# Patient Record
Sex: FEMALE | Race: WHITE | NOT HISPANIC OR LATINO | URBAN - METROPOLITAN AREA
[De-identification: names, ages, dates, MRNs, and addresses within clinical notes are randomized per-mention and may not be internally consistent; named-entity substitution may affect disease eponyms.]

---

## 2023-06-23 ENCOUNTER — INPATIENT (INPATIENT)
Facility: HOSPITAL | Age: 83
LOS: 15 days | Discharge: NURSING, REHAB OR LONG-TERM CARE | DRG: 535 | End: 2023-07-09
Attending: INTERNAL MEDICINE | Admitting: INTERNAL MEDICINE
Payer: MEDICARE

## 2023-06-23 VITALS
SYSTOLIC BLOOD PRESSURE: 145 MMHG | DIASTOLIC BLOOD PRESSURE: 95 MMHG | TEMPERATURE: 98 F | WEIGHT: 104.94 LBS | RESPIRATION RATE: 17 BRPM | OXYGEN SATURATION: 98 % | HEART RATE: 89 BPM

## 2023-06-23 PROCEDURE — 99285 EMERGENCY DEPT VISIT HI MDM: CPT | Mod: 25

## 2023-06-23 PROCEDURE — 29125 APPL SHORT ARM SPLINT STATIC: CPT

## 2023-06-23 PROCEDURE — 72192 CT PELVIS W/O DYE: CPT | Mod: 26,MA

## 2023-06-23 PROCEDURE — 73090 X-RAY EXAM OF FOREARM: CPT | Mod: 26,LT

## 2023-06-23 PROCEDURE — 73110 X-RAY EXAM OF WRIST: CPT | Mod: 26,LT

## 2023-06-23 RX ORDER — ACETAMINOPHEN 500 MG
650 TABLET ORAL ONCE
Refills: 0 | Status: COMPLETED | OUTPATIENT
Start: 2023-06-23 | End: 2023-06-23

## 2023-06-23 RX ADMIN — Medication 650 MILLIGRAM(S): at 22:57

## 2023-06-23 RX ADMIN — Medication 650 MILLIGRAM(S): at 20:57

## 2023-06-23 NOTE — ED ADULT NURSE NOTE - NSFALLHARMRISKINTERV_ED_ALL_ED
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
## 2023-06-24 DIAGNOSIS — E78.00 PURE HYPERCHOLESTEROLEMIA, UNSPECIFIED: ICD-10-CM

## 2023-06-24 DIAGNOSIS — S32.592S OTHER SPECIFIED FRACTURE OF LEFT PUBIS, SEQUELA: ICD-10-CM

## 2023-06-24 DIAGNOSIS — S32.599A OTHER SPECIFIED FRACTURE OF UNSPECIFIED PUBIS, INITIAL ENCOUNTER FOR CLOSED FRACTURE: ICD-10-CM

## 2023-06-24 DIAGNOSIS — I10 ESSENTIAL (PRIMARY) HYPERTENSION: ICD-10-CM

## 2023-06-24 DIAGNOSIS — Z29.9 ENCOUNTER FOR PROPHYLACTIC MEASURES, UNSPECIFIED: ICD-10-CM

## 2023-06-24 DIAGNOSIS — E03.9 HYPOTHYROIDISM, UNSPECIFIED: ICD-10-CM

## 2023-06-24 LAB
ALBUMIN SERPL ELPH-MCNC: 3.1 G/DL — LOW (ref 3.5–5)
ALP SERPL-CCNC: 61 U/L — SIGNIFICANT CHANGE UP (ref 40–120)
ALT FLD-CCNC: 19 U/L DA — SIGNIFICANT CHANGE UP (ref 10–60)
ANION GAP SERPL CALC-SCNC: 8 MMOL/L — SIGNIFICANT CHANGE UP (ref 5–17)
ANION GAP SERPL CALC-SCNC: 8 MMOL/L — SIGNIFICANT CHANGE UP (ref 5–17)
APPEARANCE UR: ABNORMAL
AST SERPL-CCNC: 15 U/L — SIGNIFICANT CHANGE UP (ref 10–40)
BACTERIA # UR AUTO: ABNORMAL /HPF
BASE EXCESS BLDA CALC-SCNC: 4 MMOL/L — HIGH (ref -2–3)
BASOPHILS # BLD AUTO: 0.05 K/UL — SIGNIFICANT CHANGE UP (ref 0–0.2)
BASOPHILS NFR BLD AUTO: 0.4 % — SIGNIFICANT CHANGE UP (ref 0–2)
BILIRUB SERPL-MCNC: 0.5 MG/DL — SIGNIFICANT CHANGE UP (ref 0.2–1.2)
BILIRUB UR-MCNC: NEGATIVE — SIGNIFICANT CHANGE UP
BUN SERPL-MCNC: 20 MG/DL — HIGH (ref 7–18)
BUN SERPL-MCNC: 20 MG/DL — HIGH (ref 7–18)
CALCIUM SERPL-MCNC: 8.6 MG/DL — SIGNIFICANT CHANGE UP (ref 8.4–10.5)
CALCIUM SERPL-MCNC: 8.8 MG/DL — SIGNIFICANT CHANGE UP (ref 8.4–10.5)
CHLORIDE SERPL-SCNC: 102 MMOL/L — SIGNIFICANT CHANGE UP (ref 96–108)
CHLORIDE SERPL-SCNC: 107 MMOL/L — SIGNIFICANT CHANGE UP (ref 96–108)
CK SERPL-CCNC: 71 U/L — SIGNIFICANT CHANGE UP (ref 21–215)
CO2 SERPL-SCNC: 26 MMOL/L — SIGNIFICANT CHANGE UP (ref 22–31)
CO2 SERPL-SCNC: 28 MMOL/L — SIGNIFICANT CHANGE UP (ref 22–31)
COLOR SPEC: SIGNIFICANT CHANGE UP
COMMENT - URINE: SIGNIFICANT CHANGE UP
CREAT SERPL-MCNC: 0.86 MG/DL — SIGNIFICANT CHANGE UP (ref 0.5–1.3)
CREAT SERPL-MCNC: 0.96 MG/DL — SIGNIFICANT CHANGE UP (ref 0.5–1.3)
DIFF PNL FLD: ABNORMAL
EGFR: 59 ML/MIN/1.73M2 — LOW
EGFR: 67 ML/MIN/1.73M2 — SIGNIFICANT CHANGE UP
EOSINOPHIL # BLD AUTO: 0 K/UL — SIGNIFICANT CHANGE UP (ref 0–0.5)
EOSINOPHIL NFR BLD AUTO: 0 % — SIGNIFICANT CHANGE UP (ref 0–6)
EPI CELLS # UR: SIGNIFICANT CHANGE UP /HPF
GLUCOSE SERPL-MCNC: 125 MG/DL — HIGH (ref 70–99)
GLUCOSE SERPL-MCNC: 133 MG/DL — HIGH (ref 70–99)
GLUCOSE UR QL: NEGATIVE — SIGNIFICANT CHANGE UP
HCO3 BLDA-SCNC: 29 MMOL/L — HIGH (ref 21–28)
HCT VFR BLD CALC: 28.7 % — LOW (ref 34.5–45)
HCT VFR BLD CALC: 31.7 % — LOW (ref 34.5–45)
HGB BLD-MCNC: 10.3 G/DL — LOW (ref 11.5–15.5)
HGB BLD-MCNC: 9.4 G/DL — LOW (ref 11.5–15.5)
HOROWITZ INDEX BLDA+IHG-RTO: 21 — SIGNIFICANT CHANGE UP
IMM GRANULOCYTES NFR BLD AUTO: 0.4 % — SIGNIFICANT CHANGE UP (ref 0–0.9)
KETONES UR-MCNC: NEGATIVE — SIGNIFICANT CHANGE UP
LEUKOCYTE ESTERASE UR-ACNC: ABNORMAL
LYMPHOCYTES # BLD AUTO: 0.48 K/UL — LOW (ref 1–3.3)
LYMPHOCYTES # BLD AUTO: 4 % — LOW (ref 13–44)
MAGNESIUM SERPL-MCNC: 1.9 MG/DL — SIGNIFICANT CHANGE UP (ref 1.6–2.6)
MCHC RBC-ENTMCNC: 29.4 PG — SIGNIFICANT CHANGE UP (ref 27–34)
MCHC RBC-ENTMCNC: 29.5 PG — SIGNIFICANT CHANGE UP (ref 27–34)
MCHC RBC-ENTMCNC: 32.5 GM/DL — SIGNIFICANT CHANGE UP (ref 32–36)
MCHC RBC-ENTMCNC: 32.8 GM/DL — SIGNIFICANT CHANGE UP (ref 32–36)
MCV RBC AUTO: 90 FL — SIGNIFICANT CHANGE UP (ref 80–100)
MCV RBC AUTO: 90.6 FL — SIGNIFICANT CHANGE UP (ref 80–100)
MONOCYTES # BLD AUTO: 0.82 K/UL — SIGNIFICANT CHANGE UP (ref 0–0.9)
MONOCYTES NFR BLD AUTO: 6.8 % — SIGNIFICANT CHANGE UP (ref 2–14)
NEUTROPHILS # BLD AUTO: 10.74 K/UL — HIGH (ref 1.8–7.4)
NEUTROPHILS NFR BLD AUTO: 88.4 % — HIGH (ref 43–77)
NITRITE UR-MCNC: POSITIVE
NRBC # BLD: 0 /100 WBCS — SIGNIFICANT CHANGE UP (ref 0–0)
NRBC # BLD: 0 /100 WBCS — SIGNIFICANT CHANGE UP (ref 0–0)
PCO2 BLDA: 45 MMHG — HIGH (ref 32–35)
PH BLDA: 7.42 — SIGNIFICANT CHANGE UP (ref 7.35–7.45)
PH UR: 7 — SIGNIFICANT CHANGE UP (ref 5–8)
PLATELET # BLD AUTO: 147 K/UL — LOW (ref 150–400)
PLATELET # BLD AUTO: 264 K/UL — SIGNIFICANT CHANGE UP (ref 150–400)
PO2 BLDA: 54 MMHG — LOW (ref 83–108)
POTASSIUM SERPL-MCNC: 3.8 MMOL/L — SIGNIFICANT CHANGE UP (ref 3.5–5.3)
POTASSIUM SERPL-MCNC: 4.3 MMOL/L — SIGNIFICANT CHANGE UP (ref 3.5–5.3)
POTASSIUM SERPL-SCNC: 3.8 MMOL/L — SIGNIFICANT CHANGE UP (ref 3.5–5.3)
POTASSIUM SERPL-SCNC: 4.3 MMOL/L — SIGNIFICANT CHANGE UP (ref 3.5–5.3)
PROT SERPL-MCNC: 6 G/DL — SIGNIFICANT CHANGE UP (ref 6–8.3)
PROT UR-MCNC: 15 MG/DL
RBC # BLD: 3.19 M/UL — LOW (ref 3.8–5.2)
RBC # BLD: 3.5 M/UL — LOW (ref 3.8–5.2)
RBC # FLD: 13.2 % — SIGNIFICANT CHANGE UP (ref 10.3–14.5)
RBC # FLD: 13.2 % — SIGNIFICANT CHANGE UP (ref 10.3–14.5)
RBC CASTS # UR COMP ASSIST: SIGNIFICANT CHANGE UP /HPF (ref 0–2)
SAO2 % BLDA: 88 % — SIGNIFICANT CHANGE UP
SODIUM SERPL-SCNC: 138 MMOL/L — SIGNIFICANT CHANGE UP (ref 135–145)
SODIUM SERPL-SCNC: 141 MMOL/L — SIGNIFICANT CHANGE UP (ref 135–145)
SP GR SPEC: 1 — LOW (ref 1.01–1.02)
UROBILINOGEN FLD QL: NEGATIVE — SIGNIFICANT CHANGE UP
WBC # BLD: 12.14 K/UL — HIGH (ref 3.8–10.5)
WBC # BLD: 12.48 K/UL — HIGH (ref 3.8–10.5)
WBC # FLD AUTO: 12.14 K/UL — HIGH (ref 3.8–10.5)
WBC # FLD AUTO: 12.48 K/UL — HIGH (ref 3.8–10.5)
WBC UR QL: SIGNIFICANT CHANGE UP /HPF (ref 0–5)

## 2023-06-24 PROCEDURE — 93010 ELECTROCARDIOGRAM REPORT: CPT

## 2023-06-24 PROCEDURE — 70450 CT HEAD/BRAIN W/O DYE: CPT | Mod: 26,MA

## 2023-06-24 PROCEDURE — 71250 CT THORAX DX C-: CPT | Mod: 26,MA

## 2023-06-24 RX ORDER — LEVOTHYROXINE SODIUM 125 MCG
50 TABLET ORAL
Refills: 0 | Status: DISCONTINUED | OUTPATIENT
Start: 2023-06-24 | End: 2023-07-09

## 2023-06-24 RX ORDER — SENNA PLUS 8.6 MG/1
2 TABLET ORAL AT BEDTIME
Refills: 0 | Status: DISCONTINUED | OUTPATIENT
Start: 2023-06-24 | End: 2023-07-09

## 2023-06-24 RX ORDER — ONDANSETRON 8 MG/1
4 TABLET, FILM COATED ORAL EVERY 8 HOURS
Refills: 0 | Status: DISCONTINUED | OUTPATIENT
Start: 2023-06-24 | End: 2023-07-09

## 2023-06-24 RX ORDER — ACETAMINOPHEN 500 MG
650 TABLET ORAL EVERY 6 HOURS
Refills: 0 | Status: DISCONTINUED | OUTPATIENT
Start: 2023-06-24 | End: 2023-06-24

## 2023-06-24 RX ORDER — AMLODIPINE BESYLATE 2.5 MG/1
10 TABLET ORAL DAILY
Refills: 0 | Status: DISCONTINUED | OUTPATIENT
Start: 2023-06-24 | End: 2023-07-09

## 2023-06-24 RX ORDER — LEVOTHYROXINE SODIUM 125 MCG
100 TABLET ORAL
Refills: 0 | Status: DISCONTINUED | OUTPATIENT
Start: 2023-06-24 | End: 2023-07-09

## 2023-06-24 RX ORDER — ACETAMINOPHEN 500 MG
650 TABLET ORAL EVERY 8 HOURS
Refills: 0 | Status: COMPLETED | OUTPATIENT
Start: 2023-06-24 | End: 2023-06-26

## 2023-06-24 RX ORDER — POLYETHYLENE GLYCOL 3350 17 G/17G
17 POWDER, FOR SOLUTION ORAL DAILY
Refills: 0 | Status: DISCONTINUED | OUTPATIENT
Start: 2023-06-24 | End: 2023-07-09

## 2023-06-24 RX ORDER — ENOXAPARIN SODIUM 100 MG/ML
40 INJECTION SUBCUTANEOUS EVERY 24 HOURS
Refills: 0 | Status: DISCONTINUED | OUTPATIENT
Start: 2023-06-24 | End: 2023-07-04

## 2023-06-24 RX ORDER — LIDOCAINE 4 G/100G
1 CREAM TOPICAL DAILY
Refills: 0 | Status: DISCONTINUED | OUTPATIENT
Start: 2023-06-24 | End: 2023-07-09

## 2023-06-24 RX ORDER — LANOLIN ALCOHOL/MO/W.PET/CERES
3 CREAM (GRAM) TOPICAL AT BEDTIME
Refills: 0 | Status: DISCONTINUED | OUTPATIENT
Start: 2023-06-24 | End: 2023-07-09

## 2023-06-24 RX ORDER — SIMVASTATIN 20 MG/1
10 TABLET, FILM COATED ORAL AT BEDTIME
Refills: 0 | Status: DISCONTINUED | OUTPATIENT
Start: 2023-06-24 | End: 2023-07-09

## 2023-06-24 RX ADMIN — Medication 10 MILLIGRAM(S): at 06:31

## 2023-06-24 RX ADMIN — AMLODIPINE BESYLATE 10 MILLIGRAM(S): 2.5 TABLET ORAL at 06:31

## 2023-06-24 RX ADMIN — Medication 650 MILLIGRAM(S): at 14:18

## 2023-06-24 RX ADMIN — Medication 650 MILLIGRAM(S): at 22:19

## 2023-06-24 RX ADMIN — ENOXAPARIN SODIUM 40 MILLIGRAM(S): 100 INJECTION SUBCUTANEOUS at 06:34

## 2023-06-24 RX ADMIN — Medication 650 MILLIGRAM(S): at 15:15

## 2023-06-24 RX ADMIN — Medication 50 MICROGRAM(S): at 06:31

## 2023-06-24 RX ADMIN — Medication 3 MILLIGRAM(S): at 21:50

## 2023-06-24 RX ADMIN — Medication 650 MILLIGRAM(S): at 21:49

## 2023-06-24 RX ADMIN — SENNA PLUS 2 TABLET(S): 8.6 TABLET ORAL at 21:50

## 2023-06-24 RX ADMIN — SIMVASTATIN 10 MILLIGRAM(S): 20 TABLET, FILM COATED ORAL at 21:50

## 2023-06-24 NOTE — CONSULT NOTE ADULT - ASSESSMENT
Search Terms: Justa Davalos, 1940 Search Date: 06/24/2023 16:22:53 PM    The Drug Utilization Report below displays all of the controlled substance prescriptions, if any, that your patient has filled in the last twelve months. The information displayed on this report is compiled from pharmacy submissions to the Department, and accurately reflects the information as submitted by the pharmacies.    This report was requested by: Frances Gonzales | Reference #: [id number]    There are no results for the search terms that you entered.

## 2023-06-24 NOTE — H&P ADULT - NSHPSOCIALHISTORY_GEN_ALL_CORE
Pt lives in assisted. Ambulates with cane at baseline. Denies hx of smoking, EtOH or recreational drug use.

## 2023-06-24 NOTE — ED PROVIDER NOTE - CARE PLAN
1 Principal Discharge DX:	Pubic ramus fracture   Principal Discharge DX:	Pubic ramus fracture  Secondary Diagnosis:	Hypoxemia
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
## 2023-06-24 NOTE — ED PROVIDER NOTE - PHYSICAL EXAMINATION
Lt wrist-volar med. aspect with hematoma, tenderness to palp., pulses/sensory intact  Lt groin- tenderness to palp.,   LLE-no shortening, ext rotated.

## 2023-06-24 NOTE — CHART NOTE - NSCHARTNOTEFT_GEN_A_CORE
Patient is a 82y old  Female who presents with a chief complaint of pubic rami fracture (24 Jun 2023 05:29)    Vital Signs Last 24 Hrs  T(F): 99.9 (24 Jun 2023 16:02), Max: 100.1 (24 Jun 2023 13:58)  HR: 105 (24 Jun 2023 16:02) (85 - 105)  BP: 114/67 (24 Jun 2023 16:02) (114/67 - 145/95)  RR: 18 (24 Jun 2023 16:02) (17 - 20)  SpO2: 98% (24 Jun 2023 16:02) (90% - 100%)    Parameters below as of 24 Jun 2023 16:02  Patient On (Oxygen Delivery Method): nasal cannula O2 Flow (L/min): 2    PAST MEDICAL & SURGICAL HISTORY:  Hypercholesterolemia, HTN (hypertension), Hypothyroidism, Osteoarthritis, Anemia    LABS:                     10.3   12.48 )-----------( 264      ( 24 Jun 2023 11:00 )             31.7     06-24    138  |  102  |  20<H>  ----------------------------<  125<H>  3.8   |  28  |  0.96    Ca    8.8      24 Jun 2023 11:00  Mg     1.9     06-24    TPro  6.0  /  Alb  3.1<L>  /  TBili  0.5  /  DBili  x   /  AST  15  /  ALT  19  /  AlkPhos  61  06-24    ASSESSMENT AND PLAN:  Patient is a 82y old  Female who presents with a chief complaint of pubic rami fracture s/p mechanical fall    Patient seen and the bedside, awake and alert, denies any pain except left hand in splint - placed by ED thumb spica  ACE wrap adjusted, pt states is better   pending ortho consult   will repeat head CT scan for stability    Care Collaborated Discussed with Consultants/Other Providers [x] YES  [ ] NO

## 2023-06-24 NOTE — ED PROVIDER NOTE - EYES, MLM
Clear bilaterally, pupils equal, round and reactive to light. EOM, Lt eye does not cross lat midline
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
## 2023-06-24 NOTE — ED PROVIDER NOTE - PROGRESS NOTE DETAILS
pt with superior ramus fracture, inability to ambulate.    Also noted O2 sat is low- 89-92%, will get ABG.  Pt denies any SOB pt with hypoxemia on ABG, will get CT chest. pt with superior ramus fracture, inability to ambulate.    Also noted O2 sat is low- 89-92%, will get ABG.  Pt denies any SOB, or any rib/chest pain pt with hypoxemia on ABG, will get CT chest to r/o PTX. S.O. to Dr. DEMAR Lopez to f/u with CT chest, & to admit pt. Lopez: ct chest neg for ptx. admit for PT

## 2023-06-24 NOTE — ED PROVIDER NOTE - NSICDXPASTMEDICALHX_GEN_ALL_CORE_FT
PAST MEDICAL HISTORY:  Anemia     HTN (hypertension)     Hypercholesterolemia     Hypothyroidism     Osteoarthritis

## 2023-06-24 NOTE — PATIENT PROFILE ADULT - FALL HARM RISK - HARM RISK INTERVENTIONS
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
## 2023-06-24 NOTE — H&P ADULT - NSHPPHYSICALEXAM_GEN_ALL_CORE
T(C): 36.6 (06-24-23 @ 05:09), Max: 36.7 (06-24-23 @ 01:02)  HR: 86 (06-24-23 @ 05:09) (85 - 89)  BP: 135/69 (06-24-23 @ 05:09) (120/80 - 145/95)  RR: 17 (06-24-23 @ 05:09) (17 - 18)  SpO2: 99% (06-24-23 @ 05:09) (92% - 100%)    GENERAL: patient appears well, NAD, pleasant  HEAD: normocephalic, atraumatic  EYES: sclera clear, no exudates  ENMT: oropharynx clear without erythema, no exudates, moist mucous membranes  NECK: supple, soft, no thyromegaly noted  LUNGS: clear to auscultation bilaterally, no wheezing, rhonchi or rales appreciated  HEART: S1/S2, regular rate and rhythm, no murmurs noted, no lower extremity edema  GASTROINTESTINAL: abdomen is soft, nondistended, nontender, normoactive bowel sounds, no palpable masses  INTEGUMENT: good skin turgor, no lesions noted  MUSCULOSKELETAL: no clubbing or cyanosis, +L wrist splinted  NEUROLOGIC: AAO x3, CNII-XII intact, no obvious sensorimotor deficits noted
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
## 2023-06-24 NOTE — H&P ADULT - PROBLEM SELECTOR PLAN 1
CT: Comminuted acute left superior ramus fracture. No hip fracture or dislocation  c/w pain control  f/u PT

## 2023-06-25 LAB
ANION GAP SERPL CALC-SCNC: 8 MMOL/L — SIGNIFICANT CHANGE UP (ref 5–17)
BUN SERPL-MCNC: 18 MG/DL — SIGNIFICANT CHANGE UP (ref 7–18)
CALCIUM SERPL-MCNC: 8.8 MG/DL — SIGNIFICANT CHANGE UP (ref 8.4–10.5)
CHLORIDE SERPL-SCNC: 100 MMOL/L — SIGNIFICANT CHANGE UP (ref 96–108)
CO2 SERPL-SCNC: 29 MMOL/L — SIGNIFICANT CHANGE UP (ref 22–31)
CREAT SERPL-MCNC: 0.74 MG/DL — SIGNIFICANT CHANGE UP (ref 0.5–1.3)
EGFR: 81 ML/MIN/1.73M2 — SIGNIFICANT CHANGE UP
GLUCOSE SERPL-MCNC: 109 MG/DL — HIGH (ref 70–99)
HCT VFR BLD CALC: 29.1 % — LOW (ref 34.5–45)
HGB BLD-MCNC: 9.6 G/DL — LOW (ref 11.5–15.5)
MCHC RBC-ENTMCNC: 29.4 PG — SIGNIFICANT CHANGE UP (ref 27–34)
MCHC RBC-ENTMCNC: 33 GM/DL — SIGNIFICANT CHANGE UP (ref 32–36)
MCV RBC AUTO: 89.3 FL — SIGNIFICANT CHANGE UP (ref 80–100)
NRBC # BLD: 0 /100 WBCS — SIGNIFICANT CHANGE UP (ref 0–0)
PLATELET # BLD AUTO: 214 K/UL — SIGNIFICANT CHANGE UP (ref 150–400)
POTASSIUM SERPL-MCNC: 3.5 MMOL/L — SIGNIFICANT CHANGE UP (ref 3.5–5.3)
POTASSIUM SERPL-SCNC: 3.5 MMOL/L — SIGNIFICANT CHANGE UP (ref 3.5–5.3)
RBC # BLD: 3.26 M/UL — LOW (ref 3.8–5.2)
RBC # FLD: 13.3 % — SIGNIFICANT CHANGE UP (ref 10.3–14.5)
SODIUM SERPL-SCNC: 137 MMOL/L — SIGNIFICANT CHANGE UP (ref 135–145)
WBC # BLD: 10.49 K/UL — SIGNIFICANT CHANGE UP (ref 3.8–10.5)
WBC # FLD AUTO: 10.49 K/UL — SIGNIFICANT CHANGE UP (ref 3.8–10.5)

## 2023-06-25 PROCEDURE — 70450 CT HEAD/BRAIN W/O DYE: CPT | Mod: 26

## 2023-06-25 RX ADMIN — ENOXAPARIN SODIUM 40 MILLIGRAM(S): 100 INJECTION SUBCUTANEOUS at 06:03

## 2023-06-25 RX ADMIN — POLYETHYLENE GLYCOL 3350 17 GRAM(S): 17 POWDER, FOR SOLUTION ORAL at 12:59

## 2023-06-25 RX ADMIN — Medication 650 MILLIGRAM(S): at 06:33

## 2023-06-25 RX ADMIN — SIMVASTATIN 10 MILLIGRAM(S): 20 TABLET, FILM COATED ORAL at 22:22

## 2023-06-25 RX ADMIN — Medication 650 MILLIGRAM(S): at 14:54

## 2023-06-25 RX ADMIN — Medication 10 MILLIGRAM(S): at 06:03

## 2023-06-25 RX ADMIN — SENNA PLUS 2 TABLET(S): 8.6 TABLET ORAL at 22:22

## 2023-06-25 RX ADMIN — Medication 100 MICROGRAM(S): at 06:03

## 2023-06-25 RX ADMIN — Medication 650 MILLIGRAM(S): at 14:11

## 2023-06-25 RX ADMIN — Medication 650 MILLIGRAM(S): at 22:22

## 2023-06-25 RX ADMIN — AMLODIPINE BESYLATE 10 MILLIGRAM(S): 2.5 TABLET ORAL at 06:03

## 2023-06-25 RX ADMIN — LIDOCAINE 1 PATCH: 4 CREAM TOPICAL at 12:58

## 2023-06-25 RX ADMIN — Medication 650 MILLIGRAM(S): at 22:57

## 2023-06-25 RX ADMIN — Medication 650 MILLIGRAM(S): at 06:03

## 2023-06-25 NOTE — DISCHARGE NOTE PROVIDER - NPI NUMBER (FOR SYSADMIN USE ONLY) :
[[id number]] [[id number]] [[id number]],[[id number]]

## 2023-06-25 NOTE — DISCHARGE NOTE PROVIDER - HOSPITAL COURSE
82 year old female, from [facility], with PMHx HTN, HLD, hypothyroidism presented to ED post mechanical fall and complain of left arm and left leg pain. Patient  stated that she was walking out of the medicine room in the facility when she was accidentally struck by a serving cart. Patient stated that she was not sure if she hit her head but denied LOC. Denied any other complaint including fever/chills, headache, dizziness, chest pain, palpitations cough, SOB, n/v/d/c, dysuria or leg swelling  Patient underwent CT imaging and was found with pubic ramus fracture and left hand sprain. Ortho was consulted with no surgical intervention recommended.   PT recommended SARAN        Discharge planning as per multidisciplinary team 82 year old female, from [facility], with PMHx HTN, HLD, hypothyroidism presents with L arm and leg pain. Patient underwent CT imaging and was found with pubic ramus fracture and left hand sprain. Ortho was consulted with no surgical intervention recommended. Pt states that she was walking out of the medicine room in the facility when she was accidentally struck by a serving cart. Found to have cystic lesion on CTH.   There was concern for possible maliignany and patient had a CT scan that showed cystic lesion on the pancrease and inflammation in the colon. MRI of the abdomen showed XXXXXX. Neurology and Hemo Onc was consulted and recommeneded XXXXXX     82 year old female, from [facility], with PMHx HTN, HLD, hypothyroidism, cervical cancer presents with L arm and leg pain. Patient underwent CT imaging and was found with pubic ramus fracture and left hand sprain. Ortho was consulted with no surgical intervention recommended. Pt states that she was walking out of the medicine room in the facility when she was accidentally struck by a serving cart.     * Ortho consulted: conservative management with pain medications and physical therapy. Pt didnot require any opioids in-patient. Orthopedic surgeon, Dr. Galicia at:  [phone] OP follow up.   *CTH: Found to have cystic lesion on CTH. 2.6 x 1.9 cm hypodensity focus in the anterior right temporal lobe and a 1.2 x 1 cm hypoattenuating focus in the lateral aspect of the left lentiform nucleus versus left subinsular   region are suspicious for small acute or subacute infarcts.  MRI brain showed Similar-appearing 9 x 8 mm T1 and FLAIR hypointense, T2 hyperintense, well-defined lesion in the right anterior temporal lobe with similar surrounding vasogenic edema. The lesion does not enhance, nor does it demonstrate restricted diffusion. Neuro consulted likely metastasize lesion , unknown primary. Follow up outpatient with neuro oncologist for further care.     * CT scan that showed cystic lesion on the pancrease and inflammation in the colon. MRI of the abdomen showed 4.0 cm cyst seen on prior CT is likely arising from the left kidney rather than the pancreas. There is a 1.3 cm cyst in the inferior pancreatic body without concerning features. 7 mm segmental dilatation of the main pancreatic duct in the distal body and tail may represent sequela of chronic pancreatitis or main duct  IPMN. GI and Hemo Onc consulted. Left kidney cyst likely benign. For pancreatic cyst as per GI outpatient follow up but features are not neoplastic.     *Anemia : Iron saturation 12%, iron total 33. Ferritin 157. Vitamin 298. elevated homocysteine. Will be discharged on vit  B12 1000mcg once daily. Hgb remain stable in-pt. Hgb in 9s and on discharge was in 8s . No s.s of active bleeding. Pt had EGD/[place] 7/7: EGD showed The esophagus was very inflamed and ulcerated throughout. Yeast was present as well. 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 Also external impingement of distal esophagus was apparent. Multiple biopsies were taken, A large 10-11 cm Hiatal Hernia was present. OP follow up for biopsy result. PPI BID , Carafate 1gm qid and Iron tabs BID. Nystatin swish and swallow .  EARLENE showed weak IgG kappa band, likely MGUS, monitor for now outpt f/u after d/c . thrombocytosis -- acute, reactive, monitor for now    Colonoscopy showed Irregular mucosa in rectum possibly radiation injury biopsied. Polyp (5 mm) in the descending colon. OP follow up with GI.    PT consulted recommended SARAN.Pt is medically stabilized and ready to be discharged home. Case discussed with attending.        82 year old female, from [facility], with PMHx HTN, HLD, hypothyroidism, cervical cancer presents with L arm and leg pain. Patient underwent CT imaging and was found with pubic ramus fracture and left hand sprain. Ortho was consulted with no surgical intervention recommended. Pt states that she was walking out of the medicine room in the facility when she was accidentally struck by a serving cart.     * Ortho consulted: conservative management with pain medications and physical therapy. Pt didnot require any opioids in-patient. Orthopedic surgeon, Dr. Galicia at:  [phone] OP follow up.   *CTH: Found to have cystic lesion on CTH. 2.6 x 1.9 cm hypodensity focus in the anterior right temporal lobe and a 1.2 x 1 cm hypoattenuating focus in the lateral aspect of the left lentiform nucleus versus left subinsular   region are suspicious for small acute or subacute infarcts.  MRI brain showed Similar-appearing 9 x 8 mm T1 and FLAIR hypointense, T2 hyperintense, well-defined lesion in the right anterior temporal lobe with similar surrounding vasogenic edema. The lesion does not enhance, nor does it demonstrate restricted diffusion. Neuro consulted likely metastasize lesion , unknown primary. Follow up outpatient with neuro oncologist for further care.     * CT scan that showed cystic lesion on the pancrease and inflammation in the colon. MRI of the abdomen showed 4.0 cm cyst seen on prior CT is likely arising from the left kidney rather than the pancreas. There is a 1.3 cm cyst in the inferior pancreatic body without concerning features. 7 mm segmental dilatation of the main pancreatic duct in the distal body and tail may represent sequela of chronic pancreatitis or main duct  IPMN. GI and Hemo Onc consulted. Left kidney cyst likely benign. For pancreatic cyst as per GI outpatient follow up but features are not neoplastic.     *Anemia : Iron saturation 12%, iron total 33. Ferritin 157. Vitamin 298. elevated homocysteine. Will be discharged on vit  B12 1000mcg once daily. Hgb remain stable in-pt. Hgb in 9s and on discharge was in 8s . No s.s of active bleeding. Pt had EGD/[place] 7/7: EGD showed The esophagus was very inflamed and ulcerated throughout. Yeast was present as well. 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 Also external impingement of distal esophagus was apparent. Multiple biopsies were taken, A large 10-11 cm Hiatal Hernia was present. OP follow up for biopsy result. PPI BID , Carafate 1gm qid and Iron tabs BID. Nystatin swish and swallow . clear liquid diet 48hours. then regular.   EARLENE showed weak IgG kappa band, likely MGUS, monitor for now outpt f/u after d/c . thrombocytosis -- acute, reactive, monitor for now    Colonoscopy showed Irregular mucosa in rectum possibly radiation injury biopsied. Polyp (5 mm) in the descending colon. OP follow up with GI.    PT consulted recommended SARAN.Pt is medically stabilized and ready to be discharged home. Case discussed with attending.

## 2023-06-25 NOTE — CHART NOTE - NSCHARTNOTEFT_GEN_A_CORE
Patient is a 82y old  Female who presents with a chief complaint of pubic rami fracture post mechanical fall (25 Jun 2023 14:48)  CT head repeated as initial head CT had motion artifact  results of repeated CT     IMPRESSION:  Poor visualization of gray matter in the right lentiform nucleus is   suspicious for an acute infarct.  A 2.6 x 1.9 cm hypodensity focus in the   anterior right temporal lobe and a 1.2 x 1 cm hypoattenuating focus in   the lateral aspect of the left lentiform nucleus versus left subinsular   region are suspicious for small acute or subacute infarcts. The   possibility of vasogenic edema is not completely excluded.  Follow-up   brain MRI is recommended for further evaluation.    No CT evidence of acute intracranial hemorrhage, subdural collection or   calvarial fracture.    Discussed with attending  will transfer patient to telemetry   neurology consult r/o stroke       Care Collaborated Discussed with Consultants/Other Providers [x] YES  [ ] NO

## 2023-06-25 NOTE — DISCHARGE NOTE PROVIDER - NSDCFUADDINST_GEN_ALL_CORE_FT
Daily Physical Therapy - Weight bearing as tolerated to left lower extremity with appropriate assistive device/rolling walker   Weight bearing as tolerated to left wrist   Fall precautions  Patient is to follow up with Orthopedic surgeon, Dr. Galicia at:  [phone]

## 2023-06-25 NOTE — DISCHARGE NOTE PROVIDER - CARE PROVIDERS DIRECT ADDRESSES
,DirectAddress_Unknown ,DirectAddress_Unknown,DirectAddress_Unknown,DirectAddress_Unknown,DirectAddress_Unknown,DirectAddress_Unknown ,DirectAddress_Unknown,DirectAddress_Unknown,DirectAddress_Unknown,DirectAddress_Unknown,DirectAddress_Unknown,DirectAddress_Unknown

## 2023-06-25 NOTE — PROGRESS NOTE ADULT - SUBJECTIVE AND OBJECTIVE BOX
PATIENT SEEN AND EXAMINED ON :- 6/25/23  DATE OF SERVICE:    6/25/23         Interim events noted,Labs ,Radiological studies and Cardiology tests reviewed .    MR#[id number]  PATIENT NAME:-[facility] COURSE: HPI:  82 year old female, from [facility], with PMHx HTN, HLD, hypothyroidism presents with L arm and leg pain. Pt states that she was walking out of the medicine room in the facility when she was accidentally struck by a serving cart. Pt states that she is not sure if she hit her head but denies LOC. Denies any other complaint including fever/chills, headache, dizziness, chest pain, palpitations cough, SOB, n/v/d/c, dysuria or leg swelling. NKDA. (24 Jun 2023 05:29)      INTERIM EVENTS:Patient seen at bedside ,interim events noted.      PMH -reviewed admission note, no change since admission  HEART FAILURE: Acute[ ]Chronic[ ] Systolic[ ] Diastolic[ ] Combined Systolic and Diastolic[ ]  CAD[ ] CABG[ ] PCI[ ]  DEVICES[ ] PPM[ ] ICD[ ] ILR[ ]  ATRIAL FIBRILLATION[ ] Paroxysmal[ ] Permanent[ ] CHADS2-[  ]  ROXI[ ] CKD1[ ] CKD2[ ] CKD3[ ] CKD4[ ] ESRD[ ]  COPD[ ] HTN[ ]   DM[ ] Type1[ ] Type 2[ ]   CVA[ ] Paresis[ ]    AMBULATION: Assisted[ ] Cane/walker[ ] Independent[ ]    MEDICATIONS  (STANDING):  acetaminophen     Tablet .. 650 milliGRAM(s) Oral every 8 hours  amLODIPine   Tablet 10 milliGRAM(s) Oral daily  enalapril 10 milliGRAM(s) Oral daily  enoxaparin Injectable 40 milliGRAM(s) SubCutaneous every 24 hours  levothyroxine 100 MICROGram(s) Oral <User Schedule>  levothyroxine 50 MICROGram(s) Oral <User Schedule>  lidocaine   4% Patch 1 Patch Transdermal daily  polyethylene glycol 3350 17 Gram(s) Oral daily  senna 2 Tablet(s) Oral at bedtime  simvastatin 10 milliGRAM(s) Oral at bedtime    MEDICATIONS  (PRN):  melatonin 3 milliGRAM(s) Oral at bedtime PRN Insomnia  ondansetron Injectable 4 milliGRAM(s) IV Push every 8 hours PRN Nausea and/or Vomiting            REVIEW OF SYSTEMS:  Constitutional: [ ] fever, [ ]weight loss,  [ ]fatigue [ ]weight gain  Eyes: [ ] visual changes  Respiratory: [ ]shortness of breath;  [ ] cough, [ ]wheezing, [ ]chills, [ ]hemoptysis  Cardiovascular: [ ] chest pain, [ ]palpitations, [ ]dizziness,  [ ]leg swelling[ ]orthopnea[ ]PND  Gastrointestinal: [ ] abdominal pain, [ ]nausea, [ ]vomiting,  [ ]diarrhea [ ]Constipation [ ]Melena  Genitourinary: [ ] dysuria, [ ] hematuria [ ]Love  Neurologic: [ ] headaches [ ] tremors[ ]weakness [ ]Paralysis Right[ ] Left[ ]  Skin: [ ] itching, [ ]burning, [ ] rashes  Endocrine: [ ] heat or cold intolerance  Musculoskeletal: [ ] joint pain or swelling; [ ] muscle, back, or extremity pain  Psychiatric: [ ] depression, [ ]anxiety, [ ]mood swings, or [ ]difficulty sleeping  Hematologic: [ ] easy bruising, [ ] bleeding gums    [ ] All remaining systems negative except as per above.   [ ]Unable to obtain.  [x] No change in ROS since admission      Vital Signs Last 24 Hrs  T(C): 36.6 (25 Jun 2023 05:00), Max: 37.8 (24 Jun 2023 13:58)  T(F): 97.9 (25 Jun 2023 05:00), Max: 100.1 (24 Jun 2023 13:58)  HR: 90 (25 Jun 2023 11:05) (87 - 105)  BP: 93/58 (25 Jun 2023 11:05) (93/58 - 136/67)  BP(mean): --  RR: 18 (25 Jun 2023 05:00) (18 - 18)  SpO2: 95% (25 Jun 2023 11:05) (95% - 98%)    Parameters below as of 25 Jun 2023 11:05  Patient On (Oxygen Delivery Method): nasal cannula  O2 Flow (L/min): 1    I&O's Summary      PHYSICAL EXAM:  General: No acute distress BMI-  HEENT: EOMI, PERRL  Neck: Supple, [ ] JVD  Lungs: Equal air entry bilaterally; [ ] rales [ ] wheezing [ ] rhonchi  Heart: Regular rate and rhythm; [x ] murmur   2/6 [ x] systolic [ ] diastolic [ ] radiation[ ] rubs [ ]  gallops  Abdomen: Nontender, bowel sounds present  Extremities: No clubbing, cyanosis, [ ] edema [ ]Pulses  equal and intact  Nervous system:  Alert & Oriented X3, no focal deficits  Psychiatric: Normal affect  Skin: No rashes or lesions    LABS:  06-25    137  |  100  |  18  ----------------------------<  109<H>  3.5   |  29  |  0.74    Ca    8.8      25 Jun 2023 06:59  Mg     1.9     06-24    TPro  6.0  /  Alb  3.1<L>  /  TBili  0.5  /  DBili  x   /  AST  15  /  ALT  19  /  AlkPhos  61  06-24    Creatinine Trend: 0.74<--, 0.96<--, 0.86<--                        9.6    10.49 )-----------( 214      ( 25 Jun 2023 06:59 )             29.1

## 2023-06-25 NOTE — CONSULT NOTE ADULT - SUBJECTIVE AND OBJECTIVE BOX
ELLIOT VENTURA  [id number]    Orthopedic Consult:    Orthopedic Diagnosis:      ELLIOT [id number]  HPI:  82 year old female, from [facility], with PMHx HTN, HLD, hypothyroidism presents with L arm and leg pain. Pt states that she was walking out of the medicine room in the facility when she was accidentally struck by a serving cart. Pt states that she is not sure if she hit her head but denies LOC. Denies any other complaint including fever/chills, headache, dizziness, chest pain, palpitations cough, SOB, n/v/d/c, dysuria or leg swelling. NKDA. (24 Jun 2023 05:29)        Ambulation:     PAST MEDICAL & SURGICAL HISTORY:  Hypercholesterolemia      HTN (hypertension)      Hypothyroidism      Osteoarthritis      Anemia      No significant past surgical history          FAMILY HISTORY:  No pertinent family history in first degree relatives        Social History:  Pt lives in [facility]. Ambulates with cane at baseline. Denies hx of smoking, EtOH or recreational drug use. (24 Jun 2023 05:29)      Allergies    No Known Allergies    Intolerances        Home Medications:  AMLODIPINE 10MG TAB:  (24 Jun 2023 05:40)  ENALAPRIL 10MG TAB:  (24 Jun 2023 05:40)  LEVOTHYROXIN 50MCG TAB:  (24 Jun 2023 05:40)  ONDANSETRON 4MG TAB:  (24 Jun 2023 05:40)  SIMVASTATIN 10MG TAB:  (24 Jun 2023 05:40)      Vital Signs Last 24 Hrs  T(C): 36.6 (25 Jun 2023 05:00), Max: 37.8 (24 Jun 2023 13:58)  T(F): 97.9 (25 Jun 2023 05:00), Max: 100.1 (24 Jun 2023 13:58)  HR: 87 (25 Jun 2023 05:00) (87 - 105)  BP: 136/67 (25 Jun 2023 05:00) (114/67 - 136/67)  BP(mean): --  RR: 18 (25 Jun 2023 05:00) (18 - 18)  SpO2: 96% (25 Jun 2023 05:00) (95% - 98%)    Parameters below as of 25 Jun 2023 05:00  Patient On (Oxygen Delivery Method): nasal cannula  O2 Flow (L/min): 2    I&O's Summary      Physical Exam:  General: Alert and oriented, NAD, resting comfortably  Musculoskeletal:  Left hip: Skin warm and pink. No obvious leg length discrepancy. TTP noted in the buttock/groin region of left hip. Negative heel strike. Hip ROM limited 2/2 pain  Left wrist: Splint in place - taken down. Skin intact. No open wounds. No snuffbox tenderness to palpation. FAROM of all digits with no pain.   Lower extremities: Calves soft and NTTP b/l. SILT. NVI. (+)EHL/FHL/ADF/APF intact bilaterally    Labs:                        9.6    10.49 )-----------( 214      ( 25 Jun 2023 06:59 )             29.1     06-25    137  |  100  |  18  ----------------------------<  109<H>  3.5   |  29  |  0.74    Ca    8.8      25 Jun 2023 06:59  Mg     1.9     06-24    TPro  6.0  /  Alb  3.1<L>  /  TBili  0.5  /  DBili  x   /  AST  15  /  ALT  19  /  AlkPhos  61  06-24        Radiology:  PROCEDURE DATE:  06/23/2023          INTERPRETATION:  HISTORY: Left hip and femur pain status post fall.  None available.  COMPARISON:    PROCEDURE:  CT of the Pelvis was performed.  Sagittal and coronal reformats were performed.    FINDINGS:  BLADDER: Partially distended and suboptimally assessed.  REPRODUCTIVE ORGANS: Uterus and adnexa are within normal limits.    BOWEL: No evidence of bowel obstruction. Moderate fecal burden in the   colon. Appendix is not discretely visualized.  PERITONEUM: No ascites.  VESSELS: Within normal limits.  RETROPERITONEUM/LYMPH NODES: No lymphadenopathy.  ABDOMINAL WALL: Within normal limits.  BONES: Generalized osteopenia. Acute comminuted fracture superior left   pubic ramus with fracture line extending towards the symphyseal joint.   The left inferior ramus is grossly intact although the left cortex   appears somewhat angulated. No lucent fracture line. The femurs are   intact. No hip dislocation. The sacrum appears intact. Grade 2   anterolisthesis of L5 on S1. Degenerative changes.    IMPRESSION:  Comminuted acute left superior ramus fracture. No hip fracture or   dislocation.    < end of copied text >    < from: Xray Wrist 3 Views, Left (06.23.23 @ 21:36) >      Radiographs of the left wrist     CPT 43333    CLINICAL INFORMATION: Left wrist pain of unknown severity or duration.    TECHNIQUE:  Frontal, oblique and lateral views of the wrist were obtained.    FINDINGS:   No prior examinations are available for review.    The osseous structures of the wrist are intact, without fracture or   malalignment.   Intercarpal spacing and alignment are preserved.  The   common carpal metacarpal and first carpal metacarpal joints appear intact.    No soft tissue abnormalities are seen.  No radiopaque foreign body is   seen.    IMPRESSION:   Unremarkable radiographs of the left wrist.    < end of copied text >      Impression: Patient is a 82yFemale with Left superior pubic ramus fracture   Plan:  - Recommendation: [ XX] Conservative management [  ] Surgical intervention  - Pain management  - DVT prophylaxis   - Daily PT - WBAT To LLE with appropriate assistive device   -  Splint removed from left wrist   - WBAT to Left wrist   - Patient is orthopedically stable for discharge  - Patient is to follow up with Orthopedic surgeon, Dr. Galicia at:  [phone]

## 2023-06-25 NOTE — DISCHARGE NOTE PROVIDER - PROVIDER TOKENS
PROVIDER:[TOKEN:[[id number]],FOLLOWUP:[1 month]] PROVIDER:[TOKEN:[[id number]],FOLLOWUP:[1 month]],PROVIDER:[TOKEN:[[id number]]],PROVIDER:[TOKEN:[[id number]]],PROVIDER:[TOKEN:[[id number]]],PROVIDER:[TOKEN:[[id number]]] PROVIDER:[TOKEN:[[id number]],FOLLOWUP:[1 month]],PROVIDER:[TOKEN:[[id number]]],PROVIDER:[TOKEN:[[id number]]],PROVIDER:[TOKEN:[[id number]]],PROVIDER:[TOKEN:[[id number]]],PROVIDER:[TOKEN:[[id number]]]

## 2023-06-25 NOTE — DISCHARGE NOTE PROVIDER - DETAILS OF MALNUTRITION DIAGNOSIS/DIAGNOSES
This patient has been assessed with a concern for Malnutrition and was treated during this hospitalization for the following Nutrition diagnosis/diagnoses:     -  06/29/2023: Severe protein-calorie malnutrition   -  06/29/2023: Underweight (BMI < 19)

## 2023-06-25 NOTE — DISCHARGE NOTE PROVIDER - CARE PROVIDER_API CALL
Wenyd Galicia  Orthopaedic Surgery  [address] [place] [address], [address]  [place] [address]  Phone: [phone]  Fax: [phone]  Follow Up Time: 1 month   Wendy Galicia  Orthopaedic Surgery  [address], [address]  [place] [address]  Phone: [phone]  Fax: [phone]  Follow Up Time: 1 month    Chalino Dubois  Gastroenterology  [address] [facility]nd [place] [address]  Phone: [phone]  Fax: [phone]  Follow Up Time:     Willie Ledbetter  Medical Oncology  [address] [place] [address]  Phone: [phone]  Fax: [phone]  Follow Up Time:     Hanna Blake  Neurology  [address] [place] [address]  Phone: [phone]  Fax: [phone]  Follow Up Time:     Deo Merida  Neurology  [address] [place] [address]  Phone: [phone]  Fax: [phone]  Follow Up Time:    Wendy Galicia  Orthopaedic Surgery  [address]  [place] [address]  Phone: [phone]  Fax: [phone]  Follow Up Time: 1 month    Chalino Dubois  Gastroenterology  [address] [facility], [address]nd [place] [address]  Phone: [phone]  Fax: [phone]  Follow Up Time:     Willie Ledbetter  Medical Oncology  [address] [place] [address]  Phone: [phone]  Fax: [phone]  Follow Up Time:     Hanna Blake  Neurology  [address] [place] [address]  Phone: [phone]  Fax: [phone]  Follow Up Time:     Deo Merida  Neurology  [address] [place] [address]  Phone: [phone]  Fax: [phone]  Follow Up Time:     Iker Kirkpatrick  Cardiology  [address] [place] [address]  Phone: [phone]  Fax: [phone]  Follow Up Time:

## 2023-06-25 NOTE — DISCHARGE NOTE PROVIDER - NSDCMRMEDTOKEN_GEN_ALL_CORE_FT
AMLODIPINE 10MG TAB:   ENALAPRIL 10MG TAB:   LEVOTHYROXIN 50MCG TAB:   ONDANSETRON 4MG TAB:   SIMVASTATIN 10MG TAB:    AMLODIPINE 10MG TAB: 1 tab(s) orally once a day  aspirin 81 mg oral tablet, chewable: 1 tab(s) orally once a day  cyanocobalamin 1000 mcg oral tablet: 1 tab(s) orally once a day  ENALAPRIL 10MG TAB: 1 tab(s) orally once a day  ferrous sulfate 325 mg (65 mg elemental iron) oral tablet: 1 tab(s) orally 2 times a day  levothyroxine 50 mcg (0.05 mg) oral tablet: 1 tab(s) orally once a day in empty stomach one hour before breakfast. Donot take with vitamins or iron pills  nystatin 100,000 units/mL oral suspension: 5 milliliter(s) orally 4 times a day  ONDANSETRON 4MG TAB: 1 tab(s) orally once a day as needed for  nausea  pantoprazole 40 mg oral delayed release tablet: 1 tab(s) orally every 12 hours  senna leaf extract oral tablet: 2 tab(s) orally once a day (at bedtime)  SIMVASTATIN 10MG TAB:   sucralfate 1 g oral tablet: 1 tab(s) orally every 6 hours   AMLODIPINE 10MG TAB: 1 tab(s) orally once a day  aspirin 81 mg oral tablet, chewable: 1 tab(s) orally once a day  cyanocobalamin 1000 mcg oral tablet: 1 tab(s) orally once a day  ENALAPRIL 10MG TAB: 1 tab(s) orally once a day  ferrous sulfate 325 mg (65 mg elemental iron) oral tablet: 1 tab(s) orally 2 times a day  levothyroxine 50 mcg (0.05 mg) oral tablet: 1 tab(s) orally once a day Take in empty stomach one hour before breakfast. Do not take with vitamins or iron pills.   TAKE 2 TABS ON SUNDAY  nystatin 100,000 units/mL oral suspension: 5 milliliter(s) orally 4 times a day  ONDANSETRON 4MG TAB: 1 tab(s) orally once a day as needed for  nausea  pantoprazole 40 mg oral delayed release tablet: 1 tab(s) orally every 12 hours  senna leaf extract oral tablet: 2 tab(s) orally once a day (at bedtime)  SIMVASTATIN 10MG TAB:   sucralfate 1 g oral tablet: 1 tab(s) orally every 6 hours  Tylenol Extended Release 650 mg oral tablet, extended release: 1 tab(s) orally every 6 hours as needed for  mild pain

## 2023-06-25 NOTE — DISCHARGE NOTE PROVIDER - NSDCCPCAREPLAN_GEN_ALL_CORE_FT
PRINCIPAL DISCHARGE DIAGNOSIS  Diagnosis: Pubic ramus fracture  Assessment and Plan of Treatment: Per orthopedic recommendations conservative management with pain menagement  Daily PT and WBAT to left lower extremity with appropriate assistive device   Fall precautions        SECONDARY DISCHARGE DIAGNOSES  Diagnosis: HTN (hypertension)  Assessment and Plan of Treatment: Continue taking your regimen as prescribed  have a low salt diet    Diagnosis: Hypothyroidism  Assessment and Plan of Treatment: Continue taking your regimen as prescribed    Diagnosis: Hypercholesterolemia  Assessment and Plan of Treatment:     Diagnosis: Hypoxemia  Assessment and Plan of Treatment: Continue taking your regimen as prescribed     PRINCIPAL DISCHARGE DIAGNOSIS  Diagnosis: Pubic ramus fracture  Assessment and Plan of Treatment: You came for fall. Per orthopedic recommendations conservative management with pain menagement  Daily PT and WBAT to left lower extremity with appropriate assistive device . Orthopedic surgeon, Dr. Galicia at:  [phone] OP follow up.   Fall precautions        SECONDARY DISCHARGE DIAGNOSES  Diagnosis: Brain lesion  Assessment and Plan of Treatment: You had a CT head on admission and Found to have cystic lesion on CTH. 2.6 x 1.9 cm hypodensity focus in the anterior right temporal lobe and a 1.2 x 1 cm hypoattenuating focus in the lateral aspect of the left lentiform nucleus versus left subinsular region are suspicious for small acute or subacute infarcts.  MRI brain showed Similar-appearing 9 x 8 mm T1 and FLAIR hypointense, T2 hyperintense, well-defined lesion in the right anterior temporal lobe with similar surrounding vasogenic edema. The lesion does not enhance, nor does it demonstrate restricted diffusion.   Neuro consulted likely spread of cancer from other part of the body.  unknown primary source  . Follow up outpatient with neuro oncologist for further care. Deo Merida MD Address: [address] [place] [address]  Phone: [phone]  OR Hanna Blake MD  Neurologist  [place], [place] · [phone]    Diagnosis: Anemia  Assessment and Plan of Treatment: You came for fall. Hgb on admission 9. On discharge it was stable in 8. Iron saturation 12%, iron total 33. Ferritin 157. Vitamin 298. elevated homocysteine. Will be discharged on vit  B12 1000mcg once daily. Hgb remain stable in-pt. Hgb in 9s and on discharge was in 8s . No signs of active bleeding. Pt had EGD/[place] 7/7: EGD showed The esophagus was very inflamed and ulcerated throughout. Yeast was present as well. 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 Also external impingement of distal esophagus was apparent. Multiple biopsies were taken, A large 10-11 cm Hiatal Hernia was present. Outpatient follow up for biopsy result. PPI BID , Carafate 1gm qid for one month atleastd and follow up GI for further care and Iron tabs BID. Nystatin swish and swallow for 14 days.    Diagnosis: Pancreatic cyst  Assessment and Plan of Treatment: CT scan that showed cystic lesion on the pancrease and inflammation in the colon. MRI of the abdomen showed 4.0 cm cyst seen on prior CT is likely arising from the left kidney rather than the pancreas. There is a 1.3 cm cyst in the inferior pancreatic body without concerning features. 7 mm segmental dilatation of the main pancreatic duct in the distal body and tail may represent sequela of chronic pancreatitis or main duct  IPMN. GI and Hemo Onc consulted. Left kidney cyst likely benign. less likely malignancy. For pancreatic cyst as per GI outpatient follow up Dr Chalino Dubois    Diagnosis: Reactive thrombocytosis  Assessment and Plan of Treatment: You had serum electrophoresis that showed weak IgG kappa band, likely MGUS, monitor for now outpt f/u with heme/onc . thrombocytosis -- acute, reactive,outpatient follow up with Dr Ledbetter Hematologist /Oncologist    Diagnosis: HTN (hypertension)  Assessment and Plan of Treatment: Continue taking your regimen as prescribed  have a low salt diet    Diagnosis: Hypothyroidism  Assessment and Plan of Treatment: Continue taking your regimen as prescribed    Diagnosis: Hypercholesterolemia  Assessment and Plan of Treatment: continue to take your medication     PRINCIPAL DISCHARGE DIAGNOSIS  Diagnosis: Pubic ramus fracture  Assessment and Plan of Treatment: You came for fall. Per orthopedic recommendations conservative management with pain menagement  Daily PT and WBAT to left lower extremity with appropriate assistive device . Orthopedic surgeon, Dr. Galicia at:  [phone] OP follow up.   Fall precautions        SECONDARY DISCHARGE DIAGNOSES  Diagnosis: Brain lesion  Assessment and Plan of Treatment: You had a CT head on admission and Found to have cystic lesion on CTH. 2.6 x 1.9 cm hypodensity focus in the anterior right temporal lobe and a 1.2 x 1 cm hypoattenuating focus in the lateral aspect of the left lentiform nucleus versus left subinsular region are suspicious for small acute or subacute infarcts.  MRI brain showed Similar-appearing 9 x 8 mm T1 and FLAIR hypointense, T2 hyperintense, well-defined lesion in the right anterior temporal lobe with similar surrounding vasogenic edema. The lesion does not enhance, nor does it demonstrate restricted diffusion.   Neuro consulted likely spread of cancer from other part of the body.  unknown primary source  . Follow up outpatient with neuro oncologist for further care. Deo Merida MD Address: [address] [place] [address]  Phone: [phone]  OR Hanna Blake MD  Neurologist  [place], [place] · [phone]    Diagnosis: Anemia  Assessment and Plan of Treatment: You came for fall. Hgb on admission 9. On discharge it was stable in 8. Iron saturation 12%, iron total 33. Ferritin 157. Vitamin 298. elevated homocysteine. Will be discharged on vit  B12 1000mcg once daily. Hgb remain stable in-pt. Hgb in 9s and on discharge was in 8s . No signs of active bleeding. Pt had EGD/[place] 7/7: EGD showed The esophagus was very inflamed and ulcerated throughout. Yeast was present as well. 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 Also external impingement of distal esophagus was apparent. Multiple biopsies were taken, A large 10-11 cm Hiatal Hernia was present. Outpatient follow up for biopsy result. PPI BID , Carafate 1gm qid for one month atleast and follow up GI for further care and Iron tabs BID. Nystatin swish and swallow for 14 days.  Repeat CBC within 2 weeks to make sure hemoglobin is stable    Diagnosis: Pancreatic cyst  Assessment and Plan of Treatment: CT scan that showed cystic lesion on the pancrease and inflammation in the colon. MRI of the abdomen showed 4.0 cm cyst seen on prior CT is likely arising from the left kidney rather than the pancreas. There is a 1.3 cm cyst in the inferior pancreatic body without concerning features. 7 mm segmental dilatation of the main pancreatic duct in the distal body and tail may represent sequela of chronic pancreatitis or main duct  IPMN. GI and Hemo Onc consulted. Left kidney cyst likely benign. less likely malignancy. For pancreatic cyst as per GI outpatient follow up Dr Chalino Dubois    Diagnosis: Reactive thrombocytosis  Assessment and Plan of Treatment: You had serum electrophoresis that showed weak IgG kappa band, likely MGUS, monitor for now outpt f/u with heme/onc . thrombocytosis -- acute, reactive,outpatient follow up with Dr Ledbetter Hematologist /Oncologist    Diagnosis: HTN (hypertension)  Assessment and Plan of Treatment: Continue taking your regimen as prescribed  have a low salt diet    Diagnosis: Hypothyroidism  Assessment and Plan of Treatment: Continue taking your regimen as prescribed    Diagnosis: Hypercholesterolemia  Assessment and Plan of Treatment: continue to take your medication
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
## 2023-06-25 NOTE — PROGRESS NOTE ADULT - PROBLEM SELECTOR PLAN 1
CT: Comminuted acute left superior ramus fracture. No hip fracture or dislocation  c/w pain control  f/u PT  othro  f/u

## 2023-06-25 NOTE — DISCHARGE NOTE PROVIDER - NSDCCAREPROVSEEN_GEN_ALL_CORE_FT
Casimiro, Iker Lopez, Nikhil Stephenson Casimiro, Iker Lopez, Atif Solano, Nikhil Fernandez, Susana Irene, Elo

## 2023-06-26 DIAGNOSIS — R90.89 OTHER ABNORMAL FINDINGS ON DIAGNOSTIC IMAGING OF CENTRAL NERVOUS SYSTEM: ICD-10-CM

## 2023-06-26 LAB
A1C WITH ESTIMATED AVERAGE GLUCOSE RESULT: 5.5 % — SIGNIFICANT CHANGE UP (ref 4–5.6)
ANION GAP SERPL CALC-SCNC: 5 MMOL/L — SIGNIFICANT CHANGE UP (ref 5–17)
BUN SERPL-MCNC: 16 MG/DL — SIGNIFICANT CHANGE UP (ref 7–18)
CALCIUM SERPL-MCNC: 8.8 MG/DL — SIGNIFICANT CHANGE UP (ref 8.4–10.5)
CHLORIDE SERPL-SCNC: 98 MMOL/L — SIGNIFICANT CHANGE UP (ref 96–108)
CHOLEST SERPL-MCNC: 147 MG/DL — SIGNIFICANT CHANGE UP
CO2 SERPL-SCNC: 30 MMOL/L — SIGNIFICANT CHANGE UP (ref 22–31)
CREAT SERPL-MCNC: 0.78 MG/DL — SIGNIFICANT CHANGE UP (ref 0.5–1.3)
EGFR: 76 ML/MIN/1.73M2 — SIGNIFICANT CHANGE UP
ESTIMATED AVERAGE GLUCOSE: 111 MG/DL — SIGNIFICANT CHANGE UP (ref 68–114)
GLUCOSE SERPL-MCNC: 97 MG/DL — SIGNIFICANT CHANGE UP (ref 70–99)
HCT VFR BLD CALC: 27.9 % — LOW (ref 34.5–45)
HDLC SERPL-MCNC: 63 MG/DL — SIGNIFICANT CHANGE UP
HGB BLD-MCNC: 9.2 G/DL — LOW (ref 11.5–15.5)
LIPID PNL WITH DIRECT LDL SERPL: 58 MG/DL — SIGNIFICANT CHANGE UP
MCHC RBC-ENTMCNC: 29.8 PG — SIGNIFICANT CHANGE UP (ref 27–34)
MCHC RBC-ENTMCNC: 33 GM/DL — SIGNIFICANT CHANGE UP (ref 32–36)
MCV RBC AUTO: 90.3 FL — SIGNIFICANT CHANGE UP (ref 80–100)
NON HDL CHOLESTEROL: 84 MG/DL — SIGNIFICANT CHANGE UP
NRBC # BLD: 0 /100 WBCS — SIGNIFICANT CHANGE UP (ref 0–0)
PLATELET # BLD AUTO: 206 K/UL — SIGNIFICANT CHANGE UP (ref 150–400)
POTASSIUM SERPL-MCNC: 3.4 MMOL/L — LOW (ref 3.5–5.3)
POTASSIUM SERPL-SCNC: 3.4 MMOL/L — LOW (ref 3.5–5.3)
RBC # BLD: 3.09 M/UL — LOW (ref 3.8–5.2)
RBC # FLD: 13.4 % — SIGNIFICANT CHANGE UP (ref 10.3–14.5)
SODIUM SERPL-SCNC: 133 MMOL/L — LOW (ref 135–145)
TRIGL SERPL-MCNC: 130 MG/DL — SIGNIFICANT CHANGE UP
TSH SERPL-MCNC: 2.46 UU/ML — SIGNIFICANT CHANGE UP (ref 0.34–4.82)
WBC # BLD: 8.75 K/UL — SIGNIFICANT CHANGE UP (ref 3.8–10.5)
WBC # FLD AUTO: 8.75 K/UL — SIGNIFICANT CHANGE UP (ref 3.8–10.5)

## 2023-06-26 PROCEDURE — 99223 1ST HOSP IP/OBS HIGH 75: CPT | Mod: FS

## 2023-06-26 RX ORDER — POTASSIUM CHLORIDE 20 MEQ
40 PACKET (EA) ORAL ONCE
Refills: 0 | Status: COMPLETED | OUTPATIENT
Start: 2023-06-26 | End: 2023-06-26

## 2023-06-26 RX ORDER — ASPIRIN/CALCIUM CARB/MAGNESIUM 324 MG
300 TABLET ORAL DAILY
Refills: 0 | Status: DISCONTINUED | OUTPATIENT
Start: 2023-06-26 | End: 2023-06-26

## 2023-06-26 RX ADMIN — ENOXAPARIN SODIUM 40 MILLIGRAM(S): 100 INJECTION SUBCUTANEOUS at 06:08

## 2023-06-26 RX ADMIN — LIDOCAINE 1 PATCH: 4 CREAM TOPICAL at 00:58

## 2023-06-26 RX ADMIN — LIDOCAINE 1 PATCH: 4 CREAM TOPICAL at 20:27

## 2023-06-26 RX ADMIN — POLYETHYLENE GLYCOL 3350 17 GRAM(S): 17 POWDER, FOR SOLUTION ORAL at 12:11

## 2023-06-26 RX ADMIN — LIDOCAINE 1 PATCH: 4 CREAM TOPICAL at 23:23

## 2023-06-26 RX ADMIN — Medication 650 MILLIGRAM(S): at 06:34

## 2023-06-26 RX ADMIN — LIDOCAINE 1 PATCH: 4 CREAM TOPICAL at 11:52

## 2023-06-26 RX ADMIN — Medication 650 MILLIGRAM(S): at 15:30

## 2023-06-26 RX ADMIN — SENNA PLUS 2 TABLET(S): 8.6 TABLET ORAL at 22:13

## 2023-06-26 RX ADMIN — SIMVASTATIN 10 MILLIGRAM(S): 20 TABLET, FILM COATED ORAL at 22:13

## 2023-06-26 RX ADMIN — LIDOCAINE 1 PATCH: 4 CREAM TOPICAL at 00:57

## 2023-06-26 RX ADMIN — Medication 300 MILLIGRAM(S): at 11:52

## 2023-06-26 RX ADMIN — Medication 40 MILLIEQUIVALENT(S): at 08:19

## 2023-06-26 RX ADMIN — Medication 650 MILLIGRAM(S): at 14:39

## 2023-06-26 RX ADMIN — Medication 50 MICROGRAM(S): at 06:08

## 2023-06-26 RX ADMIN — Medication 10 MILLIGRAM(S): at 06:08

## 2023-06-26 RX ADMIN — Medication 650 MILLIGRAM(S): at 06:08

## 2023-06-26 RX ADMIN — AMLODIPINE BESYLATE 10 MILLIGRAM(S): 2.5 TABLET ORAL at 06:09
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
## 2023-06-26 NOTE — CONSULT NOTE ADULT - TIME BILLING
I counseled the primary team about the testing indicated to complete the stroke workup, as well as the medications to maintain for secondary stroke prevention.

## 2023-06-26 NOTE — CONSULT NOTE ADULT - SUBJECTIVE AND OBJECTIVE BOX
NEUROLOGY CONSULT NOTE    NAME:  ELLIOT VENTURA      ASSESSMENT:  82 RHF with subacute right temporal infarct in setting of public rami fracture      RECOMMENDATIONS:      1. Stroke workup  - MRI Brain approved to distinguish among chronic and acute infarcts (if there are no contraindications)  - If stroke is present on MRI Brain, recommend CTA Head/Neck to evaluate for intracranial or neck vessel abnormalities  - Transthoracic Echocardiogram  - Telemetry monitoring while inpatient    2. Secondary stroke prevention  - Q4H Neurochecks & Vital signs  - Confirm that patient has passed bedside swallow evaluation before administering any PO meds  - Aspirin 81mg PO Daily (or Aspirin 300mg [place] daily if NPO)  - Simvastatin 10mg PO QHS if patient is not NPO (higher dose or change in statin is not needed since LDL < 70 mg/dL on Simvastatin 10mg PO QHS)  - Treat BP if over 140/90 (goal /80) - Maintain home antihypertensive medications, No role for permissive hypertension at this time  - Diabetes management as per primary team  - PT/OT  - DVT ppx: SCDs, Enoxaparin          NOTE TO BE COMPLETED - PLEASE REFER TO ABOVE ONLY AND IGNORE INFORMATION BELOW    *******************************      CHIEF COMPLAINT:  Patient is a 82y old  Female who presents with a chief complaint of pubic rami fracture (26 Jun 2023 12:25)      HPI:  82 year old female, from [facility], with PMHx HTN, HLD, hypothyroidism presents with L arm and leg pain. Pt states that she was walking out of the medicine room in the facility when she was accidentally struck by a serving cart. Pt states that she is not sure if she hit her head but denies LOC. Denies any other complaint including fever/chills, headache, dizziness, chest pain, palpitations cough, SOB, n/v/d/c, dysuria or leg swelling. NKDA. (24 Jun 2023 05:29)      NEURO HPI:      PAST MEDICAL & SURGICAL HISTORY:  Hypercholesterolemia  HTN (hypertension)  Hypothyroidism  Osteoarthritis  Anemia  No significant past surgical history      MEDICATIONS:  amLODIPine   Tablet 10 milliGRAM(s) Oral daily  enalapril 10 milliGRAM(s) Oral daily  enoxaparin Injectable 40 milliGRAM(s) SubCutaneous every 24 hours  levothyroxine 100 MICROGram(s) Oral <User Schedule>  levothyroxine 50 MICROGram(s) Oral <User Schedule>  lidocaine   4% Patch 1 Patch Transdermal daily  melatonin 3 milliGRAM(s) Oral at bedtime PRN  ondansetron Injectable 4 milliGRAM(s) IV Push every 8 hours PRN  polyethylene glycol 3350 17 Gram(s) Oral daily  senna 2 Tablet(s) Oral at bedtime  simvastatin 10 milliGRAM(s) Oral at bedtime      ALLERGIES:  No Known Allergies      FAMILY HISTORY:  No pertinent family history in first degree relatives          SOCIAL HISTORY:  Denies alcohol, tobacco, or illicit drug use      REVIEW OF SYSTEMS:  GENERAL: No fever, weight changes, fatigue  EYES: No eye pain or discharge  EAR/NOSE/MOUTH/THROAT: No sinus or throat pain; No difficulty hearing  NECK: No pain or stiffness  RESPIRATORY: No cough, wheezing, chills, or hemoptysis  CARDIOVASCULAR: No chest pain, palpitations, shortness of breath, or dyspnea on exertion  GASTROINTESTINAL: No abdominal pain, nausea, vomiting, hematemesis, diarrhea, or constipation  GENITOURINARY: No dysuria, frequency, hematuria, or incontinence  SKIN: No rashes or lesions  ENDOCRINE: No heat or cold intolerance  HEMATOLOGIC: No easy bruising or bleeding  PSYCHIATRIC: No depression, anxiety, or mood swings  MUSCULOSKELETAL: No joint pain or swelling  NEUROLOGICAL: As per HPI          OBJECTIVE:    Vital Signs Last 24 Hrs  T(C): 36.9 (26 Jun 2023 10:53), Max: 37.2 (25 Jun 2023 22:25)  T(F): 98.4 (26 Jun 2023 10:53), Max: 99 (25 Jun 2023 22:25)  HR: 94 (26 Jun 2023 10:53) (87 - 94)  BP: 110/67 (26 Jun 2023 10:53) (110/67 - 135/76)  RR: 17 (26 Jun 2023 10:53) (17 - 20)  SpO2: 94% (26 Jun 2023 10:53) (94% - 98%)  Parameters below as of 26 Jun 2023 10:53  Patient On (Oxygen Delivery Method): nasal cannula  O2 Flow (L/min): 2    General Examination:  General: No acute distress  HEENT: Atraumatic, Normocephalic  Respiratory: CTA B/l.  No crackles, rhonchi, or wheezes.  Cardiovascular: RRR.  Normal S1 & S2.  Normal b/l radial and pedal pulses.    Neurological Examination:  General / Mental Status: AAO x 3.  No aphasia or dysarthria.  Naming and repetition intact.  Cranial Nerves: VFF x 4.  PERRL.  EOMI x 2, No nystagmus or diplopia.  B/l V1-V3 equal and intact to light touch and pinprick.  Symmetric facial movement and palate elevation.  B/l hearing equal to finger rub.  5/5 strength with b/l sternocleidomastoid and trapezius.  Midline tongue protrusion, with no atrophy or fasciculations.  Motor: Normal bulk & tone in all four extremities.  5/5 strength throughout all four extremities.  No downward drift, rigidity, spasticity, or tremors in any of the four extremities.  Sensory: Intact to light touch and pinprick in all four extremities.  Negative Romberg.  Reflex: 2+ and symmetric at b/l biceps, triceps, brachioradialis, patellae, and ankles.  Downgoing toes b/l.  Coordination: No dysmetria with b/l finger-to-nose and heel raise tests.  Symmetric rapid alternating movements b/l.  Gait: Normal, narrow-based gait.  No difficulty with tiptoe, heel, and tandem gaits.        LABORATORY VALUES:                        9.2    8.75  )-----------( 206      ( 26 Jun 2023 05:55 )             27.9       06-26    133<L>  |  98  |  16  ----------------------------<  97  3.4<L>   |  30  |  0.78    Ca    8.8      26 Jun 2023 05:55    06-26 Chol 147 LDL 58 HDL 63 Trig 130    A1C with Estimated Average Glucose (06.26.23 @ 05:35)   A1C with Estimated Average Glucose Result: 5.5%  Estimated Average Glucose: 111 mg/dL                NEUROIMAGING:          Please contact the Neurology consult service with any neurological questions.    Timoteo Romero MD   of Neurology  [facility] at [facility] NEUROLOGY CONSULT NOTE    NAME:  ELLIOT VENTURA      ASSESSMENT:  82 RHF with subacute right temporal infarct in setting of public rami fracture      RECOMMENDATIONS:      1. Stroke workup  - MRI Brain approved to distinguish among chronic and acute infarcts (if there are no contraindications)  - If stroke is present on MRI Brain, recommend CTA Head/Neck to evaluate for intracranial or neck vessel abnormalities  - Transthoracic Echocardiogram  - Telemetry monitoring while inpatient    2. Secondary stroke prevention  - Q4H Neurochecks & Vital signs  - Confirm that patient has passed bedside swallow evaluation before administering any PO meds  - Aspirin 81mg PO Daily (or Aspirin 300mg [place] daily if NPO)  - Simvastatin 10mg PO QHS if patient is not NPO (higher dose or change in statin is not needed since LDL < 70 mg/dL on Simvastatin 10mg PO QHS)  - Treat BP if over 140/90 (goal /80) - Maintain home antihypertensive medications, No role for permissive hypertension at this time  - Diabetes management as per primary team  - PT/OT  - DVT ppx: SCDs, Enoxaparin          *******************************      CHIEF COMPLAINT:  Patient is a 82y old  Female who presents with a chief complaint of pubic rami fracture (26 Jun 2023 12:25)      HPI:  82 year old female, from [facility], with PMHx HTN, HLD, hypothyroidism presents with L arm and leg pain. Pt states that she was walking out of the medicine room in the facility when she was accidentally struck by a serving cart. Pt states that she is not sure if she hit her head but denies LOC. Denies any other complaint including fever/chills, headache, dizziness, chest pain, palpitations cough, SOB, n/v/d/c, dysuria or leg swelling. NKDA. (24 Jun 2023 05:29)      NEURO HPI:  82F with dementia presents from a skilled nursing facility and presents after having an accidental head injury without loss of consciousness. Her CT Head revealed an incidental ischemic stroke.      PAST MEDICAL & SURGICAL HISTORY:  Hypercholesterolemia  HTN (hypertension)  Hypothyroidism  Osteoarthritis  Anemia  No significant past surgical history      MEDICATIONS:  amLODIPine   Tablet 10 milliGRAM(s) Oral daily  enalapril 10 milliGRAM(s) Oral daily  enoxaparin Injectable 40 milliGRAM(s) SubCutaneous every 24 hours  levothyroxine 100 MICROGram(s) Oral <User Schedule>  levothyroxine 50 MICROGram(s) Oral <User Schedule>  lidocaine   4% Patch 1 Patch Transdermal daily  melatonin 3 milliGRAM(s) Oral at bedtime PRN  ondansetron Injectable 4 milliGRAM(s) IV Push every 8 hours PRN  polyethylene glycol 3350 17 Gram(s) Oral daily  senna 2 Tablet(s) Oral at bedtime  simvastatin 10 milliGRAM(s) Oral at bedtime      ALLERGIES:  No Known Allergies      FAMILY HISTORY:  No reported family history of stroke      SOCIAL HISTORY:  Skilled nursing facility resident  No reported alcohol, tobacco, or illicit drug use      REVIEW OF SYSTEMS: Limited due to encephalopathy  GENERAL: No fever  EYES: No eye discharge  EAR/NOSE/MOUTH/THROAT: No sinus discharge  NECK: No stiffness  RESPIRATORY: No cough  CARDIOVASCULAR: No shortness of breath  GASTROINTESTINAL: No nausea, vomiting, hematemesis  GENITOURINARY: No frequency, hematuria  SKIN: No rashes or lesions  ENDOCRINE: No reported heat or cold intolerance  HEMATOLOGIC: No reported easy bruising or bleeding  PSYCHIATRIC: Dementia present; No known depression or anxiety  MUSCULOSKELETAL: Pubic rami fracture present; No joint swelling  NEUROLOGICAL: As per HPI          OBJECTIVE:    Vital Signs Last 24 Hrs  T(C): 36.9 (26 Jun 2023 10:53), Max: 37.2 (25 Jun 2023 22:25)  T(F): 98.4 (26 Jun 2023 10:53), Max: 99 (25 Jun 2023 22:25)  HR: 94 (26 Jun 2023 10:53) (87 - 94)  BP: 110/67 (26 Jun 2023 10:53) (110/67 - 135/76)  RR: 17 (26 Jun 2023 10:53) (17 - 20)  SpO2: 94% (26 Jun 2023 10:53) (94% - 98%)  Parameters below as of 26 Jun 2023 10:53  Patient On (Oxygen Delivery Method): nasal cannula  O2 Flow (L/min): 2    General Examination:  General: No acute distress  HEENT: Atraumatic, Normocephalic  Respiratory: CTA B/l.  No crackles, rhonchi, or wheezes.  Cardiovascular: RRR.  Normal S1 & S2.  Normal b/l radial and pedal pulses.    Neurological Examination:  General / Mental Status: AAO x 1 (only to person).  Minimally verbal, with no apparent aphasia or dysarthria.  Not answering all questions or following all commands.  Cranial Nerves: B/l blink to threat present.  PERRL.  EOMI x 2, No nystagmus or gaze preference.  B/l V1-V3 equal and intact to light touch and pinprick.  Symmetric facial movement and palate elevation.  B/l hearing diminished to finger rub.  At least 4/5 strength with b/l sternocleidomastoid and trapezius.  Midline tongue protrusion.  Motor: Diminished bulk & tone in all four extremities.  At least 4/5 strength throughout all four extremities.  No downward drift, rigidity, spasticity, or tremors in any of the four extremities.  Sensory: Intact to light touch and pinprick in all four extremities.  Reflex: 1+ and symmetric at b/l biceps, triceps, brachioradialis, patellae, and ankles.  Downgoing toes b/l.  Coordination: No dysmetria with b/l finger-to-nose and heel raise tests.  Gait and Romberg sign testing deferred due to pubic rami fracture.      NIHSS Score:    LOC - 0  LOC Questions - 1  LOC Commands - 1  Gaze Preference - 0  Visual Fields - 0  Facial Palsy - 0  Motor Arm Left - 0  Motor Arm Right - 0  Motor Leg Left - 0  Motor Leg Right - 0  Limb Ataxia - 0  Sensory - 0  Language - 0  Speech - 0  Extinction - 0    NIHSS Score Total: 2 - No IV TNK because patient presented outside of time window    Modified [place] Scale: 2          LABORATORY VALUES:                        9.2    8.75  )-----------( 206      ( 26 Jun 2023 05:55 )             27.9       06-26    133<L>  |  98  |  16  ----------------------------<  97  3.4<L>   |  30  |  0.78    Ca    8.8      26 Jun 2023 05:55    06-26 Chol 147 LDL 58 HDL 63 Trig 130    A1C with Estimated Average Glucose (06.26.23 @ 05:35)   A1C with Estimated Average Glucose Result: 5.5%  Estimated Average Glucose: 111 mg/dL          NEUROIMAGING:      CT Head (6/25/23):  - Subacute/Chronic right temporal infarct  - Mild chronic microvascular changes          Please contact the Neurology consult service with any neurological questions.    Timoteo Romero MD   of Neurology  [facility] [employer] [facility] at [facility] NEUROLOGY CONSULT NOTE    NAME:  ELLIOT VENTURA      ASSESSMENT:  82 RHF with subacute right temporal infarct in setting of pubic rami fracture      RECOMMENDATIONS:      1. Stroke workup  - MRI Brain approved to distinguish among chronic and acute infarcts (if there are no contraindications)  - If stroke is present on MRI Brain, recommend CTA Head/Neck to evaluate for intracranial or neck vessel abnormalities  - Transthoracic Echocardiogram  - Telemetry monitoring while inpatient    2. Secondary stroke prevention  - Q4H Neurochecks & Vital signs  - Confirm that patient has passed bedside swallow evaluation before administering any PO meds  - Aspirin 81mg PO Daily (or Aspirin 300mg [place] daily if NPO)  - Simvastatin 10mg PO QHS if patient is not NPO (higher dose or change in statin is not needed since LDL < 70 mg/dL on Simvastatin 10mg PO QHS)  - Treat BP if over 140/90 (goal /80) - Maintain home antihypertensive medications, No role for permissive hypertension at this time  - Diabetes management as per primary team  - PT/OT  - DVT ppx: SCDs, Enoxaparin          *******************************      CHIEF COMPLAINT:  Patient is a 82y old  Female who presents with a chief complaint of pubic rami fracture (26 Jun 2023 12:25)      HPI:  82 year old female, from [facility], with PMHx HTN, HLD, hypothyroidism presents with L arm and leg pain. Pt states that she was walking out of the medicine room in the facility when she was accidentally struck by a serving cart. Pt states that she is not sure if she hit her head but denies LOC. Denies any other complaint including fever/chills, headache, dizziness, chest pain, palpitations cough, SOB, n/v/d/c, dysuria or leg swelling. NKDA. (24 Jun 2023 05:29)      NEURO HPI:  82F with dementia presents from a skilled nursing facility and presents after having an accidental head injury without loss of consciousness. Her CT Head revealed an incidental ischemic stroke.      PAST MEDICAL & SURGICAL HISTORY:  Hypercholesterolemia  HTN (hypertension)  Hypothyroidism  Osteoarthritis  Anemia  No significant past surgical history      MEDICATIONS:  amLODIPine   Tablet 10 milliGRAM(s) Oral daily  enalapril 10 milliGRAM(s) Oral daily  enoxaparin Injectable 40 milliGRAM(s) SubCutaneous every 24 hours  levothyroxine 100 MICROGram(s) Oral <User Schedule>  levothyroxine 50 MICROGram(s) Oral <User Schedule>  lidocaine   4% Patch 1 Patch Transdermal daily  melatonin 3 milliGRAM(s) Oral at bedtime PRN  ondansetron Injectable 4 milliGRAM(s) IV Push every 8 hours PRN  polyethylene glycol 3350 17 Gram(s) Oral daily  senna 2 Tablet(s) Oral at bedtime  simvastatin 10 milliGRAM(s) Oral at bedtime      ALLERGIES:  No Known Allergies      FAMILY HISTORY:  No reported family history of stroke      SOCIAL HISTORY:  Skilled nursing facility resident  No reported alcohol, tobacco, or illicit drug use      REVIEW OF SYSTEMS: Limited due to encephalopathy  GENERAL: No fever  EYES: No eye discharge  EAR/NOSE/MOUTH/THROAT: No sinus discharge  NECK: No stiffness  RESPIRATORY: No cough  CARDIOVASCULAR: No shortness of breath  GASTROINTESTINAL: No nausea, vomiting, hematemesis  GENITOURINARY: No frequency, hematuria  SKIN: No rashes or lesions  ENDOCRINE: No reported heat or cold intolerance  HEMATOLOGIC: No reported easy bruising or bleeding  PSYCHIATRIC: Dementia present; No known depression or anxiety  MUSCULOSKELETAL: Pubic rami fracture present; No joint swelling  NEUROLOGICAL: As per HPI          OBJECTIVE:    Vital Signs Last 24 Hrs  T(C): 36.9 (26 Jun 2023 10:53), Max: 37.2 (25 Jun 2023 22:25)  T(F): 98.4 (26 Jun 2023 10:53), Max: 99 (25 Jun 2023 22:25)  HR: 94 (26 Jun 2023 10:53) (87 - 94)  BP: 110/67 (26 Jun 2023 10:53) (110/67 - 135/76)  RR: 17 (26 Jun 2023 10:53) (17 - 20)  SpO2: 94% (26 Jun 2023 10:53) (94% - 98%)  Parameters below as of 26 Jun 2023 10:53  Patient On (Oxygen Delivery Method): nasal cannula  O2 Flow (L/min): 2    General Examination:  General: No acute distress  HEENT: Atraumatic, Normocephalic  Respiratory: CTA B/l.  No crackles, rhonchi, or wheezes.  Cardiovascular: RRR.  Normal S1 & S2.  Normal b/l radial and pedal pulses.    Neurological Examination:  General / Mental Status: AAO x 1 (only to person).  Minimally verbal, with no apparent aphasia or dysarthria.  Not answering all questions or following all commands.  Cranial Nerves: B/l blink to threat present.  PERRL.  EOMI x 2, No nystagmus or gaze preference.  B/l V1-V3 equal and intact to light touch and pinprick.  Symmetric facial movement and palate elevation.  B/l hearing diminished to finger rub.  At least 4/5 strength with b/l sternocleidomastoid and trapezius.  Midline tongue protrusion.  Motor: Diminished bulk & tone in all four extremities.  At least 4/5 strength throughout all four extremities.  No downward drift, rigidity, spasticity, or tremors in any of the four extremities.  Sensory: Intact to light touch and pinprick in all four extremities.  Reflex: 1+ and symmetric at b/l biceps, triceps, brachioradialis, patellae, and ankles.  Downgoing toes b/l.  Coordination: No dysmetria with b/l finger-to-nose and heel raise tests.  Gait and Romberg sign testing deferred due to pubic rami fracture.      NIHSS Score:    LOC - 0  LOC Questions - 1  LOC Commands - 1  Gaze Preference - 0  Visual Fields - 0  Facial Palsy - 0  Motor Arm Left - 0  Motor Arm Right - 0  Motor Leg Left - 0  Motor Leg Right - 0  Limb Ataxia - 0  Sensory - 0  Language - 0  Speech - 0  Extinction - 0    NIHSS Score Total: 2 - No IV TNK because patient presented outside of time window    Modified [place] Scale: 2          LABORATORY VALUES:                        9.2    8.75  )-----------( 206      ( 26 Jun 2023 05:55 )             27.9       06-26    133<L>  |  98  |  16  ----------------------------<  97  3.4<L>   |  30  |  0.78    Ca    8.8      26 Jun 2023 05:55    06-26 Chol 147 LDL 58 HDL 63 Trig 130    A1C with Estimated Average Glucose (06.26.23 @ 05:35)   A1C with Estimated Average Glucose Result: 5.5%  Estimated Average Glucose: 111 mg/dL          NEUROIMAGING:      CT Head (6/25/23):  - Subacute/Chronic right temporal infarct  - Mild chronic microvascular changes          Please contact the Neurology consult service with any neurological questions.    Timoteo Romero MD   of Neurology  [facility] at [facility] NEUROLOGY CONSULT NOTE    NAME:  ELLIOT VENTURA      ASSESSMENT:  82 RHF with subacute right temporal infarct in setting of pubic rami fracture      RECOMMENDATIONS:      1. Stroke workup  - MRI Brain approved to distinguish among chronic and acute infarcts (if there are no contraindications)  - If stroke is present on MRI Brain, recommend CTA Head/Neck to evaluate for intracranial or neck vessel abnormalities  - Transthoracic Echocardiogram  - Telemetry monitoring while inpatient    2. Secondary stroke prevention  - Q4H Neurochecks & Vital signs  - Confirm that patient has passed bedside swallow evaluation before administering any PO meds  - Aspirin 81mg PO Daily (or Aspirin 300mg [place] daily if NPO)  - Simvastatin 10mg PO QHS if patient is not NPO (higher dose or change in statin is not needed since LDL < 70 mg/dL on Simvastatin 10mg PO QHS)  - Treat BP if over 140/90 (goal /80) - Maintain home antihypertensive medications, No role for permissive hypertension at this time  - Diabetes management as per primary team  - PT/OT  - DVT ppx: SCDs, Enoxaparin          *******************************      CHIEF COMPLAINT:  Patient is a 82y old  Female who presents with a chief complaint of pubic rami fracture (26 Jun 2023 12:25)      HPI:  82 year old female, from [facility], with PMHx HTN, HLD, hypothyroidism presents with L arm and leg pain. Pt states that she was walking out of the medicine room in the facility when she was accidentally struck by a serving cart. Pt states that she is not sure if she hit her head but denies LOC. Denies any other complaint including fever/chills, headache, dizziness, chest pain, palpitations cough, SOB, n/v/d/c, dysuria or leg swelling. NKDA. (24 Jun 2023 05:29)      NEURO HPI:  82F with dementia presents from a skilled nursing facility and presents after having an accidental head injury without loss of consciousness. Her CT Head revealed an incidental ischemic stroke.      PAST MEDICAL & SURGICAL HISTORY:  Hypercholesterolemia  HTN (hypertension)  Hypothyroidism  Osteoarthritis  Anemia  No significant past surgical history      MEDICATIONS:  amLODIPine   Tablet 10 milliGRAM(s) Oral daily  enalapril 10 milliGRAM(s) Oral daily  enoxaparin Injectable 40 milliGRAM(s) SubCutaneous every 24 hours  levothyroxine 100 MICROGram(s) Oral <User Schedule>  levothyroxine 50 MICROGram(s) Oral <User Schedule>  lidocaine   4% Patch 1 Patch Transdermal daily  melatonin 3 milliGRAM(s) Oral at bedtime PRN  ondansetron Injectable 4 milliGRAM(s) IV Push every 8 hours PRN  polyethylene glycol 3350 17 Gram(s) Oral daily  senna 2 Tablet(s) Oral at bedtime  simvastatin 10 milliGRAM(s) Oral at bedtime      ALLERGIES:  No Known Allergies      FAMILY HISTORY:  No reported family history of stroke      SOCIAL HISTORY:  Skilled nursing facility resident  No reported alcohol, tobacco, or illicit drug use      REVIEW OF SYSTEMS: Limited due to encephalopathy  GENERAL: No fever  EYES: No eye discharge  EAR/NOSE/MOUTH/THROAT: No sinus discharge  NECK: No stiffness  RESPIRATORY: No cough  CARDIOVASCULAR: No shortness of breath  GASTROINTESTINAL: No nausea, vomiting, hematemesis  GENITOURINARY: No frequency, hematuria  SKIN: No rashes or lesions  ENDOCRINE: No reported heat or cold intolerance  HEMATOLOGIC: No reported easy bruising or bleeding  PSYCHIATRIC: Dementia present; No known depression or anxiety  MUSCULOSKELETAL: Pubic rami fracture present; No joint swelling  NEUROLOGICAL: As per HPI          OBJECTIVE:    Vital Signs Last 24 Hrs  T(C): 36.9 (26 Jun 2023 10:53), Max: 37.2 (25 Jun 2023 22:25)  T(F): 98.4 (26 Jun 2023 10:53), Max: 99 (25 Jun 2023 22:25)  HR: 94 (26 Jun 2023 10:53) (87 - 94)  BP: 110/67 (26 Jun 2023 10:53) (110/67 - 135/76)  RR: 17 (26 Jun 2023 10:53) (17 - 20)  SpO2: 94% (26 Jun 2023 10:53) (94% - 98%)  Parameters below as of 26 Jun 2023 10:53  Patient On (Oxygen Delivery Method): nasal cannula  O2 Flow (L/min): 2    General Examination:  General: No acute distress  HEENT: Atraumatic, Normocephalic  Respiratory: CTA B/l.  No crackles, rhonchi, or wheezes.  Cardiovascular: RRR.  Normal S1 & S2.  Normal b/l radial and pedal pulses.    Neurological Examination:  General / Mental Status: AAO x 1 (only to person).  Minimally verbal, with no apparent aphasia or dysarthria.  Immediate recall: 1/3, 5-minute delayed recall: 0/3 (cannot recall any words with category or MCQ cues).  Not answering all questions or following all commands.  Cranial Nerves: B/l blink to threat present.  PERRL.  EOMI x 2, No nystagmus or gaze preference.  B/l V1-V3 equal and intact to light touch and pinprick.  Symmetric facial movement and palate elevation.  B/l hearing diminished to finger rub.  At least 4/5 strength with b/l sternocleidomastoid and trapezius.  Midline tongue protrusion.  Motor: Diminished bulk & tone in all four extremities.  At least 4/5 strength throughout all four extremities.  No downward drift, rigidity, spasticity, or tremors in any of the four extremities.  Sensory: Intact to light touch and pinprick in all four extremities.  Reflex: 1+ and symmetric at b/l biceps, triceps, brachioradialis, patellae, and ankles.  Downgoing toes b/l.  Coordination: No dysmetria with b/l finger-to-nose and heel raise tests.  Gait and Romberg sign testing deferred due to pubic rami fracture.      NIHSS Score:    LOC - 0  LOC Questions - 1  LOC Commands - 1  Gaze Preference - 0  Visual Fields - 0  Facial Palsy - 0  Motor Arm Left - 0  Motor Arm Right - 0  Motor Leg Left - 0  Motor Leg Right - 0  Limb Ataxia - 0  Sensory - 0  Language - 0  Speech - 0  Extinction - 0    NIHSS Score Total: 2 - No IV TNK because patient presented outside of time window    Modified John Scale: 2          LABORATORY VALUES:                        9.2    8.75  )-----------( 206      ( 26 Jun 2023 05:55 )             27.9       06-26    133<L>  |  98  |  16  ----------------------------<  97  3.4<L>   |  30  |  0.78    Ca    8.8      26 Jun 2023 05:55    06-26 Chol 147 LDL 58 HDL 63 Trig 130    A1C with Estimated Average Glucose (06.26.23 @ 05:35)   A1C with Estimated Average Glucose Result: 5.5%  Estimated Average Glucose: 111 mg/dL          NEUROIMAGING:      CT Head (6/25/23):  - Subacute/Chronic right temporal infarct  - Mild chronic microvascular changes          Please contact the Neurology consult service with any neurological questions.    Timoteo Romero MD   of Neurology  [facility] [employer] [facility] NEUROLOGY CONSULT NOTE    NAME:  ELLIOT VENTURA      ASSESSMENT:  82 RHF with subacute right temporal infarct in setting of pubic rami fracture      RECOMMENDATIONS:      1. Stroke workup  - MRI Brain approved to distinguish among chronic and acute infarcts (if there are no contraindications)  - If stroke is present on MRI Brain, recommend CTA Head/Neck to evaluate for intracranial or neck vessel abnormalities  - Transthoracic Echocardiogram  - Telemetry monitoring while inpatient    2. Secondary stroke prevention  - Q4H Neurochecks & Vital signs  - Confirm that patient has passed bedside swallow evaluation before administering any PO meds  - Aspirin 81mg PO Daily (or Aspirin 300mg MN daily if NPO)  - Simvastatin 10mg PO QHS if patient is not NPO (higher dose or change in statin is not needed since LDL < 70 mg/dL on Simvastatin 10mg PO QHS)  - Treat BP if over 140/90 (goal /80) - Maintain home antihypertensive medications, No role for permissive hypertension at this time  - Diabetes management as per primary team  - PT/OT  - DVT ppx: SCDs, Enoxaparin          *******************************      CHIEF COMPLAINT:  Patient is a 82y old  Female who presents with a chief complaint of pubic rami fracture (26 Jun 2023 12:25)      HPI:  82 year old female, from [facility], with PMHx HTN, HLD, hypothyroidism presents with L arm and leg pain. Pt states that she was walking out of the medicine room in the facility when she was accidentally struck by a serving cart. Pt states that she is not sure if she hit her head but denies LOC. Denies any other complaint including fever/chills, headache, dizziness, chest pain, palpitations cough, SOB, n/v/d/c, dysuria or leg swelling. NKDA. (24 Jun 2023 05:29)      NEURO HPI:  82F with dementia presents from a skilled nursing facility and presents after having an accidental head injury without loss of consciousness. Her CT Head revealed an incidental ischemic stroke.      PAST MEDICAL & SURGICAL HISTORY:  Hypercholesterolemia  HTN (hypertension)  Hypothyroidism  Osteoarthritis  Anemia  No significant past surgical history      MEDICATIONS:  amLODIPine   Tablet 10 milliGRAM(s) Oral daily  enalapril 10 milliGRAM(s) Oral daily  enoxaparin Injectable 40 milliGRAM(s) SubCutaneous every 24 hours  levothyroxine 100 MICROGram(s) Oral <User Schedule>  levothyroxine 50 MICROGram(s) Oral <User Schedule>  lidocaine   4% Patch 1 Patch Transdermal daily  melatonin 3 milliGRAM(s) Oral at bedtime PRN  ondansetron Injectable 4 milliGRAM(s) IV Push every 8 hours PRN  polyethylene glycol 3350 17 Gram(s) Oral daily  senna 2 Tablet(s) Oral at bedtime  simvastatin 10 milliGRAM(s) Oral at bedtime      ALLERGIES:  No Known Allergies      FAMILY HISTORY:  No reported family history of stroke      SOCIAL HISTORY:  Skilled nursing facility resident  No reported alcohol, tobacco, or illicit drug use      REVIEW OF SYSTEMS: Limited due to encephalopathy  GENERAL: No fever  EYES: No eye discharge  EAR/NOSE/MOUTH/THROAT: No sinus discharge  NECK: No stiffness  RESPIRATORY: No cough  CARDIOVASCULAR: No shortness of breath  GASTROINTESTINAL: No nausea, vomiting, hematemesis  GENITOURINARY: No frequency, hematuria  SKIN: No rashes or lesions  ENDOCRINE: No reported heat or cold intolerance  HEMATOLOGIC: No reported easy bruising or bleeding  PSYCHIATRIC: Dementia present; No known depression or anxiety  MUSCULOSKELETAL: Pubic rami fracture present; No joint swelling  NEUROLOGICAL: As per HPI          OBJECTIVE:    Vital Signs Last 24 Hrs  T(C): 36.9 (26 Jun 2023 10:53), Max: 37.2 (25 Jun 2023 22:25)  T(F): 98.4 (26 Jun 2023 10:53), Max: 99 (25 Jun 2023 22:25)  HR: 94 (26 Jun 2023 10:53) (87 - 94)  BP: 110/67 (26 Jun 2023 10:53) (110/67 - 135/76)  RR: 17 (26 Jun 2023 10:53) (17 - 20)  SpO2: 94% (26 Jun 2023 10:53) (94% - 98%)  Parameters below as of 26 Jun 2023 10:53  Patient On (Oxygen Delivery Method): nasal cannula  O2 Flow (L/min): 2    General Examination:  General: No acute distress  HEENT: Atraumatic, Normocephalic  Respiratory: CTA B/l.  No crackles, rhonchi, or wheezes.  Cardiovascular: RRR.  Normal S1 & S2.  Normal b/l radial and pedal pulses.    Neurological Examination:  General / Mental Status: AAO x 1 (only to person).  Minimally verbal, with no apparent aphasia or dysarthria.  Immediate recall: 1/3, 5-minute delayed recall: 0/3 (cannot recall any words with category or MCQ cues).  Not answering all questions or following all commands.  Cranial Nerves: B/l blink to threat present.  PERRL.  EOMI x 2, No nystagmus or gaze preference.  B/l V1-V3 equal and intact to light touch and pinprick.  Symmetric facial movement and palate elevation.  B/l hearing diminished to finger rub.  At least 4/5 strength with b/l sternocleidomastoid and trapezius.  Midline tongue protrusion.  Motor: Diminished bulk & tone in all four extremities.  At least 4/5 strength throughout all four extremities.  No downward drift, rigidity, spasticity, or tremors in any of the four extremities.  Sensory: Intact to light touch and pinprick in all four extremities.  Reflex: 1+ and symmetric at b/l biceps, triceps, brachioradialis, patellae, and ankles.  Downgoing toes b/l.  Coordination: No dysmetria with b/l finger-to-nose and heel raise tests.  Gait and Romberg sign testing deferred due to pubic rami fracture.      NIHSS Score:    LOC - 0  LOC Questions - 1  LOC Commands - 1  Gaze Preference - 0  Visual Fields - 0  Facial Palsy - 0  Motor Arm Left - 0  Motor Arm Right - 0  Motor Leg Left - 0  Motor Leg Right - 0  Limb Ataxia - 0  Sensory - 0  Language - 0  Speech - 0  Extinction - 0    NIHSS Score Total: 2 - No IV TNK because patient presented outside of time window    Modified John Scale: 2          LABORATORY VALUES:                        9.2    8.75  )-----------( 206      ( 26 Jun 2023 05:55 )             27.9       06-26    133<L>  |  98  |  16  ----------------------------<  97  3.4<L>   |  30  |  0.78    Ca    8.8      26 Jun 2023 05:55    06-26 Chol 147 LDL 58 HDL 63 Trig 130    A1C with Estimated Average Glucose (06.26.23 @ 05:35)   A1C with Estimated Average Glucose Result: 5.5%  Estimated Average Glucose: 111 mg/dL    Thyroid Stimulating Hormone, Serum: 2.46 uU/mL (06.26.23 @ 05:55)           NEUROIMAGING:      CT Head (6/25/23):  - Subacute/Chronic right temporal infarct  - Mild chronic microvascular changes          Please contact the Neurology consult service with any neurological questions.    Timoteo Romero MD   of Neurology  [facility] at [facility]

## 2023-06-26 NOTE — PROGRESS NOTE ADULT - PROBLEM SELECTOR PLAN 1
CT: Comminuted acute left superior ramus fracture. No hip fracture or dislocation  c/w pain control-pain management consulted  PT recommending SARAN  Ortho recommending conservative management

## 2023-06-26 NOTE — PROGRESS NOTE ADULT - PROBLEM SELECTOR PLAN 2
-CT head shows: Poor visualization of gray matter in the right lentiform nucleus is suspicious for an acute infarct.  A 2.6 x 1.9 cm hypodensity focus in the anterior right temporal lobe and a 1.2 x 1 cm hypoattenuating focus in   the lateral aspect of the left lentiform nucleus versus left subinsular region are suspicious for small acute or subacute infarcts.  -Neurology consulted: Dr. Romero  -f/u MR Brain   -f/u Lipid panel, A1c  -c/w ASA and statin

## 2023-06-26 NOTE — PROGRESS NOTE ADULT - SUBJECTIVE AND OBJECTIVE BOX
PGY-1 Progress Note discussed with attending    PAGER #: [[phone]] TILL 5:00 PM  PLEASE CONTACT ON CALL TEAM:  - On Call Team (Please refer to Alissa) FROM 5:00 PM - 8:30PM  - Nightfloat Team FROM 8:30 -7:30 AM    CC: Patient is a 82y old  Female who presents with a chief complaint of pubic rami fracture (25 Jun 2023 14:48)      OVERNIGHT EVENTS:    SUBJECTIVE / INTERVAL HPI: Patient seen and examined at bedside. Says that pelvic pain is improved. No acute complaints. Limited participation in interview. AOx2. Unable to answer year. Denies, headache, focal weakness, difficulty swallowing, chest pain, shortness of breath, abdominal pain, nausea, vomiting, diarrhea, constipation, and dysuria.     ROS:  CONSTITUTIONAL: No weakness, fevers or chills  EYES/ENT: No visual changes;  No vertigo or throat pain   NECK: No pain or stiffness  RESPIRATORY: No cough, wheezing, hemoptysis; No shortness of breath  CARDIOVASCULAR: No chest pain or palpitations  GASTROINTESTINAL: No abdominal or epigastric pain. No nausea, vomiting, or hematemesis; No diarrhea or constipation. No melena or hematochezia.  GENITOURINARY: No dysuria, frequency or hematuria  NEUROLOGICAL: No numbness or weakness  SKIN: No itching, burning, rashes, or lesions     VITAL SIGNS:  Vital Signs Last 24 Hrs  T(C): 36.9 (26 Jun 2023 10:53), Max: 37.2 (25 Jun 2023 22:25)  T(F): 98.4 (26 Jun 2023 10:53), Max: 99 (25 Jun 2023 22:25)  HR: 94 (26 Jun 2023 10:53) (87 - 94)  BP: 110/67 (26 Jun 2023 10:53) (110/67 - 135/76)  BP(mean): --  RR: 17 (26 Jun 2023 10:53) (17 - 20)  SpO2: 94% (26 Jun 2023 10:53) (94% - 98%)    Parameters below as of 26 Jun 2023 10:53  Patient On (Oxygen Delivery Method): nasal cannula  O2 Flow (L/min): 2      PHYSICAL EXAM:    General: frail, limited interaction  HEENT: NC/AT; PERRL, anicteric sclera; MMM  Neck: supple  Cardiovascular: +S1/S2; RRR  Respiratory: CTA B/L; no W/R/R  Gastrointestinal: soft, NT/ND; +BSx4  Extremities: WWP; no edema, clubbing or cyanosis  Vascular: 2+ radial, DP/PT pulses B/L  Skin: Warm, dry, good turgor, no rashes, or ecchymoses  Neurological: AAOx2; no focal deficits    MEDICATIONS:  MEDICATIONS  (STANDING):  acetaminophen     Tablet .. 650 milliGRAM(s) Oral every 8 hours  amLODIPine   Tablet 10 milliGRAM(s) Oral daily  aspirin Suppository 300 milliGRAM(s) Rectal daily  enalapril 10 milliGRAM(s) Oral daily  enoxaparin Injectable 40 milliGRAM(s) SubCutaneous every 24 hours  levothyroxine 50 MICROGram(s) Oral <User Schedule>  levothyroxine 100 MICROGram(s) Oral <User Schedule>  lidocaine   4% Patch 1 Patch Transdermal daily  polyethylene glycol 3350 17 Gram(s) Oral daily  senna 2 Tablet(s) Oral at bedtime  simvastatin 10 milliGRAM(s) Oral at bedtime    MEDICATIONS  (PRN):  melatonin 3 milliGRAM(s) Oral at bedtime PRN Insomnia  ondansetron Injectable 4 milliGRAM(s) IV Push every 8 hours PRN Nausea and/or Vomiting      ALLERGIES:  Allergies    No Known Allergies    Intolerances        LABS:                        9.2    8.75  )-----------( 206      ( 26 Jun 2023 05:55 )             27.9     06-26    133<L>  |  98  |  16  ----------------------------<  97  3.4<L>   |  30  |  0.78    Ca    8.8      26 Jun 2023 05:55        Urinalysis Basic - ( 26 Jun 2023 05:55 )    Color: x / Appearance: x / SG: x / pH: x  Gluc: 97 mg/dL / Ketone: x  / Bili: x / Urobili: x   Blood: x / Protein: x / Nitrite: x   Leuk Esterase: x / RBC: x / WBC x   Sq Epi: x / Non Sq Epi: x / Bacteria: x      CAPILLARY BLOOD GLUCOSE          RADIOLOGY & ADDITIONAL TESTS:   < from: CT Head No Cont (06.25.23 @ 13:45) >  INTERPRETATION:  CLINICAL INFORMATION: Status post fall.  Rule out   intracranial hemorrhage.  Prior noncontrast head CT was motion limited.    TECHNIQUE:  Noncontrast axial CT images were acquired through the head.  Sagittal and coronal reformats were performed.    COMPARISON STUDY: 06/24/2023.    FINDINGS:  There is poor visualization of gray matter in the right lentiform nucleus   suspiciousfor acute infarct.  A 2.6 x 1.9 cm hypoattenuating focus in   the anterior right temporal lobe (2:21) and a 1.2 x 1 cm hypoattenuating   focus in the lateral aspect of the left lentiform nucleus versus left   subinsular region (2:30) are suspicious for acute versus subacute   infarcts.   The possibility of vasogenic edema is not completely excluded.    There is no evidence of acute intracranial hemorrhage, subdural   collection, midline shift or hydrocephalus.    There is slight prominence of thesulci compatible with mild age-related   involutional changes.  There is mild patchy periventricular white matter   hypoattenuation compatible with chronic microvascular ischemic disease.    There are well-defined lacunar infarcts in the medial aspect of the left   lentiform nucleus and anterior limb of the right internal capsule, which   are probably chronic.      The paranasal sinuses and mastoids are grossly clear.      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  There is disconjugate gaze with both globes being   medially deviated.    The calvarium and skull base appear within normal limits.    IMPRESSION:  Poor visualization of gray matter in the right lentiform nucleus is   suspicious for an acute infarct.  A 2.6 x 1.9 cm hypodensity focus in the   anterior right temporal lobe and a 1.2 x 1 cm hypoattenuating focus in   the lateral aspect of the left lentiform nucleus versus left subinsular   region are suspicious for small acute or subacute infarcts. The   possibility of vasogenic edema is not completely excluded.  Follow-up   brain MRI is recommended for further evaluation.    No CT evidence of acute intracranial hemorrhage, subdural collection or   calvarial fracture.    --- End of Report ---      < end of copied text >

## 2023-06-26 NOTE — PROGRESS NOTE ADULT - ASSESSMENT
82F from [facility] with PMHx HTN, HLD, hypothyroidism p/w pain after being hit by serving cart. CT shows pubic rami fracture. CT head incidentally showing possible acute infarcts. Pending MR brain.

## 2023-06-26 NOTE — PROGRESS NOTE ADULT - SUBJECTIVE AND OBJECTIVE BOX
PATIENT SEEN AND EXAMINED ON :- 6/26/23  DATE OF SERVICE: 6/26/23            Interim events noted,Labs ,Radiological studies and Cardiology tests reviewed .    MR#[id number]  PATIENT NAME:-[facility] COURSE: HPI:  82 year old female, from [facility], with PMHx HTN, HLD, hypothyroidism presents with L arm and leg pain. Pt states that she was walking out of the medicine room in the facility when she was accidentally struck by a serving cart. Pt states that she is not sure if she hit her head but denies LOC. Denies any other complaint including fever/chills, headache, dizziness, chest pain, palpitations cough, SOB, n/v/d/c, dysuria or leg swelling. NKDA. (24 Jun 2023 05:29)      INTERIM EVENTS:Patient seen at bedside ,interim events noted.      PMH -reviewed admission note, no change since admission  HEART FAILURE: Acute[ ]Chronic[ ] Systolic[ ] Diastolic[ ] Combined Systolic and Diastolic[ ]  CAD[ ] CABG[ ] PCI[ ]  DEVICES[ ] PPM[ ] ICD[ ] ILR[ ]  ATRIAL FIBRILLATION[ ] Paroxysmal[ ] Permanent[ ] CHADS2-[  ]  ROXI[ ] CKD1[ ] CKD2[ ] CKD3[ ] CKD4[ ] ESRD[ ]  COPD[ ] HTN[ ]   DM[ ] Type1[ ] Type 2[ ]   CVA[ ] Paresis[ ]    AMBULATION: Assisted[ ] Cane/walker[ ] Independent[ ]    MEDICATIONS  (STANDING):  amLODIPine   Tablet 10 milliGRAM(s) Oral daily  enalapril 10 milliGRAM(s) Oral daily  enoxaparin Injectable 40 milliGRAM(s) SubCutaneous every 24 hours  levothyroxine 100 MICROGram(s) Oral <User Schedule>  levothyroxine 50 MICROGram(s) Oral <User Schedule>  lidocaine   4% Patch 1 Patch Transdermal daily  polyethylene glycol 3350 17 Gram(s) Oral daily  senna 2 Tablet(s) Oral at bedtime  simvastatin 10 milliGRAM(s) Oral at bedtime    MEDICATIONS  (PRN):  melatonin 3 milliGRAM(s) Oral at bedtime PRN Insomnia  ondansetron Injectable 4 milliGRAM(s) IV Push every 8 hours PRN Nausea and/or Vomiting            REVIEW OF SYSTEMS:  Constitutional: [ ] fever, [ ]weight loss,  [ ]fatigue [ ]weight gain  Eyes: [ ] visual changes  Respiratory: [ ]shortness of breath;  [ ] cough, [ ]wheezing, [ ]chills, [ ]hemoptysis  Cardiovascular: [ ] chest pain, [ ]palpitations, [ ]dizziness,  [ ]leg swelling[ ]orthopnea[ ]PND  Gastrointestinal: [ ] abdominal pain, [ ]nausea, [ ]vomiting,  [ ]diarrhea [ ]Constipation [ ]Melena  Genitourinary: [ ] dysuria, [ ] hematuria [ ]Love  Neurologic: [ ] headaches [ ] tremors[ ]weakness [ ]Paralysis Right[ ] Left[ ]  Skin: [ ] itching, [ ]burning, [ ] rashes  Endocrine: [ ] heat or cold intolerance  Musculoskeletal: [ ] joint pain or swelling; [ ] muscle, back, or extremity pain  Psychiatric: [ ] depression, [ ]anxiety, [ ]mood swings, or [ ]difficulty sleeping  Hematologic: [ ] easy bruising, [ ] bleeding gums    [ ] All remaining systems negative except as per above.   [ ]Unable to obtain.  [x] No change in ROS since admission      Vital Signs Last 24 Hrs  T(C): 36.8 (26 Jun 2023 19:50), Max: 37.2 (25 Jun 2023 22:25)  T(F): 98.2 (26 Jun 2023 19:50), Max: 99 (25 Jun 2023 22:25)  HR: 90 (26 Jun 2023 19:50) (87 - 99)  BP: 125/74 (26 Jun 2023 19:50) (110/67 - 135/76)  BP(mean): --  RR: 18 (26 Jun 2023 19:50) (17 - 20)  SpO2: 96% (26 Jun 2023 19:50) (94% - 98%)    Parameters below as of 26 Jun 2023 19:50  Patient On (Oxygen Delivery Method): nasal cannula  O2 Flow (L/min): 2    I&O's Summary      PHYSICAL EXAM:  General: No acute distress BMI-  HEENT: EOMI, PERRL  Neck: Supple, [ ] JVD  Lungs: Equal air entry bilaterally; [ ] rales [ ] wheezing [ ] rhonchi  Heart: Regular rate and rhythm; [x ] murmur   2/6 [ x] systolic [ ] diastolic [ ] radiation[ ] rubs [ ]  gallops  Abdomen: Nontender, bowel sounds present  Extremities: No clubbing, cyanosis, [ ] edema [ ]Pulses  equal and intact  Nervous system:  Alert & Oriented X3, no focal deficits  Psychiatric: Normal affect  Skin: No rashes or lesions    LABS:  06-26    133<L>  |  98  |  16  ----------------------------<  97  3.4<L>   |  30  |  0.78    Ca    8.8      26 Jun 2023 05:55      Creatinine Trend: 0.78<--, 0.74<--, 0.96<--, 0.86<--                        9.2    8.75  )-----------( 206      ( 26 Jun 2023 05:55 )             27.9

## 2023-06-27 LAB
ANION GAP SERPL CALC-SCNC: 8 MMOL/L — SIGNIFICANT CHANGE UP (ref 5–17)
BASOPHILS # BLD AUTO: 0.04 K/UL — SIGNIFICANT CHANGE UP (ref 0–0.2)
BASOPHILS NFR BLD AUTO: 0.4 % — SIGNIFICANT CHANGE UP (ref 0–2)
BUN SERPL-MCNC: 17 MG/DL — SIGNIFICANT CHANGE UP (ref 7–18)
CALCIUM SERPL-MCNC: 8.7 MG/DL — SIGNIFICANT CHANGE UP (ref 8.4–10.5)
CHLORIDE SERPL-SCNC: 98 MMOL/L — SIGNIFICANT CHANGE UP (ref 96–108)
CHOLEST SERPL-MCNC: 137 MG/DL — SIGNIFICANT CHANGE UP
CO2 SERPL-SCNC: 28 MMOL/L — SIGNIFICANT CHANGE UP (ref 22–31)
CREAT SERPL-MCNC: 0.69 MG/DL — SIGNIFICANT CHANGE UP (ref 0.5–1.3)
EGFR: 87 ML/MIN/1.73M2 — SIGNIFICANT CHANGE UP
EOSINOPHIL # BLD AUTO: 0.01 K/UL — SIGNIFICANT CHANGE UP (ref 0–0.5)
EOSINOPHIL NFR BLD AUTO: 0.1 % — SIGNIFICANT CHANGE UP (ref 0–6)
GLUCOSE SERPL-MCNC: 112 MG/DL — HIGH (ref 70–99)
HCT VFR BLD CALC: 26.7 % — LOW (ref 34.5–45)
HDLC SERPL-MCNC: 60 MG/DL — SIGNIFICANT CHANGE UP
HGB BLD-MCNC: 8.8 G/DL — LOW (ref 11.5–15.5)
IMM GRANULOCYTES NFR BLD AUTO: 0.6 % — SIGNIFICANT CHANGE UP (ref 0–0.9)
LIPID PNL WITH DIRECT LDL SERPL: 55 MG/DL — SIGNIFICANT CHANGE UP
LYMPHOCYTES # BLD AUTO: 0.51 K/UL — LOW (ref 1–3.3)
LYMPHOCYTES # BLD AUTO: 5 % — LOW (ref 13–44)
MAGNESIUM SERPL-MCNC: 1.9 MG/DL — SIGNIFICANT CHANGE UP (ref 1.6–2.6)
MCHC RBC-ENTMCNC: 29.7 PG — SIGNIFICANT CHANGE UP (ref 27–34)
MCHC RBC-ENTMCNC: 33 GM/DL — SIGNIFICANT CHANGE UP (ref 32–36)
MCV RBC AUTO: 90.2 FL — SIGNIFICANT CHANGE UP (ref 80–100)
MONOCYTES # BLD AUTO: 1.11 K/UL — HIGH (ref 0–0.9)
MONOCYTES NFR BLD AUTO: 10.8 % — SIGNIFICANT CHANGE UP (ref 2–14)
NEUTROPHILS # BLD AUTO: 8.55 K/UL — HIGH (ref 1.8–7.4)
NEUTROPHILS NFR BLD AUTO: 83.1 % — HIGH (ref 43–77)
NON HDL CHOLESTEROL: 77 MG/DL — SIGNIFICANT CHANGE UP
NRBC # BLD: 0 /100 WBCS — SIGNIFICANT CHANGE UP (ref 0–0)
PHOSPHATE SERPL-MCNC: 3.7 MG/DL — SIGNIFICANT CHANGE UP (ref 2.5–4.5)
PLATELET # BLD AUTO: 215 K/UL — SIGNIFICANT CHANGE UP (ref 150–400)
POTASSIUM SERPL-MCNC: 3.8 MMOL/L — SIGNIFICANT CHANGE UP (ref 3.5–5.3)
POTASSIUM SERPL-SCNC: 3.8 MMOL/L — SIGNIFICANT CHANGE UP (ref 3.5–5.3)
RBC # BLD: 2.96 M/UL — LOW (ref 3.8–5.2)
RBC # FLD: 13.4 % — SIGNIFICANT CHANGE UP (ref 10.3–14.5)
SODIUM SERPL-SCNC: 134 MMOL/L — LOW (ref 135–145)
TRIGL SERPL-MCNC: 109 MG/DL — SIGNIFICANT CHANGE UP
WBC # BLD: 10.28 K/UL — SIGNIFICANT CHANGE UP (ref 3.8–10.5)
WBC # FLD AUTO: 10.28 K/UL — SIGNIFICANT CHANGE UP (ref 3.8–10.5)

## 2023-06-27 PROCEDURE — 99232 SBSQ HOSP IP/OBS MODERATE 35: CPT

## 2023-06-27 PROCEDURE — 70551 MRI BRAIN STEM W/O DYE: CPT | Mod: 26

## 2023-06-27 RX ORDER — ACETAMINOPHEN 500 MG
650 TABLET ORAL EVERY 6 HOURS
Refills: 0 | Status: DISCONTINUED | OUTPATIENT
Start: 2023-06-27 | End: 2023-06-27

## 2023-06-27 RX ORDER — ASPIRIN/CALCIUM CARB/MAGNESIUM 324 MG
81 TABLET ORAL DAILY
Refills: 0 | Status: DISCONTINUED | OUTPATIENT
Start: 2023-06-27 | End: 2023-07-09

## 2023-06-27 RX ORDER — ACETAMINOPHEN 500 MG
1000 TABLET ORAL EVERY 8 HOURS
Refills: 0 | Status: DISCONTINUED | OUTPATIENT
Start: 2023-06-27 | End: 2023-06-30

## 2023-06-27 RX ORDER — ACETAMINOPHEN 500 MG
1000 TABLET ORAL EVERY 8 HOURS
Refills: 0 | Status: DISCONTINUED | OUTPATIENT
Start: 2023-06-27 | End: 2023-06-27

## 2023-06-27 RX ADMIN — LIDOCAINE 1 PATCH: 4 CREAM TOPICAL at 19:35

## 2023-06-27 RX ADMIN — Medication 81 MILLIGRAM(S): at 13:52

## 2023-06-27 RX ADMIN — AMLODIPINE BESYLATE 10 MILLIGRAM(S): 2.5 TABLET ORAL at 05:54

## 2023-06-27 RX ADMIN — LIDOCAINE 1 PATCH: 4 CREAM TOPICAL at 13:52

## 2023-06-27 RX ADMIN — Medication 3 MILLIGRAM(S): at 21:40

## 2023-06-27 RX ADMIN — POLYETHYLENE GLYCOL 3350 17 GRAM(S): 17 POWDER, FOR SOLUTION ORAL at 13:52

## 2023-06-27 RX ADMIN — SIMVASTATIN 10 MILLIGRAM(S): 20 TABLET, FILM COATED ORAL at 21:39

## 2023-06-27 RX ADMIN — ENOXAPARIN SODIUM 40 MILLIGRAM(S): 100 INJECTION SUBCUTANEOUS at 05:54

## 2023-06-27 RX ADMIN — Medication 10 MILLIGRAM(S): at 05:54

## 2023-06-27 RX ADMIN — Medication 1000 MILLIGRAM(S): at 21:39

## 2023-06-27 RX ADMIN — SENNA PLUS 2 TABLET(S): 8.6 TABLET ORAL at 21:39

## 2023-06-27 RX ADMIN — Medication 50 MICROGRAM(S): at 05:54

## 2023-06-27 NOTE — PROGRESS NOTE ADULT - PROBLEM SELECTOR PLAN 2
-CT head shows: Poor visualization of gray matter in the right lentiform nucleus is suspicious for an acute infarct.  A 2.6 x 1.9 cm hypodensity focus in the anterior right temporal lobe and a 1.2 x 1 cm hypoattenuating focus in   the lateral aspect of the left lentiform nucleus versus left subinsular region are suspicious for small acute or subacute infarcts.  -Neurology consulted: Dr. Romero  -f/u MR Brain   -lipid panel WNL (LDL 55), A1c 5.5  -c/w ASA and home simvastatin 10mg

## 2023-06-27 NOTE — PROGRESS NOTE ADULT - ASSESSMENT
82F from [facility] with PMHx HTN, HLD, hypothyroidism p/w pain after being hit by serving cart. CT shows pubic rami fracture. CT head incidentally showing possible acute infarcts. Pending MR brain.

## 2023-06-27 NOTE — PROGRESS NOTE ADULT - SUBJECTIVE AND OBJECTIVE BOX
PGY-1 Progress Note discussed with attending    PAGER #: [[phone]] TILL 5:00 PM  PLEASE CONTACT ON CALL TEAM:  - On Call Team (Please refer to Alissa) FROM 5:00 PM - 8:30PM  - Nightfloat Team FROM 8:30 -7:30 AM    CC: Patient is a 82y old  Female who presents with a chief complaint of pubic rami fracture (26 Jun 2023 16:04)      OVERNIGHT EVENTS:    SUBJECTIVE / INTERVAL HPI: Patient seen and examined at bedside. No acute complaints. Denies pelvic pain. More alert, oriented, and interactive today. However does not answer all questions appropriately. Denies, headache, vision changes, chest pain, shortness of breath, abdominal pain, nausea, vomiting, diarrhea, constipation, and dysuria.     ROS:  CONSTITUTIONAL: No weakness, fevers or chills  EYES/ENT: No visual changes;  No vertigo or throat pain   NECK: No pain or stiffness  RESPIRATORY: No cough, wheezing, hemoptysis; No shortness of breath  CARDIOVASCULAR: No chest pain or palpitations  GASTROINTESTINAL: No abdominal or epigastric pain. No nausea, vomiting, or hematemesis; No diarrhea or constipation. No melena or hematochezia.  GENITOURINARY: No dysuria, frequency or hematuria  NEUROLOGICAL: No numbness or weakness  SKIN: No itching, burning, rashes, or lesions     VITAL SIGNS:  Vital Signs Last 24 Hrs  T(C): 36.8 (27 Jun 2023 11:10), Max: 37 (26 Jun 2023 16:12)  T(F): 98.2 (27 Jun 2023 11:10), Max: 98.6 (26 Jun 2023 16:12)  HR: 97 (27 Jun 2023 11:10) (90 - 99)  BP: 113/66 (27 Jun 2023 11:10) (113/66 - 132/76)  BP(mean): --  RR: 18 (27 Jun 2023 11:10) (17 - 20)  SpO2: 97% (27 Jun 2023 11:10) (94% - 98%)    Parameters below as of 27 Jun 2023 11:10  Patient On (Oxygen Delivery Method): nasal cannula  O2 Flow (L/min): 2      PHYSICAL EXAM:    General: cachectic, NAD  HEENT: NC/AT; PERRL, medial deviation of left eye (strabismus vs amblyopia ex anopsia) with limited lateral horizontal movement. Right EOMI, anicteric sclera; MMM  Neck: supple  Cardiovascular: +S1/S2; RRR  Respiratory: CTA B/L; no W/R/R  Gastrointestinal: soft, NT/ND; +BSx4  Extremities: WWP; no edema, clubbing or cyanosis  Vascular: 2+ radial, DP/PT pulses B/L  Skin: Warm, dry, good turgor, no rashes, or ecchymoses  Neurological: AAOx2-3; medial deviation of left eye (strabismus vs amblyopia ex anopsia) with limited lateral horizontal movement, finger to finger test impaired on right finger to nose normal.     MEDICATIONS:  MEDICATIONS  (STANDING):  amLODIPine   Tablet 10 milliGRAM(s) Oral daily  enalapril 10 milliGRAM(s) Oral daily  enoxaparin Injectable 40 milliGRAM(s) SubCutaneous every 24 hours  levothyroxine 50 MICROGram(s) Oral <User Schedule>  levothyroxine 100 MICROGram(s) Oral <User Schedule>  lidocaine   4% Patch 1 Patch Transdermal daily  polyethylene glycol 3350 17 Gram(s) Oral daily  senna 2 Tablet(s) Oral at bedtime  simvastatin 10 milliGRAM(s) Oral at bedtime    MEDICATIONS  (PRN):  acetaminophen     Tablet .. 650 milliGRAM(s) Oral every 6 hours PRN Moderate Pain (4 - 6)  melatonin 3 milliGRAM(s) Oral at bedtime PRN Insomnia  ondansetron Injectable 4 milliGRAM(s) IV Push every 8 hours PRN Nausea and/or Vomiting      ALLERGIES:  Allergies    No Known Allergies    Intolerances        LABS:                        8.8    10.28 )-----------( 215      ( 27 Jun 2023 05:47 )             26.7     06-27    134<L>  |  98  |  17  ----------------------------<  112<H>  3.8   |  28  |  0.69    Ca    8.7      27 Jun 2023 05:47  Phos  3.7     06-27  Mg     1.9     06-27        Urinalysis Basic - ( 27 Jun 2023 05:47 )    Color: x / Appearance: x / SG: x / pH: x  Gluc: 112 mg/dL / Ketone: x  / Bili: x / Urobili: x   Blood: x / Protein: x / Nitrite: x   Leuk Esterase: x / RBC: x / WBC x   Sq Epi: x / Non Sq Epi: x / Bacteria: x      CAPILLARY BLOOD GLUCOSE          RADIOLOGY & ADDITIONAL TESTS: Reviewed.

## 2023-06-27 NOTE — PROGRESS NOTE ADULT - SUBJECTIVE AND OBJECTIVE BOX
Source of information: ELLIOT VENTURA, Chart review  Patient language: English  : n/a    HPI:  82 year old female, from [facility], with PMHx HTN, HLD, hypothyroidism presents with L arm and leg pain. Pt states that she was walking out of the medicine room in the facility when she was accidentally struck by a serving cart. Pt states that she is not sure if she hit her head but denies LOC. Denies any other complaint including fever/chills, headache, dizziness, chest pain, palpitations cough, SOB, n/v/d/c, dysuria or leg swelling. NKDA. (24 Jun 2023 05:29)      Patient is a 82y old  Female who presents with a chief complaint of pubic rami fracture (24 Jun 2023 05:29)  CT Pelvis bony only demonstrated a comminuted acute left superior ramus fracture. No hip fracture or dislocation. Patient was recommended conservative management with daily PT and is WBAT to LLE.   Pt is admitted s/p fall after she was accidentally struck by a serving cart. Pain service consulted for management of left thigh pain.  Pt seen and examined at bedside, this morning. She reports no pain while laying in bed, unable to rate pain score upon movement and repositioning in bed. Patient reports that pain is exacerbated by movement. Pt was unable to describe quality of left leg pain. Pt tolerating PO diet. Denies lethargy, chest pain, SOB, nausea, vomiting, constipation. Reports last BM this morning.  Patient did not state pain goals. Pt denies taking medications for pain at home.     PAST MEDICAL & SURGICAL HISTORY:    Hypercholesterolemia    HTN (hypertension)    Hypothyroidism    Osteoarthritis    Anemia    No significant past surgical history    FAMILY HISTORY:  No pertinent family history in first degree relatives    Social History:  Pt lives in [facility]. Ambulates with cane at baseline. Denies hx of smoking, EtOH or recreational drug use. (24 Jun 2023 05:29)   [X] Denies ETOH use, illicit drug use and smoking    Allergies    No Known Allergies    MEDICATIONS  (STANDING):  acetaminophen     Tablet .. 1000 milliGRAM(s) Oral every 8 hours  amLODIPine   Tablet 10 milliGRAM(s) Oral daily  aspirin  chewable 81 milliGRAM(s) Oral daily  enalapril 10 milliGRAM(s) Oral daily  enoxaparin Injectable 40 milliGRAM(s) SubCutaneous every 24 hours  levothyroxine 100 MICROGram(s) Oral <User Schedule>  levothyroxine 50 MICROGram(s) Oral <User Schedule>  lidocaine   4% Patch 1 Patch Transdermal daily  polyethylene glycol 3350 17 Gram(s) Oral daily  senna 2 Tablet(s) Oral at bedtime  simvastatin 10 milliGRAM(s) Oral at bedtime    MEDICATIONS  (PRN):  melatonin 3 milliGRAM(s) Oral at bedtime PRN Insomnia  ondansetron Injectable 4 milliGRAM(s) IV Push every 8 hours PRN Nausea and/or Vomiting    Vital Signs Last 24 Hrs  T(C): 36.8 (27 Jun 2023 11:10), Max: 37 (26 Jun 2023 16:12)  T(F): 98.2 (27 Jun 2023 11:10), Max: 98.6 (26 Jun 2023 16:12)  HR: 97 (27 Jun 2023 11:10) (90 - 99)  BP: 113/66 (27 Jun 2023 11:10) (113/66 - 132/76)  BP(mean): --  RR: 18 (27 Jun 2023 11:10) (17 - 20)  SpO2: 97% (27 Jun 2023 11:10) (94% - 98%)    Parameters below as of 27 Jun 2023 11:10  Patient On (Oxygen Delivery Method): nasal cannula  O2 Flow (L/min): 2      LABS: Reviewed.                          8.8    10.28 )-----------( 215      ( 27 Jun 2023 05:47 )             26.7     06-27    134<L>  |  98  |  17  ----------------------------<  112<H>  3.8   |  28  |  0.69    Ca    8.7      27 Jun 2023 05:47  Phos  3.7     06-27  Mg     1.9     06-27    Urinalysis Basic - ( 27 Jun 2023 05:47 )    Color: x / Appearance: x / SG: x / pH: x  Gluc: 112 mg/dL / Ketone: x  / Bili: x / Urobili: x   Blood: x / Protein: x / Nitrite: x   Leuk Esterase: x / RBC: x / WBC x   Sq Epi: x / Non Sq Epi: x / Bacteria: x    CAPILLARY BLOOD GLUCOSE    Radiology: Reviewed.     ACC: [id number] EXAM:  CT PELVIS BONY ONLY   ORDERED BY: TE THORNE   PROCEDURE DATE:  06/23/2023      INTERPRETATION:  HISTORY: Left hip and femur pain status post fall.  None available.  COMPARISON:    PROCEDURE:  CT of the Pelvis was performed.  Sagittal and coronal reformats were performed.    FINDINGS:  BLADDER: Partially distended and suboptimally assessed.  REPRODUCTIVE ORGANS: Uterus and adnexa are within normal limits.    BOWEL: No evidence of bowel obstruction. Moderate fecal burden in the   colon. Appendix is not discretely visualized.  PERITONEUM: No ascites.  VESSELS: Within normal limits.  RETROPERITONEUM/LYMPH NODES: No lymphadenopathy.  ABDOMINAL WALL: Within normal limits.  BONES: Generalized osteopenia. Acute comminuted fracture superior left   pubic ramus with fracture line extending towards the symphyseal joint.   The left inferior ramus is grossly intact although the left cortex   appears somewhat angulated. No lucent fracture line. The femurs are   intact. No hip dislocation. The sacrum appears intact. Grade 2   anterolisthesis of L5 on S1. Degenerative changes.    IMPRESSION:  Comminuted acute left superior ramus fracture. No hip fracture or   dislocation.    --- End of Report ---    ALBARO SMYTH MD; Attending Radiologist  This document has been electronically signed. Jun 23 2023  9:58PM    ORT Score -   Family Hx of substance abuse	Female	      Male  Alcohol 	                                           1                     3  Illegal drugs	                                   2                     3  Rx drugs                                           4 	                  4  Personal Hx of substance abuse		  Alcohol 	                                          3	                  3  Illegal drugs                                     4	                  4  Rx drugs                                            5 	                  5  Age between 16- 45 years	           1                     1  hx preadolescent sexual abuse	   3 	                  0  Psychological disease		  ADD, OCD, bipolar, schizophrenia   2	          2  Depression                                           1 	          1  Total: 0    a score of 3 or lower indicates low risk for opioid abuse		  a score of 4-7 indicates moderate risk for opioid abuse		  a score of 8 or higher indicates high risk for opioid abuse  	  REVIEW OF SYSTEMS:  CONSTITUTIONAL: No fever or fatigue  HEENT:  No difficulty hearing, no change in vision  NECK: No pain or stiffness  RESPIRATORY: No cough, wheezing, chills or hemoptysis; No shortness of breath  CARDIOVASCULAR: No chest pain, palpitations, dizziness, or leg swelling  GASTROINTESTINAL: No loss of appetite, decreased PO intake. No abdominal or epigastric pain. No nausea, vomiting; No diarrhea or constipation.   GENITOURINARY: No dysuria, frequency, hematuria, +  incontinence  MUSCULOSKELETAL: No joint pain or swelling; No muscle, back, or extremity pain, no upper motor strength weakness, +LLE weakness  + falls   NEURO: No headaches, No numbness/tingling b/l LE  PSYCHIATRIC: No depression, anxiety or difficulty sleeping    PHYSICAL EXAM:  GENERAL:  Alert & Oriented X 2-3, cooperative, NAD, Good concentration. Speech is clear.   RESPIRATORY: Respirations even and unlabored. Clear to auscultation bilaterally; No rales, rhonchi, wheezing, or rubs  CARDIOVASCULAR: Normal S1/S2, regular rate and rhythm; No murmurs, rubs, or gallops. No JVD.   GASTROINTESTINAL:  Soft, Nontender, Nondistended; Bowel sounds present  GENITOURINARY: No dysuria, frequency, hematuria, +  incontinence (external urine collection device intact)  PERIPHERAL VASCULAR:  Extremities warm without edema. 2+ Peripheral Pulses, No cyanosis, No calf tenderness  MUSCULOSKELETAL: Motor Strength 5/5 B/L upper and lower extremities; moves all extremities equally against gravity; ROM intact; negative SLR to RLE;  + tenderness on palpation of left inner thigh  SKIN: Warm, dry, intact. No rashes, lesions, scars or wounds, + ecchymosis to L inner thigh      Risk factors associated with adverse outcomes related to opioid treatment  [ ] Concurrent benzodiazepine use  [ ] History/ Active substance use or alcohol use disorder  [ ] Psychiatric co-morbidity  [ ] Sleep apnea  [ ] COPD  [ ] BMI> 35  [ ] Liver dysfunction  [ ] Renal dysfunction  [ ] CHF  [ ] Smoker  [X]  Age > 60 years    [X]  [place]  Reviewed and Copied to Chart. See below.    Plan of care and goal oriented pain management treatment options were discussed with patient and /or primary care giver; all questions and concerns were addressed and care was aligned with patient's wishes.    Educated patient on goal oriented pain management treatment options      Source of information: ELLIOT VENTURA, Chart review  Patient language: English  : n/a    HPI:  82 year old female, from [facility], with PMHx HTN, HLD, hypothyroidism presents with L arm and leg pain. Pt states that she was walking out of the medicine room in the facility when she was accidentally struck by a serving cart. Pt states that she is not sure if she hit her head but denies LOC. Denies any other complaint including fever/chills, headache, dizziness, chest pain, palpitations cough, SOB, n/v/d/c, dysuria or leg swelling. NKDA. (24 Jun 2023 05:29)    Pt is admitted s/p fall after she was accidentally struck by a serving cart. Dx with pubic rami fracture. CT Pelvis bony only demonstrated a comminuted acute left superior ramus fracture. No hip fracture or dislocation. Patient was recommended conservative management with daily PT and is WBAT to LLE.   Pain service consulted for management of left thigh pain.  Pt seen and examined at bedside, this morning. She reports no pain while laying in bed, unable to rate pain score upon movement and repositioning in bed. Patient reports that pain is exacerbated by movement, managed with current pain regimen. Pt was unable to describe quality of left leg pain. Pt tolerating PO diet. Denies lethargy, chest pain, SOB, nausea, vomiting, constipation. Reports last BM this morning.  Patient did not state pain goals. Pt denies taking medications for pain at home.     PAST MEDICAL & SURGICAL HISTORY:    Hypercholesterolemia    HTN (hypertension)    Hypothyroidism    Osteoarthritis    Anemia    No significant past surgical history    FAMILY HISTORY:  No pertinent family history in first degree relatives    Social History:  Pt lives in [facility]. Ambulates with cane at baseline. Denies hx of smoking, EtOH or recreational drug use. (24 Jun 2023 05:29)   [X] Denies ETOH use, illicit drug use and smoking    Allergies    No Known Allergies    MEDICATIONS  (STANDING):  acetaminophen     Tablet .. 1000 milliGRAM(s) Oral every 8 hours  amLODIPine   Tablet 10 milliGRAM(s) Oral daily  aspirin  chewable 81 milliGRAM(s) Oral daily  enalapril 10 milliGRAM(s) Oral daily  enoxaparin Injectable 40 milliGRAM(s) SubCutaneous every 24 hours  levothyroxine 100 MICROGram(s) Oral <User Schedule>  levothyroxine 50 MICROGram(s) Oral <User Schedule>  lidocaine   4% Patch 1 Patch Transdermal daily  polyethylene glycol 3350 17 Gram(s) Oral daily  senna 2 Tablet(s) Oral at bedtime  simvastatin 10 milliGRAM(s) Oral at bedtime    MEDICATIONS  (PRN):  melatonin 3 milliGRAM(s) Oral at bedtime PRN Insomnia  ondansetron Injectable 4 milliGRAM(s) IV Push every 8 hours PRN Nausea and/or Vomiting    Vital Signs Last 24 Hrs  T(C): 36.8 (27 Jun 2023 11:10), Max: 37 (26 Jun 2023 16:12)  T(F): 98.2 (27 Jun 2023 11:10), Max: 98.6 (26 Jun 2023 16:12)  HR: 97 (27 Jun 2023 11:10) (90 - 99)  BP: 113/66 (27 Jun 2023 11:10) (113/66 - 132/76)  BP(mean): --  RR: 18 (27 Jun 2023 11:10) (17 - 20)  SpO2: 97% (27 Jun 2023 11:10) (94% - 98%)    Parameters below as of 27 Jun 2023 11:10  Patient On (Oxygen Delivery Method): nasal cannula  O2 Flow (L/min): 2      LABS: Reviewed.                          8.8    10.28 )-----------( 215      ( 27 Jun 2023 05:47 )             26.7     06-27    134<L>  |  98  |  17  ----------------------------<  112<H>  3.8   |  28  |  0.69    Ca    8.7      27 Jun 2023 05:47  Phos  3.7     06-27  Mg     1.9     06-27    Urinalysis Basic - ( 27 Jun 2023 05:47 )    Color: x / Appearance: x / SG: x / pH: x  Gluc: 112 mg/dL / Ketone: x  / Bili: x / Urobili: x   Blood: x / Protein: x / Nitrite: x   Leuk Esterase: x / RBC: x / WBC x   Sq Epi: x / Non Sq Epi: x / Bacteria: x    CAPILLARY BLOOD GLUCOSE    Radiology: Reviewed.     ACC: [id number] EXAM:  CT PELVIS BONY ONLY   ORDERED BY: TE THORNE   PROCEDURE DATE:  06/23/2023      INTERPRETATION:  HISTORY: Left hip and femur pain status post fall.  None available.  COMPARISON:    PROCEDURE:  CT of the Pelvis was performed.  Sagittal and coronal reformats were performed.    FINDINGS:  BLADDER: Partially distended and suboptimally assessed.  REPRODUCTIVE ORGANS: Uterus and adnexa are within normal limits.    BOWEL: No evidence of bowel obstruction. Moderate fecal burden in the   colon. Appendix is not discretely visualized.  PERITONEUM: No ascites.  VESSELS: Within normal limits.  RETROPERITONEUM/LYMPH NODES: No lymphadenopathy.  ABDOMINAL WALL: Within normal limits.  BONES: Generalized osteopenia. Acute comminuted fracture superior left   pubic ramus with fracture line extending towards the symphyseal joint.   The left inferior ramus is grossly intact although the left cortex   appears somewhat angulated. No lucent fracture line. The femurs are   intact. No hip dislocation. The sacrum appears intact. Grade 2   anterolisthesis of L5 on S1. Degenerative changes.    IMPRESSION:  Comminuted acute left superior ramus fracture. No hip fracture or   dislocation.    --- End of Report ---    ALBARO SMYTH MD; Attending Radiologist  This document has been electronically signed. Jun 23 2023  9:58PM    ORT Score -   Family Hx of substance abuse	Female	      Male  Alcohol 	                                           1                     3  Illegal drugs	                                   2                     3  Rx drugs                                           4 	                  4  Personal Hx of substance abuse		  Alcohol 	                                          3	                  3  Illegal drugs                                     4	                  4  Rx drugs                                            5 	                  5  Age between 16- 45 years	           1                     1  hx preadolescent sexual abuse	   3 	                  0  Psychological disease		  ADD, OCD, bipolar, schizophrenia   2	          2  Depression                                           1 	          1  Total: 0    a score of 3 or lower indicates low risk for opioid abuse		  a score of 4-7 indicates moderate risk for opioid abuse		  a score of 8 or higher indicates high risk for opioid abuse  	  REVIEW OF SYSTEMS:  CONSTITUTIONAL: No fever or fatigue  HEENT:  No difficulty hearing, no change in vision  NECK: No pain or stiffness  RESPIRATORY: No cough, wheezing, chills or hemoptysis; No shortness of breath  CARDIOVASCULAR: No chest pain, palpitations, dizziness, or leg swelling  GASTROINTESTINAL: No loss of appetite, decreased PO intake. No abdominal or epigastric pain. No nausea, vomiting; No diarrhea or constipation.   GENITOURINARY: No dysuria, frequency, hematuria, +  incontinence  MUSCULOSKELETAL: + intermittent left hip pain. No joint swelling; no upper motor strength weakness, +LLE weakness  + falls   NEURO: No headaches, No numbness/tingling b/l LE  PSYCHIATRIC: No depression, anxiety or difficulty sleeping    PHYSICAL EXAM:  GENERAL:  Alert & Oriented X 2-3, cooperative, NAD, Good concentration. Speech is clear.   RESPIRATORY: Respirations even and unlabored. Clear to auscultation bilaterally; No rales, rhonchi, wheezing, or rubs  CARDIOVASCULAR: Normal S1/S2, regular rate and rhythm; No murmurs, rubs, or gallops. No JVD.   GASTROINTESTINAL:  Soft, Nontender, Nondistended; Bowel sounds present  GENITOURINARY: No dysuria, frequency, hematuria, +  incontinence (external urine collection device intact)  PERIPHERAL VASCULAR:  Extremities warm without edema. 2+ Peripheral Pulses, No cyanosis, No calf tenderness  MUSCULOSKELETAL: Motor Strength 5/5 B/L upper and 4/5 lower extremities; moves all extremities equally against gravity; + limited ROM over left lower extremity; negative SLR to RLE;  + tenderness on palpation of left inner thigh  SKIN: Warm, dry, intact. No rashes, lesions, scars or wounds, + ecchymosis to L inner thigh      Risk factors associated with adverse outcomes related to opioid treatment  [ ] Concurrent benzodiazepine use  [ ] History/ Active substance use or alcohol use disorder  [ ] Psychiatric co-morbidity  [ ] Sleep apnea  [ ] COPD  [ ] BMI> 35  [ ] Liver dysfunction  [ ] Renal dysfunction  [ ] CHF  [ ] Smoker  [X]  Age > 60 years    [X]  [place]  Reviewed and Copied to Chart. See below.    Plan of care and goal oriented pain management treatment options were discussed with patient and /or primary care giver; all questions and concerns were addressed and care was aligned with patient's wishes.    Educated patient on goal oriented pain management treatment options

## 2023-06-27 NOTE — PROGRESS NOTE ADULT - ASSESSMENT
Search Terms: Justa Davalos, 1940 Search Date: 06/24/2023 16:22:53 PM    The Drug Utilization Report below displays all of the controlled substance prescriptions, if any, that your patient has filled in the last twelve months. The information displayed on this report is compiled from pharmacy submissions to the Department, and accurately reflects the information as submitted by the pharmacies.    This report was requested by: Frances Gonzales | Reference #: [id number]    There are no results for the search terms that you entered.

## 2023-06-27 NOTE — PROGRESS NOTE ADULT - ASSESSMENT
82F from long-term with PMHx HTN, HLD, hypothyroidism p/w pain after being hit by serving cart. CT shows pubic rami fracture. CT head incidentally showing possible acute infarcts. Pending MR brain.

## 2023-06-27 NOTE — PROGRESS NOTE ADULT - SUBJECTIVE AND OBJECTIVE BOX
PATIENT SEEN AND EXAMINED ON :- 6/27/23  DATE OF SERVICE:   6/27/23          Interim events noted,Labs ,Radiological studies and Cardiology tests reviewed .    MR#[id number]  PATIENT NAME:-[facility] COURSE: HPI:  82 year old female, from [facility], with PMHx HTN, HLD, hypothyroidism presents with L arm and leg pain. Pt states that she was walking out of the medicine room in the facility when she was accidentally struck by a serving cart. Pt states that she is not sure if she hit her head but denies LOC. Denies any other complaint including fever/chills, headache, dizziness, chest pain, palpitations cough, SOB, n/v/d/c, dysuria or leg swelling. NKDA. (24 Jun 2023 05:29)      INTERIM EVENTS:Patient seen at bedside ,interim events noted.      PMH -reviewed admission note, no change since admission  HEART FAILURE: Acute[ ]Chronic[ ] Systolic[ ] Diastolic[ ] Combined Systolic and Diastolic[ ]  CAD[ ] CABG[ ] PCI[ ]  DEVICES[ ] PPM[ ] ICD[ ] ILR[ ]  ATRIAL FIBRILLATION[ ] Paroxysmal[ ] Permanent[ ] CHADS2-[  ]  ROXI[ ] CKD1[ ] CKD2[ ] CKD3[ ] CKD4[ ] ESRD[ ]  COPD[ ] HTN[ ]   DM[ ] Type1[ ] Type 2[ ]   CVA[ ] Paresis[ ]    AMBULATION: Assisted[ ] Cane/walker[ ] Independent[ ]    MEDICATIONS  (STANDING):  acetaminophen     Tablet .. 1000 milliGRAM(s) Oral every 8 hours  amLODIPine   Tablet 10 milliGRAM(s) Oral daily  aspirin  chewable 81 milliGRAM(s) Oral daily  enalapril 10 milliGRAM(s) Oral daily  enoxaparin Injectable 40 milliGRAM(s) SubCutaneous every 24 hours  levothyroxine 50 MICROGram(s) Oral <User Schedule>  levothyroxine 100 MICROGram(s) Oral <User Schedule>  lidocaine   4% Patch 1 Patch Transdermal daily  polyethylene glycol 3350 17 Gram(s) Oral daily  senna 2 Tablet(s) Oral at bedtime  simvastatin 10 milliGRAM(s) Oral at bedtime    MEDICATIONS  (PRN):  melatonin 3 milliGRAM(s) Oral at bedtime PRN Insomnia  ondansetron Injectable 4 milliGRAM(s) IV Push every 8 hours PRN Nausea and/or Vomiting            REVIEW OF SYSTEMS:  Constitutional: [ ] fever, [ ]weight loss,  [ ]fatigue [ ]weight gain  Eyes: [ ] visual changes  Respiratory: [ ]shortness of breath;  [ ] cough, [ ]wheezing, [ ]chills, [ ]hemoptysis  Cardiovascular: [ ] chest pain, [ ]palpitations, [ ]dizziness,  [ ]leg swelling[ ]orthopnea[ ]PND  Gastrointestinal: [ ] abdominal pain, [ ]nausea, [ ]vomiting,  [ ]diarrhea [ ]Constipation [ ]Melena  Genitourinary: [ ] dysuria, [ ] hematuria [ ]Love  Neurologic: [ ] headaches [ ] tremors[ ]weakness [ ]Paralysis Right[ ] Left[ ]  Skin: [ ] itching, [ ]burning, [ ] rashes  Endocrine: [ ] heat or cold intolerance  Musculoskeletal: [ ] joint pain or swelling; [ ] muscle, back, or extremity pain  Psychiatric: [ ] depression, [ ]anxiety, [ ]mood swings, or [ ]difficulty sleeping  Hematologic: [ ] easy bruising, [ ] bleeding gums    [ ] All remaining systems negative except as per above.   [ ]Unable to obtain.  [x] No change in ROS since admission      Vital Signs Last 24 Hrs  T(C): 36.8 (27 Jun 2023 15:22), Max: 36.9 (27 Jun 2023 04:51)  T(F): 98.2 (27 Jun 2023 15:22), Max: 98.4 (27 Jun 2023 04:51)  HR: 95 (27 Jun 2023 15:22) (92 - 98)  BP: 121/67 (27 Jun 2023 15:22) (113/66 - 132/76)  BP(mean): --  RR: 20 (27 Jun 2023 15:22) (18 - 20)  SpO2: 92% (27 Jun 2023 15:22) (92% - 98%)    Parameters below as of 27 Jun 2023 15:22  Patient On (Oxygen Delivery Method): nasal cannula  O2 Flow (L/min): 2    I&O's Summary      PHYSICAL EXAM:  General: No acute distress BMI-  HEENT: EOMI, PERRL  Neck: Supple, [ ] JVD  Lungs: Equal air entry bilaterally; [ ] rales [ ] wheezing [ ] rhonchi  Heart: Regular rate and rhythm; [x ] murmur   2/6 [ x] systolic [ ] diastolic [ ] radiation[ ] rubs [ ]  gallops  Abdomen: Nontender, bowel sounds present  Extremities: No clubbing, cyanosis, [ ] edema [ ]Pulses  equal and intact  Nervous system:  Alert & Oriented X3, no focal deficits  Psychiatric: Normal affect  Skin: No rashes or lesions    LABS:  06-27    134<L>  |  98  |  17  ----------------------------<  112<H>  3.8   |  28  |  0.69    Ca    8.7      27 Jun 2023 05:47  Phos  3.7     06-27  Mg     1.9     06-27      Creatinine Trend: 0.69<--, 0.78<--, 0.74<--, 0.96<--, 0.86<--                        8.8    10.28 )-----------( 215      ( 27 Jun 2023 05:47 )             26.7

## 2023-06-27 NOTE — PROGRESS NOTE ADULT - PROBLEM SELECTOR PLAN 1
Pt was admitted on 6/24 s/p fall. Pt with pain which is somatic in nature s/p fall after she was accidentally struck by a serving cart. Pt with report of left inner thigh pain upon movement. CT Pelvis bony only demonstrated a comminuted acute left superior ramus fracture. No hip fracture or dislocation. The sacrum appears intact. Grade 2 anterolisthesis of L5 on S1. Degenerative changes.    No Opioid pain recommendations at this time. Will maximize the effects of Non-opioid pain medications.   - Increase Acetaminophen 650 mg PO q 8 to 1gram PO q 8hours. Monitor LFTs (to maximize OOB activities with PT)  - Consider adding Gabapentin 100mg po q 8 hours if pain increases and warrants adding. (Monitor renal function).   - Lidoderm 4% patch daily.   Bowel Regimen  - Miralax 17G PO daily  - Senna 2 tablets at bedtime for constipation  Mild pain   - Non-pharmacological pain treatment recommendations  - Warm/ Cool packs PRN   - Repositioning extremity, elevation, imagery, relaxation, distraction.  - Physical therapy OOB if no contraindications   Recommendations discussed with primary team and RN. Pt with acute left hip/ thigh pain which is somatic in nature due to comminuted acute left superior ramus fracture, s/p fall.  CT Pelvis bony only demonstrated a comminuted acute left superior ramus fracture. No hip fracture or dislocation. The sacrum appears intact. Grade 2 anterolisthesis of L5 on S1. Degenerative changes.   High risk medications reviewed. Avoid polypharmacy. Avoid IV opioids. Avoid NSAIDs and benzodiazepines. Non-pharmacological sleep aides initiated. Non-opioid medications and non-pharmacological pain management measures initiated.    No Opioid pain recommendations at this time. Will maximize non-opioid pain medications.   - Increase Acetaminophen 650 mg PO q 8 to 1gram PO q 8 hours x4 days. Monitor LFTs.   - Continue Lidoderm 4% patch daily.   Bowel Regimen  - Continue Miralax 17G PO daily. Hold for diarrhea/ loose stools  - Continue Senna 2 tablets at bedtime for constipation   Mild pain   - Non-pharmacological pain treatment recommendations  - Warm/ Cool packs PRN   - Repositioning extremity, elevation, imagery, relaxation, distraction.  - Physical therapy OOB if no contraindications   Recommendations discussed with primary team and RN.

## 2023-06-28 DIAGNOSIS — D64.9 ANEMIA, UNSPECIFIED: ICD-10-CM

## 2023-06-28 DIAGNOSIS — R82.71 BACTERIURIA: ICD-10-CM

## 2023-06-28 DIAGNOSIS — G93.89 OTHER SPECIFIED DISORDERS OF BRAIN: ICD-10-CM

## 2023-06-28 DIAGNOSIS — I51.89 OTHER ILL-DEFINED HEART DISEASES: ICD-10-CM

## 2023-06-28 LAB
-  AMIKACIN: SIGNIFICANT CHANGE UP
-  AMOXICILLIN/CLAVULANIC ACID: SIGNIFICANT CHANGE UP
-  AMPICILLIN/SULBACTAM: SIGNIFICANT CHANGE UP
-  AMPICILLIN: SIGNIFICANT CHANGE UP
-  AZTREONAM: SIGNIFICANT CHANGE UP
-  CEFAZOLIN: SIGNIFICANT CHANGE UP
-  CEFEPIME: SIGNIFICANT CHANGE UP
-  CEFOXITIN: SIGNIFICANT CHANGE UP
-  CEFTRIAXONE: SIGNIFICANT CHANGE UP
-  CEFUROXIME: SIGNIFICANT CHANGE UP
-  CIPROFLOXACIN: SIGNIFICANT CHANGE UP
-  ERTAPENEM: SIGNIFICANT CHANGE UP
-  GENTAMICIN: SIGNIFICANT CHANGE UP
-  IMIPENEM: SIGNIFICANT CHANGE UP
-  LEVOFLOXACIN: SIGNIFICANT CHANGE UP
-  MEROPENEM: SIGNIFICANT CHANGE UP
-  NITROFURANTOIN: SIGNIFICANT CHANGE UP
-  PIPERACILLIN/TAZOBACTAM: SIGNIFICANT CHANGE UP
-  TOBRAMYCIN: SIGNIFICANT CHANGE UP
-  TRIMETHOPRIM/SULFAMETHOXAZOLE: SIGNIFICANT CHANGE UP
ANION GAP SERPL CALC-SCNC: 11 MMOL/L — SIGNIFICANT CHANGE UP (ref 5–17)
BASOPHILS # BLD AUTO: 0.03 K/UL — SIGNIFICANT CHANGE UP (ref 0–0.2)
BASOPHILS NFR BLD AUTO: 0.4 % — SIGNIFICANT CHANGE UP (ref 0–2)
BUN SERPL-MCNC: 19 MG/DL — HIGH (ref 7–18)
CALCIUM SERPL-MCNC: 8.4 MG/DL — SIGNIFICANT CHANGE UP (ref 8.4–10.5)
CHLORIDE SERPL-SCNC: 96 MMOL/L — SIGNIFICANT CHANGE UP (ref 96–108)
CO2 SERPL-SCNC: 25 MMOL/L — SIGNIFICANT CHANGE UP (ref 22–31)
CREAT SERPL-MCNC: 0.63 MG/DL — SIGNIFICANT CHANGE UP (ref 0.5–1.3)
CULTURE RESULTS: SIGNIFICANT CHANGE UP
EGFR: 89 ML/MIN/1.73M2 — SIGNIFICANT CHANGE UP
EOSINOPHIL # BLD AUTO: 0.02 K/UL — SIGNIFICANT CHANGE UP (ref 0–0.5)
EOSINOPHIL NFR BLD AUTO: 0.3 % — SIGNIFICANT CHANGE UP (ref 0–6)
GLUCOSE SERPL-MCNC: 103 MG/DL — HIGH (ref 70–99)
HCT VFR BLD CALC: 26.3 % — LOW (ref 34.5–45)
HGB BLD-MCNC: 8.9 G/DL — LOW (ref 11.5–15.5)
IMM GRANULOCYTES NFR BLD AUTO: 0.5 % — SIGNIFICANT CHANGE UP (ref 0–0.9)
IRON SATN MFR SERPL: 12 % — LOW (ref 15–50)
IRON SATN MFR SERPL: 33 UG/DL — LOW (ref 40–150)
LYMPHOCYTES # BLD AUTO: 0.71 K/UL — LOW (ref 1–3.3)
LYMPHOCYTES # BLD AUTO: 9.4 % — LOW (ref 13–44)
MAGNESIUM SERPL-MCNC: 1.8 MG/DL — SIGNIFICANT CHANGE UP (ref 1.6–2.6)
MCHC RBC-ENTMCNC: 29.8 PG — SIGNIFICANT CHANGE UP (ref 27–34)
MCHC RBC-ENTMCNC: 33.8 GM/DL — SIGNIFICANT CHANGE UP (ref 32–36)
MCV RBC AUTO: 88 FL — SIGNIFICANT CHANGE UP (ref 80–100)
METHOD TYPE: SIGNIFICANT CHANGE UP
MONOCYTES # BLD AUTO: 1.03 K/UL — HIGH (ref 0–0.9)
MONOCYTES NFR BLD AUTO: 13.7 % — SIGNIFICANT CHANGE UP (ref 2–14)
NEUTROPHILS # BLD AUTO: 5.71 K/UL — SIGNIFICANT CHANGE UP (ref 1.8–7.4)
NEUTROPHILS NFR BLD AUTO: 75.7 % — SIGNIFICANT CHANGE UP (ref 43–77)
NRBC # BLD: 0 /100 WBCS — SIGNIFICANT CHANGE UP (ref 0–0)
ORGANISM # SPEC MICROSCOPIC CNT: SIGNIFICANT CHANGE UP
ORGANISM # SPEC MICROSCOPIC CNT: SIGNIFICANT CHANGE UP
PHOSPHATE SERPL-MCNC: 3.6 MG/DL — SIGNIFICANT CHANGE UP (ref 2.5–4.5)
PLATELET # BLD AUTO: 251 K/UL — SIGNIFICANT CHANGE UP (ref 150–400)
POTASSIUM SERPL-MCNC: 3.7 MMOL/L — SIGNIFICANT CHANGE UP (ref 3.5–5.3)
POTASSIUM SERPL-SCNC: 3.7 MMOL/L — SIGNIFICANT CHANGE UP (ref 3.5–5.3)
RBC # BLD: 2.99 M/UL — LOW (ref 3.8–5.2)
RBC # FLD: 13.3 % — SIGNIFICANT CHANGE UP (ref 10.3–14.5)
SARS-COV-2 RNA SPEC QL NAA+PROBE: SIGNIFICANT CHANGE UP
SODIUM SERPL-SCNC: 132 MMOL/L — LOW (ref 135–145)
SPECIMEN SOURCE: SIGNIFICANT CHANGE UP
TIBC SERPL-MCNC: 269 UG/DL — SIGNIFICANT CHANGE UP (ref 250–450)
UIBC SERPL-MCNC: 236 UG/DL — SIGNIFICANT CHANGE UP (ref 110–370)
WBC # BLD: 7.54 K/UL — SIGNIFICANT CHANGE UP (ref 3.8–10.5)
WBC # FLD AUTO: 7.54 K/UL — SIGNIFICANT CHANGE UP (ref 3.8–10.5)

## 2023-06-28 PROCEDURE — 99233 SBSQ HOSP IP/OBS HIGH 50: CPT | Mod: FS

## 2023-06-28 PROCEDURE — 71260 CT THORAX DX C+: CPT | Mod: 26

## 2023-06-28 PROCEDURE — 74177 CT ABD & PELVIS W/CONTRAST: CPT | Mod: 26

## 2023-06-28 RX ORDER — DIATRIZOATE MEGLUMINE 180 MG/ML
30 INJECTION, SOLUTION INTRAVESICAL ONCE
Refills: 0 | Status: COMPLETED | OUTPATIENT
Start: 2023-06-28 | End: 2023-06-28

## 2023-06-28 RX ORDER — SODIUM CHLORIDE 9 MG/ML
1000 INJECTION INTRAMUSCULAR; INTRAVENOUS; SUBCUTANEOUS
Refills: 0 | Status: DISCONTINUED | OUTPATIENT
Start: 2023-06-28 | End: 2023-07-06

## 2023-06-28 RX ADMIN — DIATRIZOATE MEGLUMINE 30 MILLILITER(S): 180 INJECTION, SOLUTION INTRAVESICAL at 11:49

## 2023-06-28 RX ADMIN — LIDOCAINE 1 PATCH: 4 CREAM TOPICAL at 01:00

## 2023-06-28 RX ADMIN — Medication 50 MICROGRAM(S): at 05:03

## 2023-06-28 RX ADMIN — AMLODIPINE BESYLATE 10 MILLIGRAM(S): 2.5 TABLET ORAL at 05:03

## 2023-06-28 RX ADMIN — Medication 1000 MILLIGRAM(S): at 21:57

## 2023-06-28 RX ADMIN — Medication 10 MILLIGRAM(S): at 05:03

## 2023-06-28 RX ADMIN — Medication 1000 MILLIGRAM(S): at 05:03

## 2023-06-28 RX ADMIN — Medication 81 MILLIGRAM(S): at 11:37

## 2023-06-28 RX ADMIN — Medication 1000 MILLIGRAM(S): at 21:27

## 2023-06-28 RX ADMIN — ENOXAPARIN SODIUM 40 MILLIGRAM(S): 100 INJECTION SUBCUTANEOUS at 05:03

## 2023-06-28 RX ADMIN — SIMVASTATIN 10 MILLIGRAM(S): 20 TABLET, FILM COATED ORAL at 21:28

## 2023-06-28 RX ADMIN — POLYETHYLENE GLYCOL 3350 17 GRAM(S): 17 POWDER, FOR SOLUTION ORAL at 11:37

## 2023-06-28 RX ADMIN — SENNA PLUS 2 TABLET(S): 8.6 TABLET ORAL at 21:28

## 2023-06-28 RX ADMIN — SODIUM CHLORIDE 90 MILLILITER(S): 9 INJECTION INTRAMUSCULAR; INTRAVENOUS; SUBCUTANEOUS at 08:32

## 2023-06-28 NOTE — PROGRESS NOTE ADULT - SUBJECTIVE AND OBJECTIVE BOX
NEUROLOGY FOLLOW-UP NOTE    NAME:  ELLIOT VENTURA      ASSESSMENT:  82 RHF with concern for right temporal neoplastic lesion in the setting of public rami fracture      RECOMMENDATIONS:    1. Stroke/Neoplasm workup  - MRI Brain w/wo Gadolinium approved for further evaluation of right temporal lesion (if there are no contraindications)  - CT Chest/Abdomen/Pelvis w/ IV contrast to evaluate for primary neoplasm source  - Hematology/Oncology consult for further recommendations on neoplastic workup  - Telemetry monitoring while inpatient    2. Stroke prevention  - Q4H Neurochecks & Vital signs  - Confirm that patient has passed bedside swallow evaluation before administering any PO meds  - Aspirin 81mg PO Daily (or Aspirin 300mg [place] daily if NPO)  - Simvastatin 10mg PO QHS if patient is not NPO (higher dose or change in statin is not needed since LDL < 70 mg/dL on Simvastatin 10mg PO QHS)  - Treat BP if over 140/90 (goal /80) - Maintain home antihypertensive medications, No role for permissive hypertension at this time  - Diabetes management as per primary team  - PT/OT  - DVT ppx: SCDs, Enoxaparin          NOTE TO BE COMPLETED - PLEASE REFER TO ABOVE ONLY AND IGNORE INFORMATION BELOW    ******************************    HPI:  82 year old female, from [facility], with PMHx HTN, HLD, hypothyroidism presents with L arm and leg pain. Pt states that she was walking out of the medicine room in the facility when she was accidentally struck by a serving cart. Pt states that she is not sure if she hit her head but denies LOC. Denies any other complaint including fever/chills, headache, dizziness, chest pain, palpitations cough, SOB, n/v/d/c, dysuria or leg swelling. NKDA. (24 Jun 2023 05:29)      NEURO HPI:      INTERVAL HISTORY:      MEDICATIONS:  acetaminophen     Tablet .. 1000 milliGRAM(s) Oral every 8 hours  amLODIPine   Tablet 10 milliGRAM(s) Oral daily  aspirin  chewable 81 milliGRAM(s) Oral daily  enalapril 10 milliGRAM(s) Oral daily  enoxaparin Injectable 40 milliGRAM(s) SubCutaneous every 24 hours  levothyroxine 50 MICROGram(s) Oral <User Schedule>  levothyroxine 100 MICROGram(s) Oral <User Schedule>  lidocaine   4% Patch 1 Patch Transdermal daily  melatonin 3 milliGRAM(s) Oral at bedtime PRN  ondansetron Injectable 4 milliGRAM(s) IV Push every 8 hours PRN  polyethylene glycol 3350 17 Gram(s) Oral daily  senna 2 Tablet(s) Oral at bedtime  simvastatin 10 milliGRAM(s) Oral at bedtime  sodium chloride 0.9%. 1000 milliLiter(s) IV Continuous <Continuous>      ALLERGIES:  No Known Allergies      REVIEW OF SYSTEMS:  Fourteen systems reviewed and negative except as in HPI / Interval History.          OBJECTIVE:  Vital Signs Last 24 Hrs  T(C): 37 (28 Jun 2023 11:23), Max: 37 (28 Jun 2023 11:23)  T(F): 98.6 (28 Jun 2023 11:23), Max: 98.6 (28 Jun 2023 11:23)  HR: 88 (28 Jun 2023 11:23) (87 - 100)  BP: 129/64 (28 Jun 2023 11:23) (111/63 - 129/64)  BP(mean): --  RR: 18 (28 Jun 2023 11:23) (18 - 20)  SpO2: 97% (28 Jun 2023 11:23) (92% - 97%)    Parameters below as of 28 Jun 2023 11:23  Patient On (Oxygen Delivery Method): nasal cannula  O2 Flow (L/min): 3      General Examination:  General: No acute distress  HEENT: Atraumatic, Normocephalic  Respiratory: CTA B/l.  No crackles, rhonchi, or wheezes.  Cardiovascular: RRR.  Normal S1 & S2.  Normal b/l radial and pedal pulses.    Neurological Examination:  General / Mental Status: AAO x 3.  No aphasia or dysarthria.  Naming and repetition intact.  Cranial Nerves: VFF x 4.  PERRL.  EOMI x 2, No nystagmus or diplopia.  B/l V1-V3 equal and intact to light touch and pinprick.  Symmetric facial movement and palate elevation.  B/l hearing equal to finger rub.  5/5 strength with b/l sternocleidomastoid and trapezius.  Midline tongue protrusion, with no atrophy or fasciculations.  Motor: Normal bulk & tone in all four extremities.  5/5 strength throughout all four extremities.  No downward drift, rigidity, spasticity, or tremors in any of the four extremities.  Sensory: Intact to light touch and pinprick in all four extremities.  Negative Romberg.  Reflex: 2+ and symmetric at b/l biceps, triceps, brachioradialis, patellae, and ankles.  Downgoing toes b/l.  Coordination: No dysmetria with b/l finger-to-nose and heel raise tests.  Symmetric rapid alternating movements b/l.  Gait: Normal, narrow-based gait.  No difficulty with tiptoe, heel, and tandem gaits.        LABORATORY VALUES:                          8.9    7.54  )-----------( 251      ( 28 Jun 2023 05:21 )             26.3       06-28    132<L>  |  96  |  19<H>  ----------------------------<  103<H>  3.7   |  25  |  0.63    Ca    8.4      28 Jun 2023 05:21  Phos  3.6     06-28  Mg     1.8     06-28 06-27 Chol 137 LDL -- HDL 60 Trig 109, 06-26 Chol 147 LDL -- HDL 63 Trig 130    Glucose Trend  06-28-23 @ 05:21   -  103<H> -- --  06-27-23 @ 05:47   -  112<H> -- --  06-26-23 @ 05:55   -  97 -- --                    NEUROIMAGING:          Please contact the Neurology consult service with any neurological questions.      Timoteo Romero MD   of Neurology  [facility] at [facility]         NEUROLOGY FOLLOW-UP NOTE    NAME:  ELLIOT VENTURA      ASSESSMENT:  82 RHF with concern for right temporal neoplastic lesion in the setting of pubic rami fracture      RECOMMENDATIONS:    1. Stroke/Neoplasm workup  - MRI Brain w/wo Gadolinium approved for further evaluation of right temporal lesion (if there are no contraindications)  - CT Chest/Abdomen/Pelvis w/ IV contrast to evaluate for primary neoplasm source  - Hematology/Oncology consult for further recommendations on neoplastic workup  - Telemetry monitoring while inpatient    2. Stroke prevention  - Q4H Neurochecks & Vital signs  - Confirm that patient has passed bedside swallow evaluation before administering any PO meds  - Aspirin 81mg PO Daily (or Aspirin 300mg CT daily if NPO)  - Simvastatin 10mg PO QHS if patient is not NPO (higher dose or change in statin is not needed since LDL < 70 mg/dL on Simvastatin 10mg PO QHS)  - Treat BP if over 140/90 (goal /80) - Maintain home antihypertensive medications, No role for permissive hypertension at this time  - Diabetes management as per primary team  - PT/OT  - DVT ppx: SCDs, Enoxaparin          ******************************    HPI:  82 year old female, from [facility], with PMHx HTN, HLD, hypothyroidism presents with L arm and leg pain. Pt states that she was walking out of the medicine room in the facility when she was accidentally struck by a serving cart. Pt states that she is not sure if she hit her head but denies LOC. Denies any other complaint including fever/chills, headache, dizziness, chest pain, palpitations cough, SOB, n/v/d/c, dysuria or leg swelling. NKDA. (24 Jun 2023 05:29)      NEURO HPI:      INTERVAL HISTORY:      MEDICATIONS:  acetaminophen     Tablet .. 1000 milliGRAM(s) Oral every 8 hours  amLODIPine   Tablet 10 milliGRAM(s) Oral daily  aspirin  chewable 81 milliGRAM(s) Oral daily  enalapril 10 milliGRAM(s) Oral daily  enoxaparin Injectable 40 milliGRAM(s) SubCutaneous every 24 hours  levothyroxine 50 MICROGram(s) Oral <User Schedule>  levothyroxine 100 MICROGram(s) Oral <User Schedule>  lidocaine   4% Patch 1 Patch Transdermal daily  melatonin 3 milliGRAM(s) Oral at bedtime PRN  ondansetron Injectable 4 milliGRAM(s) IV Push every 8 hours PRN  polyethylene glycol 3350 17 Gram(s) Oral daily  senna 2 Tablet(s) Oral at bedtime  simvastatin 10 milliGRAM(s) Oral at bedtime  sodium chloride 0.9%. 1000 milliLiter(s) IV Continuous <Continuous>      ALLERGIES:  No Known Allergies      REVIEW OF SYSTEMS:  Fourteen systems reviewed and negative except as in HPI / Interval History.          OBJECTIVE:  Vital Signs Last 24 Hrs  T(C): 37 (28 Jun 2023 11:23), Max: 37 (28 Jun 2023 11:23)  T(F): 98.6 (28 Jun 2023 11:23), Max: 98.6 (28 Jun 2023 11:23)  HR: 88 (28 Jun 2023 11:23) (87 - 100)  BP: 129/64 (28 Jun 2023 11:23) (111/63 - 129/64)  BP(mean): --  RR: 18 (28 Jun 2023 11:23) (18 - 20)  SpO2: 97% (28 Jun 2023 11:23) (92% - 97%)    Parameters below as of 28 Jun 2023 11:23  Patient On (Oxygen Delivery Method): nasal cannula  O2 Flow (L/min): 3      General Examination:  General: No acute distress  HEENT: Atraumatic, Normocephalic  Respiratory: CTA B/l.  No crackles, rhonchi, or wheezes.  Cardiovascular: RRR.  Normal S1 & S2.  Normal b/l radial and pedal pulses.    Neurological Examination:  General / Mental Status: AAO x 3.  No aphasia or dysarthria.  Naming and repetition intact.  Cranial Nerves: VFF x 4.  PERRL.  EOMI x 2, No nystagmus or diplopia.  B/l V1-V3 equal and intact to light touch and pinprick.  Symmetric facial movement and palate elevation.  B/l hearing equal to finger rub.  5/5 strength with b/l sternocleidomastoid and trapezius.  Midline tongue protrusion, with no atrophy or fasciculations.  Motor: Normal bulk & tone in all four extremities.  5/5 strength throughout all four extremities.  No downward drift, rigidity, spasticity, or tremors in any of the four extremities.  Sensory: Intact to light touch and pinprick in all four extremities.  Negative Romberg.  Reflex: 2+ and symmetric at b/l biceps, triceps, brachioradialis, patellae, and ankles.  Downgoing toes b/l.  Coordination: No dysmetria with b/l finger-to-nose and heel raise tests.  Symmetric rapid alternating movements b/l.  Gait: Normal, narrow-based gait.  No difficulty with tiptoe, heel, and tandem gaits.        LABORATORY VALUES:                          8.9    7.54  )-----------( 251      ( 28 Jun 2023 05:21 )             26.3       06-28    132<L>  |  96  |  19<H>  ----------------------------<  103<H>  3.7   |  25  |  0.63    Ca    8.4      28 Jun 2023 05:21  Phos  3.6     06-28  Mg     1.8     06-28 06-27 Chol 137 LDL -- HDL 60 Trig 109, 06-26 Chol 147 LDL -- HDL 63 Trig 130    Glucose Trend  06-28-23 @ 05:21   -  103<H> -- --  06-27-23 @ 05:47   -  112<H> -- --  06-26-23 @ 05:55   -  97 -- --                    NEUROIMAGING:          Please contact the Neurology consult service with any neurological questions.      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 Stroke/Neoplasm workup  - MRI Brain w/wo Gadolinium approved for further evaluation of right temporal lesion (if there are no contraindications)  - CT Chest/Abdomen/Pelvis w/ IV contrast to evaluate for primary neoplasm source  - Hematology/Oncology consult for further recommendations on neoplastic workup  - Telemetry monitoring while inpatient    2. Stroke prevention  - Q4H Neurochecks & Vital signs  - Confirm that patient has passed bedside swallow evaluation before administering any PO meds  - Aspirin 81mg PO Daily (or Aspirin 300mg [place] daily if NPO)  - Simvastatin 10mg PO QHS if patient is not NPO (higher dose or change in statin is not needed since LDL < 70 mg/dL on Simvastatin 10mg PO QHS)  - Treat BP if over 140/90 (goal /80) - Maintain home antihypertensive medications, No role for permissive hypertension at this time  - Diabetes management as per primary team  - PT/OT  - DVT ppx: SCDs, Enoxaparin          ******************************      HPI:  82 year old female, from [facility], with PMHx HTN, HLD, hypothyroidism presents with L arm and leg pain. Pt states that she was walking out of the medicine room in the facility when she was accidentally struck by a serving cart. Pt states that she is not sure if she hit her head but denies LOC. Denies any other complaint including fever/chills, headache, dizziness, chest pain, palpitations cough, SOB, n/v/d/c, dysuria or leg swelling. NKDA. (24 Jun 2023 05:29)      NEURO HPI:  82F with dementia presents from a skilled nursing facility and presents after having an accidental head injury without loss of consciousness. Her CT Head revealed an incidental ischemic stroke.      INTERVAL HISTORY:  The patient has not had any new stroke-like symptoms overnight.      MEDICATIONS:  acetaminophen     Tablet .. 1000 milliGRAM(s) Oral every 8 hours  amLODIPine   Tablet 10 milliGRAM(s) Oral daily  aspirin  chewable 81 milliGRAM(s) Oral daily  enalapril 10 milliGRAM(s) Oral daily  enoxaparin Injectable 40 milliGRAM(s) SubCutaneous every 24 hours  levothyroxine 50 MICROGram(s) Oral <User Schedule>  levothyroxine 100 MICROGram(s) Oral <User Schedule>  lidocaine   4% Patch 1 Patch Transdermal daily  melatonin 3 milliGRAM(s) Oral at bedtime PRN  ondansetron Injectable 4 milliGRAM(s) IV Push every 8 hours PRN  polyethylene glycol 3350 17 Gram(s) Oral daily  senna 2 Tablet(s) Oral at bedtime  simvastatin 10 milliGRAM(s) Oral at bedtime  sodium chloride 0.9%. 1000 milliLiter(s) IV Continuous <Continuous>      ALLERGIES:  No Known Allergies      REVIEW OF SYSTEMS:  Fourteen systems reviewed and negative except as in HPI / Interval History.          OBJECTIVE:  Vital Signs Last 24 Hrs  T(C): 37 (28 Jun 2023 11:23), Max: 37 (28 Jun 2023 11:23)  T(F): 98.6 (28 Jun 2023 11:23), Max: 98.6 (28 Jun 2023 11:23)  HR: 88 (28 Jun 2023 11:23) (87 - 100)  BP: 129/64 (28 Jun 2023 11:23) (111/63 - 129/64)  BP(mean): --  RR: 18 (28 Jun 2023 11:23) (18 - 20)  SpO2: 97% (28 Jun 2023 11:23) (92% - 97%)    Parameters below as of 28 Jun 2023 11:23  Patient On (Oxygen Delivery Method): nasal cannula  O2 Flow (L/min): 3      General Examination:  General: No acute distress  HEENT: Atraumatic, Normocephalic  Respiratory: CTA B/l.  No crackles, rhonchi, or wheezes.  Cardiovascular: RRR.  Normal S1 & S2.  Normal b/l radial and pedal pulses.    Neurological Examination:  General / Mental Status: AAO x 3.  No aphasia or dysarthria.  Naming and repetition intact.  Cranial Nerves: VFF x 4.  PERRL.  EOMI x 2, No nystagmus or diplopia.  B/l V1-V3 equal and intact to light touch and pinprick.  Symmetric facial movement and palate elevation.  B/l hearing equal to finger rub.  5/5 strength with b/l sternocleidomastoid and trapezius.  Midline tongue protrusion, with no atrophy or fasciculations.  Motor: Normal bulk & tone in all four extremities.  5/5 strength throughout all four extremities.  No downward drift, rigidity, spasticity, or tremors in any of the four extremities.  Sensory: Intact to light touch and pinprick in all four extremities.  Negative Romberg.  Reflex: 2+ and symmetric at b/l biceps, triceps, brachioradialis, patellae, and ankles.  Downgoing toes b/l.  Coordination: No dysmetria with b/l finger-to-nose and heel raise tests.  Symmetric rapid alternating movements b/l.  Gait: Normal, narrow-based gait.  No difficulty with tiptoe, heel, and tandem gaits.        LABORATORY VALUES:                          8.9    7.54  )-----------( 251      ( 28 Jun 2023 05:21 )             26.3       06-28    132<L>  |  96  |  19<H>  ----------------------------<  103<H>  3.7   |  25  |  0.63    Ca    8.4      28 Jun 2023 05:21  Phos  3.6     06-28  Mg     1.8     06-28 06-27 Chol 137 LDL -- HDL 60 Trig 109, 06-26 Chol 147 LDL -- HDL 63 Trig 130    Glucose Trend  06-28-23 @ 05:21   -  103<H> -- --  06-27-23 @ 05:47   -  112<H> -- --  06-26-23 @ 05:55   -  97 -- --                    NEUROIMAGING:          Please contact the Neurology consult service with any neurological questions.      Timoteo Romero MD   of Neurology  [facility] at [facility]         NEUROLOGY FOLLOW-UP NOTE    NAME:  ELLIOT VENTURA      ASSESSMENT:  82 RHF with concern for right temporal neoplastic lesion in the setting of pubic rami fracture      RECOMMENDATIONS:    1. Stroke/Neoplasm workup  - MRI Brain w/wo Gadolinium approved for further evaluation of right temporal lesion (if there are no contraindications)  - CT Chest/Abdomen/Pelvis w/ IV contrast to evaluate for primary neoplasm source  - Hematology/Oncology consult for further recommendations on neoplastic workup  - Telemetry monitoring while inpatient    2. Stroke prevention  - Q4H Neurochecks & Vital signs  - Confirm that patient has passed bedside swallow evaluation before administering any PO meds  - Aspirin 81mg PO Daily (or Aspirin 300mg [place] daily if NPO)  - Simvastatin 10mg PO QHS if patient is not NPO (higher dose or change in statin is not needed since LDL < 70 mg/dL on Simvastatin 10mg PO QHS)  - Treat BP if over 140/90 (goal /80) - Maintain home antihypertensive medications, No role for permissive hypertension at this time  - Diabetes management as per primary team  - PT/OT  - DVT ppx: SCDs, Enoxaparin          ******************************      HPI:  82 year old female, from [facility], with PMHx HTN, HLD, hypothyroidism presents with L arm and leg pain. Pt states that she was walking out of the medicine room in the facility when she was accidentally struck by a serving cart. Pt states that she is not sure if she hit her head but denies LOC. Denies any other complaint including fever/chills, headache, dizziness, chest pain, palpitations cough, SOB, n/v/d/c, dysuria or leg swelling. NKDA. (24 Jun 2023 05:29)      NEURO HPI:  82F with dementia presents from a skilled nursing facility and presents after having an accidental head injury without loss of consciousness. Her CT Head revealed an incidental ischemic stroke.      INTERVAL HISTORY:  The patient has not had any new stroke-like symptoms overnight.      MEDICATIONS:  acetaminophen     Tablet .. 1000 milliGRAM(s) Oral every 8 hours  amLODIPine   Tablet 10 milliGRAM(s) Oral daily  aspirin  chewable 81 milliGRAM(s) Oral daily  enalapril 10 milliGRAM(s) Oral daily  enoxaparin Injectable 40 milliGRAM(s) SubCutaneous every 24 hours  levothyroxine 50 MICROGram(s) Oral <User Schedule>  levothyroxine 100 MICROGram(s) Oral <User Schedule>  lidocaine   4% Patch 1 Patch Transdermal daily  melatonin 3 milliGRAM(s) Oral at bedtime PRN  ondansetron Injectable 4 milliGRAM(s) IV Push every 8 hours PRN  polyethylene glycol 3350 17 Gram(s) Oral daily  senna 2 Tablet(s) Oral at bedtime  simvastatin 10 milliGRAM(s) Oral at bedtime  sodium chloride 0.9%. 1000 milliLiter(s) IV Continuous <Continuous>      ALLERGIES:  No Known Allergies      REVIEW OF SYSTEMS:  Fourteen systems reviewed and negative or unobtainable except as in HPI / Interval History.          OBJECTIVE:  Vital Signs Last 24 Hrs  T(C): 37 (28 Jun 2023 11:23), Max: 37 (28 Jun 2023 11:23)  T(F): 98.6 (28 Jun 2023 11:23), Max: 98.6 (28 Jun 2023 11:23)  HR: 88 (28 Jun 2023 11:23) (87 - 100)  BP: 129/64 (28 Jun 2023 11:23) (111/63 - 129/64)  RR: 18 (28 Jun 2023 11:23) (18 - 20)  SpO2: 97% (28 Jun 2023 11:23) (92% - 97%)  Parameters below as of 28 Jun 2023 11:23  Patient On (Oxygen Delivery Method): nasal cannula  O2 Flow (L/min): 3      General Examination:  General: No acute distress  HEENT: Atraumatic, Normocephalic  Respiratory: CTA B/l.  No crackles, rhonchi, or wheezes.  Cardiovascular: RRR.  Normal S1 & S2.  Normal b/l radial and pedal pulses.    Neurological Examination:  General / Mental Status: AAO x 1 (only to person).  Minimally verbal, with no apparent aphasia or dysarthria.  Immediate recall: 0/3, 5-minute delayed recall: 0/3 (cannot recall any words with category or MCQ cues).  Not answering all questions or following all commands.  Cranial Nerves: B/l blink to threat present.  PERRL.  EOMI x 2, No nystagmus or gaze preference.  B/l V1-V3 equal and intact to light touch and pinprick.  Symmetric facial movement and palate elevation.  B/l hearing diminished to finger rub.  At least 4/5 strength with b/l sternocleidomastoid and trapezius.  Midline tongue protrusion.  Motor: Diminished bulk & tone in all four extremities.  At least 4/5 strength throughout all four extremities.  No downward drift, rigidity, spasticity, or tremors in any of the four extremities.  Sensory: Intact to light touch and pinprick in all four extremities.  Reflex: 1+ and symmetric at b/l biceps, triceps, brachioradialis, patellae, and ankles.  Downgoing toes b/l.  Coordination: No dysmetria with b/l finger-to-nose and heel raise tests.  Gait and Romberg sign testing deferred due to pubic rami fracture.      NIHSS Score:    LOC - 0  LOC Questions - 1  LOC Commands - 1  Gaze Preference - 0  Visual Fields - 0  Facial Palsy - 0  Motor Arm Left - 0  Motor Arm Right - 0  Motor Leg Left - 0  Motor Leg Right - 0  Limb Ataxia - 0  Sensory - 0  Language - 0  Speech - 0  Extinction - 0    NIHSS Score Total: 2 - No IV TNK because patient presented outside of time window    Modified John Scale: 2          LABORATORY VALUES:                          8.9    7.54  )-----------( 251      ( 28 Jun 2023 05:21 )             26.3       06-28    132<L>  |  96  |  19<H>  ----------------------------<  103<H>  3.7   |  25  |  0.63    Ca    8.4      28 Jun 2023 05:21  Phos  3.6     06-28  Mg     1.8     06-28 06-27 Chol 137 LDL 58 HDL 60 Trig 109  06-26 Chol 147 LDL 55 HDL 63 Trig 130    Glucose Trend  06-28-23 @ 05:21   -  103<H> -- --  06-27-23 @ 05:47   -  112<H> -- --  06-26-23 @ 05:55   -  97 -- --    A1C with Estimated Average Glucose (06.26.23 @ 05:35)   A1C with Estimated Average Glucose Result: 5.5%  Estimated Average Glucose: 111 mg/dL          NEUROIMAGING:      CT Head (6/25/23):  - Subacute/Chronic right temporal hypodenist  - Mild chronic microvascular changes      MRI Brain (6/27/23):  - Right anterior temporal lobe probably cystic lesion, 0.9 cm diameter  - Chronic microvascular changes      Echo (6/27/23):  - LVEF 55-60%  - Grade I (mild) diastolic dysfunction  - No cardiac thrombus or intracardiac shunt identified          Please contact the Neurology consult service with any neurological questions.      Timoteo Romero MD   of Neurology  [facility] at [facility]         NEUROLOGY FOLLOW-UP NOTE    NAME:  ELLIOT VENTURA      ASSESSMENT:  82 RHF with concern for right temporal neoplastic lesion in the setting of pubic rami fracture      RECOMMENDATIONS:    1. Stroke/Neoplasm workup  - MRI Brain w/wo Gadolinium approved for further evaluation of right temporal lesion (if there are no contraindications)  - CT Chest/Abdomen/Pelvis w/ IV contrast to evaluate for primary neoplasm source  - Hematology/Oncology consult for further recommendations on neoplastic workup  - Telemetry monitoring while inpatient    2. Stroke prevention  - Q4H Neurochecks & Vital signs  - Confirm that patient has passed bedside swallow evaluation before administering any PO meds  - Aspirin 81mg PO Daily (or Aspirin 300mg [place] daily if NPO)  - Simvastatin 10mg PO QHS if patient is not NPO (higher dose or change in statin is not needed since LDL < 70 mg/dL on Simvastatin 10mg PO QHS)  - Treat BP if over 140/90 (goal /80) - Maintain home antihypertensive medications, No role for permissive hypertension at this time  - Diabetes management as per primary team  - PT/OT  - DVT ppx: SCDs, Enoxaparin          ******************************      HPI:  82 year old female, from [facility], with PMHx HTN, HLD, hypothyroidism presents with L arm and leg pain. Pt states that she was walking out of the medicine room in the facility when she was accidentally struck by a serving cart. Pt states that she is not sure if she hit her head but denies LOC. Denies any other complaint including fever/chills, headache, dizziness, chest pain, palpitations cough, SOB, n/v/d/c, dysuria or leg swelling. NKDA. (24 Jun 2023 05:29)      NEURO HPI:  82F with dementia presents from a skilled nursing facility and presents after having an accidental head injury without loss of consciousness. Her CT Head revealed an incidental ischemic stroke.      INTERVAL HISTORY:  The patient has not had any new stroke-like symptoms overnight.      MEDICATIONS:  acetaminophen     Tablet .. 1000 milliGRAM(s) Oral every 8 hours  amLODIPine   Tablet 10 milliGRAM(s) Oral daily  aspirin  chewable 81 milliGRAM(s) Oral daily  enalapril 10 milliGRAM(s) Oral daily  enoxaparin Injectable 40 milliGRAM(s) SubCutaneous every 24 hours  levothyroxine 50 MICROGram(s) Oral <User Schedule>  levothyroxine 100 MICROGram(s) Oral <User Schedule>  lidocaine   4% Patch 1 Patch Transdermal daily  melatonin 3 milliGRAM(s) Oral at bedtime PRN  ondansetron Injectable 4 milliGRAM(s) IV Push every 8 hours PRN  polyethylene glycol 3350 17 Gram(s) Oral daily  senna 2 Tablet(s) Oral at bedtime  simvastatin 10 milliGRAM(s) Oral at bedtime  sodium chloride 0.9%. 1000 milliLiter(s) IV Continuous <Continuous>      ALLERGIES:  No Known Allergies      REVIEW OF SYSTEMS:  Fourteen systems reviewed and negative or unobtainable except as in HPI / Interval History.          OBJECTIVE:  Vital Signs Last 24 Hrs  T(C): 37 (28 Jun 2023 11:23), Max: 37 (28 Jun 2023 11:23)  T(F): 98.6 (28 Jun 2023 11:23), Max: 98.6 (28 Jun 2023 11:23)  HR: 88 (28 Jun 2023 11:23) (87 - 100)  BP: 129/64 (28 Jun 2023 11:23) (111/63 - 129/64)  RR: 18 (28 Jun 2023 11:23) (18 - 20)  SpO2: 97% (28 Jun 2023 11:23) (92% - 97%)  Parameters below as of 28 Jun 2023 11:23  Patient On (Oxygen Delivery Method): nasal cannula  O2 Flow (L/min): 3      General Examination:  General: No acute distress  HEENT: Atraumatic, Normocephalic  Respiratory: CTA B/l.  No crackles, rhonchi, or wheezes.  Cardiovascular: RRR.  Normal S1 & S2.  Normal b/l radial and pedal pulses.    Neurological Examination:  General / Mental Status: AAO x 1 (only to person).  Minimally verbal, with no apparent aphasia or dysarthria.  Immediate recall: 0/3, 5-minute delayed recall: 0/3 (cannot recall any words with category or MCQ cues).  Not answering all questions or following all commands.  Cranial Nerves: B/l blink to threat present.  PERRL.  EOMI x 2, No nystagmus or gaze preference.  B/l V1-V3 equal and intact to light touch and pinprick.  Symmetric facial movement and palate elevation.  B/l hearing diminished to finger rub.  At least 4/5 strength with b/l sternocleidomastoid and trapezius.  Midline tongue protrusion.  Motor: Diminished bulk & tone in all four extremities.  At least 4/5 strength throughout all four extremities.  No downward drift, rigidity, spasticity, or tremors in any of the four extremities.  Sensory: Intact to light touch and pinprick in all four extremities.  Reflex: 1+ and symmetric at b/l biceps, triceps, brachioradialis, patellae, and ankles.  Downgoing toes b/l.  Coordination: No dysmetria with b/l finger-to-nose and heel raise tests.  Gait and Romberg sign testing deferred due to pubic rami fracture.      NIHSS Score:    LOC - 0  LOC Questions - 1  LOC Commands - 1  Gaze Preference - 0  Visual Fields - 0  Facial Palsy - 0  Motor Arm Left - 0  Motor Arm Right - 0  Motor Leg Left - 0  Motor Leg Right - 0  Limb Ataxia - 0  Sensory - 0  Language - 0  Speech - 0  Extinction - 0    NIHSS Score Total: 2 - No IV TNK because patient presented outside of time window    Modified John Scale: 2          LABORATORY VALUES:                          8.9    7.54  )-----------( 251      ( 28 Jun 2023 05:21 )             26.3       06-28    132<L>  |  96  |  19<H>  ----------------------------<  103<H>  3.7   |  25  |  0.63    Ca    8.4      28 Jun 2023 05:21  Phos  3.6     06-28  Mg     1.8     06-28 06-27 Chol 137 LDL 58 HDL 60 Trig 109  06-26 Chol 147 LDL 55 HDL 63 Trig 130    Glucose Trend  06-28-23 @ 05:21   -  103<H> -- --  06-27-23 @ 05:47   -  112<H> -- --  06-26-23 @ 05:55   -  97 -- --    A1C with Estimated Average Glucose (06.26.23 @ 05:35)   A1C with Estimated Average Glucose Result: 5.5%  Estimated Average Glucose: 111 mg/dL    Thyroid Stimulating Hormone, Serum: 2.46 uU/mL (06.26.23 @ 05:55)           NEUROIMAGING:      CT Head (6/25/23):  - Subacute/Chronic right temporal hypodenist  - Mild chronic microvascular changes      MRI Brain (6/27/23):  - Right anterior temporal lobe probably cystic lesion, 0.9 cm diameter  - Chronic microvascular changes      Echo (6/27/23):  - LVEF 55-60%  - Grade I (mild) diastolic dysfunction  - No cardiac thrombus or intracardiac shunt identified          Please contact the Neurology consult service with any neurological questions.      Timoteo Romero MD   of Neurology  [facility] [employer] at [facility]

## 2023-06-28 NOTE — PROGRESS NOTE ADULT - ASSESSMENT
82F from senior care with PMHx HTN, HLD, hypothyroidism p/w pain after being hit by serving cart. CT shows pubic rami fracture. CT head incidentally showing possible acute infarcts. Follow up MRI revealed cystic lesion in left temporal lobe concerning for neoplasm warranting neoplastic workup.

## 2023-06-28 NOTE — PROGRESS NOTE ADULT - PROBLEM SELECTOR PLAN 5
Presenting with WBC of 12K that quickly down trended to normal  -Urinalysis negative for WBC, +nitrite, +LE, bacteria too numerous to count  -Urine culture growing klebsiella pneumoniae  -No dysuria or fevers, VSS  -No treatment indicated

## 2023-06-28 NOTE — PROGRESS NOTE ADULT - PROBLEM SELECTOR PROBLEM 5
Asymptomatic bacteriuria Mucosal Advancement Flap Text: Given the location of the defect, shape of the defect and the proximity to free margins a mucosal advancement flap was deemed most appropriate. Incisions were made with a 15 blade scalpel in the appropriate fashion along the cutaneous vermilion border and the mucosal lip. The remaining actinically damaged mucosal tissue was excised.  The mucosal advancement flap was then elevated to the gingival sulcus with care taken to preserve the neurovascular structures and advanced into the primary defect. Care was taken to ensure that precise realignment of the vermilion border was achieved.

## 2023-06-28 NOTE — PROGRESS NOTE ADULT - SUBJECTIVE AND OBJECTIVE BOX
PGY-1 Progress Note discussed with attending    PAGER #: [phone]] TILL 5:00 PM  PLEASE CONTACT ON CALL TEAM:  - On Call Team (Please refer to Alissa) FROM 5:00 PM - 8:30PM  - Nightfloat Team FROM 8:30 -7:30 AM    CC: Patient is a 82y old  Female who presents with a chief complaint of pubic rami fracture (28 Jun 2023 12:24)      OVERNIGHT EVENTS:    SUBJECTIVE / INTERVAL HPI: Patient seen and examined at bedside. No acute complaints. Denies pelvic pain. Less oriented to place today but knows it is 2023, June, and president is Brenda. Does not answer some questions appropriately. Denies, headache, chest pain, shortness of breath, abdominal pain, nausea, vomiting, diarrhea, constipation, and dysuria.     ROS:  CONSTITUTIONAL: No weakness, fevers or chills  EYES/ENT: No visual changes;  No vertigo or throat pain   NECK: No pain or stiffness  RESPIRATORY: No cough, wheezing, hemoptysis; No shortness of breath  CARDIOVASCULAR: No chest pain or palpitations  GASTROINTESTINAL: No abdominal or epigastric pain. No nausea, vomiting, or hematemesis; No diarrhea or constipation. No melena or hematochezia.  GENITOURINARY: No dysuria, frequency or hematuria  NEUROLOGICAL: No numbness or weakness  SKIN: No itching, burning, rashes, or lesions     VITAL SIGNS:  Vital Signs Last 24 Hrs  T(C): 37 (28 Jun 2023 11:23), Max: 37 (28 Jun 2023 11:23)  T(F): 98.6 (28 Jun 2023 11:23), Max: 98.6 (28 Jun 2023 11:23)  HR: 88 (28 Jun 2023 11:23) (87 - 100)  BP: 129/64 (28 Jun 2023 11:23) (111/63 - 129/64)  BP(mean): --  RR: 18 (28 Jun 2023 11:23) (18 - 20)  SpO2: 97% (28 Jun 2023 11:23) (92% - 97%)    Parameters below as of 28 Jun 2023 11:23  Patient On (Oxygen Delivery Method): nasal cannula  O2 Flow (L/min): 3      PHYSICAL EXAM:    General: cachectic, NAD  HEENT: NC/AT; PERRL, medial deviation of left eye (strabismus vs amblyopia ex anopsia) with limited lateral horizontal movement. Right EOMI, anicteric sclera; MMM  Neck: supple  Cardiovascular: +S1/S2; RRR  Respiratory: CTA B/L; no W/R/R  Gastrointestinal: soft, NT/ND; +BSx4  Extremities: WWP; no edema, clubbing or cyanosis  Vascular: 2+ radial, DP/PT pulses B/L  Skin: Warm, dry, good turgor, no rashes, or ecchymoses  Neurological: AAOx2-3; medial deviation of left eye (strabismus vs amblyopia ex anopsia) with limited lateral horizontal movement, finger to finger test impaired on right finger to nose normal.     MEDICATIONS:  MEDICATIONS  (STANDING):  acetaminophen     Tablet .. 1000 milliGRAM(s) Oral every 8 hours  amLODIPine   Tablet 10 milliGRAM(s) Oral daily  aspirin  chewable 81 milliGRAM(s) Oral daily  enalapril 10 milliGRAM(s) Oral daily  enoxaparin Injectable 40 milliGRAM(s) SubCutaneous every 24 hours  levothyroxine 50 MICROGram(s) Oral <User Schedule>  levothyroxine 100 MICROGram(s) Oral <User Schedule>  lidocaine   4% Patch 1 Patch Transdermal daily  polyethylene glycol 3350 17 Gram(s) Oral daily  senna 2 Tablet(s) Oral at bedtime  simvastatin 10 milliGRAM(s) Oral at bedtime  sodium chloride 0.9%. 1000 milliLiter(s) (90 mL/Hr) IV Continuous <Continuous>    MEDICATIONS  (PRN):  melatonin 3 milliGRAM(s) Oral at bedtime PRN Insomnia  ondansetron Injectable 4 milliGRAM(s) IV Push every 8 hours PRN Nausea and/or Vomiting      ALLERGIES:  Allergies    No Known Allergies    Intolerances        LABS:                        8.9    7.54  )-----------( 251      ( 28 Jun 2023 05:21 )             26.3     06-28    132<L>  |  96  |  19<H>  ----------------------------<  103<H>  3.7   |  25  |  0.63    Ca    8.4      28 Jun 2023 05:21  Phos  3.6     06-28  Mg     1.8     06-28        Urinalysis Basic - ( 28 Jun 2023 05:21 )    Color: x / Appearance: x / SG: x / pH: x  Gluc: 103 mg/dL / Ketone: x  / Bili: x / Urobili: x   Blood: x / Protein: x / Nitrite: x   Leuk Esterase: x / RBC: x / WBC x   Sq Epi: x / Non Sq Epi: x / Bacteria: x      CAPILLARY BLOOD GLUCOSE          RADIOLOGY & ADDITIONAL TESTS:   < from: MR Head No Cont (06.27.23 @ 16:50) >  INTERPRETATION:  CLINICAL STATEMENT: Status post fall. Question of acute   CVA on CT brain 6/25/2023.    TECHNIQUE: Multiplanar multisequence noncontrast MRI of the brain was   performed. Diffusion weighted imaging was performed. Multiple images   degraded by motion artifact.  COMPARISON: No prior studies available for comparison.    FINDINGS:    No areas of restricted diffusion are present.    Corpus callosum, pituitary and pineal regions appear unremarkable. No   tonsillar herniation or evidence of marrow replacement process.    CP angle and IAC regions appear within normal limits, as are the   intraconal regions and major intracranial flow-voids. Probable chronic   deformity bilateral globes. No paranasal sinus air-fluid levels or   opacification is evident.    Mild parenchymal atrophy with moderate confluent and multiple scattered   foci of increased T2 and FLAIR signal within the alma,periventricular   and juxtacortical white matter bilateral cerebral hemispheres.    0.9 x 0.9 x 0.7 cm CSF signal intensity lesion right anterior temporal   lobe, with surrounding vasogenic edema as well as a peripheral rim of   mildly decreased T2 signal and a question of mild signal loss on   susceptibility imaging. No evidence of acute hemorrhage.    IMPRESSION:  1. 0.9 cm probable cystic lesion with surrounding vasogenic edema right   anterior temporal lobe. Primary diagnostic considerations include   neoplastic as well as infectious-inflammatory etiologies. Appearance is   not typical of paracolic, and this finding corresponds to the region of   low-attenuation on the recent prior CT.  2. Pontine and bihemispheric altered white matter signal; a nonspecific   finding which statistically reflects chronic microvascular ischemic   change. No acute infarct.  3. Additional findings described in detail above.    In light of the right temporal findings, recommend multiplanar   T1-weighted MRimaging of the brain prior to and following intravenous   gadolinium administration, provided there are no medical   contraindications.    < end of copied text >

## 2023-06-28 NOTE — PROGRESS NOTE ADULT - ASSESSMENT
82F from [facility] with PMHx HTN, HLD, hypothyroidism p/w pain after being hit by serving cart. CT shows pubic rami fracture. CT head incidentally showing possible acute infarcts. Follow up MRI revealed cystic lesion in left temporal lobe concerning for neoplasm warranting neoplastic workup.

## 2023-06-28 NOTE — PROGRESS NOTE ADULT - PROBLEM SELECTOR PLAN 1
MR Brain on 0.9 cm probable cystic lesion with surrounding vasogenic edema right anterior temporal lobe.  -suspicious for metastatic malignant neoplasm  -sister reports history of ?thyroid or endometrial cancer s/p unknown treatment  -f/u CT C/A/P with IV and oral contrast today to identify possible primary neoplasm  -plan for repeat MRI with IV contrast tomorrow 6/29  -Heme/onc consult pending results of CT

## 2023-06-28 NOTE — PROGRESS NOTE ADULT - SUBJECTIVE AND OBJECTIVE BOX
PATIENT SEEN AND EXAMINED ON :- 6/28/23  DATE OF SERVICE: 6/28/23            Interim events noted,Labs ,Radiological studies and Cardiology tests reviewed .    MR#[id number]  PATIENT NAME:-[facility] COURSE: HPI:  82 year old female, from [facility], with PMHx HTN, HLD, hypothyroidism presents with L arm and leg pain. Pt states that she was walking out of the medicine room in the facility when she was accidentally struck by a serving cart. Pt states that she is not sure if she hit her head but denies LOC. Denies any other complaint including fever/chills, headache, dizziness, chest pain, palpitations cough, SOB, n/v/d/c, dysuria or leg swelling. NKDA. (24 Jun 2023 05:29)      INTERIM EVENTS:Patient seen at bedside ,interim events noted.      PMH -reviewed admission note, no change since admission  HEART FAILURE: Acute[ ]Chronic[ ] Systolic[ ] Diastolic[ ] Combined Systolic and Diastolic[ ]  CAD[ ] CABG[ ] PCI[ ]  DEVICES[ ] PPM[ ] ICD[ ] ILR[ ]  ATRIAL FIBRILLATION[ ] Paroxysmal[ ] Permanent[ ] CHADS2-[  ]  ROXI[ ] CKD1[ ] CKD2[ ] CKD3[ ] CKD4[ ] ESRD[ ]  COPD[ ] HTN[ ]   DM[ ] Type1[ ] Type 2[ ]   CVA[ ] Paresis[ ]    AMBULATION: Assisted[ ] Cane/walker[ ] Independent[ ]    MEDICATIONS  (STANDING):  acetaminophen     Tablet .. 1000 milliGRAM(s) Oral every 8 hours  amLODIPine   Tablet 10 milliGRAM(s) Oral daily  aspirin  chewable 81 milliGRAM(s) Oral daily  enalapril 10 milliGRAM(s) Oral daily  enoxaparin Injectable 40 milliGRAM(s) SubCutaneous every 24 hours  levothyroxine 50 MICROGram(s) Oral <User Schedule>  levothyroxine 100 MICROGram(s) Oral <User Schedule>  lidocaine   4% Patch 1 Patch Transdermal daily  polyethylene glycol 3350 17 Gram(s) Oral daily  senna 2 Tablet(s) Oral at bedtime  simvastatin 10 milliGRAM(s) Oral at bedtime  sodium chloride 0.9%. 1000 milliLiter(s) (90 mL/Hr) IV Continuous <Continuous>    MEDICATIONS  (PRN):  melatonin 3 milliGRAM(s) Oral at bedtime PRN Insomnia  ondansetron Injectable 4 milliGRAM(s) IV Push every 8 hours PRN Nausea and/or Vomiting            REVIEW OF SYSTEMS:  Constitutional: [ ] fever, [ ]weight loss,  [ ]fatigue [ ]weight gain  Eyes: [ ] visual changes  Respiratory: [ ]shortness of breath;  [ ] cough, [ ]wheezing, [ ]chills, [ ]hemoptysis  Cardiovascular: [ ] chest pain, [ ]palpitations, [ ]dizziness,  [ ]leg swelling[ ]orthopnea[ ]PND  Gastrointestinal: [ ] abdominal pain, [ ]nausea, [ ]vomiting,  [ ]diarrhea [ ]Constipation [ ]Melena  Genitourinary: [ ] dysuria, [ ] hematuria [ ]Love  Neurologic: [ ] headaches [ ] tremors[ ]weakness [ ]Paralysis Right[ ] Left[ ]  Skin: [ ] itching, [ ]burning, [ ] rashes  Endocrine: [ ] heat or cold intolerance  Musculoskeletal: [ ] joint pain or swelling; [ ] muscle, back, or extremity pain  Psychiatric: [ ] depression, [ ]anxiety, [ ]mood swings, or [ ]difficulty sleeping  Hematologic: [ ] easy bruising, [ ] bleeding gums    [ ] All remaining systems negative except as per above.   [ ]Unable to obtain.  [x] No change in ROS since admission      Vital Signs Last 24 Hrs  T(C): 37.1 (28 Jun 2023 16:51), Max: 37.1 (28 Jun 2023 16:51)  T(F): 98.8 (28 Jun 2023 16:51), Max: 98.8 (28 Jun 2023 16:51)  HR: 106 (28 Jun 2023 16:51) (87 - 106)  BP: 104/63 (28 Jun 2023 16:51) (104/63 - 129/64)  BP(mean): --  RR: 20 (28 Jun 2023 16:51) (18 - 20)  SpO2: 99% (28 Jun 2023 16:51) (96% - 99%)    Parameters below as of 28 Jun 2023 16:51  Patient On (Oxygen Delivery Method): nasal cannula  O2 Flow (L/min): 2    I&O's Summary    27 Jun 2023 07:01  -  28 Jun 2023 07:00  --------------------------------------------------------  IN: 0 mL / OUT: 200 mL / NET: -200 mL    28 Jun 2023 07:01  -  28 Jun 2023 19:29  --------------------------------------------------------  IN: 0 mL / OUT: 300 mL / NET: -300 mL        PHYSICAL EXAM:  General: No acute distress BMI-  HEENT: EOMI, PERRL  Neck: Supple, [ ] JVD  Lungs: Equal air entry bilaterally; [ ] rales [ ] wheezing [ ] rhonchi  Heart: Regular rate and rhythm; [x ] murmur   2/6 [ x] systolic [ ] diastolic [ ] radiation[ ] rubs [ ]  gallops  Abdomen: Nontender, bowel sounds present  Extremities: No clubbing, cyanosis, [ ] edema [ ]Pulses  equal and intact  Nervous system:  Alert & Oriented X3, no focal deficits  Psychiatric: Normal affect  Skin: No rashes or lesions    LABS:  06-28    132<L>  |  96  |  19<H>  ----------------------------<  103<H>  3.7   |  25  |  0.63    Ca    8.4      28 Jun 2023 05:21  Phos  3.6     06-28  Mg     1.8     06-28      Creatinine Trend: 0.63<--, 0.69<--, 0.78<--, 0.74<--, 0.96<--, 0.86<--                        8.9    7.54  )-----------( 251      ( 28 Jun 2023 05:21 )             26.3

## 2023-06-28 NOTE — PROGRESS NOTE ADULT - PROBLEM SELECTOR PLAN 4
presented with hbg of 9.4 and normal MCV  -hbg down trended to as low as 8.8  -total iron 33, % sat 12, TIBC 269  -borderline iron deficiency and normal MCV raise possible contribution of chronic disease (?malignancy)  -monitor CBC daily  -transfuse hbg<7  -heme/onc consult

## 2023-06-28 NOTE — PROGRESS NOTE ADULT - PROBLEM SELECTOR PLAN 3
-CT head shows: Poor visualization of gray matter in the right lentiform nucleus is suspicious for an acute infarct.  A 2.6 x 1.9 cm hypodensity focus in the anterior right temporal lobe and a 1.2 x 1 cm hypoattenuating focus in   the lateral aspect of the left lentiform nucleus versus left subinsular region are suspicious for small acute or subacute infarcts.  -Neurology consulted: Dr. Romero  -lipid panel WNL (LDL 55), A1c 5.5  -c/w ASA and home simvastatin 10mg  -follow up MR brain shows cystic lesion in left anterior temporal lobe as well as pontine and bihemispheric altered white matter signal likely chronic microvascular ischemic change. No acute infarct

## 2023-06-28 NOTE — PROGRESS NOTE ADULT - PROBLEM SELECTOR PLAN 7
TTE shows EF 55-60%, severely dilated LA, G1DD, and mild PHTN  -no clinical signs of CHF exacerbation  -d/c telemetry  -c/w current meds

## 2023-06-29 DIAGNOSIS — K44.9 DIAPHRAGMATIC HERNIA WITHOUT OBSTRUCTION OR GANGRENE: ICD-10-CM

## 2023-06-29 DIAGNOSIS — R68.89 OTHER GENERAL SYMPTOMS AND SIGNS: ICD-10-CM

## 2023-06-29 LAB
ANION GAP SERPL CALC-SCNC: 6 MMOL/L — SIGNIFICANT CHANGE UP (ref 5–17)
BASOPHILS # BLD AUTO: 0.02 K/UL — SIGNIFICANT CHANGE UP (ref 0–0.2)
BASOPHILS NFR BLD AUTO: 0.4 % — SIGNIFICANT CHANGE UP (ref 0–2)
BUN SERPL-MCNC: 15 MG/DL — SIGNIFICANT CHANGE UP (ref 7–18)
CALCIUM SERPL-MCNC: 8.3 MG/DL — LOW (ref 8.4–10.5)
CANCER AG125 SERPL-ACNC: 18 U/ML — SIGNIFICANT CHANGE UP
CANCER AG19-9 SERPL-ACNC: <2 U/ML — SIGNIFICANT CHANGE UP
CEA SERPL-MCNC: 3.1 NG/ML — SIGNIFICANT CHANGE UP (ref 0–3.8)
CHLORIDE SERPL-SCNC: 99 MMOL/L — SIGNIFICANT CHANGE UP (ref 96–108)
CO2 SERPL-SCNC: 28 MMOL/L — SIGNIFICANT CHANGE UP (ref 22–31)
CREAT SERPL-MCNC: 0.57 MG/DL — SIGNIFICANT CHANGE UP (ref 0.5–1.3)
EGFR: 91 ML/MIN/1.73M2 — SIGNIFICANT CHANGE UP
EOSINOPHIL # BLD AUTO: 0.05 K/UL — SIGNIFICANT CHANGE UP (ref 0–0.5)
EOSINOPHIL NFR BLD AUTO: 0.9 % — SIGNIFICANT CHANGE UP (ref 0–6)
GLUCOSE SERPL-MCNC: 98 MG/DL — SIGNIFICANT CHANGE UP (ref 70–99)
HCT VFR BLD CALC: 24.4 % — LOW (ref 34.5–45)
HGB BLD-MCNC: 8 G/DL — LOW (ref 11.5–15.5)
IMM GRANULOCYTES NFR BLD AUTO: 0.6 % — SIGNIFICANT CHANGE UP (ref 0–0.9)
LYMPHOCYTES # BLD AUTO: 0.55 K/UL — LOW (ref 1–3.3)
LYMPHOCYTES # BLD AUTO: 10.3 % — LOW (ref 13–44)
MAGNESIUM SERPL-MCNC: 1.9 MG/DL — SIGNIFICANT CHANGE UP (ref 1.6–2.6)
MCHC RBC-ENTMCNC: 29.3 PG — SIGNIFICANT CHANGE UP (ref 27–34)
MCHC RBC-ENTMCNC: 32.8 GM/DL — SIGNIFICANT CHANGE UP (ref 32–36)
MCV RBC AUTO: 89.4 FL — SIGNIFICANT CHANGE UP (ref 80–100)
MONOCYTES # BLD AUTO: 0.8 K/UL — SIGNIFICANT CHANGE UP (ref 0–0.9)
MONOCYTES NFR BLD AUTO: 15 % — HIGH (ref 2–14)
NEUTROPHILS # BLD AUTO: 3.89 K/UL — SIGNIFICANT CHANGE UP (ref 1.8–7.4)
NEUTROPHILS NFR BLD AUTO: 72.8 % — SIGNIFICANT CHANGE UP (ref 43–77)
NRBC # BLD: 0 /100 WBCS — SIGNIFICANT CHANGE UP (ref 0–0)
PHOSPHATE SERPL-MCNC: 3.3 MG/DL — SIGNIFICANT CHANGE UP (ref 2.5–4.5)
PLATELET # BLD AUTO: 256 K/UL — SIGNIFICANT CHANGE UP (ref 150–400)
POTASSIUM SERPL-MCNC: 3.7 MMOL/L — SIGNIFICANT CHANGE UP (ref 3.5–5.3)
POTASSIUM SERPL-SCNC: 3.7 MMOL/L — SIGNIFICANT CHANGE UP (ref 3.5–5.3)
RBC # BLD: 2.73 M/UL — LOW (ref 3.8–5.2)
RBC # FLD: 13.3 % — SIGNIFICANT CHANGE UP (ref 10.3–14.5)
SODIUM SERPL-SCNC: 133 MMOL/L — LOW (ref 135–145)
WBC # BLD: 5.34 K/UL — SIGNIFICANT CHANGE UP (ref 3.8–10.5)
WBC # FLD AUTO: 5.34 K/UL — SIGNIFICANT CHANGE UP (ref 3.8–10.5)

## 2023-06-29 PROCEDURE — 70552 MRI BRAIN STEM W/DYE: CPT | Mod: 26

## 2023-06-29 RX ADMIN — Medication 10 MILLIGRAM(S): at 06:04

## 2023-06-29 RX ADMIN — Medication 1000 MILLIGRAM(S): at 21:26

## 2023-06-29 RX ADMIN — Medication 1000 MILLIGRAM(S): at 15:00

## 2023-06-29 RX ADMIN — Medication 1000 MILLIGRAM(S): at 14:18

## 2023-06-29 RX ADMIN — Medication 81 MILLIGRAM(S): at 12:34

## 2023-06-29 RX ADMIN — Medication 1000 MILLIGRAM(S): at 06:05

## 2023-06-29 RX ADMIN — Medication 1000 MILLIGRAM(S): at 21:55

## 2023-06-29 RX ADMIN — SENNA PLUS 2 TABLET(S): 8.6 TABLET ORAL at 21:27

## 2023-06-29 RX ADMIN — SIMVASTATIN 10 MILLIGRAM(S): 20 TABLET, FILM COATED ORAL at 21:27

## 2023-06-29 RX ADMIN — AMLODIPINE BESYLATE 10 MILLIGRAM(S): 2.5 TABLET ORAL at 06:04

## 2023-06-29 RX ADMIN — Medication 1000 MILLIGRAM(S): at 06:36

## 2023-06-29 RX ADMIN — ENOXAPARIN SODIUM 40 MILLIGRAM(S): 100 INJECTION SUBCUTANEOUS at 06:12

## 2023-06-29 RX ADMIN — Medication 50 MICROGRAM(S): at 06:04

## 2023-06-29 NOTE — DIETITIAN NUTRITION RISK NOTIFICATION - TREATMENT: THE FOLLOWING DIET HAS BEEN RECOMMENDED
Diet, Soft and Bite Sized:   Supplement Feeding Modality:  Oral  Ensure Enlive Cans or Servings Per Day:  1       Frequency:  Three Times a day (06-29-23 @ 11:41) [Pending Verification By Attending]

## 2023-06-29 NOTE — DIETITIAN INITIAL EVALUATION ADULT - FEEDING ASSISTANCE
Provide food choices within diet Rx as available/updated; Nursing to continue feeding assistance and encouragement as needed  Provide food choices within diet Rx as available/updated; Nursing to continue feeding assistance and encouragement as needed.

## 2023-06-29 NOTE — DIETITIAN INITIAL EVALUATION ADULT - NSFNSGIIOFT_GEN_A_CORE
TAM=572 lb   Wt data in EMR: 104 lb 6/24/23; 82.6 lb 6/28/23; 86.6 lb 6/29/23  WPR=096 lb   Wt data in EMR: 104 lb ?? 6/24/23; 82.6 lb 6/28/23; 86.6 lb 6/29/23

## 2023-06-29 NOTE — DIETITIAN INITIAL EVALUATION ADULT - FACTORS AFF FOOD INTAKE
acute on chronic comorbidities/difficulty chewing/Rastafarian/ethnic/cultural/personal food preferences

## 2023-06-29 NOTE — DIETITIAN INITIAL EVALUATION ADULT - PERTINENT LABORATORY DATA
06-29    133<L>  |  99  |  15  ----------------------------<  98  3.7   |  28  |  0.57    Ca    8.3<L>      29 Jun 2023 05:30  Phos  3.3     06-29  Mg     1.9     06-29    A1C with Estimated Average Glucose Result: 5.5 % (06-26-23 @ 05:35)

## 2023-06-29 NOTE — DIETITIAN INITIAL EVALUATION ADULT - OTHER INFO
Pt from assisted living facility, alert, oriented, able to communicate but with difficult due to hard of hearing, also impaired vision per nursing; Reported appetite good, Ht=5' 5" verified by pt, unclear recent wt changes, poor dentition, denied GI distress, swallowing problem, no specific food choices, tolerating Soft/Bite Sized food, 50% lunch consumed; pt needs oral nutritional supplement per RN as family requested; Pending discharge planning to skilled nursing facility for rehab when medically ready per team  Pt from assisted living facility, alert, oriented, able to communicate but with difficult due to hard of hearing, also impaired vision per nursing; Reported appetite good, Ht=5' 5" verified by pt, unclear recent wt changes, poor dentition, denied GI distress, swallowing problem, no specific food choices, tolerating Soft/Bite Sized food, 50% lunch consumed; pt needs oral nutritional supplement per RN as family ( [place] requested; Pending discharge planning to skilled nursing facility for rehab when medically ready per team

## 2023-06-29 NOTE — PROGRESS NOTE ADULT - ASSESSMENT
82[facility] with PMHx HTN, HLD, hypothyroidism p/w pain after being hit by serving cart. CT shows pubic rami fracture. CT head incidentally showing possible acute infarcts. Follow up MRI revealed cystic lesion in left temporal lobe concerning for neoplasm warranting neoplastic workup.

## 2023-06-29 NOTE — PROGRESS NOTE ADULT - PROBLEM SELECTOR PLAN 2
hx of uterine cancer and thyroid nodules per sister and daughter  -MR Brain: 0.9 cm probable cystic lesion with surrounding vasogenic edema R anterior temporal lobe  -f/u MR Brain w/ contrast report  -CT C/A/P: 1.1cm pancreatic tail cyst and rectal wall thickening  -f/u MRI A/P w/ contrast to further evaluate pancreatic cyst  -tumor markers CA 19-9, CA-125, CEA normal  -Heme/onc: Dr. Roberts  -GI consult for possible colonoscopy to r/o underlying rectal mass

## 2023-06-29 NOTE — DIETITIAN NUTRITION RISK NOTIFICATION - ADDITIONAL COMMENTS/DIETITIAN RECOMMENDATIONS
Malnutrition Diagnosis Parameters: severe muscle wasting, severe fat loss, cachectic, BMI=14.4, debility

## 2023-06-29 NOTE — PROGRESS NOTE ADULT - PROBLEM SELECTOR PLAN 6
Asymptomatic tolerating PO  -CT chest shows Large sliding hiatus hernia with approximately two thirds of the stomach intrathoracic. Esophageal distention with possible reflux.  -f/u GI for possible EGD

## 2023-06-29 NOTE — PROGRESS NOTE ADULT - PROBLEM SELECTOR PLAN 3
s/p fall at assisted living  -CT: Comminuted acute left superior ramus fracture. No hip fracture or dislocation  c/w pain control-pain management consulted  -PT recommending SARAN  -Ortho recommending conservative management  -Repeat CT  Redemonstrated acute comminuted left superior pubic ramus fracture with fracture lines extending to the pubic symphysis. A mildly displaced angulated left inferior pubic ramus fracture is now identified. The right pubic rami are intact.

## 2023-06-29 NOTE — DIETITIAN INITIAL EVALUATION ADULT - REASON INDICATOR FOR ASSESSMENT
verbal consult for oral nutritional supplement by RN; BMI<19 verbal consult for oral nutritional supplement by RN; BMI<19 upon admission

## 2023-06-29 NOTE — DIETITIAN INITIAL EVALUATION ADULT - NAME AND PHONE
Pt to be followed by dietitian at skilled nursing facility when medically ready to be discharged  Pt to be followed by dietitian at skilled nursing facility when medically ready to be discharged.

## 2023-06-29 NOTE — PROGRESS NOTE ADULT - PROBLEM SELECTOR PLAN 4
-CT head shows: Poor visualization of gray matter in the right lentiform nucleus is suspicious for an acute infarct.  A 2.6 x 1.9 cm hypodensity focus in the anterior right temporal lobe and a 1.2 x 1 cm hypoattenuating focus in   the lateral aspect of the left lentiform nucleus versus left subinsular region are suspicious for small acute or subacute infarcts.  -Neurology consulted: Dr. Romero  -lipid panel WNL (LDL 55), A1c 5.5  -c/w ASA and home simvastatin 10mg  -MR brain shows cystic lesion in left anterior temporal lobe as well as pontine and bihemispheric altered white matter signal likely chronic microvascular ischemic change. No acute infarct

## 2023-06-29 NOTE — DIETITIAN INITIAL EVALUATION ADULT - DIET TYPE
Ensure Enlive 1can (240ml) x tid (1050kcal, 60g protein) Ensure Enlive 1can (240ml) x tid (1050kcal, 60g protein).

## 2023-06-29 NOTE — PROGRESS NOTE ADULT - SUBJECTIVE AND OBJECTIVE BOX
PGY-1 Progress Note discussed with attending    PAGER #: [[phone]] TILL 5:00 PM  PLEASE CONTACT ON CALL TEAM:  - On Call Team (Please refer to Alissa) FROM 5:00 PM - 8:30PM  - Nightfloat Team FROM 8:30 -7:30 AM    CC: Patient is a 82y old  Female who presents with a chief complaint of Other specified fracture of unspecified pubis, initial encounter for closed fracture     (29 Jun 2023 12:34)      OVERNIGHT EVENTS:    SUBJECTIVE / INTERVAL HPI: Patient seen and examined at bedside, observed sleeping comfortably. No acute complaints. Denies, headache, chest pain, shortness of breath, abdominal pain, nausea, vomiting, diarrhea, constipation, and dysuria.     ROS:  CONSTITUTIONAL: No weakness, fevers or chills  EYES/ENT: No visual changes;  No vertigo or throat pain   NECK: No pain or stiffness  RESPIRATORY: No cough, wheezing, hemoptysis; No shortness of breath  CARDIOVASCULAR: No chest pain or palpitations  GASTROINTESTINAL: No abdominal or epigastric pain. No nausea, vomiting, or hematemesis; No diarrhea or constipation. No melena or hematochezia.  GENITOURINARY: No dysuria, frequency or hematuria  NEUROLOGICAL: No numbness or weakness  SKIN: No itching, burning, rashes, or lesions     VITAL SIGNS:  Vital Signs Last 24 Hrs  T(C): 37 (29 Jun 2023 11:09), Max: 37.1 (28 Jun 2023 16:51)  T(F): 98.6 (29 Jun 2023 11:09), Max: 98.8 (28 Jun 2023 16:51)  HR: 92 (29 Jun 2023 15:16) (85 - 106)  BP: 115/52 (29 Jun 2023 15:16) (100/56 - 124/75)  BP(mean): --  RR: 18 (29 Jun 2023 11:09) (18 - 20)  SpO2: 94% (29 Jun 2023 15:16) (91% - 100%)    Parameters below as of 29 Jun 2023 15:16  Patient On (Oxygen Delivery Method): nasal cannula        PHYSICAL EXAM:    HEENT: NC/AT; PERRL, medial deviation of left eye (strabismus vs amblyopia ex anopsia) with limited lateral horizontal movement. Right EOMI, anicteric sclera; MMM  Neck: supple  Cardiovascular: +S1/S2; RRR  Respiratory: CTA B/L; no W/R/R  Gastrointestinal: soft, NT/ND; +BSx4  Extremities: WWP; no edema, clubbing or cyanosis  Vascular: 2+ radial, DP/PT pulses B/L  Skin: Warm, dry, good turgor, no rashes, or ecchymoses  Neurological: AAOx2-3; medial deviation of left eye (strabismus vs amblyopia ex anopsia) with limited lateral horizontal movement, finger to finger test impaired on right finger to nose normal.    MEDICATIONS:  MEDICATIONS  (STANDING):  acetaminophen     Tablet .. 1000 milliGRAM(s) Oral every 8 hours  amLODIPine   Tablet 10 milliGRAM(s) Oral daily  aspirin  chewable 81 milliGRAM(s) Oral daily  enalapril 10 milliGRAM(s) Oral daily  enoxaparin Injectable 40 milliGRAM(s) SubCutaneous every 24 hours  levothyroxine 50 MICROGram(s) Oral <User Schedule>  levothyroxine 100 MICROGram(s) Oral <User Schedule>  lidocaine   4% Patch 1 Patch Transdermal daily  polyethylene glycol 3350 17 Gram(s) Oral daily  senna 2 Tablet(s) Oral at bedtime  simvastatin 10 milliGRAM(s) Oral at bedtime  sodium chloride 0.9%. 1000 milliLiter(s) (90 mL/Hr) IV Continuous <Continuous>    MEDICATIONS  (PRN):  melatonin 3 milliGRAM(s) Oral at bedtime PRN Insomnia  ondansetron Injectable 4 milliGRAM(s) IV Push every 8 hours PRN Nausea and/or Vomiting      ALLERGIES:  Allergies    No Known Allergies    Intolerances        LABS:                        8.0    5.34  )-----------( 256      ( 29 Jun 2023 05:30 )             24.4     06-29    133<L>  |  99  |  15  ----------------------------<  98  3.7   |  28  |  0.57    Ca    8.3<L>      29 Jun 2023 05:30  Phos  3.3     06-29  Mg     1.9     06-29        Urinalysis Basic - ( 29 Jun 2023 05:30 )    Color: x / Appearance: x / SG: x / pH: x  Gluc: 98 mg/dL / Ketone: x  / Bili: x / Urobili: x   Blood: x / Protein: x / Nitrite: x   Leuk Esterase: x / RBC: x / WBC x   Sq Epi: x / Non Sq Epi: x / Bacteria: x      CAPILLARY BLOOD GLUCOSE          RADIOLOGY & ADDITIONAL TESTS:   < from: CT Abdomen and Pelvis w/ Oral Cont and w/ IV Cont (06.28.23 @ 15:04) >  PROCEDURE:  CT of the Chest, Abdomen and Pelvis was performed.  Sagittal and coronal reformats were performed.    FINDINGS:  CHEST:  LUNGS AND LARGE AIRWAYS: Patent central airways. No pulmonary nodules.   Mild left lower lobe dependent atelectasis.  PLEURA: No pleural effusion.  VESSELS: Atheroscleroticaorta without aneurysm or dissection. Coronary   atherosclerosis.  HEART: Mild cardiomegaly. No pericardial effusion.  MEDIASTINUM AND SHAD: No lymphadenopathy.  CHEST WALL AND LOWER NECK: Within normal limits.    ABDOMEN AND PELVIS:  LIVER: Within normal limits.  BILE DUCTS: Normal caliber.  GALLBLADDER: Within normal limits.  SPLEEN: Within normal limits.  PANCREAS: Atrophic pancreas. 1.1 x 0.7 cm bilobed pancreatic tail cyst.   Upstream ductal dilatation to 4 mm.  ADRENALS: Within normal limits.  KIDNEYS/URETERS: Dominant 4.0 cm left midpole parapelvic cyst. Bilateral   subcentimeter hypodensities too small to characterize. No   hydroureteronephrosis.    BLADDER: Within normal limits.  REPRODUCTIVE ORGANS: Hysterectomy.    BOWEL: No bowel obstruction. Appendix is not visualized and cannot be   assessed. Large sliding hiatus hernia with approximately two thirds of   the stomach intrathoracic. Esophageal dilatation containing enteric   contrast. Rectal wall thickening and perirectal edema.  PERITONEUM: No ascites.  VESSELS: Within normal limits.  RETROPERITONEUM/LYMPH NODES: No lymphadenopathy.  ABDOMINAL WALL: Within normal limits.  BONES: Degenerative changes. Grade 1-2 L5-S1 anterolisthesis. Moderate   thoracic dextroscoliosis. Healing left lateral sixth rib fracture.   Osteoporosis. Redemonstrated acute comminuted left superior pubic ramus   fracture with fracture lines extending to the pubic symphysis. A mildly   displaced angulated left inferior pubic ramus fracture is now identified.   The right pubic rami are intact.    IMPRESSION:  Large sliding hiatus hernia with approximately two thirds of the stomach   intrathoracic. Esophageal distention with possible reflux. Correlate with   endoscopy.    1.1 x 0.7 cm pancreatictail cyst with upstream ductal dilatation.   Recommend MRI for additional characterization.    Rectal wall thickening and perirectal edema consistent with proctitis.   Recommend colonoscopy to exclude underlying mass.    Acute left superior and inferior pubic ramus fractures.    < end of copied text >

## 2023-06-29 NOTE — DIETITIAN INITIAL EVALUATION ADULT - PERTINENT MEDS FT
MEDICATIONS  (STANDING):  acetaminophen     Tablet .. 1000 milliGRAM(s) Oral every 8 hours  amLODIPine   Tablet 10 milliGRAM(s) Oral daily  aspirin  chewable 81 milliGRAM(s) Oral daily  enalapril 10 milliGRAM(s) Oral daily  enoxaparin Injectable 40 milliGRAM(s) SubCutaneous every 24 hours  levothyroxine 50 MICROGram(s) Oral <User Schedule>  levothyroxine 100 MICROGram(s) Oral <User Schedule>  lidocaine   4% Patch 1 Patch Transdermal daily  polyethylene glycol 3350 17 Gram(s) Oral daily  senna 2 Tablet(s) Oral at bedtime  simvastatin 10 milliGRAM(s) Oral at bedtime  sodium chloride 0.9%. 1000 milliLiter(s) (90 mL/Hr) IV Continuous <Continuous>    MEDICATIONS  (PRN):  melatonin 3 milliGRAM(s) Oral at bedtime PRN Insomnia  ondansetron Injectable 4 milliGRAM(s) IV Push every 8 hours PRN Nausea and/or Vomiting
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
## 2023-06-29 NOTE — PROGRESS NOTE ADULT - PROBLEM SELECTOR PLAN 1
MR Brain on 0.9 cm probable cystic lesion with surrounding vasogenic edema right anterior temporal lobe.  -suspicious for metastatic malignant neoplasm--> neoplastic w/u ongoing  -sister reports history of ?thyroid or endometrial cancer s/p unknown treatment  -f/u MR Brain w/ contrast report  -Heme/onc consulted: Dr. Roberts

## 2023-06-29 NOTE — CONSULT NOTE ADULT - ASSESSMENT
ELLIOT VENTURA is a 82y Female who presents with a chief complaint of fracture    Malignancy, Suspected  ·	Imaging reviewed. MRI brain with 0.9 cm right temporal lobe cystic lesion. CT imaging showed pancreatic tail cyst as well as rectal wall thickening.  ·	Recommend MRI abdomen/pelvis with contrast to further evaluate pancreatic lesion.  ·	Recommend gastroenterology evaluation with colonoscopy.  ·	Will order tumor markers.    Anemia  ·	Slight iron deficiency noted.  ·	Will order workup.    Will continue to follow.    Richard Roberts MD  Hematology/Oncology  O: [phone] ELLIOT VENTURA is a 82y Female who presents with a chief complaint of fracture    Malignancy, Suspected  ·	Imaging reviewed. MRI brain with 0.9 cm right temporal lobe cystic lesion. CT imaging showed pancreatic tail cyst as well as rectal wall thickening.  ·	Recommend MRI abdomen/pelvis with contrast to further evaluate pancreatic lesion.  ·	Recommend gastroenterology evaluation with colonoscopy.  ·	Tumor markers thus far unremarkable.    Anemia  ·	Slight iron deficiency noted.  ·	Will order workup.    Patient states that she does not have family members.  Consider goals of care discussion in a patient with poor performance status and fracture.     Will continue to follow.    Richard Roberts MD  Hematology/Oncology  O: [phone]
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
## 2023-06-30 LAB
ANION GAP SERPL CALC-SCNC: 8 MMOL/L — SIGNIFICANT CHANGE UP (ref 5–17)
BUN SERPL-MCNC: 15 MG/DL — SIGNIFICANT CHANGE UP (ref 7–18)
CALCIUM SERPL-MCNC: 8.4 MG/DL — SIGNIFICANT CHANGE UP (ref 8.4–10.5)
CHLORIDE SERPL-SCNC: 100 MMOL/L — SIGNIFICANT CHANGE UP (ref 96–108)
CO2 SERPL-SCNC: 27 MMOL/L — SIGNIFICANT CHANGE UP (ref 22–31)
CREAT SERPL-MCNC: 0.67 MG/DL — SIGNIFICANT CHANGE UP (ref 0.5–1.3)
EGFR: 87 ML/MIN/1.73M2 — SIGNIFICANT CHANGE UP
FOLATE SERPL-MCNC: 16.4 NG/ML — SIGNIFICANT CHANGE UP
GLUCOSE SERPL-MCNC: 98 MG/DL — SIGNIFICANT CHANGE UP (ref 70–99)
HAPTOGLOB SERPL-MCNC: 148 MG/DL — SIGNIFICANT CHANGE UP (ref 34–200)
HCT VFR BLD CALC: 24.8 % — LOW (ref 34.5–45)
HGB BLD-MCNC: 8.3 G/DL — LOW (ref 11.5–15.5)
IGA FLD-MCNC: 154 MG/DL — SIGNIFICANT CHANGE UP (ref 84–499)
IGG FLD-MCNC: 781 MG/DL — SIGNIFICANT CHANGE UP (ref 610–1660)
IGM SERPL-MCNC: 19 MG/DL — LOW (ref 35–242)
KAPPA LC SER QL IFE: 5.75 MG/DL — HIGH (ref 0.33–1.94)
KAPPA/LAMBDA FREE LIGHT CHAIN RATIO, SERUM: 1.92 RATIO — HIGH (ref 0.26–1.65)
LAMBDA LC SER QL IFE: 2.99 MG/DL — HIGH (ref 0.57–2.63)
LDH SERPL L TO P-CCNC: 221 U/L — SIGNIFICANT CHANGE UP (ref 120–225)
MAGNESIUM SERPL-MCNC: 1.9 MG/DL — SIGNIFICANT CHANGE UP (ref 1.6–2.6)
MCHC RBC-ENTMCNC: 29.6 PG — SIGNIFICANT CHANGE UP (ref 27–34)
MCHC RBC-ENTMCNC: 33.5 GM/DL — SIGNIFICANT CHANGE UP (ref 32–36)
MCV RBC AUTO: 88.6 FL — SIGNIFICANT CHANGE UP (ref 80–100)
NRBC # BLD: 0 /100 WBCS — SIGNIFICANT CHANGE UP (ref 0–0)
PHOSPHATE SERPL-MCNC: 3.6 MG/DL — SIGNIFICANT CHANGE UP (ref 2.5–4.5)
PLATELET # BLD AUTO: 308 K/UL — SIGNIFICANT CHANGE UP (ref 150–400)
POTASSIUM SERPL-MCNC: 3.5 MMOL/L — SIGNIFICANT CHANGE UP (ref 3.5–5.3)
POTASSIUM SERPL-SCNC: 3.5 MMOL/L — SIGNIFICANT CHANGE UP (ref 3.5–5.3)
RBC # BLD: 2.8 M/UL — LOW (ref 3.8–5.2)
RBC # FLD: 13.2 % — SIGNIFICANT CHANGE UP (ref 10.3–14.5)
SODIUM SERPL-SCNC: 135 MMOL/L — SIGNIFICANT CHANGE UP (ref 135–145)
VIT B12 SERPL-MCNC: 298 PG/ML — SIGNIFICANT CHANGE UP (ref 232–1245)
WBC # BLD: 5.89 K/UL — SIGNIFICANT CHANGE UP (ref 3.8–10.5)
WBC # FLD AUTO: 5.89 K/UL — SIGNIFICANT CHANGE UP (ref 3.8–10.5)

## 2023-06-30 PROCEDURE — 99232 SBSQ HOSP IP/OBS MODERATE 35: CPT

## 2023-06-30 PROCEDURE — 99222 1ST HOSP IP/OBS MODERATE 55: CPT

## 2023-06-30 PROCEDURE — 99233 SBSQ HOSP IP/OBS HIGH 50: CPT | Mod: FS

## 2023-06-30 PROCEDURE — 74182 MRI ABDOMEN W/CONTRAST: CPT | Mod: 26

## 2023-06-30 RX ORDER — ACETAMINOPHEN 500 MG
1000 TABLET ORAL EVERY 8 HOURS
Refills: 0 | Status: DISCONTINUED | OUTPATIENT
Start: 2023-06-30 | End: 2023-07-06

## 2023-06-30 RX ADMIN — Medication 50 MICROGRAM(S): at 05:41

## 2023-06-30 RX ADMIN — AMLODIPINE BESYLATE 10 MILLIGRAM(S): 2.5 TABLET ORAL at 05:41

## 2023-06-30 RX ADMIN — Medication 81 MILLIGRAM(S): at 12:44

## 2023-06-30 RX ADMIN — SENNA PLUS 2 TABLET(S): 8.6 TABLET ORAL at 22:34

## 2023-06-30 RX ADMIN — ENOXAPARIN SODIUM 40 MILLIGRAM(S): 100 INJECTION SUBCUTANEOUS at 05:42

## 2023-06-30 RX ADMIN — SIMVASTATIN 10 MILLIGRAM(S): 20 TABLET, FILM COATED ORAL at 22:34

## 2023-06-30 RX ADMIN — Medication 10 MILLIGRAM(S): at 05:41

## 2023-06-30 NOTE — PROGRESS NOTE ADULT - PROBLEM SELECTOR PLAN 1
Pt with acute left hip/ thigh pain which is somatic in nature due to comminuted acute left superior ramus fracture, s/p fall.  CT Pelvis bony only demonstrated a comminuted acute left superior ramus fracture. No hip fracture or dislocation. The sacrum appears intact. Grade 2 anterolisthesis of L5 on S1. Degenerative changes.   CT Osseous Pelvis on 6/28/23 redemonstrated acute comminuted left superior pubic ramus fracture with fracture lines extending to the pubic symphysis. A mildly displaced angulated left inferior pubic ramus fracture is now identified. The right pubic rami are intact.  High risk medications reviewed. Avoid polypharmacy. Avoid IV opioids. Avoid NSAIDs and benzodiazepines. Non-pharmacological sleep aides initiated. Non-opioid medications and non-pharmacological pain management measures initiated.    No Opioid pain recommendations at this time. Will maximize non-opioid pain medications.   - Change Acetaminophen 1gram PO q 8 hours to PRN for severe pain x 4 days. Monitor LFTs.   - Continue Lidoderm 4% patch daily.   Bowel Regimen  - Continue Miralax 17G PO daily. Hold for diarrhea/ loose stools  - Continue Senna 2 tablets at bedtime for constipation   Mild pain   - Non-pharmacological pain treatment recommendations  - Warm/ Cool packs PRN   - Repositioning extremity, elevation, imagery, relaxation, distraction.  - Physical therapy OOB if no contraindications   Recommendations discussed with primary team and RN.  Patient denies acute pain, Pain Service will sign off, please reconsult for changes. Pt with acute left hip/ thigh pain which is somatic in nature due to comminuted acute left superior ramus fracture, s/p fall.  CT Pelvis bony only demonstrated a comminuted acute left superior ramus fracture. No hip fracture or dislocation. The sacrum appears intact. Grade 2 anterolisthesis of L5 on S1. Degenerative changes.   CT Osseous Pelvis on 6/28/23 redemonstrated acute comminuted left superior pubic ramus fracture with fracture lines extending to the pubic symphysis. A mildly displaced angulated left inferior pubic ramus fracture is now identified. The right pubic rami are intact.  High risk medications reviewed. Avoid polypharmacy. Avoid IV opioids. Avoid NSAIDs and benzodiazepines. Non-pharmacological sleep aides initiated. Non-opioid medications and non-pharmacological pain management measures initiated.    No Opioid pain recommendations at this time. Will maximize non-opioid pain medications.   - Change Acetaminophen 1gram PO q 8 hours to PRN for severe pain x 4 days. Monitor LFTs.   - Continue Lidoderm 4% patch daily.   Bowel Regimen  - Continue Miralax 17G PO daily. Hold for diarrhea/ loose stools  - Continue Senna 2 tablets at bedtime for constipation   Mild pain   - Non-pharmacological pain treatment recommendations  - Warm/ Cool packs PRN   - Repositioning extremity, elevation, imagery, relaxation, distraction.  - Physical therapy OOB if no contraindications   Recommendations discussed with primary team and RN.  Patient denies acute pain, Pain Service will sign off, please reconsult if needed.
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
## 2023-06-30 NOTE — PROGRESS NOTE ADULT - SUBJECTIVE AND OBJECTIVE BOX
Source of information: ELLIOT VENTURA, Chart review  Patient language: English  : n/a    HPI:  82 year old female, from [facility], with PMHx HTN, HLD, hypothyroidism presents with L arm and leg pain. Pt states that she was walking out of the medicine room in the facility when she was accidentally struck by a serving cart. Pt states that she is not sure if she hit her head but denies LOC. Denies any other complaint including fever/chills, headache, dizziness, chest pain, palpitations cough, SOB, n/v/d/c, dysuria or leg swelling. NKDA. (24 Jun 2023 05:29)    Pt is admitted s/p fall after she was accidentally struck by a serving cart. Dx with pubic rami fracture. CT Pelvis bony only demonstrated a comminuted acute left superior ramus fracture. No hip fracture or dislocation. Patient was recommended conservative management with daily PT and is WBAT to LLE. CT Osseous Pelvis on 6/28/23 redemonstrated acute comminuted left superior pubic ramus fracture with fracture lines extending to the pubic symphysis. A mildly displaced angulated left inferior pubic ramus fracture is now identified. The right pubic rami are intact, no change in plan. Heme/Onc and GI now following for incidental findings on MRI Brain and CT A/P.  The pain service consulted for management of left thigh pain. Pt seen and examined at bedside, this morning. She reports having no pain while laying in bed or while OOB with PT. Patient reports refusing standing Tylenol this morning because she is not experiencing any pain. Pt tolerating PO diet. Denies lethargy, chest pain, SOB, nausea, vomiting, constipation. Reports last BM yesterday 6/29.  Pt denies taking medications for pain at home.     PAST MEDICAL & SURGICAL HISTORY:    Hypercholesterolemia    HTN (hypertension)    Hypothyroidism    Osteoarthritis    Anemia    No significant past surgical history    FAMILY HISTORY:  No pertinent family history in first degree relatives    Social History:  Pt lives in [facility]. Ambulates with cane at baseline. Denies hx of smoking, EtOH or recreational drug use. (24 Jun 2023 05:29)  [X] Denies ETOH use, illicit drug use and smoking    Allergies    No Known Allergies    MEDICATIONS  (STANDING):  amLODIPine   Tablet 10 milliGRAM(s) Oral daily  aspirin  chewable 81 milliGRAM(s) Oral daily  enalapril 10 milliGRAM(s) Oral daily  enoxaparin Injectable 40 milliGRAM(s) SubCutaneous every 24 hours  levothyroxine 50 MICROGram(s) Oral <User Schedule>  levothyroxine 100 MICROGram(s) Oral <User Schedule>  lidocaine   4% Patch 1 Patch Transdermal daily  polyethylene glycol 3350 17 Gram(s) Oral daily  senna 2 Tablet(s) Oral at bedtime  simvastatin 10 milliGRAM(s) Oral at bedtime  sodium chloride 0.9%. 1000 milliLiter(s) (90 mL/Hr) IV Continuous <Continuous>    MEDICATIONS  (PRN):  acetaminophen     Tablet .. 1000 milliGRAM(s) Oral every 8 hours PRN Severe Pain (7 - 10)  melatonin 3 milliGRAM(s) Oral at bedtime PRN Insomnia  ondansetron Injectable 4 milliGRAM(s) IV Push every 8 hours PRN Nausea and/or Vomiting    Vital Signs Last 24 Hrs  T(C): 36.8 (30 Jun 2023 11:21), Max: 36.8 (30 Jun 2023 05:11)  T(F): 98.2 (30 Jun 2023 11:21), Max: 98.2 (30 Jun 2023 05:11)  HR: 91 (30 Jun 2023 11:21) (79 - 97)  BP: 102/62 (30 Jun 2023 11:21) (102/62 - 121/70)  BP(mean): --  RR: 18 (30 Jun 2023 11:21) (18 - 18)  SpO2: 94% (30 Jun 2023 11:21) (91% - 97%)    Parameters below as of 30 Jun 2023 11:21  Patient On (Oxygen Delivery Method): room air    LABS: Reviewed.                          8.3    5.89  )-----------( 308      ( 30 Jun 2023 05:27 )             24.8     06-30    135  |  100  |  15  ----------------------------<  98  3.5   |  27  |  0.67    Ca    8.4      30 Jun 2023 05:27  Phos  3.6     06-30  Mg     1.9     06-30    Urinalysis Basic - ( 30 Jun 2023 05:27 )    Color: x / Appearance: x / SG: x / pH: x  Gluc: 98 mg/dL / Ketone: x  / Bili: x / Urobili: x   Blood: x / Protein: x / Nitrite: x   Leuk Esterase: x / RBC: x / WBC x   Sq Epi: x / Non Sq Epi: x / Bacteria: x      CAPILLARY BLOOD GLUCOSE    COVID-19 PCR: NotDetec (28 Jun 2023 10:29)    Radiology: Reviewed.     ACC: [id number] EXAM:  MR BRAIN IC   ORDERED BY: ENRIQUE CARRANZA   PROCEDURE DATE:  06/29/2023      INTERPRETATION:  Contrast-enhanced MRI of the brain.    CLINICAL INDICATION: Follow-up right anterior temporal cystic lesion with   surrounding edema    TECHNIQUE:  Multiplanar, multisequence MR images of the brain were   obtained before administration of 7.5 cc of Gadavist. 0 cc were discarded.    COMPARISON: MRI brain 6/27/2023. CT brain 6/25/2020.    FINDINGS:    Similar-appearing 9 x 8 mm T1 and FLAIR hypointense, T2 hyperintense,   well-defined lesion in the right anterior temporal lobe with similar   surrounding vasogenic edema.    The lesion does not enhance, nor does it demonstrate restricted diffusion.    No abnormal parenchymal orleptomeningeal enhancement.    No hydrocephalus, midline shift, or effacement of the basal cisterns.    Moderate white matter microvascular ischemic disease.    No acute intracranial hemorrhage. No acute infarction.    Signal voids are seen within the major intracranial vessels consistent   with their patency.    Paranasal sinuses clear.    Scattered bilateral mastoid air cell effusions.    Optic globes are enlarged and misshapen.    Sellar/suprasellar structures and cervicomedullary junction unremarkable.    IMPRESSION:    Similar-appearing 9 x 8 mm T1 and FLAIR hypointense, T2 hyperintense,   well-defined lesion in the right anterior temporal lobe with similar   surrounding vasogenic edema.    The lesion does not enhance, nor does it demonstrate restricted diffusion.    Differential diagnosis includes atypical metastatic and infectious   process other than pyogenic cerebral abscess    --- End of Report ---      ACC: [id number] EXAM:  CT CHEST IC   ORDERED BY: ENRIQUE CARRANZA   ACC: [id number] EXAM:  CT ABDOMEN AND PELVIS OC IC   ORDERED BY: ENRIQUE CARRANZA   PROCEDURE DATE:  06/28/2023      INTERPRETATION:  CLINICAL INFORMATION: 82 years  Female with r/o   malignant neoplasm. Brain lesion.    COMPARISON: Noncontrast chest CT 6/24/2023 and CT osseous pelvis 6/23/2023  CONTRAST/COMPLICATIONS:  IV Contrast: IV contrast documented in unlinked concurrent exam   (accession [id number]), Omnipaque 350 (accession [id number])  90 cc   administered   10 cc discarded  Oral Contrast: Omnipaque 300 (accession [id number]), NONE (accession   [id number])  Complications: None reported at time of study completion    PROCEDURE:  CT of the Chest, Abdomen and Pelvis was performed.  Sagittal and coronal reformats were performed.    FINDINGS:  CHEST:  LUNGS AND LARGE AIRWAYS: Patent central airways. No pulmonary nodules.   Mild left lower lobe dependent atelectasis.  PLEURA: No pleural effusion.  VESSELS: Atheroscleroticaorta without aneurysm or dissection. Coronary   atherosclerosis.  HEART: Mild cardiomegaly. No pericardial effusion.  MEDIASTINUM AND SHAD: No lymphadenopathy.  CHEST WALL AND LOWER NECK: Within normal limits.    ABDOMEN AND PELVIS:  LIVER: Within normal limits.  BILE DUCTS: Normal caliber.  GALLBLADDER: Within normal limits.  SPLEEN: Within normal limits.  PANCREAS: Atrophic pancreas. 1.1 x 0.7 cm bilobed pancreatic tail cyst.   Upstream ductal dilatation to 4 mm.  ADRENALS: Within normal limits.  KIDNEYS/URETERS: Dominant 4.0 cm left midpole parapelvic cyst. Bilateral   subcentimeter hypodensities too small to characterize. No   hydroureteronephrosis.    BLADDER: Within normal limits.  REPRODUCTIVE ORGANS: Hysterectomy.    BOWEL: No bowel obstruction. Appendix is not visualized and cannot be   assessed. Large sliding hiatus hernia with approximately two thirds of   the stomach intrathoracic. Esophageal dilatation containing enteric   contrast. Rectal wall thickening and perirectal edema.  PERITONEUM: No ascites.  VESSELS: Within normal limits.  RETROPERITONEUM/LYMPH NODES: No lymphadenopathy.  ABDOMINAL WALL: Within normal limits.  BONES: Degenerative changes. Grade 1-2 L5-S1 anterolisthesis. Moderate   thoracic dextroscoliosis. Healing left lateral sixth rib fracture.   Osteoporosis. Redemonstrated acute comminuted left superior pubic ramus   fracture with fracture lines extending to the pubic symphysis. A mildly   displaced angulated left inferior pubic ramus fracture is now identified.   The right pubic rami are intact.    IMPRESSION:  Large sliding hiatus hernia with approximately two thirds of the stomach   intrathoracic. Esophageal distention with possible reflux. Correlate with   endoscopy.    1.1 x 0.7 cm pancreatictail cyst with upstream ductal dilatation.   Recommend MRI for additional characterization.    Rectal wall thickening and perirectal edema consistent with proctitis.   Recommend colonoscopy to exclude underlying mass.    Acute left superior and inferior pubic ramus fractures.    --- End of Report ---  ZACKARY SCHULZ MD; Attending Radiologist  This document has been electronically signed. Jun 28 2023  3:46PM    ACC: [id number] EXAM:  CT PELVIS BONY ONLY   ORDERED BY: TE THORNE   PROCEDURE DATE:  06/23/2023      INTERPRETATION:  HISTORY: Left hip and femur pain status post fall.  None available.  COMPARISON:    PROCEDURE:  CT of the Pelvis was performed.  Sagittal and coronal reformats were performed.    FINDINGS:  BLADDER: Partially distended and suboptimally assessed.  REPRODUCTIVE ORGANS: Uterus and adnexa are within normal limits.    BOWEL: No evidence of bowel obstruction. Moderate fecal burden in the   colon. Appendix is not discretely visualized.  PERITONEUM: No ascites.  VESSELS: Within normal limits.  RETROPERITONEUM/LYMPH NODES: No lymphadenopathy.  ABDOMINAL WALL: Within normal limits.  BONES: Generalized osteopenia. Acute comminuted fracture superior left   pubic ramus with fracture line extending towards the symphyseal joint.   The left inferior ramus is grossly intact although the left cortex   appears somewhat angulated. No lucent fracture line. The femurs are   intact. No hip dislocation. The sacrum appears intact. Grade 2   anterolisthesis of L5 on S1. Degenerative changes.    IMPRESSION:  Comminuted acute left superior ramus fracture. No hip fracture or   dislocation.    --- End of Report ---    ALBARO SMYTH MD; Attending Radiologist  This document has been electronically signed. Jun 23 2023  9:58PM    ORT Score -   Family Hx of substance abuse	Female	      Male  Alcohol 	                                           1                     3  Illegal drugs	                                   2                     3  Rx drugs                                           4 	                  4  Personal Hx of substance abuse		  Alcohol 	                                          3	                  3  Illegal drugs                                     4	                  4  Rx drugs                                            5 	                  5  Age between 16- 45 years	           1                     1  hx preadolescent sexual abuse	   3 	                  0  Psychological disease		  ADD, OCD, bipolar, schizophrenia   2	          2  Depression                                           1 	          1  Total: 0    a score of 3 or lower indicates low risk for opioid abuse		  a score of 4-7 indicates moderate risk for opioid abuse		  a score of 8 or higher indicates high risk for opioid abuse  	  REVIEW OF SYSTEMS:  CONSTITUTIONAL: No fever or fatigue  HEENT:  No difficulty hearing, no change in vision  NECK: No pain or stiffness  RESPIRATORY: No cough, wheezing, chills or hemoptysis; No shortness of breath  CARDIOVASCULAR: No chest pain, palpitations, dizziness, or leg swelling  GASTROINTESTINAL: No loss of appetite, good PO intake. No abdominal or epigastric pain. No nausea, vomiting; No diarrhea or constipation.   GENITOURINARY: No dysuria, frequency, hematuria, +  incontinence  MUSCULOSKELETAL: No left hip pain. No joint swelling; no upper motor strength weakness, +LLE weakness, + falls   NEURO: No headaches, No numbness/tingling b/l LE  PSYCHIATRIC: No depression, anxiety or difficulty sleeping    PHYSICAL EXAM:  GENERAL:  Alert & Oriented X 2-3, cooperative, NAD, Good concentration. Speech is clear.   RESPIRATORY: Respirations even and unlabored. Clear to auscultation bilaterally; No rales, rhonchi, wheezing, or rubs  CARDIOVASCULAR: Normal S1/S2, regular rate and rhythm; No murmurs, rubs, or gallops. No JVD.   GASTROINTESTINAL:  Soft, Nontender, Nondistended; Bowel sounds present  GENITOURINARY: No dysuria, frequency, hematuria, +  incontinence (external urine collection device intact)  PERIPHERAL VASCULAR:  Extremities warm without edema. 2+ Peripheral Pulses, No cyanosis, No calf tenderness  MUSCULOSKELETAL: Motor Strength 5/5 B/L upper and 4/5 lower extremities; moves all extremities equally against gravity; + limited ROM over left lower extremity; negative SLR to RLE;  no joint tenderness on palpation  SKIN: Warm, dry, intact. No rashes, lesions, scars or wounds, + ecchymosis to L inner thigh      Risk factors associated with adverse outcomes related to opioid treatment  [ ] Concurrent benzodiazepine use  [ ] History/ Active substance use or alcohol use disorder  [ ] Psychiatric co-morbidity  [ ] Sleep apnea  [ ] COPD  [ ] BMI> 35  [ ] Liver dysfunction  [ ] Renal dysfunction  [ ] CHF  [ ] Smoker  [X]  Age > 60 years    [X]  [place]  Reviewed and Copied to Chart. See below.    Plan of care and goal oriented pain management treatment options were discussed with patient and /or primary care giver; all questions and concerns were addressed and care was aligned with patient's wishes.    Educated patient on goal oriented pain management treatment options      Source of information: ELLIOT VENTURA, Chart review  Patient language: English  : n/a    HPI:  82 year old female, from [facility], with PMHx HTN, HLD, hypothyroidism presents with L arm and leg pain. Pt states that she was walking out of the medicine room in the facility when she was accidentally struck by a serving cart. Pt states that she is not sure if she hit her head but denies LOC. Denies any other complaint including fever/chills, headache, dizziness, chest pain, palpitations cough, SOB, n/v/d/c, dysuria or leg swelling. NKDA. (24 Jun 2023 05:29)    Pt is admitted s/p fall after she was accidentally struck by a serving cart. Dx with pubic rami fracture. CT Pelvis bony only demonstrated a comminuted acute left superior ramus fracture. No hip fracture or dislocation. Patient was recommended conservative management with daily PT and is WBAT to LLE. CT Osseous Pelvis on 6/28/23 redemonstrated acute comminuted left superior pubic ramus fracture with fracture lines extending to the pubic symphysis. A mildly displaced angulated left inferior pubic ramus fracture is now identified. The right pubic rami are intact, no change in plan. Heme/Onc and GI now following for incidental findings on MRI Brain and CT A/P.  The pain service consulted for management of left thigh pain 6/24. Pt seen and examined at bedside, this morning. She reports having no pain while laying in bed or while OOB with PT. Patient reports refusing standing Tylenol this morning because she is not experiencing any pain. Pt tolerating PO diet. Denies lethargy, chest pain, SOB, nausea, vomiting, constipation. Reports last BM yesterday 6/29.  Pt denies taking medications for pain at home.     PAST MEDICAL & SURGICAL HISTORY:    Hypercholesterolemia    HTN (hypertension)    Hypothyroidism    Osteoarthritis    Anemia    No significant past surgical history    FAMILY HISTORY:  No pertinent family history in first degree relatives    Social History:  Pt lives in [facility]. Ambulates with cane at baseline. Denies hx of smoking, EtOH or recreational drug use. (24 Jun 2023 05:29)  [X] Denies ETOH use, illicit drug use and smoking    Allergies    No Known Allergies    MEDICATIONS  (STANDING):  amLODIPine   Tablet 10 milliGRAM(s) Oral daily  aspirin  chewable 81 milliGRAM(s) Oral daily  enalapril 10 milliGRAM(s) Oral daily  enoxaparin Injectable 40 milliGRAM(s) SubCutaneous every 24 hours  levothyroxine 50 MICROGram(s) Oral <User Schedule>  levothyroxine 100 MICROGram(s) Oral <User Schedule>  lidocaine   4% Patch 1 Patch Transdermal daily  polyethylene glycol 3350 17 Gram(s) Oral daily  senna 2 Tablet(s) Oral at bedtime  simvastatin 10 milliGRAM(s) Oral at bedtime  sodium chloride 0.9%. 1000 milliLiter(s) (90 mL/Hr) IV Continuous <Continuous>    MEDICATIONS  (PRN):  acetaminophen     Tablet .. 1000 milliGRAM(s) Oral every 8 hours PRN Severe Pain (7 - 10)  melatonin 3 milliGRAM(s) Oral at bedtime PRN Insomnia  ondansetron Injectable 4 milliGRAM(s) IV Push every 8 hours PRN Nausea and/or Vomiting    Vital Signs Last 24 Hrs  T(C): 36.8 (30 Jun 2023 11:21), Max: 36.8 (30 Jun 2023 05:11)  T(F): 98.2 (30 Jun 2023 11:21), Max: 98.2 (30 Jun 2023 05:11)  HR: 91 (30 Jun 2023 11:21) (79 - 97)  BP: 102/62 (30 Jun 2023 11:21) (102/62 - 121/70)  BP(mean): --  RR: 18 (30 Jun 2023 11:21) (18 - 18)  SpO2: 94% (30 Jun 2023 11:21) (91% - 97%)    Parameters below as of 30 Jun 2023 11:21  Patient On (Oxygen Delivery Method): room air    LABS: Reviewed.                          8.3    5.89  )-----------( 308      ( 30 Jun 2023 05:27 )             24.8     06-30    135  |  100  |  15  ----------------------------<  98  3.5   |  27  |  0.67    Ca    8.4      30 Jun 2023 05:27  Phos  3.6     06-30  Mg     1.9     06-30    Urinalysis Basic - ( 30 Jun 2023 05:27 )    Color: x / Appearance: x / SG: x / pH: x  Gluc: 98 mg/dL / Ketone: x  / Bili: x / Urobili: x   Blood: x / Protein: x / Nitrite: x   Leuk Esterase: x / RBC: x / WBC x   Sq Epi: x / Non Sq Epi: x / Bacteria: x      CAPILLARY BLOOD GLUCOSE    COVID-19 PCR: NotDetec (28 Jun 2023 10:29)    Radiology: Reviewed.     ACC: [id number] EXAM:  MR BRAIN IC   ORDERED BY: ENRIQUE CARRANZA   PROCEDURE DATE:  06/29/2023      INTERPRETATION:  Contrast-enhanced MRI of the brain.    CLINICAL INDICATION: Follow-up right anterior temporal cystic lesion with   surrounding edema    TECHNIQUE:  Multiplanar, multisequence MR images of the brain were   obtained before administration of 7.5 cc of Gadavist. 0 cc were discarded.    COMPARISON: MRI brain 6/27/2023. CT brain 6/25/2020.    FINDINGS:    Similar-appearing 9 x 8 mm T1 and FLAIR hypointense, T2 hyperintense,   well-defined lesion in the right anterior temporal lobe with similar   surrounding vasogenic edema.    The lesion does not enhance, nor does it demonstrate restricted diffusion.    No abnormal parenchymal orleptomeningeal enhancement.    No hydrocephalus, midline shift, or effacement of the basal cisterns.    Moderate white matter microvascular ischemic disease.    No acute intracranial hemorrhage. No acute infarction.    Signal voids are seen within the major intracranial vessels consistent   with their patency.    Paranasal sinuses clear.    Scattered bilateral mastoid air cell effusions.    Optic globes are enlarged and misshapen.    Sellar/suprasellar structures and cervicomedullary junction unremarkable.    IMPRESSION:    Similar-appearing 9 x 8 mm T1 and FLAIR hypointense, T2 hyperintense,   well-defined lesion in the right anterior temporal lobe with similar   surrounding vasogenic edema.    The lesion does not enhance, nor does it demonstrate restricted diffusion.    Differential diagnosis includes atypical metastatic and infectious   process other than pyogenic cerebral abscess    --- End of Report ---      ACC: [id number] EXAM:  CT CHEST IC   ORDERED BY: ENRIQUE CARRANZA   ACC: [id number] EXAM:  CT ABDOMEN AND PELVIS OC IC   ORDERED BY: ENRIQUE CARRANZA   PROCEDURE DATE:  06/28/2023      INTERPRETATION:  CLINICAL INFORMATION: 82 years  Female with r/o   malignant neoplasm. Brain lesion.    COMPARISON: Noncontrast chest CT 6/24/2023 and CT osseous pelvis 6/23/2023  CONTRAST/COMPLICATIONS:  IV Contrast: IV contrast documented in unlinked concurrent exam   (accession [id number]), Omnipaque 350 (accession [id number])  90 cc   administered   10 cc discarded  Oral Contrast: Omnipaque 300 (accession [id number]), NONE (accession   [id number])  Complications: None reported at time of study completion    PROCEDURE:  CT of the Chest, Abdomen and Pelvis was performed.  Sagittal and coronal reformats were performed.    FINDINGS:  CHEST:  LUNGS AND LARGE AIRWAYS: Patent central airways. No pulmonary nodules.   Mild left lower lobe dependent atelectasis.  PLEURA: No pleural effusion.  VESSELS: Atheroscleroticaorta without aneurysm or dissection. Coronary   atherosclerosis.  HEART: Mild cardiomegaly. No pericardial effusion.  MEDIASTINUM AND SHAD: No lymphadenopathy.  CHEST WALL AND LOWER NECK: Within normal limits.    ABDOMEN AND PELVIS:  LIVER: Within normal limits.  BILE DUCTS: Normal caliber.  GALLBLADDER: Within normal limits.  SPLEEN: Within normal limits.  PANCREAS: Atrophic pancreas. 1.1 x 0.7 cm bilobed pancreatic tail cyst.   Upstream ductal dilatation to 4 mm.  ADRENALS: Within normal limits.  KIDNEYS/URETERS: Dominant 4.0 cm left midpole parapelvic cyst. Bilateral   subcentimeter hypodensities too small to characterize. No   hydroureteronephrosis.    BLADDER: Within normal limits.  REPRODUCTIVE ORGANS: Hysterectomy.    BOWEL: No bowel obstruction. Appendix is not visualized and cannot be   assessed. Large sliding hiatus hernia with approximately two thirds of   the stomach intrathoracic. Esophageal dilatation containing enteric   contrast. Rectal wall thickening and perirectal edema.  PERITONEUM: No ascites.  VESSELS: Within normal limits.  RETROPERITONEUM/LYMPH NODES: No lymphadenopathy.  ABDOMINAL WALL: Within normal limits.  BONES: Degenerative changes. Grade 1-2 L5-S1 anterolisthesis. Moderate   thoracic dextroscoliosis. Healing left lateral sixth rib fracture.   Osteoporosis. Redemonstrated acute comminuted left superior pubic ramus   fracture with fracture lines extending to the pubic symphysis. A mildly   displaced angulated left inferior pubic ramus fracture is now identified.   The right pubic rami are intact.    IMPRESSION:  Large sliding hiatus hernia with approximately two thirds of the stomach   intrathoracic. Esophageal distention with possible reflux. Correlate with   endoscopy.    1.1 x 0.7 cm pancreatictail cyst with upstream ductal dilatation.   Recommend MRI for additional characterization.    Rectal wall thickening and perirectal edema consistent with proctitis.   Recommend colonoscopy to exclude underlying mass.    Acute left superior and inferior pubic ramus fractures.    --- End of Report ---  ZACKARY SCHULZ MD; Attending Radiologist  This document has been electronically signed. Jun 28 2023  3:46PM    ACC: [id number] EXAM:  CT PELVIS BONY ONLY   ORDERED BY: TE THORNE   PROCEDURE DATE:  06/23/2023      INTERPRETATION:  HISTORY: Left hip and femur pain status post fall.  None available.  COMPARISON:    PROCEDURE:  CT of the Pelvis was performed.  Sagittal and coronal reformats were performed.    FINDINGS:  BLADDER: Partially distended and suboptimally assessed.  REPRODUCTIVE ORGANS: Uterus and adnexa are within normal limits.    BOWEL: No evidence of bowel obstruction. Moderate fecal burden in the   colon. Appendix is not discretely visualized.  PERITONEUM: No ascites.  VESSELS: Within normal limits.  RETROPERITONEUM/LYMPH NODES: No lymphadenopathy.  ABDOMINAL WALL: Within normal limits.  BONES: Generalized osteopenia. Acute comminuted fracture superior left   pubic ramus with fracture line extending towards the symphyseal joint.   The left inferior ramus is grossly intact although the left cortex   appears somewhat angulated. No lucent fracture line. The femurs are   intact. No hip dislocation. The sacrum appears intact. Grade 2   anterolisthesis of L5 on S1. Degenerative changes.    IMPRESSION:  Comminuted acute left superior ramus fracture. No hip fracture or   dislocation.    --- End of Report ---    ALBARO SMYTH MD; Attending Radiologist  This document has been electronically signed. Jun 23 2023  9:58PM    ORT Score -   Family Hx of substance abuse	Female	      Male  Alcohol 	                                           1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

## 2023-06-30 NOTE — PROGRESS NOTE ADULT - PROBLEM SELECTOR PLAN 5
presented with hbg of 9.4 and normal MCV  -hbg down trended to as low as 8.8  -total iron 33, % sat 12, TIBC 269  -borderline iron deficiency and normal MCV raise possible contribution of chronic disease (?malignancy)  -monitor CBC daily  -transfuse hbg<7  -heme/onc consult  -B12/folate normal  -f/u methylmalonic acid and homocysteine

## 2023-06-30 NOTE — CONSULT NOTE ADULT - CONSULT REASON
Malignancy
Pelvis fracture
Rectal thickening, dilated esophagus and pancreas tail
Stroke
Left thigh pain

## 2023-06-30 NOTE — PROGRESS NOTE ADULT - ASSESSMENT
Search Terms: Justa Davalos, 1940 Search Date: 06/24/2023 16:22:53 PM    The Drug Utilization Report below displays all of the controlled substance prescriptions, if any, that your patient has filled in the last twelve months. The information displayed on this report is compiled from pharmacy submissions to the Department, and accurately reflects the information as submitted by the pharmacies.    This report was requested by: Frances Gonzales | Reference #: [id number]    There are no results for the search terms that you entered.
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
## 2023-06-30 NOTE — PROGRESS NOTE ADULT - ASSESSMENT
· Assessment	  ELLIOT VENTURA is a 82y Female who presents with a chief complaint of fracture    Malignancy, Suspected  ·	Imaging reviewed. MRI brain with 0.9 cm right temporal lobe cystic lesion. CT imaging showed pancreatic tail cyst as well as rectal wall thickening.  ·	Recommend MRI abdomen/pelvis with contrast to further evaluate pancreatic lesion.  ·	Recommend gastroenterology evaluation with colonoscopy.  ·	Tumor markers thus far unremarkable.    Anemia  ·	Slight iron deficiency noted.  ·	Will order workup.    Patient states that she does not have family members.  Consider goals of care discussion in a patient with poor performance status and fracture.     Will continue to follow.
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
## 2023-06-30 NOTE — PROGRESS NOTE ADULT - SUBJECTIVE AND OBJECTIVE BOX
HPI:  82 year old female, from [facility], with PMHx HTN, HLD, hypothyroidism presents with L arm and leg pain. Pt states that she was walking out of the medicine room in the facility when she was accidentally struck by a serving cart. Pt states that she is not sure if she hit her head but denies LOC. Denies any other complaint including fever/chills, headache, dizziness, chest pain, palpitations cough, SOB, n/v/d/c, dysuria or leg swelling. NKDA. (24 Jun 2023 05:29)     Pt is seen and examined  pt is awake and lying in bed/out of bed to chair  pt seems comfortable and denies any complaints at this time    ROS:  Negative except for:    MEDICATIONS  (STANDING):  acetaminophen     Tablet .. 1000 milliGRAM(s) Oral every 8 hours  amLODIPine   Tablet 10 milliGRAM(s) Oral daily  aspirin  chewable 81 milliGRAM(s) Oral daily  enalapril 10 milliGRAM(s) Oral daily  enoxaparin Injectable 40 milliGRAM(s) SubCutaneous every 24 hours  levothyroxine 50 MICROGram(s) Oral <User Schedule>  levothyroxine 100 MICROGram(s) Oral <User Schedule>  lidocaine   4% Patch 1 Patch Transdermal daily  polyethylene glycol 3350 17 Gram(s) Oral daily  senna 2 Tablet(s) Oral at bedtime  simvastatin 10 milliGRAM(s) Oral at bedtime  sodium chloride 0.9%. 1000 milliLiter(s) (90 mL/Hr) IV Continuous <Continuous>    MEDICATIONS  (PRN):  melatonin 3 milliGRAM(s) Oral at bedtime PRN Insomnia  ondansetron Injectable 4 milliGRAM(s) IV Push every 8 hours PRN Nausea and/or Vomiting      Allergies    No Known Allergies    Intolerances        Vital Signs Last 24 Hrs  T(C): 36.7 (30 Jun 2023 08:11), Max: 37 (29 Jun 2023 11:09)  T(F): 98.1 (30 Jun 2023 08:11), Max: 98.6 (29 Jun 2023 11:09)  HR: 97 (30 Jun 2023 08:11) (79 - 97)  BP: 112/58 (30 Jun 2023 08:11) (105/64 - 121/70)  BP(mean): --  RR: 18 (30 Jun 2023 08:11) (18 - 18)  SpO2: 95% (30 Jun 2023 08:11) (91% - 97%)    Parameters below as of 30 Jun 2023 08:11  Patient On (Oxygen Delivery Method): room air        PHYSICAL EXAM  General: adult in NAD  HEENT: clear oropharynx, anicteric sclera, pink conjunctiva  Neck: supple  CV: normal S1/S2 with no murmur rubs or gallops  Lungs: positive air movement b/l ant lungs,clear to auscultation, no wheezes, no rales  Abdomen: soft non-tender non-distended, no hepatosplenomegaly  Ext: no clubbing cyanosis or edema  Skin: no rashes and no petechiae  Neuro: alert and oriented X 4, no focal deficits  LABS:                          8.3    5.89  )-----------( 308      ( 30 Jun 2023 05:27 )             24.8         Mean Cell Volume : 88.6 fl  Mean Cell Hemoglobin : 29.6 pg  Mean Cell Hemoglobin Concentration : 33.5 gm/dL  Auto Neutrophil # : x  Auto Lymphocyte # : x  Auto Monocyte # : x  Auto Eosinophil # : x  Auto Basophil # : x  Auto Neutrophil % : x  Auto Lymphocyte % : x  Auto Monocyte % : x  Auto Eosinophil % : x  Auto Basophil % : x    Serial CBC  Hematocrit 24.8  Hemoglobin 8.3  Plat 308  RBC 2.80  WBC 5.89  Serial CBC  Hematocrit 24.4  Hemoglobin 8.0  Plat 256  RBC 2.73  WBC 5.34  Serial CBC  Hematocrit 26.3  Hemoglobin 8.9  Plat 251  RBC 2.99  WBC 7.54  Serial CBC  Hematocrit 26.7  Hemoglobin 8.8  Plat 215  RBC 2.96  WBC 10.28    06-30    135  |  100  |  15  ----------------------------<  98  3.5   |  27  |  0.67    Ca    8.4      30 Jun 2023 05:27  Phos  3.6     06-30  Mg     1.9     06-30            Iron - Total Binding Capacity.: 269 ug/dL (06-28 @ 05:21)            BLOOD SMEAR INTERPRETATION:       RADIOLOGY & ADDITIONAL STUDIES:

## 2023-06-30 NOTE — PROGRESS NOTE ADULT - SUBJECTIVE AND OBJECTIVE BOX
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 Neoplasm workup  - Workup of pancreatic lesion, including MRI Abdomen as per primary team and Hematology/Oncology consult  - Check Methylmalonic acid and Homocysteine levels due to low-normal Vitamin B12 level - If either of these levels are high, then Vitamin B12 level is functionally low and should be supplemented with, at minimum 1000 mcg PO Daily  - Telemetry monitoring while inpatient    2. Stroke prevention  - Q4H Neurochecks & Vital signs  - Patient has passed  bedside swallow evaluation  - Aspirin 81mg PO Daily (or Aspirin 300mg OH daily if NPO)  - Simvastatin 10mg PO QHS if patient is not NPO (higher dose or change in statin is not needed since LDL < 70 mg/dL on Simvastatin 10mg PO QHS)  - Treat BP if over 140/90 (goal /80) - Maintain home antihypertensive medications, No role for permissive hypertension at this time  - PT/OT  - DVT ppx: SCDs, Enoxaparin          NOTE TO BE COMPLETED - PLEASE REFER TO ABOVE ONLY AND IGNORE INFORMATION BELOW    ******************************    HPI:  82 year old female, from [facility], with PMHx HTN, HLD, hypothyroidism presents with L arm and leg pain. Pt states that she was walking out of the medicine room in the facility when she was accidentally struck by a serving cart. Pt states that she is not sure if she hit her head but denies LOC. Denies any other complaint including fever/chills, headache, dizziness, chest pain, palpitations cough, SOB, n/v/d/c, dysuria or leg swelling. NKDA. (24 Jun 2023 05:29)      NEURO HPI:      INTERVAL HISTORY:      MEDICATIONS:  acetaminophen     Tablet .. 1000 milliGRAM(s) Oral every 8 hours PRN  amLODIPine   Tablet 10 milliGRAM(s) Oral daily  aspirin  chewable 81 milliGRAM(s) Oral daily  enalapril 10 milliGRAM(s) Oral daily  enoxaparin Injectable 40 milliGRAM(s) SubCutaneous every 24 hours  levothyroxine 50 MICROGram(s) Oral <User Schedule>  levothyroxine 100 MICROGram(s) Oral <User Schedule>  lidocaine   4% Patch 1 Patch Transdermal daily  melatonin 3 milliGRAM(s) Oral at bedtime PRN  ondansetron Injectable 4 milliGRAM(s) IV Push every 8 hours PRN  polyethylene glycol 3350 17 Gram(s) Oral daily  senna 2 Tablet(s) Oral at bedtime  simvastatin 10 milliGRAM(s) Oral at bedtime  sodium chloride 0.9%. 1000 milliLiter(s) IV Continuous <Continuous>      ALLERGIES:  No Known Allergies      REVIEW OF SYSTEMS:  Fourteen systems reviewed and negative except as in HPI / Interval History.          OBJECTIVE:  Vital Signs Last 24 Hrs  T(C): 36.8 (30 Jun 2023 11:21), Max: 36.8 (30 Jun 2023 05:11)  T(F): 98.2 (30 Jun 2023 11:21), Max: 98.2 (30 Jun 2023 05:11)  HR: 91 (30 Jun 2023 11:21) (79 - 97)  BP: 102/62 (30 Jun 2023 11:21) (102/62 - 121/70)  RR: 18 (30 Jun 2023 11:21) (18 - 18)  SpO2: 94% (30 Jun 2023 11:21) (91% - 97%)  Parameters below as of 30 Jun 2023 11:21  Patient On (Oxygen Delivery Method): room air      General Examination:  General: No acute distress  HEENT: Atraumatic, Normocephalic  Respiratory: CTA B/l.  No crackles, rhonchi, or wheezes.  Cardiovascular: RRR.  Normal S1 & S2.  Normal b/l radial and pedal pulses.    Neurological Examination:  General / Mental Status: AAO x 3.  No aphasia or dysarthria.  Naming and repetition intact.  Cranial Nerves: VFF x 4.  PERRL.  EOMI x 2, No nystagmus or diplopia.  B/l V1-V3 equal and intact to light touch and pinprick.  Symmetric facial movement and palate elevation.  B/l hearing equal to finger rub.  5/5 strength with b/l sternocleidomastoid and trapezius.  Midline tongue protrusion, with no atrophy or fasciculations.  Motor: Normal bulk & tone in all four extremities.  5/5 strength throughout all four extremities.  No downward drift, rigidity, spasticity, or tremors in any of the four extremities.  Sensory: Intact to light touch and pinprick in all four extremities.  Negative Romberg.  Reflex: 2+ and symmetric at b/l biceps, triceps, brachioradialis, patellae, and ankles.  Downgoing toes b/l.  Coordination: No dysmetria with b/l finger-to-nose and heel raise tests.  Symmetric rapid alternating movements b/l.  Gait: Normal, narrow-based gait.  No difficulty with tiptoe, heel, and tandem gaits.        LABORATORY VALUES:                          8.3    5.89  )-----------( 308      ( 30 Jun 2023 05:27 )             24.8       06-30    135  |  100  |  15  ----------------------------<  98  3.5   |  27  |  0.67    Ca    8.4      30 Jun 2023 05:27  Phos  3.6     06-30  Mg     1.9     06-30 06-27 Chol 137 LDL -- HDL 60 Trig 109  06-26 Chol 147 LDL -- HDL 63 Trig 130    Glucose Trend  06-30-23 @ 05:27   -  98 -- --  06-29-23 @ 05:30   -  98 -- --  06-28-23 @ 05:21   -  103<H> -- --    Vitamin B12, Serum: 298 pg/mL (06-30-23 @ 05:27)  Folate, Serum: 16.4 ng/mL (06-30-23 @ 05:27)          NEUROIMAGING:          Please contact the Neurology consult service with any neurological questions.      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 Neoplasm workup  - Workup of pancreatic lesion, including MRI Abdomen as per primary team and Hematology/Oncology consult  - Check Methylmalonic acid and Homocysteine levels due to low-normal Vitamin B12 level - If either of these levels are high, then Vitamin B12 level is functionally low and should be supplemented with, at minimum 1000 mcg PO Daily  - Telemetry monitoring while inpatient    2. Stroke prevention  - Q4H Neurochecks & Vital signs  - Patient has passed  bedside swallow evaluation  - Aspirin 81mg PO Daily (or Aspirin 300mg SC daily if NPO)  - Simvastatin 10mg PO QHS if patient is not NPO (higher dose or change in statin is not needed since LDL < 70 mg/dL on Simvastatin 10mg PO QHS)  - Treat BP if over 140/90 (goal /80) - Maintain home antihypertensive medications, No role for permissive hypertension at this time  - PT/OT  - DVT ppx: SCDs, Enoxaparin          ******************************    HPI:  82 year old female, from [facility], with PMHx HTN, HLD, hypothyroidism presents with L arm and leg pain. Pt states that she was walking out of the medicine room in the facility when she was accidentally struck by a serving cart. Pt states that she is not sure if she hit her head but denies LOC. Denies any other complaint including fever/chills, headache, dizziness, chest pain, palpitations cough, SOB, n/v/d/c, dysuria or leg swelling. NKDA. (24 Jun 2023 05:29)      NEURO HPI:  82F with dementia presents from a skilled nursing facility and presents after having an accidental head injury without loss of consciousness. Her CT Head revealed an incidental ischemic stroke.      INTERVAL HISTORY:  The patient has not developed any new stroke-like symptoms overnight.      MEDICATIONS:  acetaminophen     Tablet .. 1000 milliGRAM(s) Oral every 8 hours PRN  amLODIPine   Tablet 10 milliGRAM(s) Oral daily  aspirin  chewable 81 milliGRAM(s) Oral daily  enalapril 10 milliGRAM(s) Oral daily  enoxaparin Injectable 40 milliGRAM(s) SubCutaneous every 24 hours  levothyroxine 50 MICROGram(s) Oral <User Schedule>  levothyroxine 100 MICROGram(s) Oral <User Schedule>  lidocaine   4% Patch 1 Patch Transdermal daily  melatonin 3 milliGRAM(s) Oral at bedtime PRN  ondansetron Injectable 4 milliGRAM(s) IV Push every 8 hours PRN  polyethylene glycol 3350 17 Gram(s) Oral daily  senna 2 Tablet(s) Oral at bedtime  simvastatin 10 milliGRAM(s) Oral at bedtime  sodium chloride 0.9%. 1000 milliLiter(s) IV Continuous <Continuous>      ALLERGIES:  No Known Allergies      REVIEW OF SYSTEMS:  Fourteen systems reviewed and negative or unobtainable except as in HPI / Interval History.          OBJECTIVE:  Vital Signs Last 24 Hrs  T(C): 36.8 (30 Jun 2023 11:21), Max: 36.8 (30 Jun 2023 05:11)  T(F): 98.2 (30 Jun 2023 11:21), Max: 98.2 (30 Jun 2023 05:11)  HR: 91 (30 Jun 2023 11:21) (79 - 97)  BP: 102/62 (30 Jun 2023 11:21) (102/62 - 121/70)  RR: 18 (30 Jun 2023 11:21) (18 - 18)  SpO2: 94% (30 Jun 2023 11:21) (91% - 97%)  Parameters below as of 30 Jun 2023 11:21  Patient On (Oxygen Delivery Method): room air      General Examination:  General: No acute distress  HEENT: Atraumatic, Normocephalic  Respiratory: CTA B/l.  No crackles, rhonchi, or wheezes.  Cardiovascular: RRR.  Normal S1 & S2.  Normal b/l radial and pedal pulses.    Neurological Examination:  General / Mental Status: AAO x 1 (only to person).  Minimally verbal, with no apparent aphasia or dysarthria.  Immediate recall: 1/3, 5-minute delayed recall: 0/3 (cannot recall any words with category or MCQ cues).  Not answering all questions or following all commands.  Cranial Nerves: B/l blink to threat present.  PERRL.  EOMI x 2, No nystagmus or gaze preference.  B/l V1-V3 equal and intact to light touch and pinprick.  Symmetric facial movement and palate elevation.  B/l hearing diminished to finger rub.  At least 4/5 strength with b/l sternocleidomastoid and trapezius.  Midline tongue protrusion.  Motor: Diminished bulk & tone in all four extremities.  At least 4/5 strength throughout all four extremities.  No downward drift, rigidity, spasticity, or tremors in any of the four extremities.  Sensory: Intact to light touch and pinprick in all four extremities.  Reflex: 1+ and symmetric at b/l biceps, triceps, brachioradialis, patellae, and ankles.  Downgoing toes b/l.  Coordination: No dysmetria with b/l finger-to-nose and heel raise tests.  Gait and Romberg sign testing deferred due to pubic rami fracture.      NIHSS Score:    LOC - 0  LOC Questions - 1  LOC Commands - 1  Gaze Preference - 0  Visual Fields - 0  Facial Palsy - 0  Motor Arm Left - 0  Motor Arm Right - 0  Motor Leg Left - 0  Motor Leg Right - 0  Limb Ataxia - 0  Sensory - 0  Language - 0  Speech - 0  Extinction - 0    NIHSS Score Total: 2 - No IV TNK because patient presented outside of time window    Modified [place] Scale: 2          LABORATORY VALUES:                          8.3    5.89  )-----------( 308      ( 30 Jun 2023 05:27 )             24.8       06-30    135  |  100  |  15  ----------------------------<  98  3.5   |  27  |  0.67    Ca    8.4      30 Jun 2023 05:27  Phos  3.6     06-30  Mg     1.9     06-30 06-27 Chol 137 LDL 58 HDL 60 Trig 109  06-26 Chol 147 LDL 55 HDL 63 Trig 130    Glucose Trend  06-30-23 @ 05:27   -  98 -- --  06-29-23 @ 05:30   - 98 -- --  06-28-23 @ 05:21   -  103<H> -- --    Vitamin B12, Serum: 298 pg/mL (06-30-23 @ 05:27)  Folate, Serum: 16.4 ng/mL (06-30-23 @ 05:27)    Thyroid Stimulating Hormone, Serum: 2.46 uU/mL (06.26.23 @ 05:55)     A1C with Estimated Average Glucose (06.26.23 @ 05:35)   A1C with Estimated Average Glucose Result: 5.5%  Estimated Average Glucose: 111 mg/dL          NEUROIMAGING:      CT Head (6/25/23):  - Subacute/Chronic right temporal hypodenist  - Mild chronic microvascular changes      MRI Brain (6/27/23):  - Right anterior temporal lobe probably cystic lesion, 0.9 cm diameter  - Chronic microvascular changes      Echo (6/27/23):  - LVEF 55-60%  - Grade I (mild) diastolic dysfunction  - No cardiac thrombus or intracardiac shunt identified      MRI Brain w/ IV Gadolinium (6/29/23):  - T1 hypointense, T2 hyperintense lesion in the right anterior temporal lobe with surrounding vasogenic edema but without enhancement  - Moderate chronic microvascular changes          Please contact the Neurology consult service with any neurological questions.      Timoteo Romero MD   of Neurology  [facility] at [facility]

## 2023-06-30 NOTE — PROGRESS NOTE ADULT - SUBJECTIVE AND OBJECTIVE BOX
PGY-1 Progress Note discussed with attending    PAGER #: [[phone]] TILL 5:00 PM  PLEASE CONTACT ON CALL TEAM:  - On Call Team (Please refer to Alissa) FROM 5:00 PM - 8:30PM  - Nightfloat Team FROM 8:30 -7:30 AM    CC: Patient is a 82y old  Female who presents with a chief complaint of pubic rami fracture (30 Jun 2023 13:35)      OVERNIGHT EVENTS:    SUBJECTIVE / INTERVAL HPI: Patient seen and examined at bedside. No acute complaints. Wants to sleep. Saying she does not want to go for MR abdomen because she "already did everything" and refers to non-related treatment of uterus. Sister called and patient re-oriented and agreeable to MRI. Denies, headache, chest pain, shortness of breath, abdominal pain, nausea, vomiting, diarrhea, constipation, and dysuria.     ROS:  CONSTITUTIONAL: No weakness, fevers or chills  EYES/ENT: No visual changes;  No vertigo or throat pain   NECK: No pain or stiffness  RESPIRATORY: No cough, wheezing, hemoptysis; No shortness of breath  CARDIOVASCULAR: No chest pain or palpitations  GASTROINTESTINAL: No abdominal or epigastric pain. No nausea, vomiting, or hematemesis; No diarrhea or constipation. No melena or hematochezia.  GENITOURINARY: No dysuria, frequency or hematuria  NEUROLOGICAL: No numbness or weakness  SKIN: No itching, burning, rashes, or lesions     VITAL SIGNS:  Vital Signs Last 24 Hrs  T(C): 36.8 (30 Jun 2023 11:21), Max: 36.8 (30 Jun 2023 05:11)  T(F): 98.2 (30 Jun 2023 11:21), Max: 98.2 (30 Jun 2023 05:11)  HR: 91 (30 Jun 2023 11:21) (79 - 97)  BP: 102/62 (30 Jun 2023 11:21) (102/62 - 121/70)  BP(mean): --  RR: 18 (30 Jun 2023 11:21) (18 - 18)  SpO2: 94% (30 Jun 2023 11:21) (91% - 97%)    Parameters below as of 30 Jun 2023 11:21  Patient On (Oxygen Delivery Method): room air        PHYSICAL EXAM:    General: cachectic  HEENT: NC/AT; PERRL, medial deviation of left eye (strabismus vs amblyopia ex anopsia) with limited lateral horizontal movement. Right EOMI, anicteric sclera; MMM  Neck: supple  Cardiovascular: +S1/S2; RRR  Respiratory: severe dextroscoliosis, CTA B/L; no W/R/R  Gastrointestinal: soft, NT/ND; +BSx4  Extremities: WWP; no edema, clubbing or cyanosis  Vascular: 2+ radial, DP/PT pulses B/L  Skin: Warm, dry, good turgor, no rashes, or ecchymoses  Neurological: AAOx2-3; medial deviation of left eye (strabismus vs amblyopia ex anopsia) with limited lateral horizontal movement, finger to finger test impaired on right finger to nose normal.    MEDICATIONS:  MEDICATIONS  (STANDING):  amLODIPine   Tablet 10 milliGRAM(s) Oral daily  aspirin  chewable 81 milliGRAM(s) Oral daily  enalapril 10 milliGRAM(s) Oral daily  enoxaparin Injectable 40 milliGRAM(s) SubCutaneous every 24 hours  levothyroxine 50 MICROGram(s) Oral <User Schedule>  levothyroxine 100 MICROGram(s) Oral <User Schedule>  lidocaine   4% Patch 1 Patch Transdermal daily  polyethylene glycol 3350 17 Gram(s) Oral daily  senna 2 Tablet(s) Oral at bedtime  simvastatin 10 milliGRAM(s) Oral at bedtime  sodium chloride 0.9%. 1000 milliLiter(s) (90 mL/Hr) IV Continuous <Continuous>    MEDICATIONS  (PRN):  acetaminophen     Tablet .. 1000 milliGRAM(s) Oral every 8 hours PRN Severe Pain (7 - 10)  melatonin 3 milliGRAM(s) Oral at bedtime PRN Insomnia  ondansetron Injectable 4 milliGRAM(s) IV Push every 8 hours PRN Nausea and/or Vomiting      ALLERGIES:  Allergies    No Known Allergies    Intolerances        LABS:                        8.3    5.89  )-----------( 308      ( 30 Jun 2023 05:27 )             24.8     06-30    135  |  100  |  15  ----------------------------<  98  3.5   |  27  |  0.67    Ca    8.4      30 Jun 2023 05:27  Phos  3.6     06-30  Mg     1.9     06-30        Urinalysis Basic - ( 30 Jun 2023 05:27 )    Color: x / Appearance: x / SG: x / pH: x  Gluc: 98 mg/dL / Ketone: x  / Bili: x / Urobili: x   Blood: x / Protein: x / Nitrite: x   Leuk Esterase: x / RBC: x / WBC x   Sq Epi: x / Non Sq Epi: x / Bacteria: x      CAPILLARY BLOOD GLUCOSE          RADIOLOGY & ADDITIONAL TESTS: Reviewed.

## 2023-06-30 NOTE — PROGRESS NOTE ADULT - PROBLEM SELECTOR PLAN 2
hx of uterine cancer and thyroid nodules per sister and daughter  -MR Brain: 0.9 cm probable cystic lesion with surrounding vasogenic edema R anterior temporal lobe  -MR Brain w/ contrast: the lesion does not enhance, nor does it demonstrate restricted diffusion.  -CT C/A/P: 1.1cm pancreatic tail cyst and rectal wall thickening  -f/u MRI A/P w/ contrast to further evaluate pancreatic cyst  -tumor markers CA 19-9, CA-125, CEA normal  -Heme/onc: Dr. Roberts  -GI consult for possible colonoscopy to r/o underlying rectal mass  -GI: "Although, pt is asymptomatic but she warrants an EGD, colonoscopy while in patient. Tentative Colonoscopy/EGD Wednesday. MRCP with contrast to assess pancreas"

## 2023-07-01 LAB
ANION GAP SERPL CALC-SCNC: 7 MMOL/L — SIGNIFICANT CHANGE UP (ref 5–17)
BASOPHILS # BLD AUTO: 0.04 K/UL — SIGNIFICANT CHANGE UP (ref 0–0.2)
BASOPHILS NFR BLD AUTO: 0.5 % — SIGNIFICANT CHANGE UP (ref 0–2)
BUN SERPL-MCNC: 15 MG/DL — SIGNIFICANT CHANGE UP (ref 7–18)
CALCIUM SERPL-MCNC: 8.5 MG/DL — SIGNIFICANT CHANGE UP (ref 8.4–10.5)
CHLORIDE SERPL-SCNC: 98 MMOL/L — SIGNIFICANT CHANGE UP (ref 96–108)
CO2 SERPL-SCNC: 30 MMOL/L — SIGNIFICANT CHANGE UP (ref 22–31)
CREAT SERPL-MCNC: 0.69 MG/DL — SIGNIFICANT CHANGE UP (ref 0.5–1.3)
EGFR: 87 ML/MIN/1.73M2 — SIGNIFICANT CHANGE UP
EOSINOPHIL # BLD AUTO: 0.04 K/UL — SIGNIFICANT CHANGE UP (ref 0–0.5)
EOSINOPHIL NFR BLD AUTO: 0.5 % — SIGNIFICANT CHANGE UP (ref 0–6)
FERRITIN SERPL-MCNC: 157 NG/ML — HIGH (ref 15–150)
GLUCOSE SERPL-MCNC: 102 MG/DL — HIGH (ref 70–99)
HCT VFR BLD CALC: 27.3 % — LOW (ref 34.5–45)
HCYS SERPL-MCNC: 18.8 UMOL/L — HIGH
HGB BLD-MCNC: 9 G/DL — LOW (ref 11.5–15.5)
IMM GRANULOCYTES NFR BLD AUTO: 1.1 % — HIGH (ref 0–0.9)
INTERPRETATION SERPL IFE-IMP: SIGNIFICANT CHANGE UP
LYMPHOCYTES # BLD AUTO: 0.68 K/UL — LOW (ref 1–3.3)
LYMPHOCYTES # BLD AUTO: 8.6 % — LOW (ref 13–44)
MAGNESIUM SERPL-MCNC: 1.9 MG/DL — SIGNIFICANT CHANGE UP (ref 1.6–2.6)
MCHC RBC-ENTMCNC: 29.7 PG — SIGNIFICANT CHANGE UP (ref 27–34)
MCHC RBC-ENTMCNC: 33 GM/DL — SIGNIFICANT CHANGE UP (ref 32–36)
MCV RBC AUTO: 90.1 FL — SIGNIFICANT CHANGE UP (ref 80–100)
MONOCYTES # BLD AUTO: 0.92 K/UL — HIGH (ref 0–0.9)
MONOCYTES NFR BLD AUTO: 11.7 % — SIGNIFICANT CHANGE UP (ref 2–14)
NEUTROPHILS # BLD AUTO: 6.11 K/UL — SIGNIFICANT CHANGE UP (ref 1.8–7.4)
NEUTROPHILS NFR BLD AUTO: 77.6 % — HIGH (ref 43–77)
NRBC # BLD: 0 /100 WBCS — SIGNIFICANT CHANGE UP (ref 0–0)
PHOSPHATE SERPL-MCNC: 3.5 MG/DL — SIGNIFICANT CHANGE UP (ref 2.5–4.5)
PLATELET # BLD AUTO: 449 K/UL — HIGH (ref 150–400)
POTASSIUM SERPL-MCNC: 3.8 MMOL/L — SIGNIFICANT CHANGE UP (ref 3.5–5.3)
POTASSIUM SERPL-SCNC: 3.8 MMOL/L — SIGNIFICANT CHANGE UP (ref 3.5–5.3)
RBC # BLD: 3.03 M/UL — LOW (ref 3.8–5.2)
RBC # FLD: 13.7 % — SIGNIFICANT CHANGE UP (ref 10.3–14.5)
SODIUM SERPL-SCNC: 135 MMOL/L — SIGNIFICANT CHANGE UP (ref 135–145)
WBC # BLD: 7.88 K/UL — SIGNIFICANT CHANGE UP (ref 3.8–10.5)
WBC # FLD AUTO: 7.88 K/UL — SIGNIFICANT CHANGE UP (ref 3.8–10.5)

## 2023-07-01 RX ADMIN — POLYETHYLENE GLYCOL 3350 17 GRAM(S): 17 POWDER, FOR SOLUTION ORAL at 12:36

## 2023-07-01 RX ADMIN — Medication 81 MILLIGRAM(S): at 12:35

## 2023-07-01 RX ADMIN — ENOXAPARIN SODIUM 40 MILLIGRAM(S): 100 INJECTION SUBCUTANEOUS at 06:16

## 2023-07-01 RX ADMIN — Medication 10 MILLIGRAM(S): at 06:16

## 2023-07-01 RX ADMIN — SIMVASTATIN 10 MILLIGRAM(S): 20 TABLET, FILM COATED ORAL at 21:44

## 2023-07-01 RX ADMIN — Medication 50 MICROGRAM(S): at 06:16

## 2023-07-01 RX ADMIN — AMLODIPINE BESYLATE 10 MILLIGRAM(S): 2.5 TABLET ORAL at 06:17

## 2023-07-01 RX ADMIN — SENNA PLUS 2 TABLET(S): 8.6 TABLET ORAL at 21:44

## 2023-07-01 NOTE — PROGRESS NOTE ADULT - PROBLEM SELECTOR PROBLEM 10
Pt presents with deficits in cognition, speech-language, functional balance, activity tolerance, resulting in decreased ADLs/functional mobility. Hypercholesterolemia [place]

## 2023-07-01 NOTE — PROGRESS NOTE ADULT - SUBJECTIVE AND OBJECTIVE BOX
PATIENT SEEN AND EXAMINED ON :- 7/1/23  DATE OF SERVICE:     7/1/23        Interim events noted,Labs ,Radiological studies and Cardiology tests reviewed .    MR#[id number]  PATIENT NAME:[facility] COURSE: HPI:  82 year old female, from [facility], with PMHx HTN, HLD, hypothyroidism presents with L arm and leg pain. Pt states that she was walking out of the medicine room in the facility when she was accidentally struck by a serving cart. Pt states that she is not sure if she hit her head but denies LOC. Denies any other complaint including fever/chills, headache, dizziness, chest pain, palpitations cough, SOB, n/v/d/c, dysuria or leg swelling. NKDA. (24 Jun 2023 05:29)      INTERIM EVENTS:Patient seen at bedside ,interim events noted.      PMH -reviewed admission note, no change since admission  HEART FAILURE: Acute[ ]Chronic[ ] Systolic[ ] Diastolic[ ] Combined Systolic and Diastolic[ ]  CAD[ ] CABG[ ] PCI[ ]  DEVICES[ ] PPM[ ] ICD[ ] ILR[ ]  ATRIAL FIBRILLATION[ ] Paroxysmal[ ] Permanent[ ] CHADS2-[  ]  ROXI[ ] CKD1[ ] CKD2[ ] CKD3[ ] CKD4[ ] ESRD[ ]  COPD[ ] HTN[ ]   DM[ ] Type1[ ] Type 2[ ]   CVA[ ] Paresis[ ]    AMBULATION: Assisted[ ] Cane/walker[ ] Independent[ ]    MEDICATIONS  (STANDING):  amLODIPine   Tablet 10 milliGRAM(s) Oral daily  aspirin  chewable 81 milliGRAM(s) Oral daily  enalapril 10 milliGRAM(s) Oral daily  enoxaparin Injectable 40 milliGRAM(s) SubCutaneous every 24 hours  levothyroxine 50 MICROGram(s) Oral <User Schedule>  levothyroxine 100 MICROGram(s) Oral <User Schedule>  lidocaine   4% Patch 1 Patch Transdermal daily  polyethylene glycol 3350 17 Gram(s) Oral daily  senna 2 Tablet(s) Oral at bedtime  simvastatin 10 milliGRAM(s) Oral at bedtime  sodium chloride 0.9%. 1000 milliLiter(s) (90 mL/Hr) IV Continuous <Continuous>    MEDICATIONS  (PRN):  acetaminophen     Tablet .. 1000 milliGRAM(s) Oral every 8 hours PRN Severe Pain (7 - 10)  melatonin 3 milliGRAM(s) Oral at bedtime PRN Insomnia  ondansetron Injectable 4 milliGRAM(s) IV Push every 8 hours PRN Nausea and/or Vomiting            REVIEW OF SYSTEMS:  Constitutional: [ ] fever, [ ]weight loss,  [ ]fatigue [ ]weight gain  Eyes: [ ] visual changes  Respiratory: [ ]shortness of breath;  [ ] cough, [ ]wheezing, [ ]chills, [ ]hemoptysis  Cardiovascular: [ ] chest pain, [ ]palpitations, [ ]dizziness,  [ ]leg swelling[ ]orthopnea[ ]PND  Gastrointestinal: [ ] abdominal pain, [ ]nausea, [ ]vomiting,  [ ]diarrhea [ ]Constipation [ ]Melena  Genitourinary: [ ] dysuria, [ ] hematuria [ ]Love  Neurologic: [ ] headaches [ ] tremors[ ]weakness [ ]Paralysis Right[ ] Left[ ]  Skin: [ ] itching, [ ]burning, [ ] rashes  Endocrine: [ ] heat or cold intolerance  Musculoskeletal: [ ] joint pain or swelling; [ ] muscle, back, or extremity pain  Psychiatric: [ ] depression, [ ]anxiety, [ ]mood swings, or [ ]difficulty sleeping  Hematologic: [ ] easy bruising, [ ] bleeding gums    [ ] All remaining systems negative except as per above.   [ ]Unable to obtain.  [x] No change in ROS since admission      Vital Signs Last 24 Hrs  T(C): 36.5 (02 Jul 2023 00:02), Max: 37.1 (01 Jul 2023 16:12)  T(F): 97.7 (02 Jul 2023 00:02), Max: 98.8 (01 Jul 2023 16:12)  HR: 95 (02 Jul 2023 00:02) (76 - 96)  BP: 128/79 (02 Jul 2023 00:02) (112/65 - 143/86)  BP(mean): --  RR: 18 (02 Jul 2023 00:02) (17 - 19)  SpO2: 95% (02 Jul 2023 00:02) (92% - 97%)    Parameters below as of 02 Jul 2023 00:02  Patient On (Oxygen Delivery Method): room air      I&O's Summary      PHYSICAL EXAM:  General: No acute distress BMI-  HEENT: EOMI, PERRL  Neck: Supple, [ ] JVD  Lungs: Equal air entry bilaterally; [ ] rales [ ] wheezing [ ] rhonchi  Heart: Regular rate and rhythm; [x ] murmur   2/6 [ x] systolic [ ] diastolic [ ] radiation[ ] rubs [ ]  gallops  Abdomen: Nontender, bowel sounds present  Extremities: No clubbing, cyanosis, [ ] edema [ ]Pulses  equal and intact  Nervous system:  Alert & Oriented X3, no focal deficits  Psychiatric: Normal affect  Skin: No rashes or lesions    LABS:  07-01    135  |  98  |  15  ----------------------------<  102<H>  3.8   |  30  |  0.69    Ca    8.5      01 Jul 2023 06:35  Phos  3.5     07-01  Mg     1.9     07-01      Creatinine Trend: 0.69<--, 0.67<--, 0.57<--, 0.63<--, 0.69<--, 0.78<--                        9.0    7.88  )-----------( 449      ( 01 Jul 2023 06:35 )             27.3

## 2023-07-01 NOTE — PROGRESS NOTE ADULT - SUBJECTIVE AND OBJECTIVE BOX
Pt is seen and examined  pt is awake and lying in bed   no acute events      PAST MEDICAL & SURGICAL HISTORY:  Hypercholesterolemia      HTN (hypertension)      Hypothyroidism      Osteoarthritis      Anemia      No significant past surgical history          ROS:  Negative except for:    MEDICATIONS  (STANDING):  amLODIPine   Tablet 10 milliGRAM(s) Oral daily  aspirin  chewable 81 milliGRAM(s) Oral daily  enalapril 10 milliGRAM(s) Oral daily  enoxaparin Injectable 40 milliGRAM(s) SubCutaneous every 24 hours  levothyroxine 50 MICROGram(s) Oral <User Schedule>  levothyroxine 100 MICROGram(s) Oral <User Schedule>  lidocaine   4% Patch 1 Patch Transdermal daily  polyethylene glycol 3350 17 Gram(s) Oral daily  senna 2 Tablet(s) Oral at bedtime  simvastatin 10 milliGRAM(s) Oral at bedtime  sodium chloride 0.9%. 1000 milliLiter(s) (90 mL/Hr) IV Continuous <Continuous>    MEDICATIONS  (PRN):  acetaminophen     Tablet .. 1000 milliGRAM(s) Oral every 8 hours PRN Severe Pain (7 - 10)  melatonin 3 milliGRAM(s) Oral at bedtime PRN Insomnia  ondansetron Injectable 4 milliGRAM(s) IV Push every 8 hours PRN Nausea and/or Vomiting      Allergies    No Known Allergies    Intolerances        Vital Signs Last 24 Hrs  T(C): 36.4 (2023 11:10), Max: 37.3 (2023 17:05)  T(F): 97.5 (2023 11:10), Max: 99.1 (2023 17:05)  HR: 92 (2023 11:10) (91 - 96)  BP: 119/68 (2023 11:10) (114/63 - 124/67)  BP(mean): --  RR: 18 (2023 11:10) (17 - 19)  SpO2: 93% (2023 11:10) (92% - 96%)    Parameters below as of 2023 11:10  Patient On (Oxygen Delivery Method): room air        PHYSICAL EXAM  NC/AT In NAD  Chest Clear Bilat  CVS S1,S2+ RRR  Abd Soft, NT/ND + BS  Ext No edema    LABS:                          9.0    7.88  )-----------( 449      ( 2023 06:35 )             27.3     Serial CBC's  07-01 @ 06:35  Hct-27.3 / Hgb-9.0 / Plat-449 / RBC-3.03 / WBC-7.88          Serial CBC's   @ 05:27  Hct-24.8 / Hgb-8.3 / Plat-308 / RBC-2.80 / WBC-5.89                135  |  98  |  15  ----------------------------<  102<H>  3.8   |  30  |  0.69    Ca    8.5      2023 06:35  Phos  3.5       Mg     1.9                 WBC Count: 7.88 K/uL ( @ 06:35)  Hemoglobin: 9.0 g/dL ( @ 06:35)      Quantitative Ig mg/dL ( @ 05:27)  Quantitative IgA: 154 mg/dL ( @ 05:27)        RADIOLOGY & ADDITIONAL STUDIES:

## 2023-07-01 NOTE — PROGRESS NOTE ADULT - PROBLEM SELECTOR PLAN 9
How Severe Are Your Spot(S)?: mild What Type Of Note Output Would You Prefer (Optional)?: Bullet Format What Is The Reason For Today's Visit?: Full Body Skin Examination What Is The Reason For Today's Visit? (Being Monitored For X): the development of a new lesion TTE shows EF 55-60%, severely dilated LA, G1DD, and mild PHTN  -no clinical signs of CHF exacerbation  -d/c telemetry  -c/w current meds

## 2023-07-01 NOTE — PROGRESS NOTE ADULT - ASSESSMENT
1. R Temporal Lobe Cystic Lesion     -- MRI w Non enhancing Lesion  -- Neuro following  -- would ask NeuroSx to see    2. Pancreatic Tail Cyst    -- CA 19-9, CEA and  not elevated  -- GI following   -- await MRI/MRCP  -- EGD and Colonoscopy next week    Raymond Casey MD

## 2023-07-01 NOTE — PROGRESS NOTE ADULT - ASSESSMENT
82F from residential with PMHx HTN, HLD, hypothyroidism p/w pain after being hit by serving cart. CT shows pubic rami fracture. CT head incidentally showing possible acute infarcts. Follow up MRI revealed cystic lesion in left temporal lobe concerning for neoplasm warranting neoplastic workup.

## 2023-07-02 LAB
ANION GAP SERPL CALC-SCNC: 5 MMOL/L — SIGNIFICANT CHANGE UP (ref 5–17)
BASOPHILS # BLD AUTO: 0.05 K/UL — SIGNIFICANT CHANGE UP (ref 0–0.2)
BASOPHILS NFR BLD AUTO: 0.7 % — SIGNIFICANT CHANGE UP (ref 0–2)
BUN SERPL-MCNC: 19 MG/DL — HIGH (ref 7–18)
CALCIUM SERPL-MCNC: 9 MG/DL — SIGNIFICANT CHANGE UP (ref 8.4–10.5)
CHLORIDE SERPL-SCNC: 99 MMOL/L — SIGNIFICANT CHANGE UP (ref 96–108)
CO2 SERPL-SCNC: 31 MMOL/L — SIGNIFICANT CHANGE UP (ref 22–31)
CREAT SERPL-MCNC: 0.68 MG/DL — SIGNIFICANT CHANGE UP (ref 0.5–1.3)
EGFR: 87 ML/MIN/1.73M2 — SIGNIFICANT CHANGE UP
EOSINOPHIL # BLD AUTO: 0.07 K/UL — SIGNIFICANT CHANGE UP (ref 0–0.5)
EOSINOPHIL NFR BLD AUTO: 1 % — SIGNIFICANT CHANGE UP (ref 0–6)
GLUCOSE SERPL-MCNC: 102 MG/DL — HIGH (ref 70–99)
HCT VFR BLD CALC: 25.3 % — LOW (ref 34.5–45)
HGB BLD-MCNC: 8.3 G/DL — LOW (ref 11.5–15.5)
IMM GRANULOCYTES NFR BLD AUTO: 1.5 % — HIGH (ref 0–0.9)
LYMPHOCYTES # BLD AUTO: 0.76 K/UL — LOW (ref 1–3.3)
LYMPHOCYTES # BLD AUTO: 11.2 % — LOW (ref 13–44)
MAGNESIUM SERPL-MCNC: 2 MG/DL — SIGNIFICANT CHANGE UP (ref 1.6–2.6)
MCHC RBC-ENTMCNC: 29.7 PG — SIGNIFICANT CHANGE UP (ref 27–34)
MCHC RBC-ENTMCNC: 32.8 GM/DL — SIGNIFICANT CHANGE UP (ref 32–36)
MCV RBC AUTO: 90.7 FL — SIGNIFICANT CHANGE UP (ref 80–100)
MONOCYTES # BLD AUTO: 1.06 K/UL — HIGH (ref 0–0.9)
MONOCYTES NFR BLD AUTO: 15.6 % — HIGH (ref 2–14)
NEUTROPHILS # BLD AUTO: 4.75 K/UL — SIGNIFICANT CHANGE UP (ref 1.8–7.4)
NEUTROPHILS NFR BLD AUTO: 70 % — SIGNIFICANT CHANGE UP (ref 43–77)
NRBC # BLD: 0 /100 WBCS — SIGNIFICANT CHANGE UP (ref 0–0)
PHOSPHATE SERPL-MCNC: 3.7 MG/DL — SIGNIFICANT CHANGE UP (ref 2.5–4.5)
PLATELET # BLD AUTO: 478 K/UL — HIGH (ref 150–400)
POTASSIUM SERPL-MCNC: 4 MMOL/L — SIGNIFICANT CHANGE UP (ref 3.5–5.3)
POTASSIUM SERPL-SCNC: 4 MMOL/L — SIGNIFICANT CHANGE UP (ref 3.5–5.3)
RBC # BLD: 2.79 M/UL — LOW (ref 3.8–5.2)
RBC # FLD: 13.8 % — SIGNIFICANT CHANGE UP (ref 10.3–14.5)
SODIUM SERPL-SCNC: 135 MMOL/L — SIGNIFICANT CHANGE UP (ref 135–145)
WBC # BLD: 6.79 K/UL — SIGNIFICANT CHANGE UP (ref 3.8–10.5)
WBC # FLD AUTO: 6.79 K/UL — SIGNIFICANT CHANGE UP (ref 3.8–10.5)

## 2023-07-02 PROCEDURE — 99233 SBSQ HOSP IP/OBS HIGH 50: CPT | Mod: FS

## 2023-07-02 RX ADMIN — Medication 100 MICROGRAM(S): at 06:12

## 2023-07-02 RX ADMIN — SIMVASTATIN 10 MILLIGRAM(S): 20 TABLET, FILM COATED ORAL at 22:44

## 2023-07-02 RX ADMIN — Medication 81 MILLIGRAM(S): at 11:16

## 2023-07-02 RX ADMIN — ENOXAPARIN SODIUM 40 MILLIGRAM(S): 100 INJECTION SUBCUTANEOUS at 06:11

## 2023-07-02 RX ADMIN — Medication 3 MILLIGRAM(S): at 22:40

## 2023-07-02 RX ADMIN — LIDOCAINE 1 PATCH: 4 CREAM TOPICAL at 18:13

## 2023-07-02 RX ADMIN — SENNA PLUS 2 TABLET(S): 8.6 TABLET ORAL at 21:46

## 2023-07-02 RX ADMIN — LIDOCAINE 1 PATCH: 4 CREAM TOPICAL at 11:16

## 2023-07-02 NOTE — PROGRESS NOTE ADULT - PROBLEM SELECTOR PLAN 5
- presented with hbg of 9.4 and normal MCV  - hbg down trended to as low as 8.0  - total iron 33, % sat 12, TIBC 269  - borderline iron deficiency and normal MCV raise possible contribution of chronic disease (?malignancy)  - monitor CBC daily  - transfuse hbg<7  - B12/folate normal  - f/u methylmalonic acid   - homocysteine 18.8  - Heme/onc consulted Dr. Roberts

## 2023-07-02 NOTE — PROGRESS NOTE ADULT - SUBJECTIVE AND OBJECTIVE BOX
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 Neoplasm workup  - Workup of pancreatic lesion, including MRI Abdomen as per primary team and Hematology/Oncology consult  - Start Vitamin B12 at a minimum dose of 1000 mcg PO Daily for functional Vitamin B12 deficiency given the presence of elevated Homocysteine level  - Follow up Methylmalonic acid level (if this is high, it confirms functional Vitamin B12 deficiency and the need for supplementation)  - Telemetry monitoring while inpatient    2. Stroke prevention  - Q4H Neurochecks & Vital signs  - Patient has passed  bedside swallow evaluation  - Continue Aspirin 81mg PO Daily  - Continue Simvastatin 10mg PO QHS (higher dose not needed since LDL < 70 mg/dL)  - Treat BP if over 140/90 (goal /80) - Maintain home antihypertensive medications, No role for permissive hypertension at this time  - PT/OT  - DVT ppx: SCDs, Enoxaparin          NOTE TO BE COMPLETED - PLEASE REFER TO ABOVE ONLY AND IGNORE INFORMATION BELOW    ******************************    HPI:  82 year old female, from [facility], with PMHx HTN, HLD, hypothyroidism presents with L arm and leg pain. Pt states that she was walking out of the medicine room in the facility when she was accidentally struck by a serving cart. Pt states that she is not sure if she hit her head but denies LOC. Denies any other complaint including fever/chills, headache, dizziness, chest pain, palpitations cough, SOB, n/v/d/c, dysuria or leg swelling. NKDA. (24 Jun 2023 05:29)      NEURO HPI:      INTERVAL HISTORY:      MEDICATIONS:  acetaminophen     Tablet .. 1000 milliGRAM(s) Oral every 8 hours PRN  amLODIPine   Tablet 10 milliGRAM(s) Oral daily  aspirin  chewable 81 milliGRAM(s) Oral daily  enalapril 10 milliGRAM(s) Oral daily  enoxaparin Injectable 40 milliGRAM(s) SubCutaneous every 24 hours  levothyroxine 50 MICROGram(s) Oral <User Schedule>  levothyroxine 100 MICROGram(s) Oral <User Schedule>  lidocaine   4% Patch 1 Patch Transdermal daily  melatonin 3 milliGRAM(s) Oral at bedtime PRN  ondansetron Injectable 4 milliGRAM(s) IV Push every 8 hours PRN  polyethylene glycol 3350 17 Gram(s) Oral daily  senna 2 Tablet(s) Oral at bedtime  simvastatin 10 milliGRAM(s) Oral at bedtime  sodium chloride 0.9%. 1000 milliLiter(s) IV Continuous <Continuous>      ALLERGIES:  No Known Allergies      REVIEW OF SYSTEMS:  Fourteen systems reviewed and negative except as in HPI / Interval History.          OBJECTIVE:  Vital Signs Last 24 Hrs  T(C): 36.9 (02 Jul 2023 15:32), Max: 37.1 (02 Jul 2023 07:13)  T(F): 98.4 (02 Jul 2023 15:32), Max: 98.8 (02 Jul 2023 07:13)  HR: 94 (02 Jul 2023 15:32) (76 - 97)  BP: 107/65 (02 Jul 2023 15:32) (104/61 - 143/86)  BP(mean): --  RR: 18 (02 Jul 2023 15:32) (17 - 19)  SpO2: 95% (02 Jul 2023 15:32) (94% - 98%)    Parameters below as of 02 Jul 2023 15:32  Patient On (Oxygen Delivery Method): room air        General Examination:  General: No acute distress  HEENT: Atraumatic, Normocephalic  Respiratory: CTA B/l.  No crackles, rhonchi, or wheezes.  Cardiovascular: RRR.  Normal S1 & S2.  Normal b/l radial and pedal pulses.    Neurological Examination:  General / Mental Status: AAO x 3.  No aphasia or dysarthria.  Naming and repetition intact.  Cranial Nerves: VFF x 4.  PERRL.  EOMI x 2, No nystagmus or diplopia.  B/l V1-V3 equal and intact to light touch and pinprick.  Symmetric facial movement and palate elevation.  B/l hearing equal to finger rub.  5/5 strength with b/l sternocleidomastoid and trapezius.  Midline tongue protrusion, with no atrophy or fasciculations.  Motor: Normal bulk & tone in all four extremities.  5/5 strength throughout all four extremities.  No downward drift, rigidity, spasticity, or tremors in any of the four extremities.  Sensory: Intact to light touch and pinprick in all four extremities.  Negative Romberg.  Reflex: 2+ and symmetric at b/l biceps, triceps, brachioradialis, patellae, and ankles.  Downgoing toes b/l.  Coordination: No dysmetria with b/l finger-to-nose and heel raise tests.  Symmetric rapid alternating movements b/l.  Gait: Normal, narrow-based gait.  No difficulty with tiptoe, heel, and tandem gaits.        LABORATORY VALUES:                          8.3    6.79  )-----------( 478      ( 02 Jul 2023 05:16 )             25.3       07-02    135  |  99  |  19<H>  ----------------------------<  102<H>  4.0   |  31  |  0.68    Ca    9.0      02 Jul 2023 05:16  Phos  3.7     07-02  Mg     2.0     07-02 06-27 Chol 137 LDL -- HDL 60 Trig 109  06-26 Chol 147 LDL -- HDL 63 Trig 130    Glucose Trend  07-02-23 @ 05:16   -  102<H> -- --  07-01-23 @ 06:35   -  102<H> -- --  06-30-23 @ 05:27   -  98 -- --            Vitamin B12, Serum: 298 pg/mL (06-30-23 @ 05:27)  Folate, Serum: 16.4 ng/mL (06-30-23 @ 05:27)          NEUROIMAGING:          Please contact the Neurology consult service with any neurological questions.      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 Neoplasm workup  - Workup of pancreatic lesion, including MRI Abdomen as per primary team and Hematology/Oncology consult  - Start Vitamin B12 at a minimum dose of 1000 mcg PO Daily for functional Vitamin B12 deficiency given the presence of elevated Homocysteine level  - Follow up Methylmalonic acid level (if this is high, it confirms functional Vitamin B12 deficiency and the need for supplementation)  - Telemetry monitoring while inpatient    2. Stroke prevention  - Q4H Neurochecks & Vital signs  - Patient has passed  bedside swallow evaluation  - Continue Aspirin 81mg PO Daily  - Continue Simvastatin 10mg PO QHS (higher dose not needed since LDL < 70 mg/dL)  - Treat BP if over 140/90 (goal /80) - Maintain home antihypertensive medications, No role for permissive hypertension at this time  - PT/OT  - DVT ppx: SCDs, Enoxaparin          ******************************    HPI:  82 year old female, from [facility], with PMHx HTN, HLD, hypothyroidism presents with L arm and leg pain. Pt states that she was walking out of the medicine room in the facility when she was accidentally struck by a serving cart. Pt states that she is not sure if she hit her head but denies LOC. Denies any other complaint including fever/chills, headache, dizziness, chest pain, palpitations cough, SOB, n/v/d/c, dysuria or leg swelling. NKDA. (24 Jun 2023 05:29)      NEURO HPI:  82F with dementia presents from a skilled nursing facility and presents after having an accidental head injury without loss of consciousness. Her CT Head revealed an incidental ischemic stroke.      INTERVAL HISTORY:  The patient has not had any new stroke-like symptoms overnight.      MEDICATIONS:  acetaminophen     Tablet .. 1000 milliGRAM(s) Oral every 8 hours PRN  amLODIPine   Tablet 10 milliGRAM(s) Oral daily  aspirin  chewable 81 milliGRAM(s) Oral daily  enalapril 10 milliGRAM(s) Oral daily  enoxaparin Injectable 40 milliGRAM(s) SubCutaneous every 24 hours  levothyroxine 50 MICROGram(s) Oral <User Schedule>  levothyroxine 100 MICROGram(s) Oral <User Schedule>  lidocaine   4% Patch 1 Patch Transdermal daily  melatonin 3 milliGRAM(s) Oral at bedtime PRN  ondansetron Injectable 4 milliGRAM(s) IV Push every 8 hours PRN  polyethylene glycol 3350 17 Gram(s) Oral daily  senna 2 Tablet(s) Oral at bedtime  simvastatin 10 milliGRAM(s) Oral at bedtime  sodium chloride 0.9%. 1000 milliLiter(s) IV Continuous <Continuous>      ALLERGIES:  No Known Allergies      REVIEW OF SYSTEMS:  Fourteen systems reviewed and negative or unobtainable except as in HPI / Interval History.          OBJECTIVE:  Vital Signs Last 24 Hrs  T(C): 36.9 (02 Jul 2023 15:32), Max: 37.1 (02 Jul 2023 07:13)  T(F): 98.4 (02 Jul 2023 15:32), Max: 98.8 (02 Jul 2023 07:13)  HR: 94 (02 Jul 2023 15:32) (76 - 97)  BP: 107/65 (02 Jul 2023 15:32) (104/61 - 143/86)  RR: 18 (02 Jul 2023 15:32) (17 - 19)  SpO2: 95% (02 Jul 2023 15:32) (94% - 98%)  Parameters below as of 02 Jul 2023 15:32  Patient On (Oxygen Delivery Method): room air      GGeneral Examination:  General: No acute distress  HEENT: Atraumatic, Normocephalic  Respiratory: CTA B/l.  No crackles, rhonchi, or wheezes.  Cardiovascular: RRR.  Normal S1 & S2.  Normal b/l radial and pedal pulses.    Neurological Examination:  General / Mental Status: AAO x 1 (only to person).  Minimally verbal, with no apparent aphasia or dysarthria.  Immediate recall: 0/3, 5-minute delayed recall: 0/3 (cannot recall any words with category or MCQ cues).  Not answering all questions or following all commands.  Cranial Nerves: B/l blink to threat present.  PERRL.  EOMI x 2, No nystagmus or gaze preference.  B/l V1-V3 equal and intact to light touch and pinprick.  Symmetric facial movement and palate elevation.  B/l hearing diminished to finger rub.  At least 4/5 strength with b/l sternocleidomastoid and trapezius.  Midline tongue protrusion.  Motor: Diminished bulk & tone in all four extremities.  At least 4/5 strength throughout all four extremities.  No downward drift, rigidity, spasticity, or tremors in any of the four extremities.  Sensory: Intact to light touch and pinprick in all four extremities.  Reflex: 1+ and symmetric at b/l biceps, triceps, brachioradialis, patellae, and ankles.  Downgoing toes b/l.  Coordination: No dysmetria with b/l finger-to-nose and heel raise tests.  Gait and Romberg sign testing deferred due to pubic rami fracture.      NIHSS Score:    LOC - 0  LOC Questions - 1  LOC Commands - 1  Gaze Preference - 0  Visual Fields - 0  Facial Palsy - 0  Motor Arm Left - 0  Motor Arm Right - 0  Motor Leg Left - 0  Motor Leg Right - 0  Limb Ataxia - 0  Sensory - 0  Language - 0  Speech - 0  Extinction - 0    NIHSS Score Total: 2 - No IV TNK because patient presented outside of time window    Modified Petroleum Scale: 2          LABORATORY VALUES:                          8.3    6.79  )-----------( 478      ( 02 Jul 2023 05:16 )             25.3       07-02    135  |  99  |  19<H>  ----------------------------<  102<H>  4.0   |  31  |  0.68    Ca    9.0      02 Jul 2023 05:16  Phos  3.7     07-02  Mg     2.0     07-02 06-27 Chol 137 LDL 58 HDL 60 Trig 109  06-26 Chol 147 LDL 55 HDL 63 Trig 130      Glucose Trend  07-02-23 @ 05:16   -  102<H> -- --  07-01-23 @ 06:35   -  102<H> -- --  06-30-23 @ 05:27   -  98 -- --    A1C with Estimated Average Glucose (06.26.23 @ 05:35)   A1C with Estimated Average Glucose Result: 5.5%  Estimated Average Glucose: 111 mg/dL    Vitamin B12, Serum: 298 pg/mL (06-30-23 @ 05:27)  Folate, Serum: 16.4 ng/mL (06-30-23 @ 05:27)    Thyroid Stimulating Hormone, Serum: 2.46 uU/mL (06.26.23 @ 05:55)     Methylmalonic Acid Level, Serum: 311 nmol/L (07.01.23 @ 06:35)     Homocysteine, Serum: 18.8 umol/L (07.01.23 @ 06:35)           NEUROIMAGING:      CT Head (6/25/23):  - Subacute/Chronic right temporal hypodenist  - Mild chronic microvascular changes      MRI Brain (6/27/23):  - Right anterior temporal lobe probably cystic lesion, 0.9 cm diameter  - Chronic microvascular changes      Echo (6/27/23):  - LVEF 55-60%  - Grade I (mild) diastolic dysfunction  - No cardiac thrombus or intracardiac shunt identified      MRI Brain w/ IV Gadolinium (6/29/23):  - T1 hypointense, T2 hyperintense lesion in the right anterior temporal lobe with surrounding vasogenic edema but without enhancement  - Moderate chronic microvascular changes          Please contact the Neurology consult service with any neurological questions.      Timoteo Romero MD   of Neurology  [facility] [employer] at [facility]

## 2023-07-02 NOTE — PROGRESS NOTE ADULT - SUBJECTIVE AND OBJECTIVE BOX
PGY-1 Progress Note discussed with attending      PLEASE CONTACT ON CALL TEAM:  - On Call Team (Please refer to Alissa) FROM 5:00 PM - 8:30PM  - Nightfloat Team FROM 8:30 -7:30 AM    INTERVAL HPI/OVERNIGHT EVENTS:       REVIEW OF SYSTEMS:  CONSTITUTIONAL: No fever, weight loss, or fatigue  RESPIRATORY: No cough, wheezing, chills or hemoptysis; No shortness of breath  CARDIOVASCULAR: No chest pain, palpitations, dizziness, or leg swelling  GASTROINTESTINAL: No abdominal pain. No nausea, vomiting, or hematemesis; No diarrhea or constipation. No melena or hematochezia.  GENITOURINARY: No dysuria or hematuria, urinary frequency  NEUROLOGICAL: No headaches, memory loss, loss of strength, numbness, or tremors  SKIN: No itching, burning, rashes, or lesions     MEDICATIONS  (STANDING):  amLODIPine   Tablet 10 milliGRAM(s) Oral daily  aspirin  chewable 81 milliGRAM(s) Oral daily  enalapril 10 milliGRAM(s) Oral daily  enoxaparin Injectable 40 milliGRAM(s) SubCutaneous every 24 hours  levothyroxine 100 MICROGram(s) Oral <User Schedule>  levothyroxine 50 MICROGram(s) Oral <User Schedule>  lidocaine   4% Patch 1 Patch Transdermal daily  polyethylene glycol 3350 17 Gram(s) Oral daily  senna 2 Tablet(s) Oral at bedtime  simvastatin 10 milliGRAM(s) Oral at bedtime  sodium chloride 0.9%. 1000 milliLiter(s) (90 mL/Hr) IV Continuous <Continuous>    MEDICATIONS  (PRN):  acetaminophen     Tablet .. 1000 milliGRAM(s) Oral every 8 hours PRN Severe Pain (7 - 10)  melatonin 3 milliGRAM(s) Oral at bedtime PRN Insomnia  ondansetron Injectable 4 milliGRAM(s) IV Push every 8 hours PRN Nausea and/or Vomiting      Vital Signs Last 24 Hrs  T(C): 37.1 (02 Jul 2023 07:13), Max: 37.1 (01 Jul 2023 16:12)  T(F): 98.8 (02 Jul 2023 07:13), Max: 98.8 (01 Jul 2023 16:12)  HR: 96 (02 Jul 2023 07:13) (76 - 97)  BP: 123/68 (02 Jul 2023 07:13) (104/61 - 143/86)  BP(mean): --  RR: 18 (02 Jul 2023 07:13) (17 - 19)  SpO2: 97% (02 Jul 2023 07:13) (92% - 97%)    Parameters below as of 02 Jul 2023 07:13  Patient On (Oxygen Delivery Method): room air        PHYSICAL EXAMINATION:  GENERAL: NAD  HEAD:  Atraumatic, Normocephalic  EYES:  conjunctiva and sclera clear  NECK: Supple, No JVD, Normal thyroid  CHEST/LUNG: Clear to auscultation. Clear to percussion bilaterally; No rales, rhonchi, wheezing, or rubs  HEART: Regular rate and rhythm; No murmurs, rubs, or gallops  ABDOMEN: Soft, Nontender, Nondistended; Bowel sounds present, no pain or masses on palpation  NERVOUS SYSTEM:  Alert & Oriented X3  EXTREMITIES:  2+ Peripheral Pulses, No clubbing, cyanosis, or edema  SKIN: warm dry                          8.3    6.79  )-----------( 478      ( 02 Jul 2023 05:16 )             25.3     07-02    135  |  99  |  19<H>  ----------------------------<  102<H>  4.0   |  31  |  0.68    Ca    9.0      02 Jul 2023 05:16  Phos  3.7     07-02  Mg     2.0     07-02                I&O's Summary          CAPILLARY BLOOD GLUCOSE      RADIOLOGY & ADDITIONAL TESTS:                   PGY-1 Progress Note discussed with attending      PLEASE CONTACT ON CALL TEAM:  - On Call Team (Please refer to Alissa) FROM 5:00 PM - 8:30PM  - Nightfloat Team FROM 8:30 -7:30 AM    INTERVAL HPI/OVERNIGHT EVENTS: No acute events overnight      REVIEW OF SYSTEMS:  CONSTITUTIONAL: Mild fatigue, No fever, weight loss  RESPIRATORY: No cough, wheezing, chills or hemoptysis; No shortness of breath  CARDIOVASCULAR: No chest pain, palpitations, dizziness, or leg swelling  GASTROINTESTINAL: No abdominal pain. No nausea, vomiting, or hematemesis; No diarrhea or constipation. No melena or hematochezia.  GENITOURINARY: No dysuria or hematuria, urinary frequency  NEUROLOGICAL: No headaches, memory loss, loss of strength, numbness, or tremors  SKIN: No itching, burning, rashes, or lesions     MEDICATIONS  (STANDING):  amLODIPine   Tablet 10 milliGRAM(s) Oral daily  aspirin  chewable 81 milliGRAM(s) Oral daily  enalapril 10 milliGRAM(s) Oral daily  enoxaparin Injectable 40 milliGRAM(s) SubCutaneous every 24 hours  levothyroxine 100 MICROGram(s) Oral <User Schedule>  levothyroxine 50 MICROGram(s) Oral <User Schedule>  lidocaine   4% Patch 1 Patch Transdermal daily  polyethylene glycol 3350 17 Gram(s) Oral daily  senna 2 Tablet(s) Oral at bedtime  simvastatin 10 milliGRAM(s) Oral at bedtime  sodium chloride 0.9%. 1000 milliLiter(s) (90 mL/Hr) IV Continuous <Continuous>    MEDICATIONS  (PRN):  acetaminophen     Tablet .. 1000 milliGRAM(s) Oral every 8 hours PRN Severe Pain (7 - 10)  melatonin 3 milliGRAM(s) Oral at bedtime PRN Insomnia  ondansetron Injectable 4 milliGRAM(s) IV Push every 8 hours PRN Nausea and/or Vomiting      Vital Signs Last 24 Hrs  T(C): 37.1 (02 Jul 2023 07:13), Max: 37.1 (01 Jul 2023 16:12)  T(F): 98.8 (02 Jul 2023 07:13), Max: 98.8 (01 Jul 2023 16:12)  HR: 96 (02 Jul 2023 07:13) (76 - 97)  BP: 123/68 (02 Jul 2023 07:13) (104/61 - 143/86)  BP(mean): --  RR: 18 (02 Jul 2023 07:13) (17 - 19)  SpO2: 97% (02 Jul 2023 07:13) (92% - 97%)    Parameters below as of 02 Jul 2023 07:13  Patient On (Oxygen Delivery Method): room air        PHYSICAL EXAMINATION:  GENERAL: NAD  HEAD:  Atraumatic, Normocephalic  EYES:  conjunctiva and sclera clear  NECK: Supple, No JVD, Normal thyroid  CHEST/LUNG: Clear to auscultation. Clear to percussion bilaterally; No rales, rhonchi, wheezing, or rubs  HEART: Regular rate and rhythm; No murmurs, rubs, or gallops  ABDOMEN: Soft, Nontender, Nondistended; Bowel sounds present, no pain or masses on palpation  NERVOUS SYSTEM:  Alert & Oriented X3  EXTREMITIES:  2+ Peripheral Pulses, No clubbing, cyanosis, or edema  SKIN: warm dry                          8.3    6.79  )-----------( 478      ( 02 Jul 2023 05:16 )             25.3     07-02    135  |  99  |  19<H>  ----------------------------<  102<H>  4.0   |  31  |  0.68    Ca    9.0      02 Jul 2023 05:16  Phos  3.7     07-02  Mg     2.0     07-02                I&O's Summary          CAPILLARY BLOOD GLUCOSE      RADIOLOGY & ADDITIONAL TESTS:                   PGY-1 Progress Note discussed with attending      PLEASE CONTACT ON CALL TEAM:  - On Call Team (Please refer to Alissa) FROM 5:00 PM - 8:30PM  - Nightfloat Team FROM 8:30 -7:30 AM    INTERVAL HPI/OVERNIGHT EVENTS:    No acute events overnight.  Patient examined at bedside this AM.  Patient reports feeling fatigued but otherwise denies acute complaints.    REVIEW OF SYSTEMS:  CONSTITUTIONAL: Mild fatigue, No fever, weight loss  RESPIRATORY: No cough, wheezing, chills or hemoptysis; No shortness of breath  CARDIOVASCULAR: No chest pain, palpitations, dizziness, or leg swelling  GASTROINTESTINAL: No abdominal pain. No nausea, vomiting, or hematemesis; No diarrhea or constipation. No melena or hematochezia.  GENITOURINARY: No dysuria or hematuria, urinary frequency  NEUROLOGICAL: No headaches, memory loss, loss of strength, numbness, or tremors  SKIN: No itching, burning, rashes, or lesions     MEDICATIONS  (STANDING):  amLODIPine   Tablet 10 milliGRAM(s) Oral daily  aspirin  chewable 81 milliGRAM(s) Oral daily  enalapril 10 milliGRAM(s) Oral daily  enoxaparin Injectable 40 milliGRAM(s) SubCutaneous every 24 hours  levothyroxine 100 MICROGram(s) Oral <User Schedule>  levothyroxine 50 MICROGram(s) Oral <User Schedule>  lidocaine   4% Patch 1 Patch Transdermal daily  polyethylene glycol 3350 17 Gram(s) Oral daily  senna 2 Tablet(s) Oral at bedtime  simvastatin 10 milliGRAM(s) Oral at bedtime  sodium chloride 0.9%. 1000 milliLiter(s) (90 mL/Hr) IV Continuous <Continuous>    MEDICATIONS  (PRN):  acetaminophen     Tablet .. 1000 milliGRAM(s) Oral every 8 hours PRN Severe Pain (7 - 10)  melatonin 3 milliGRAM(s) Oral at bedtime PRN Insomnia  ondansetron Injectable 4 milliGRAM(s) IV Push every 8 hours PRN Nausea and/or Vomiting      Vital Signs Last 24 Hrs  T(C): 37.1 (02 Jul 2023 07:13), Max: 37.1 (01 Jul 2023 16:12)  T(F): 98.8 (02 Jul 2023 07:13), Max: 98.8 (01 Jul 2023 16:12)  HR: 96 (02 Jul 2023 07:13) (76 - 97)  BP: 123/68 (02 Jul 2023 07:13) (104/61 - 143/86)  BP(mean): --  RR: 18 (02 Jul 2023 07:13) (17 - 19)  SpO2: 97% (02 Jul 2023 07:13) (92% - 97%)    Parameters below as of 02 Jul 2023 07:13  Patient On (Oxygen Delivery Method): room air        PHYSICAL EXAMINATION:  GENERAL: NAD  HEAD:  Atraumatic, Normocephalic  EYES:  conjunctiva and sclera clear  NECK: Supple, No JVD, Normal thyroid  CHEST/LUNG: Clear to auscultation. Clear to percussion bilaterally; No rales, rhonchi, wheezing, or rubs  HEART: Regular rate and rhythm; No murmurs, rubs, or gallops  ABDOMEN: Soft, Nontender, Nondistended; Bowel sounds present, no pain or masses on palpation  NERVOUS SYSTEM:  Alert & Oriented X3  EXTREMITIES:  2+ Peripheral Pulses, No clubbing, cyanosis, or edema  SKIN: warm dry                          8.3    6.79  )-----------( 478      ( 02 Jul 2023 05:16 )             25.3     07-02    135  |  99  |  19<H>  ----------------------------<  102<H>  4.0   |  31  |  0.68    Ca    9.0      02 Jul 2023 05:16  Phos  3.7     07-02  Mg     2.0     07-02                I&O's Summary          CAPILLARY BLOOD GLUCOSE      RADIOLOGY & ADDITIONAL TESTS:                   PGY-1 Progress Note discussed with attending      PLEASE CONTACT ON CALL TEAM:  - On Call Team (Please refer to Alissa) FROM 5:00 PM - 8:30PM  - Nightfloat Team FROM 8:30 -7:30 AM    INTERVAL HPI/OVERNIGHT EVENTS:  No acute events overnight.  Patient examined at bedside this AM.  Patient reports feeling fatigued but otherwise denies acute complaints.    REVIEW OF SYSTEMS:  CONSTITUTIONAL: Mild fatigue, No fever, weight loss  RESPIRATORY: No cough, wheezing, chills; No shortness of breath  CARDIOVASCULAR: No chest pain, palpitations, dizziness, or leg swelling  GASTROINTESTINAL: No abdominal pain. No nausea, vomiting; No diarrhea or constipation. No melena or hematochezia.  GENITOURINARY: No dysuria or hematuria, urinary frequency  NEUROLOGICAL: No headaches, memory loss, loss of strength, numbness, or tremors  SKIN: No itching, burning, rashes, or lesions     MEDICATIONS  (STANDING):  amLODIPine   Tablet 10 milliGRAM(s) Oral daily  aspirin  chewable 81 milliGRAM(s) Oral daily  enalapril 10 milliGRAM(s) Oral daily  enoxaparin Injectable 40 milliGRAM(s) SubCutaneous every 24 hours  levothyroxine 100 MICROGram(s) Oral <User Schedule>  levothyroxine 50 MICROGram(s) Oral <User Schedule>  lidocaine   4% Patch 1 Patch Transdermal daily  polyethylene glycol 3350 17 Gram(s) Oral daily  senna 2 Tablet(s) Oral at bedtime  simvastatin 10 milliGRAM(s) Oral at bedtime  sodium chloride 0.9%. 1000 milliLiter(s) (90 mL/Hr) IV Continuous <Continuous>    MEDICATIONS  (PRN):  acetaminophen     Tablet .. 1000 milliGRAM(s) Oral every 8 hours PRN Severe Pain (7 - 10)  melatonin 3 milliGRAM(s) Oral at bedtime PRN Insomnia  ondansetron Injectable 4 milliGRAM(s) IV Push every 8 hours PRN Nausea and/or Vomiting      Vital Signs Last 24 Hrs  T(C): 37.1 (02 Jul 2023 07:13), Max: 37.1 (01 Jul 2023 16:12)  T(F): 98.8 (02 Jul 2023 07:13), Max: 98.8 (01 Jul 2023 16:12)  HR: 96 (02 Jul 2023 07:13) (76 - 97)  BP: 123/68 (02 Jul 2023 07:13) (104/61 - 143/86)  BP(mean): --  RR: 18 (02 Jul 2023 07:13) (17 - 19)  SpO2: 97% (02 Jul 2023 07:13) (92% - 97%)    Parameters below as of 02 Jul 2023 07:13  Patient On (Oxygen Delivery Method): room air        PHYSICAL EXAMINATION:  General: cachectic  HEENT: NC/AT; PERRL, medial deviation of left eye (strabismus vs amblyopia ex anopsia) with limited lateral horizontal movement. Right EOMI, anicteric sclera; MMM  Neck: supple  Cardiovascular: +S1/S2; RRR  Respiratory: severe dextroscoliosis, CTA B/L; no W/R/R  Gastrointestinal: soft, NT/ND; +BSx4  Extremities: WWP; no edema, clubbing or cyanosis  Vascular: 2+ radial, DP/PT pulses B/L  Skin: Warm, dry, good turgor, no rashes, or ecchymoses  Neurological: AAOx2-3; medial deviation of left eye (strabismus vs amblyopia ex anopsia) with limited lateral horizontal movement, finger to finger test impaired on right finger to nose normal.                          8.3    6.79  )-----------( 478      ( 02 Jul 2023 05:16 )             25.3     07-02    135  |  99  |  19<H>  ----------------------------<  102<H>  4.0   |  31  |  0.68    Ca    9.0      02 Jul 2023 05:16  Phos  3.7     07-02  Mg     2.0     07-02                I&O's Summary          CAPILLARY BLOOD GLUCOSE      RADIOLOGY & ADDITIONAL TESTS:

## 2023-07-02 NOTE — PROGRESS NOTE ADULT - PROBLEM SELECTOR PLAN 1
MR Brain on 0.9 cm probable cystic lesion with surrounding vasogenic edema right anterior temporal lobe.  -suspicious for metastatic malignant neoplasm--> neoplastic w/u ongoing  -sister reports history of ?thyroid or endometrial cancer s/p unknown treatment  -f/u MR Brain w/ contrast report  -Heme/onc consulted: Dr. Roberts MR Brain on 0.9 cm probable cystic lesion with surrounding vasogenic edema right anterior temporal lobe.  -suspicious for metastatic malignant neoplasm--> neoplastic w/u ongoing  - Per Heme/onc, likely cystic lesion, will consider neurosurgical eval per heme/onc recommendations  -sister reports history of thyroid or endometrial cancer s/p unknown treatment  -Heme/onc consulted: Dr. Roberts MR Brain on 0.9 cm probable cystic lesion with surrounding vasogenic edema right anterior temporal lobe.  -suspicious for metastatic malignant neoplasm vs cystic lesions--> neoplastic w/u ongoing  - Per Heme/onc, likely cystic lesion, will consider neurosurgical eval per heme/onc recommendations  -sister reports history of thyroid or endometrial cancer s/p unknown treatment  -Heme/onc consulted: Dr. Roberts

## 2023-07-02 NOTE — PROGRESS NOTE ADULT - PROBLEM SELECTOR PLAN 2
hx of uterine cancer and thyroid nodules per sister and daughter  -MR Brain: 0.9 cm probable cystic lesion with surrounding vasogenic edema R anterior temporal lobe  -MR Brain w/ contrast: the lesion does not enhance, nor does it demonstrate restricted diffusion.  -CT C/A/P: 1.1cm pancreatic tail cyst and rectal wall thickening  -f/u MRI A/P w/ contrast to further evaluate pancreatic cyst  -tumor markers CA 19-9, CA-125, CEA normal  -Heme/onc: Dr. Roberts  -GI consult for possible colonoscopy to r/o underlying rectal mass  -GI: "Although, pt is asymptomatic but she warrants an EGD, colonoscopy while in patient. Tentative Colonoscopy/EGD Wednesday. MRCP with contrast to assess pancreas" hx of uterine cancer and thyroid nodules per sister and daughter  -MR Brain: 0.9 cm probable cystic lesion with surrounding vasogenic edema R anterior temporal lobe  -MR Brain w/ contrast: the lesion does not enhance, nor does it demonstrate restricted diffusion.  -CT C/A/P: 1.1cm pancreatic tail cyst and rectal wall thickening  -f/u MRI A/P w/ contrast to further evaluate pancreatic cyst  -tumor markers CA 19-9, CA-125, CEA normal  - EGD/Colonoscopy tentative for Wednesday per GI  - Heme/onc consulted Dr. Roberts  - GI consulted Dr. Dubois
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
## 2023-07-02 NOTE — PROGRESS NOTE ADULT - PROBLEM SELECTOR PLAN 1
MR Brain on 0.9 cm probable cystic lesion with surrounding vasogenic edema right anterior temporal lobe.  -suspicious for metastatic malignant neoplasm vs cystic lesions--> neoplastic w/u ongoing  - Per Heme/onc, likely cystic lesion, will consider neurosurgical eval per heme/onc recommendations  -sister reports history of thyroid or endometrial cancer s/p unknown treatment  -Heme/onc consulted: Dr. Roberts

## 2023-07-02 NOTE — PROGRESS NOTE ADULT - PROBLEM SELECTOR PLAN 5
presented with hbg of 9.4 and normal MCV  -hbg down trended to as low as 8.8  -total iron 33, % sat 12, TIBC 269  -borderline iron deficiency and normal MCV raise possible contribution of chronic disease (?malignancy)  -monitor CBC daily  -transfuse hbg<7  -heme/onc consult  -B12/folate normal  -f/u methylmalonic acid and homocysteine presented with hbg of 9.4 and normal MCV  -hbg down trended to as low as 8.0  -total iron 33, % sat 12, TIBC 269  -borderline iron deficiency and normal MCV raise possible contribution of chronic disease (?malignancy)  -monitor CBC daily  -transfuse hbg<7  -heme/onc consult  -B12/folate normal  -f/u methylmalonic acid and homocysteine - presented with hbg of 9.4 and normal MCV  - hbg down trended to as low as 8.0  - total iron 33, % sat 12, TIBC 269  - borderline iron deficiency and normal MCV raise possible contribution of chronic disease (?malignancy)  - monitor CBC daily  - transfuse hbg<7  - B12/folate normal  - f/u methylmalonic acid   - homocysteine 18.8  - Heme/onc consulted Dr. Roberts

## 2023-07-02 NOTE — PROGRESS NOTE ADULT - PROBLEM SELECTOR PLAN 2
hx of uterine cancer and thyroid nodules per sister and daughter  -MR Brain: 0.9 cm probable cystic lesion with surrounding vasogenic edema R anterior temporal lobe  -MR Brain w/ contrast: the lesion does not enhance, nor does it demonstrate restricted diffusion.  -CT C/A/P: 1.1cm pancreatic tail cyst and rectal wall thickening  -f/u MRI A/P w/ contrast to further evaluate pancreatic cyst  -tumor markers CA 19-9, CA-125, CEA normal  - EGD/Colonoscopy tentative for Wednesday per GI  - Heme/onc consulted Dr. Roberts  - GI consulted Dr. Dubois
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
## 2023-07-03 DIAGNOSIS — M25.552 PAIN IN LEFT HIP: ICD-10-CM

## 2023-07-03 DIAGNOSIS — K86.2 CYST OF PANCREAS: ICD-10-CM

## 2023-07-03 LAB
ANION GAP SERPL CALC-SCNC: 6 MMOL/L — SIGNIFICANT CHANGE UP (ref 5–17)
BASOPHILS # BLD AUTO: 0.04 K/UL — SIGNIFICANT CHANGE UP (ref 0–0.2)
BASOPHILS NFR BLD AUTO: 0.6 % — SIGNIFICANT CHANGE UP (ref 0–2)
BUN SERPL-MCNC: 23 MG/DL — HIGH (ref 7–18)
CALCIUM SERPL-MCNC: 8.7 MG/DL — SIGNIFICANT CHANGE UP (ref 8.4–10.5)
CHLORIDE SERPL-SCNC: 99 MMOL/L — SIGNIFICANT CHANGE UP (ref 96–108)
CO2 SERPL-SCNC: 30 MMOL/L — SIGNIFICANT CHANGE UP (ref 22–31)
CREAT SERPL-MCNC: 0.74 MG/DL — SIGNIFICANT CHANGE UP (ref 0.5–1.3)
EGFR: 81 ML/MIN/1.73M2 — SIGNIFICANT CHANGE UP
EOSINOPHIL # BLD AUTO: 0.08 K/UL — SIGNIFICANT CHANGE UP (ref 0–0.5)
EOSINOPHIL NFR BLD AUTO: 1.2 % — SIGNIFICANT CHANGE UP (ref 0–6)
GLUCOSE SERPL-MCNC: 101 MG/DL — HIGH (ref 70–99)
HCT VFR BLD CALC: 25.1 % — LOW (ref 34.5–45)
HGB BLD-MCNC: 8.3 G/DL — LOW (ref 11.5–15.5)
IMM GRANULOCYTES NFR BLD AUTO: 1.5 % — HIGH (ref 0–0.9)
LYMPHOCYTES # BLD AUTO: 0.76 K/UL — LOW (ref 1–3.3)
LYMPHOCYTES # BLD AUTO: 11.5 % — LOW (ref 13–44)
MAGNESIUM SERPL-MCNC: 2.3 MG/DL — SIGNIFICANT CHANGE UP (ref 1.6–2.6)
MCHC RBC-ENTMCNC: 29.6 PG — SIGNIFICANT CHANGE UP (ref 27–34)
MCHC RBC-ENTMCNC: 33.1 GM/DL — SIGNIFICANT CHANGE UP (ref 32–36)
MCV RBC AUTO: 89.6 FL — SIGNIFICANT CHANGE UP (ref 80–100)
MONOCYTES # BLD AUTO: 0.99 K/UL — HIGH (ref 0–0.9)
MONOCYTES NFR BLD AUTO: 15 % — HIGH (ref 2–14)
NEUTROPHILS # BLD AUTO: 4.65 K/UL — SIGNIFICANT CHANGE UP (ref 1.8–7.4)
NEUTROPHILS NFR BLD AUTO: 70.2 % — SIGNIFICANT CHANGE UP (ref 43–77)
NRBC # BLD: 0 /100 WBCS — SIGNIFICANT CHANGE UP (ref 0–0)
PHOSPHATE SERPL-MCNC: 4.1 MG/DL — SIGNIFICANT CHANGE UP (ref 2.5–4.5)
PLATELET # BLD AUTO: 556 K/UL — HIGH (ref 150–400)
POTASSIUM SERPL-MCNC: 4.2 MMOL/L — SIGNIFICANT CHANGE UP (ref 3.5–5.3)
POTASSIUM SERPL-SCNC: 4.2 MMOL/L — SIGNIFICANT CHANGE UP (ref 3.5–5.3)
RBC # BLD: 2.8 M/UL — LOW (ref 3.8–5.2)
RBC # FLD: 14.6 % — HIGH (ref 10.3–14.5)
SODIUM SERPL-SCNC: 135 MMOL/L — SIGNIFICANT CHANGE UP (ref 135–145)
WBC # BLD: 6.62 K/UL — SIGNIFICANT CHANGE UP (ref 3.8–10.5)
WBC # FLD AUTO: 6.62 K/UL — SIGNIFICANT CHANGE UP (ref 3.8–10.5)

## 2023-07-03 PROCEDURE — 99232 SBSQ HOSP IP/OBS MODERATE 35: CPT | Mod: FS

## 2023-07-03 PROCEDURE — 99232 SBSQ HOSP IP/OBS MODERATE 35: CPT

## 2023-07-03 RX ADMIN — Medication 50 MICROGRAM(S): at 06:25

## 2023-07-03 RX ADMIN — Medication 10 MILLIGRAM(S): at 06:24

## 2023-07-03 RX ADMIN — ENOXAPARIN SODIUM 40 MILLIGRAM(S): 100 INJECTION SUBCUTANEOUS at 06:25

## 2023-07-03 RX ADMIN — AMLODIPINE BESYLATE 10 MILLIGRAM(S): 2.5 TABLET ORAL at 06:24

## 2023-07-03 RX ADMIN — POLYETHYLENE GLYCOL 3350 17 GRAM(S): 17 POWDER, FOR SOLUTION ORAL at 12:02

## 2023-07-03 RX ADMIN — Medication 81 MILLIGRAM(S): at 12:02

## 2023-07-03 RX ADMIN — SENNA PLUS 2 TABLET(S): 8.6 TABLET ORAL at 22:07

## 2023-07-03 RX ADMIN — SIMVASTATIN 10 MILLIGRAM(S): 20 TABLET, FILM COATED ORAL at 22:09

## 2023-07-03 NOTE — PROGRESS NOTE ADULT - PROBLEM SELECTOR PLAN 9
TTE shows EF 55-60%, severely dilated LA, G1DD, and mild PHTN  -no clinical signs of CHF exacerbation  -d/c telemetry  -c/w current meds c/w statin (home med)
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
## 2023-07-03 NOTE — PROGRESS NOTE ADULT - PROBLEM SELECTOR PROBLEM 10

## 2023-07-03 NOTE — PROGRESS NOTE ADULT - ASSESSMENT
Patient is an 82F with a PMHx of thyroid and uterine CA, HTN, HLD, hypothyroidism, who presented with L arm and leg pain. GI was consulted for dilated esophagus, rectal wall thickening, and pancreatic tail cyst.     #Anemia  #Esophageal dilatation  #Rectal wall thickening  #Pancreas tail cyst  #Pancreas duct dilation  #R temporal lesion  MRCP done, showing small gallstones, 7mm segmental dilatation of main PD in distal body and tail may represent sequela of chronic pancreatitis or main duct IPMN, occult obstructing mass is not excluded, 1.3cm cyst in inferior PB without concerning features. Labs reviewed.     	- Tentative EGD/colonoscopy Wednesday, 7/5  	- Tuesday: CLD, NPO after midnight. 2L golytely at 6PM and 2L golytely at 6AM on Friday morning  	- Wednesday: AM labs: CBC, CMP, PT/INR  	- Please notify GI Wednesday morning if rectal effluent is not clear  	- Maintain active T&S, 2 large bore peripheral IVs, transfuse for goal Hgb >7 or if symptomatic  	- Trend H/H  	- Covid swab within 72 hrs of procedure  	- Monitor for signs of bleeding  	- Medical optimization per primary team      This note and its recommendations herein are preliminary until such time as cosigned by an attending.

## 2023-07-03 NOTE — PROGRESS NOTE ADULT - PROBLEM SELECTOR PLAN 5
- presented with hbg of 9.4 and normal MCV  - hbg down trended to as low as 8.0  - total iron 33, % sat 12, TIBC 269  - borderline iron deficiency and normal MCV raise possible contribution of chronic disease (?malignancy)  - monitor CBC daily  - transfuse hbg<7  - B12/folate normal  - f/u methylmalonic acid   - homocysteine 18.8  - Heme/onc consulted Dr. Roberts Asymptomatic tolerating PO  -CT chest shows Large sliding hiatus hernia with approximately two thirds of the stomach intrathoracic. Esophageal distention with possible reflux.

## 2023-07-03 NOTE — PROGRESS NOTE ADULT - PROBLEM SELECTOR PLAN 1
hx of SCC of cervix and benign goiter per her previous doctor     -CT C/A/P: 1.1cm pancreatic tail cyst and rectal wall thickening  -MRI A/P w/ contrast  4.0 cm cyst seen on prior CT is likely arising from the left kidney   rather than the pancreas. There is a 1.3 cm cyst in the inferior pancreatic body without   concerning features. Follow-up as per institutional/Society guidelines. 7 mm segmental dilatation of the main pancreatic duct in the distal body and tail may represent sequela of chronic pancreatitis or main duct   IPMN. Occult obstructing mass is not excluded  -As per GI less likely cancerous, outpatient follow up for repeat MRI in 6months     -tumor markers CA 19-9, CA-125, CEA normal  - EGD/Colonoscopy tentative for Wednesday per GI  - Heme/onc consulted Dr. Roberts  - GI consulted Dr. Dubois

## 2023-07-03 NOTE — PROGRESS NOTE ADULT - PROBLEM SELECTOR PLAN 12
Lovenox for DVT ppx

## 2023-07-03 NOTE — PROGRESS NOTE ADULT - ASSESSMENT
Search Terms: Justa Davalos, 1940 Search Date: 06/24/2023 16:22:53 PM    The Drug Utilization Report below displays all of the controlled substance prescriptions, if any, that your patient has filled in the last twelve months. The information displayed on this report is compiled from pharmacy submissions to the Department, and accurately reflects the information as submitted by the pharmacies.    This report was requested by: Frances Gonzales | Reference #: [id number]    There are no results for the search terms that you entered.

## 2023-07-03 NOTE — PROGRESS NOTE ADULT - ASSESSMENT
1. R Temporal Lobe Cystic Lesion     -- MRI w Non enhancing Lesion  -- Neuro following  -- would ask NeuroSx to see    2. Pancreatic Tail Cyst    -- CA 19-9, CEA and  not elevated  -- GI following   -- MRCP showed 4.0 cm cyst seen on prior CT is likely arising from the left kidney rather than the pancreas. There is a 1.3 cm cyst in the inferior pancreatic body without concerning features. Follow-up as per institutional/Society guidelines.  7 mm segmental dilatation of the main pancreatic duct in the distal body and tail may represent sequela of chronic pancreatitis or main duct IPMN. Occult obstructing mass is not excluded. Dedicated GI follow-up is recommended.  -- possible EGD/colonoscopy  -- f/u GI re MRCP finding as well    anemia -- hgb stable and adequate, EARLENE showed weak IgG kappa band, likely MGUS, monitor for now  -- outpt f/u after d/c    thrombocytosis -- acute, reactive, monitor for now    will follow.

## 2023-07-03 NOTE — PROGRESS NOTE ADULT - SUBJECTIVE AND OBJECTIVE BOX
PGY-1 Progress Note discussed with attending    PAGER #: [] TILL 5:00 PM  PLEASE CONTACT ON CALL TEAM:  - On Call Team (Please refer to Alissa) FROM 5:00 PM - 8:30PM  - Nightfloat Team FROM 8:30 -7:30 AM    CHIEF COMPLAINT & BRIEF HOSPITAL COURSE:    INTERVAL HPI/OVERNIGHT EVENTS:   No acute overnight events. Patient mentioned pain is well controlled      REVIEW OF SYSTEMS:  CONSTITUTIONAL: No fever, weight loss, or fatigue  RESPIRATORY: No cough, wheezing, chills or hemoptysis; No shortness of breath  CARDIOVASCULAR: No chest pain, palpitations, dizziness, or leg swelling  GASTROINTESTINAL: No abdominal pain. No nausea, vomiting, or hematemesis; No diarrhea or constipation. No melena or hematochezia.  GENITOURINARY: No dysuria or hematuria, urinary frequency  NEUROLOGICAL: No headaches, memory loss, loss of strength, numbness, or tremors  SKIN: No itching, burning, rashes, or lesions     Vital Signs Last 24 Hrs  T(C): 36.7 (03 Jul 2023 07:19), Max: 37 (02 Jul 2023 23:35)  T(F): 98 (03 Jul 2023 07:19), Max: 98.6 (02 Jul 2023 23:35)  HR: 95 (03 Jul 2023 07:19) (87 - 95)  BP: 114/60 (03 Jul 2023 07:19) (107/65 - 123/77)  BP(mean): --  RR: 18 (03 Jul 2023 07:19) (18 - 18)  SpO2: 96% (03 Jul 2023 07:19) (93% - 98%)    Parameters below as of 03 Jul 2023 07:19  Patient On (Oxygen Delivery Method): room air        PHYSICAL EXAMINATION:  GENERAL: NAD, Pain is well controlled  HEAD:  Atraumatic, Normocephalic  EYES:  conjunctiva and sclera clear  NECK: Supple, No JVD, Normal thyroid  CHEST/LUNG: Clear to auscultation. Clear to percussion bilaterally; No rales, rhonchi, wheezing, or rubs  HEART: Regular rate and rhythm; No murmurs, rubs, or gallops  ABDOMEN: Soft, Nontender, Nondistended; Bowel sounds present  NERVOUS SYSTEM:  Alert & Oriented X3,    EXTREMITIES:  2+ Peripheral Pulses, No clubbing, cyanosis, or edema  SKIN: warm dry                          8.3    6.62  )-----------( 556      ( 03 Jul 2023 05:50 )             25.1     07-03    135  |  99  |  23<H>  ----------------------------<  101<H>  4.2   |  30  |  0.74    Ca    8.7      03 Jul 2023 05:50  Phos  4.1     07-03  Mg     2.3     07-03      CAPILLARY BLOOD GLUCOSE      RADIOLOGY & ADDITIONAL TESTS:                   PGY-1 Progress Note discussed with attending    PAGER #: [] TILL 5:00 PM  PLEASE CONTACT ON CALL TEAM:  - On Call Team (Please refer to Alissa) FROM 5:00 PM - 8:30PM  - Nightfloat Team FROM 8:30 -7:30 AM    CHIEF COMPLAINT & BRIEF HOSPITAL COURSE:    INTERVAL HPI/OVERNIGHT EVENTS:   No acute overnight events. On bedside examination patient mentioned pain is well controlled.      REVIEW OF SYSTEMS:  CONSTITUTIONAL: No fever, weight loss, or fatigue  RESPIRATORY: No cough, wheezing, chills or hemoptysis; No shortness of breath  CARDIOVASCULAR: No chest pain, palpitations, dizziness, or leg swelling  GASTROINTESTINAL: No abdominal pain. No nausea, vomiting, or hematemesis; No diarrhea or constipation. No melena or hematochezia.  GENITOURINARY: No dysuria or hematuria, urinary frequency  NEUROLOGICAL: No headaches, memory loss, loss of strength, numbness, or tremors  SKIN: No itching, burning, rashes, or lesions     Vital Signs Last 24 Hrs  T(C): 36.7 (03 Jul 2023 07:19), Max: 37 (02 Jul 2023 23:35)  T(F): 98 (03 Jul 2023 07:19), Max: 98.6 (02 Jul 2023 23:35)  HR: 95 (03 Jul 2023 07:19) (87 - 95)  BP: 114/60 (03 Jul 2023 07:19) (107/65 - 123/77)  BP(mean): --  RR: 18 (03 Jul 2023 07:19) (18 - 18)  SpO2: 96% (03 Jul 2023 07:19) (93% - 98%)    Parameters below as of 03 Jul 2023 07:19  Patient On (Oxygen Delivery Method): room air        PHYSICAL EXAMINATION:  GENERAL: NAD, Pain is well controlled  HEAD:  Atraumatic, Normocephalic  EYES:  conjunctiva and sclera clear  NECK: Supple, No JVD, Normal thyroid  CHEST/LUNG: Clear to auscultation. Clear to percussion bilaterally; No rales, rhonchi, wheezing, or rubs  HEART: Regular rate and rhythm; No murmurs, rubs, or gallops  ABDOMEN: Soft, Nontender, Nondistended; Bowel sounds present  NERVOUS SYSTEM:  Alert & Oriented X3,    EXTREMITIES:  2+ Peripheral Pulses, No clubbing, cyanosis, or edema  SKIN: warm dry                          8.3    6.62  )-----------( 556      ( 03 Jul 2023 05:50 )             25.1     07-03    135  |  99  |  23<H>  ----------------------------<  101<H>  4.2   |  30  |  0.74    Ca    8.7      03 Jul 2023 05:50  Phos  4.1     07-03  Mg     2.3     07-03      CAPILLARY BLOOD GLUCOSE      RADIOLOGY & ADDITIONAL TESTS:

## 2023-07-03 NOTE — PROGRESS NOTE ADULT - PROBLEM SELECTOR PLAN 2
hx of uterine cancer and thyroid nodules per sister and daughter    -CT C/A/P: 1.1cm pancreatic tail cyst and rectal wall thickening  -f/u MRI A/P w/ contrast to further evaluate pancreatic cyst  -tumor markers CA 19-9, CA-125, CEA normal  - EGD/Colonoscopy tentative for Wednesday per GI  - Heme/onc consulted Dr. Roberts  - GI consulted Dr. Dubois hx of SCC of cervix and thyroid nodules per sister and daughter    -CT C/A/P: 1.1cm pancreatic tail cyst and rectal wall thickening  -f/u MRI A/P w/ contrast to further evaluate pancreatic cyst  -tumor markers CA 19-9, CA-125, CEA normal  - EGD/Colonoscopy tentative for Wednesday per GI  - Heme/onc consulted Dr. Roberts  - GI consulted Dr. Dubois hx of SCC of cervix and benign goiter per her previous doctor     -CT C/A/P: 1.1cm pancreatic tail cyst and rectal wall thickening  -f/u MRI A/P w/ contrast to further evaluate pancreatic cyst  -tumor markers CA 19-9, CA-125, CEA normal  - EGD/Colonoscopy tentative for Wednesday per GI  - Heme/onc consulted Dr. Roberts  - GI consulted Dr. Dubois s/p fall at assisted living  -CT: Comminuted acute left superior ramus fracture. No hip fracture or dislocation  c/w pain control-pain management consulted  -PT recommending SARAN  -Ortho recommending conservative management  -Repeat CT  Redemonstrated acute comminuted left superior pubic ramus fracture with fracture lines extending to the pubic symphysis. A mildly displaced angulated left inferior pubic ramus fracture is now identified. The right pubic rami are intact.

## 2023-07-03 NOTE — PROGRESS NOTE ADULT - SUBJECTIVE AND OBJECTIVE BOX
Source of information: ELLIOT VENTURA, Chart review  Patient language: English  : n/a    HPI:  82 year old female, from [facility], with PMHx HTN, HLD, hypothyroidism presents with L arm and leg pain. Pt states that she was walking out of the medicine room in the facility when she was accidentally struck by a serving cart. Pt states that she is not sure if she hit her head but denies LOC. Denies any other complaint including fever/chills, headache, dizziness, chest pain, palpitations cough, SOB, n/v/d/c, dysuria or leg swelling. NKDA. (24 Jun 2023 05:29)    Pt is admitted s/p fall after she was accidentally struck by a serving cart. Dx with pubic rami fracture. CT Pelvis bony only demonstrated a comminuted acute left superior ramus fracture. No hip fracture or dislocation. Patient was recommended conservative management with daily PT and is WBAT to LLE. CT Osseous Pelvis on 6/28/23 redemonstrated acute comminuted left superior pubic ramus fracture with fracture lines extending to the pubic symphysis. A mildly displaced angulated left inferior pubic ramus fracture is now identified. The right pubic rami are intact, no change in plan. Heme/Onc and GI now following for incidental findings on MRI Brain and CT A/P. Pain service consulted for management of left thigh pain 6/24. Pt seen and examined at bedside, this morning. Pt found lying in bed, observed resting, easy arousable, alert and oriented x 2, reoriented to time. She reports having no pain while laying in bed or while OOB with PT. Pt tolerating PO diet. Denies lethargy, chest pain, SOB, nausea, vomiting, constipation. Reports last BM yesterday 7/2.  Pt denies taking medications for pain at home. Pt stating baseline walking with a cane.    PAST MEDICAL & SURGICAL HISTORY:  Hypercholesterolemia    HTN (hypertension)    Hypothyroidism    Osteoarthritis    Anemia    No significant past surgical history    FAMILY HISTORY:  No pertinent family history in first degree relatives    Social History:  Pt lives in [facility]. Ambulates with cane at baseline. Denies hx of smoking, EtOH or recreational drug use. (24 Jun 2023 05:29)   [x]Denies ETOH use, illicit drug use, and smoking     Allergies    No Known Allergies    Intolerances    MEDICATIONS  (STANDING):  amLODIPine   Tablet 10 milliGRAM(s) Oral daily  aspirin  chewable 81 milliGRAM(s) Oral daily  enalapril 10 milliGRAM(s) Oral daily  enoxaparin Injectable 40 milliGRAM(s) SubCutaneous every 24 hours  levothyroxine 50 MICROGram(s) Oral <User Schedule>  levothyroxine 100 MICROGram(s) Oral <User Schedule>  lidocaine   4% Patch 1 Patch Transdermal daily  polyethylene glycol 3350 17 Gram(s) Oral daily  senna 2 Tablet(s) Oral at bedtime  simvastatin 10 milliGRAM(s) Oral at bedtime  sodium chloride 0.9%. 1000 milliLiter(s) (90 mL/Hr) IV Continuous <Continuous>    MEDICATIONS  (PRN):  acetaminophen     Tablet .. 1000 milliGRAM(s) Oral every 8 hours PRN Severe Pain (7 - 10)  melatonin 3 milliGRAM(s) Oral at bedtime PRN Insomnia  ondansetron Injectable 4 milliGRAM(s) IV Push every 8 hours PRN Nausea and/or Vomiting    Vital Signs Last 24 Hrs  T(C): 36.4 (03 Jul 2023 11:37), Max: 37 (02 Jul 2023 23:35)  T(F): 97.6 (03 Jul 2023 11:37), Max: 98.6 (02 Jul 2023 23:35)  HR: 99 (03 Jul 2023 11:37) (87 - 99)  BP: 110/61 (03 Jul 2023 11:37) (107/65 - 123/77)  BP(mean): --  RR: 18 (03 Jul 2023 11:37) (18 - 18)  SpO2: 97% (03 Jul 2023 11:37) (93% - 97%)    Parameters below as of 03 Jul 2023 11:37  Patient On (Oxygen Delivery Method): room air    COVID-19 PCR: NotDetec (28 Jun 2023 10:29)    LABS: Reviewed                        8.3    6.62  )-----------( 556      ( 03 Jul 2023 05:50 )             25.1     07-03    135  |  99  |  23<H>  ----------------------------<  101<H>  4.2   |  30  |  0.74    Ca    8.7      03 Jul 2023 05:50  Phos  4.1     07-03  Mg     2.3     07-03    Urinalysis Basic - ( 03 Jul 2023 05:50 )    Color: x / Appearance: x / SG: x / pH: x  Gluc: 101 mg/dL / Ketone: x  / Bili: x / Urobili: x   Blood: x / Protein: x / Nitrite: x   Leuk Esterase: x / RBC: x / WBC x   Sq Epi: x / Non Sq Epi: x / Bacteria: x    CAPILLARY BLOOD GLUCOSE    COVID-19 PCR: NotDetec (28 Jun 2023 10:29)    Radiology: Reviewed  ACC: [id number] EXAM:  CT PELVIS BONY ONLY   ORDERED BY: TE THORNE     PROCEDURE DATE:  06/23/2023      INTERPRETATION:  HISTORY: Left hip and femur pain status post fall.  None available.  COMPARISON:    PROCEDURE:  CT of the Pelvis was performed.  Sagittal and coronal reformats were performed.    FINDINGS:  BLADDER: Partially distended and suboptimally assessed.  REPRODUCTIVE ORGANS: Uterus and adnexa are within normal limits.    BOWEL: No evidence of bowel obstruction. Moderate fecal burden in the   colon. Appendix is not discretely visualized.  PERITONEUM: No ascites.  VESSELS: Within normal limits.  RETROPERITONEUM/LYMPH NODES: No lymphadenopathy.  ABDOMINAL WALL: Within normal limits.  BONES: Generalized osteopenia. Acute comminuted fracture superior left   pubic ramus with fracture line extending towards the symphyseal joint.   The left inferior ramus is grossly intact although the left cortex   appears somewhat angulated. No lucent fracture line. The femurs are   intact. No hip dislocation. The sacrum appears intact. Grade 2   anterolisthesis of L5 on S1. Degenerative changes.    IMPRESSION:  Comminuted acute left superior ramus fracture. No hip fracture or   dislocation.    --- End of Report ---    ALBARO SMYTH MD; Attending Radiologist  This document has been electronically signed. Jun 23 2023  9:58PM  ACC: [id number] EXAM:  MR ABDOMEN IC   ORDERED BY: ENRIQUE CARRANZA     PROCEDURE DATE:  06/30/2023      INTERPRETATION:  CLINICAL INFORMATION: Evaluate pancreatic cyst    COMPARISON: CT abdomen and pelvis 6/28/2023    CONTRAST/COMPLICATIONS:  IV Contrast: Gadavist  6 cc administered   1.5 cc discarded  Oral Contrast: NONE  Complications: None reported at time of study completion    PROCEDURE:  MRI of the abdomen was performed.  MRCP was performed.    FINDINGS:  LOWER CHEST: Small right pleural effusion.    LIVER: Normal.  BILE DUCTS: Nondilated. No stones.  GALLBLADDER: Small gallstones without wall thickening or inflammation.  SPLEEN: Normal.  PANCREAS: Segmental dilatation of the main pancreatic duct in the distal   body and tail measuring up to 7 mm. No discrete mass. 1.3 cm inferior   pancreatic body cyst.  ADRENALS: Normal.  KIDNEYS/URETERS: Symmetric nephrograms. No hydronephrosis. 4.0 cm cyst in   the left anterior midpole, corresponding to cystic lesion on prior CT.    VISUALIZED PORTIONS:  BOWEL: No dilated bowel. Moderate hiatal hernia.  PERITONEUM: No ascites.  VESSELS: Normal caliber aorta.  RETROPERITONEUM/LYMPH NODES: No adenopathy.  ABDOMINAL WALL: Normal.  BONES: No aggressive lesion.    IMPRESSION:  *  4.0 cm cyst seen on prior CT is likely arising from the left kidney   rather than the pancreas.  *  There is a 1.3 cm cyst in the inferior pancreatic body without   concerning features. Follow-up as per institutional/Society guidelines.  *  7 mm segmental dilatation of the main pancreatic duct in the distal   body and tail may represent sequela of chronic pancreatitis or main duct   IPMN. Occult obstructing mass is not excluded. Dedicated GI follow-up is   recommended.    --- End of Report ---    UZMA HAMPTON MD; Attending Radiologist  This document has been electronically signed. Jul 1 2023  4:07PM    ORT Score -   Family Hx of substance abuse	Female	      Male  Alcohol 	                                           1                     3  Illegal drugs	                                   2                     3  Rx drugs                                           4 	                  4  Personal Hx of substance abuse		  Alcohol 	                                          3	                  3  Illegal drugs                                     4	                  4  Rx drugs                                            5 	                  5  Age between 16- 45 years	           1                     1  hx preadolescent sexual abuse	   3 	                  0  Psychological disease		  ADD, OCD, bipolar, schizophrenia   2	          2  Depression                                           1 	          1  Total: 0    a score of 3 or lower indicates low risk for opioid abuse		  a score of 4-7 indicates moderate risk for opioid abuse		  a score of 8 or higher indicates high risk for opioid abuse    REVIEW OF SYSTEMS:  CONSTITUTIONAL: No fever or fatigue  HEENT:  +[place], + Visual impaired.  NECK: No pain or stiffness  RESPIRATORY: No cough, wheezing, chills or hemoptysis; No shortness of breath  CARDIOVASCULAR: No chest pain, palpitations, dizziness, or leg swelling  GASTROINTESTINAL: No loss of appetite, good PO intake. No abdominal or epigastric pain. No nausea, vomiting; No diarrhea or constipation.   GENITOURINARY: No dysuria, frequency, hematuria, +  incontinence  MUSCULOSKELETAL: No left hip pain. No joint swelling; no upper motor strength weakness, +LLE weakness, + falls   NEURO: No headaches, No numbness/tingling b/l LE  PSYCHIATRIC: No depression, anxiety or difficulty sleeping    PHYSICAL EXAM:  GENERAL:  Alert & Oriented X 2-3, cooperative, NAD, Good concentration. Speech is clear.   RESPIRATORY: Respirations even and unlabored. Clear to auscultation bilaterally; No rales, rhonchi, wheezing, or rubs  CARDIOVASCULAR: Normal S1/S2, regular rate and rhythm; No murmurs, rubs, or gallops. No JVD.   GASTROINTESTINAL:  Soft, Nontender, Nondistended; Bowel sounds present  GENITOURINARY: No dysuria, frequency, hematuria, +  incontinence (external urine collection device noted)  PERIPHERAL VASCULAR:  Extremities warm without edema. 2+ Peripheral Pulses, No cyanosis, No calf tenderness  MUSCULOSKELETAL: Motor Strength 5/5 B/L upper and 4/5 lower extremities; moves all extremities equally against gravity; + limited ROM over left lower extremity; + mild tenderness on palpation of left hip  SKIN: Warm, dry, intact. No rashes, lesions, scars or wounds, + ecchymosis to L inner thigh      Risk factors associated with adverse outcomes related to opioid treatment  [ ]  Concurrent benzodiazepine use  [ ]  History/ Active substance use or alcohol use disorder  [ ] Psychiatric co-morbidity  [ ] Sleep apnea  [ ] COPD  [ ] BMI> 35  [ ] Liver dysfunction  [ ] Renal dysfunction  [ ] CHF  [ ] Smoker  [x]  Age > 60 years    [x]  [place]  Reviewed and Copied to Chart. See below.    Plan of care and goal oriented pain management treatment options were discussed with patient and /or primary care giver; all questions and concerns were addressed and care was aligned with patient's wishes.    Educated patient on goal oriented pain management treatment options     07-03-23 @ 15:29

## 2023-07-03 NOTE — PROGRESS NOTE ADULT - PROBLEM SELECTOR PLAN 4
-CT head shows: Poor visualization of gray matter in the right lentiform nucleus is suspicious for an acute infarct.  A 2.6 x 1.9 cm hypodensity focus in the anterior right temporal lobe and a 1.2 x 1 cm hypoattenuating focus in   the lateral aspect of the left lentiform nucleus versus left subinsular region are suspicious for small acute or subacute infarcts.  -Neurology consulted: Dr. Romero  -lipid panel WNL (LDL 55), A1c 5.5  -c/w ASA and home simvastatin 10mg  -MR brain shows cystic lesion in left anterior temporal lobe as well as pontine and bihemispheric altered white matter signal likely chronic microvascular ischemic change. No acute infarct - presented with hbg of 9.4 and normal MCV  - hbg down trended to as low as 8.0  - total iron 33, % sat 12, TIBC 269  - borderline iron deficiency and normal MCV raise possible contribution of chronic disease (?malignancy)  - monitor CBC daily  - transfuse hbg<7  - B12/folate normal  - f/u methylmalonic acid   - homocysteine 18.8  - Heme/onc consulted Dr. Roberts

## 2023-07-03 NOTE — PROGRESS NOTE ADULT - PROBLEM SELECTOR PLAN 1
Pt with acute left hip/ thigh pain which is somatic in nature due to comminuted acute left superior ramus fracture, s/p fall. CT Pelvis bony only demonstrated a comminuted acute left superior ramus fracture. No hip fracture or dislocation. The sacrum appears intact. Grade 2 anterolisthesis of L5 on S1. Degenerative changes. CT Osseous Pelvis on 6/28/23 re-demonstrated acute comminuted left superior pubic ramus fracture with fracture lines extending to the pubic symphysis. A mildly displaced angulated left inferior pubic ramus fracture is now identified. The right pubic rami are intact. High risk medications reviewed. Avoid polypharmacy. Avoid IV opioids. Avoid NSAIDs and benzodiazepines. Non-pharmacological sleep aides initiated. Non-opioid medications and non-pharmacological pain management measures initiated.    No Opioid pain recommendations at this time. Will maximize non-opioid pain medications. Pt denies any pain.  - Continue Acetaminophen 1gram PO q 8 hours to PRN for severe pain x 4 days. Monitor LFTs.   - Continue Lidoderm 4% patch daily.   Bowel Regimen  - Continue Miralax 17G PO daily. Hold for diarrhea/ loose stools  - Continue Senna 2 tablets at bedtime for constipation   Mild pain   - Non-pharmacological pain treatment recommendations  - Warm/ Cool packs PRN   - Repositioning extremity, elevation, imagery, relaxation, distraction.  - Physical therapy OOB if no contraindications   Recommendations discussed with primary team and RN.  Patient denies acute pain, Pain Service will sign off, please reconsult if needed.

## 2023-07-03 NOTE — CHART NOTE - NSCHARTNOTEFT_GEN_A_CORE
Updated family at bedside sister Ammy. [phone] regarding patient medical condition. Endoscopy / colonoscopy tentatively on Wednesday. Discussed GOC . Ms Gimenez will talk to patient's daughter who lives [place] and will let the medicine team know. Full Code for now.  Ammy Emergency contact but HCP is Nory her daughter. Updated family at bedside sister Ammy. [phone] regarding patient medical condition. Endoscopy / colonoscopy tentatively on Wednesday. Discussed GOC . Ms Gimenez will talk to patient's daughter who is currently in [place] and will let the medicine team know. Full Code for now.  Ammy Emergency contact but HCP is Nory her daughter.

## 2023-07-03 NOTE — PROGRESS NOTE ADULT - PROBLEM SELECTOR PLAN 8
c/w norvasc and vasotech ([place] [facility]) TTE shows EF 55-60%, severely dilated LA, G1DD, and mild PHTN  -no clinical signs of CHF exacerbation  -d/c telemetry  -c/w current meds

## 2023-07-03 NOTE — PROGRESS NOTE ADULT - SUBJECTIVE AND OBJECTIVE BOX
Pt seen, feeling fine, no pain, no bleeding    MEDICATIONS  (STANDING):  amLODIPine   Tablet 10 milliGRAM(s) Oral daily  aspirin  chewable 81 milliGRAM(s) Oral daily  enalapril 10 milliGRAM(s) Oral daily  enoxaparin Injectable 40 milliGRAM(s) SubCutaneous every 24 hours  levothyroxine 100 MICROGram(s) Oral <User Schedule>  levothyroxine 50 MICROGram(s) Oral <User Schedule>  lidocaine   4% Patch 1 Patch Transdermal daily  polyethylene glycol 3350 17 Gram(s) Oral daily  senna 2 Tablet(s) Oral at bedtime  simvastatin 10 milliGRAM(s) Oral at bedtime  sodium chloride 0.9%. 1000 milliLiter(s) (90 mL/Hr) IV Continuous <Continuous>    MEDICATIONS  (PRN):  acetaminophen     Tablet .. 1000 milliGRAM(s) Oral every 8 hours PRN Severe Pain (7 - 10)  melatonin 3 milliGRAM(s) Oral at bedtime PRN Insomnia  ondansetron Injectable 4 milliGRAM(s) IV Push every 8 hours PRN Nausea and/or Vomiting      ROS  limited 2/2 participation, as above    Vital Signs Last 24 Hrs  T(C): 36.4 (03 Jul 2023 11:37), Max: 37 (02 Jul 2023 23:35)  T(F): 97.6 (03 Jul 2023 11:37), Max: 98.6 (02 Jul 2023 23:35)  HR: 99 (03 Jul 2023 11:37) (87 - 99)  BP: 110/61 (03 Jul 2023 11:37) (107/65 - 123/77)  BP(mean): --  RR: 18 (03 Jul 2023 11:37) (18 - 18)  SpO2: 97% (03 Jul 2023 11:37) (93% - 97%)    Parameters below as of 03 Jul 2023 11:37  Patient On (Oxygen Delivery Method): room air        PE  NAD  Awake, alert  anicteric  cachectic  limited 2/2 participation                            8.3    6.62  )-----------( 556      ( 03 Jul 2023 05:50 )             25.1       07-03    135  |  99  |  23<H>  ----------------------------<  101<H>  4.2   |  30  |  0.74    Ca    8.7      03 Jul 2023 05:50  Phos  4.1     07-03  Mg     2.3     07-03

## 2023-07-03 NOTE — PROGRESS NOTE ADULT - PROBLEM SELECTOR PLAN 3
s/p fall at assisted living  -CT: Comminuted acute left superior ramus fracture. No hip fracture or dislocation  c/w pain control-pain management consulted  -PT recommending SARAN  -Ortho recommending conservative management  -Repeat CT  Redemonstrated acute comminuted left superior pubic ramus fracture with fracture lines extending to the pubic symphysis. A mildly displaced angulated left inferior pubic ramus fracture is now identified. The right pubic rami are intact. -CT head shows: Poor visualization of gray matter in the right lentiform nucleus is suspicious for an acute infarct.  A 2.6 x 1.9 cm hypodensity focus in the anterior right temporal lobe and a 1.2 x 1 cm hypoattenuating focus in   the lateral aspect of the left lentiform nucleus versus left subinsular region are suspicious for small acute or subacute infarcts.  -Neurology consulted: Dr. Romero  -lipid panel WNL (LDL 55), A1c 5.5  -c/w ASA and home simvastatin 10mg  -MR brain shows cystic lesion in left anterior temporal lobe as well as pontine and bihemispheric altered white matter signal likely chronic microvascular ischemic change. No acute infarct

## 2023-07-03 NOTE — PROGRESS NOTE ADULT - PROBLEM SELECTOR PLAN 5
Asymptomatic tolerating PO  -CT chest shows Large sliding hiatus hernia with approximately two thirds of the stomach intrathoracic. Esophageal distention with possible reflux.
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
## 2023-07-03 NOTE — PROGRESS NOTE ADULT - PROBLEM SELECTOR PLAN 6
Asymptomatic tolerating PO  -CT chest shows Large sliding hiatus hernia with approximately two thirds of the stomach intrathoracic. Esophageal distention with possible reflux.  -f/u GI for possible EGD Presenting with WBC of 12K that quickly down trended to normal  -Urinalysis negative for WBC, +nitrite, +LE, bacteria too numerous to count  -Urine culture growing klebsiella pneumoniae  -No dysuria or fevers, VSS  -No treatment indicated
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
## 2023-07-03 NOTE — PROGRESS NOTE ADULT - PROBLEM SELECTOR PLAN 7
Presenting with WBC of 12K that quickly down trended to normal  -Urinalysis negative for WBC, +nitrite, +LE, bacteria too numerous to count  -Urine culture growing klebsiella pneumoniae  -No dysuria or fevers, VSS  -No treatment indicated c/w norvasc and vasotech ([place] [facility])

## 2023-07-03 NOTE — PROGRESS NOTE ADULT - SUBJECTIVE AND OBJECTIVE BOX
GI Progress Note    Patient is a 82y old  Female who presents with a chief complaint of pubic rami fracture (03 Jul 2023 10:14)    GI was consulted for rectal thickening, dilated esophagus, and pancreatic tail cyst.    24-HOUR INTERVAL EVENTS: Patient resting in bed, offers no acute complaints, remains confused. Tentative EGD/colonoscopy this Wednesday, 7/5, patient will require encouragement for bowel prep. Labs reviewed, no overt signs of bleeding.     MEDICATIONS  (STANDING):  amLODIPine   Tablet 10 milliGRAM(s) Oral daily  aspirin  chewable 81 milliGRAM(s) Oral daily  enalapril 10 milliGRAM(s) Oral daily  enoxaparin Injectable 40 milliGRAM(s) SubCutaneous every 24 hours  levothyroxine 50 MICROGram(s) Oral <User Schedule>  levothyroxine 100 MICROGram(s) Oral <User Schedule>  lidocaine   4% Patch 1 Patch Transdermal daily  polyethylene glycol 3350 17 Gram(s) Oral daily  senna 2 Tablet(s) Oral at bedtime  simvastatin 10 milliGRAM(s) Oral at bedtime  sodium chloride 0.9%. 1000 milliLiter(s) (90 mL/Hr) IV Continuous <Continuous>    MEDICATIONS  (PRN):  acetaminophen     Tablet .. 1000 milliGRAM(s) Oral every 8 hours PRN Severe Pain (7 - 10)  melatonin 3 milliGRAM(s) Oral at bedtime PRN Insomnia  ondansetron Injectable 4 milliGRAM(s) IV Push every 8 hours PRN Nausea and/or Vomiting    __________________________________________________  REVIEW OF SYSTEMS:  A detailed set of ROS were asked and negative except those outlined in GI HPI above/below.   ________________________________________________  PHYSICAL EXAM    Vital Signs Last 24 Hrs  T(C): 36.4 (03 Jul 2023 11:37), Max: 37 (02 Jul 2023 23:35)  T(F): 97.6 (03 Jul 2023 11:37), Max: 98.6 (02 Jul 2023 23:35)  HR: 99 (03 Jul 2023 11:37) (87 - 99)  BP: 110/61 (03 Jul 2023 11:37) (107/65 - 123/77)  BP(mean): --  RR: 18 (03 Jul 2023 11:37) (18 - 18)  SpO2: 97% (03 Jul 2023 11:37) (93% - 97%)    Parameters below as of 03 Jul 2023 11:37  Patient On (Oxygen Delivery Method): room air        GEN: NAD, frail  HEENT: EOMI, conjunctivae anicteric, neck supple, dry mucous membranes  PULM: LCTAB, no wheezing, rales, or rhonchi  CV: RRR, no m/r/g  GI: soft, NT, ND; +BS in all four quadrants, no ascites, no Culp's sign  MSK: SUE, no edema  NEURO: A&O x 1-2  _________________________________________________  LABS:                        8.3    6.62  )-----------( 556      ( 03 Jul 2023 05:50 )             25.1     07-03    135  |  99  |  23<H>  ----------------------------<  101<H>  4.2   |  30  |  0.74    Ca    8.7      03 Jul 2023 05:50  Phos  4.1     07-03  Mg     2.3     07-03        Urinalysis Basic - ( 03 Jul 2023 05:50 )    Color: x / Appearance: x / SG: x / pH: x  Gluc: 101 mg/dL / Ketone: x  / Bili: x / Urobili: x   Blood: x / Protein: x / Nitrite: x   Leuk Esterase: x / RBC: x / WBC x   Sq Epi: x / Non Sq Epi: x / Bacteria: x      CAPILLARY BLOOD GLUCOSE        COVID-19 PCR: NotDetec (28 Jun 2023 10:29)      RADIOLOGY & ADDITIONAL TESTS:      < from: MR Abdomen w/ IV Cont (06.30.23 @ 17:23) >    MR ABDOMEN IC   ORDERED BY: ENRIQUE CARRANZA     PROCEDURE DATE:  06/30/2023          INTERPRETATION:  CLINICAL INFORMATION: Evaluate pancreatic cyst    COMPARISON: CT abdomen and pelvis 6/28/2023    CONTRAST/COMPLICATIONS:  IV Contrast: Gadavist  6 cc administered   1.5 cc discarded  Oral Contrast: NONE  Complications: None reported at time of study completion    PROCEDURE:  MRI of the abdomen was performed.  MRCP was performed.    FINDINGS:  LOWER CHEST: Small right pleural effusion.    LIVER: Normal.  BILE DUCTS: Nondilated. No stones.  GALLBLADDER: Small gallstones without wall thickening or inflammation.  SPLEEN: Normal.  PANCREAS: Segmental dilatation of the main pancreatic duct in the distal   body and tail measuring up to 7 mm. No discrete mass. 1.3 cm inferior   pancreatic body cyst.  ADRENALS: Normal.  KIDNEYS/URETERS: Symmetric nephrograms. No hydronephrosis. 4.0 cm cyst in   the left anterior midpole, corresponding to cystic lesion on prior CT.    VISUALIZED PORTIONS:  BOWEL: No dilated bowel. Moderate hiatal hernia.  PERITONEUM: No ascites.  VESSELS: Normal caliber aorta.  RETROPERITONEUM/LYMPH NODES: No adenopathy.  ABDOMINAL WALL: Normal.  BONES: No aggressive lesion.    IMPRESSION:  *  4.0 cm cyst seen on prior CT is likely arising from the left kidney   rather than the pancreas.  *  There is a 1.3 cm cyst in the inferior pancreatic body without   concerning features. Follow-up as per institutional/Society guidelines.  *  7 mm segmental dilatation of the main pancreatic duct in the distal   body and tail may represent sequela of chronic pancreatitis or main duct   IPMN. Occult obstructing mass is not excluded. Dedicated GI follow-up is   recommended.    < end of copied text >

## 2023-07-04 DIAGNOSIS — K63.9 DISEASE OF INTESTINE, UNSPECIFIED: ICD-10-CM

## 2023-07-04 LAB
ALBUMIN SERPL ELPH-MCNC: 2.7 G/DL — LOW (ref 3.5–5)
ALP SERPL-CCNC: 220 U/L — HIGH (ref 40–120)
ALT FLD-CCNC: 34 U/L DA — SIGNIFICANT CHANGE UP (ref 10–60)
ANION GAP SERPL CALC-SCNC: 4 MMOL/L — LOW (ref 5–17)
AST SERPL-CCNC: 25 U/L — SIGNIFICANT CHANGE UP (ref 10–40)
BILIRUB SERPL-MCNC: 0.5 MG/DL — SIGNIFICANT CHANGE UP (ref 0.2–1.2)
BUN SERPL-MCNC: 34 MG/DL — HIGH (ref 7–18)
CALCIUM SERPL-MCNC: 8.5 MG/DL — SIGNIFICANT CHANGE UP (ref 8.4–10.5)
CHLORIDE SERPL-SCNC: 101 MMOL/L — SIGNIFICANT CHANGE UP (ref 96–108)
CO2 SERPL-SCNC: 31 MMOL/L — SIGNIFICANT CHANGE UP (ref 22–31)
CREAT SERPL-MCNC: 0.71 MG/DL — SIGNIFICANT CHANGE UP (ref 0.5–1.3)
EGFR: 85 ML/MIN/1.73M2 — SIGNIFICANT CHANGE UP
GLUCOSE SERPL-MCNC: 113 MG/DL — HIGH (ref 70–99)
HCT VFR BLD CALC: 25.8 % — LOW (ref 34.5–45)
HGB BLD-MCNC: 8.3 G/DL — LOW (ref 11.5–15.5)
MAGNESIUM SERPL-MCNC: 2.4 MG/DL — SIGNIFICANT CHANGE UP (ref 1.6–2.6)
MCHC RBC-ENTMCNC: 30 PG — SIGNIFICANT CHANGE UP (ref 27–34)
MCHC RBC-ENTMCNC: 32.2 GM/DL — SIGNIFICANT CHANGE UP (ref 32–36)
MCV RBC AUTO: 93.1 FL — SIGNIFICANT CHANGE UP (ref 80–100)
METHYLMALONATE SERPL-SCNC: 311 NMOL/L — SIGNIFICANT CHANGE UP (ref 0–378)
NRBC # BLD: 0 /100 WBCS — SIGNIFICANT CHANGE UP (ref 0–0)
PHOSPHATE SERPL-MCNC: 4.2 MG/DL — SIGNIFICANT CHANGE UP (ref 2.5–4.5)
PLATELET # BLD AUTO: 650 K/UL — HIGH (ref 150–400)
POTASSIUM SERPL-MCNC: 4.2 MMOL/L — SIGNIFICANT CHANGE UP (ref 3.5–5.3)
POTASSIUM SERPL-SCNC: 4.2 MMOL/L — SIGNIFICANT CHANGE UP (ref 3.5–5.3)
PROT SERPL-MCNC: 6.2 G/DL — SIGNIFICANT CHANGE UP (ref 6–8.3)
RBC # BLD: 2.77 M/UL — LOW (ref 3.8–5.2)
RBC # FLD: 14.8 % — HIGH (ref 10.3–14.5)
SODIUM SERPL-SCNC: 136 MMOL/L — SIGNIFICANT CHANGE UP (ref 135–145)
WBC # BLD: 7.45 K/UL — SIGNIFICANT CHANGE UP (ref 3.8–10.5)
WBC # FLD AUTO: 7.45 K/UL — SIGNIFICANT CHANGE UP (ref 3.8–10.5)

## 2023-07-04 RX ORDER — SOD SULF/SODIUM/NAHCO3/KCL/PEG
4000 SOLUTION, RECONSTITUTED, ORAL ORAL ONCE
Refills: 0 | Status: COMPLETED | OUTPATIENT
Start: 2023-07-04 | End: 2023-07-04

## 2023-07-04 RX ADMIN — Medication 81 MILLIGRAM(S): at 13:05

## 2023-07-04 RX ADMIN — Medication 10 MILLIGRAM(S): at 06:19

## 2023-07-04 RX ADMIN — AMLODIPINE BESYLATE 10 MILLIGRAM(S): 2.5 TABLET ORAL at 06:18

## 2023-07-04 RX ADMIN — Medication 3 MILLIGRAM(S): at 21:38

## 2023-07-04 RX ADMIN — Medication 50 MICROGRAM(S): at 06:18

## 2023-07-04 RX ADMIN — POLYETHYLENE GLYCOL 3350 17 GRAM(S): 17 POWDER, FOR SOLUTION ORAL at 13:06

## 2023-07-04 RX ADMIN — SIMVASTATIN 10 MILLIGRAM(S): 20 TABLET, FILM COATED ORAL at 21:38

## 2023-07-04 RX ADMIN — LIDOCAINE 1 PATCH: 4 CREAM TOPICAL at 13:07

## 2023-07-04 RX ADMIN — Medication 4000 MILLILITER(S): at 18:43

## 2023-07-04 RX ADMIN — SENNA PLUS 2 TABLET(S): 8.6 TABLET ORAL at 21:38

## 2023-07-04 RX ADMIN — ENOXAPARIN SODIUM 40 MILLIGRAM(S): 100 INJECTION SUBCUTANEOUS at 06:19

## 2023-07-04 RX ADMIN — LIDOCAINE 1 PATCH: 4 CREAM TOPICAL at 19:37

## 2023-07-04 NOTE — PROGRESS NOTE ADULT - PROBLEM SELECTOR PLAN 4
- presented with hbg of 9.4 and normal MCV  - hbg down trended to as low as 8.0  - total iron 33, % sat 12, TIBC 269  - borderline iron deficiency and normal MCV raise possible contribution of chronic disease (?malignancy)  - monitor CBC daily  - transfuse hbg<7  - B12/folate normal  - f/u methylmalonic acid   - homocysteine 18.8  - Heme/onc consulted Dr. Roberts -CT head shows: Poor visualization of gray matter in the right lentiform nucleus is suspicious for an acute infarct.  A 2.6 x 1.9 cm hypodensity focus in the anterior right temporal lobe and a 1.2 x 1 cm hypoattenuating focus in   the lateral aspect of the left lentiform nucleus versus left subinsular region are suspicious for small acute or subacute infarcts.  -Neurology consulted: Dr. Romero  -lipid panel WNL (LDL 55), A1c 5.5  -c/w ASA and home simvastatin 10mg  -MR brain shows cystic lesion in left anterior temporal lobe as well as pontine and bihemispheric altered white matter signal likely chronic microvascular ischemic change. No acute infarct

## 2023-07-04 NOTE — PROGRESS NOTE ADULT - PROBLEM SELECTOR PLAN 1
hx of SCC of cervix and benign goiter per her previous doctor     -CT C/A/P: 1.1cm pancreatic tail cyst and rectal wall thickening  -MRI A/P w/ contrast  4.0 cm cyst seen on prior CT is likely arising from the left kidney   rather than the pancreas. There is a 1.3 cm cyst in the inferior pancreatic body without   concerning features. Follow-up as per institutional/Society guidelines. 7 mm segmental dilatation of the main pancreatic duct in the distal body and tail may represent sequela of chronic pancreatitis or main duct   IPMN. Occult obstructing mass is not excluded  -As per GI less likely cancerous, outpatient follow up for repeat MRI in 6months     -tumor markers CA 19-9, CA-125, CEA normal  - EGD/Colonoscopy tentative for Wednesday per GI  - Heme/onc consulted Dr. Robetrs  - GI consulted Dr. Dubois - presented with hbg of 9.4 and normal MCV  - hbg down trended to as low as 8.0  - total iron 33, % sat 12, TIBC 269  - borderline iron deficiency and normal MCV raise possible contribution of chronic disease (?malignancy)  - monitor CBC daily  - transfuse hbg<7  - B12/folate normal  - f/u methylmalonic acid   - homocysteine 18.8  - Heme/onc consulted Dr. Roberts  -Pt has colon thickening on CT A/P going for Endo/[place] 7/5  -NPO after midnight, Golytely 6pm 2lit 7/4 and 2 lit 6am 7/5

## 2023-07-04 NOTE — PROGRESS NOTE ADULT - PROBLEM SELECTOR PLAN 6
c/w norvasc and vasotech ([place] [facility]) TTE shows EF 55-60%, severely dilated LA, G1DD, and mild PHTN  -no clinical signs of CHF exacerbation  -d/c telemetry  -c/w current meds
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
## 2023-07-04 NOTE — PROGRESS NOTE ADULT - PROBLEM SELECTOR PLAN 3
-CT head shows: Poor visualization of gray matter in the right lentiform nucleus is suspicious for an acute infarct.  A 2.6 x 1.9 cm hypodensity focus in the anterior right temporal lobe and a 1.2 x 1 cm hypoattenuating focus in   the lateral aspect of the left lentiform nucleus versus left subinsular region are suspicious for small acute or subacute infarcts.  -Neurology consulted: Dr. Romero  -lipid panel WNL (LDL 55), A1c 5.5  -c/w ASA and home simvastatin 10mg  -MR brain shows cystic lesion in left anterior temporal lobe as well as pontine and bihemispheric altered white matter signal likely chronic microvascular ischemic change. No acute infarct s/p fall at assisted living  -CT: Comminuted acute left superior ramus fracture. No hip fracture or dislocation  c/w pain control-pain management consulted  -PT recommending SARAN  -Ortho recommending conservative management  -Repeat CT  Redemonstrated acute comminuted left superior pubic ramus fracture with fracture lines extending to the pubic symphysis. A mildly displaced angulated left inferior pubic ramus fracture is now identified. The right pubic rami are intact.

## 2023-07-04 NOTE — PROGRESS NOTE ADULT - PROBLEM SELECTOR PLAN 5
Presenting with WBC of 12K that quickly down trended to normal  -Urinalysis negative for WBC, +nitrite, +LE, bacteria too numerous to count  -Urine culture growing klebsiella pneumoniae  -No dysuria or fevers, VSS  -No treatment indicated c/w norvasc and vasotech ([place] [facility])

## 2023-07-04 NOTE — PROGRESS NOTE ADULT - SUBJECTIVE AND OBJECTIVE BOX
PGY-1 Progress Note discussed with attending    PAGER #: [] TILL 5:00 PM  PLEASE CONTACT ON CALL TEAM:  - On Call Team (Please refer to Alissa) FROM 5:00 PM - 8:30PM  - Nightfloat Team FROM 8:30 -7:30 AM    CHIEF COMPLAINT & BRIEF HOSPITAL COURSE:    INTERVAL HPI/OVERNIGHT EVENTS:   No acute overnight events. Patient said they were feeling better on bedside examination.    REVIEW OF SYSTEMS:  CONSTITUTIONAL: No fever, weight loss, or fatigue  RESPIRATORY: No cough, wheezing, chills or hemoptysis; No shortness of breath  CARDIOVASCULAR: No chest pain, palpitations, dizziness, or leg swelling  GASTROINTESTINAL: No abdominal pain. No nausea, vomiting, or hematemesis; No diarrhea or constipation. No melena or hematochezia.  GENITOURINARY: No dysuria or hematuria, urinary frequency  NEUROLOGICAL: No headaches, memory loss, loss of strength, numbness, or tremors  SKIN: No itching, burning, rashes, or lesions     Vital Signs Last 24 Hrs  T(C): 36.8 (04 Jul 2023 11:16), Max: 37.4 (03 Jul 2023 19:35)  T(F): 98.2 (04 Jul 2023 11:16), Max: 99.3 (03 Jul 2023 19:35)  HR: 100 (04 Jul 2023 11:16) (87 - 100)  BP: 100/62 (04 Jul 2023 11:16) (100/62 - 125/70)  BP(mean): --  RR: 18 (04 Jul 2023 11:16) (18 - 20)  SpO2: 98% (04 Jul 2023 11:16) (95% - 99%)    Parameters below as of 04 Jul 2023 11:16  Patient On (Oxygen Delivery Method): room air        PHYSICAL EXAMINATION:  GENERAL: NAD  HEAD:  Atraumatic, Normocephalic  EYES:  conjunctiva and sclera clear  NECK: Supple, No JVD, Normal thyroid  CHEST/LUNG: Clear to auscultation. Clear to percussion bilaterally; No rales, rhonchi, wheezing, or rubs  HEART: Regular rate and rhythm; No murmurs, rubs, or gallops  ABDOMEN: Soft, Nontender, Nondistended; Bowel sounds present  NERVOUS SYSTEM:  Alert & Oriented X3,    EXTREMITIES:  2+ Peripheral Pulses, No clubbing, cyanosis, or edema  SKIN: warm dry                          8.3    7.45  )-----------( 650      ( 04 Jul 2023 05:05 )             25.8     07-04    136  |  101  |  34<H>  ----------------------------<  113<H>  4.2   |  31  |  0.71    Ca    8.5      04 Jul 2023 05:05  Phos  4.2     07-04  Mg     2.4     07-04    TPro  6.2  /  Alb  2.7<L>  /  TBili  0.5  /  DBili  x   /  AST  25  /  ALT  34  /  AlkPhos  220<H>  07-04    LIVER FUNCTIONS - ( 04 Jul 2023 05:05 )  Alb: 2.7 g/dL / Pro: 6.2 g/dL / ALK PHOS: 220 U/L / ALT: 34 U/L DA / AST: 25 U/L / GGT: x                   CAPILLARY BLOOD GLUCOSE      RADIOLOGY & ADDITIONAL TESTS:

## 2023-07-04 NOTE — PROGRESS NOTE ADULT - PROBLEM SELECTOR PLAN 2
s/p fall at assisted living  -CT: Comminuted acute left superior ramus fracture. No hip fracture or dislocation  c/w pain control-pain management consulted  -PT recommending SARAN  -Ortho recommending conservative management  -Repeat CT  Redemonstrated acute comminuted left superior pubic ramus fracture with fracture lines extending to the pubic symphysis. A mildly displaced angulated left inferior pubic ramus fracture is now identified. The right pubic rami are intact. hx of SCC of cervix and benign goiter per her previous doctor     -CT C/A/P: 1.1cm pancreatic tail cyst and rectal wall thickening  -MRI A/P w/ contrast  4.0 cm cyst seen on prior CT is likely arising from the left kidney   rather than the pancreas. There is a 1.3 cm cyst in the inferior pancreatic body without   concerning features. Follow-up as per institutional/Society guidelines. 7 mm segmental dilatation of the main pancreatic duct in the distal body and tail may represent sequela of chronic pancreatitis or main duct   IPMN. Occult obstructing mass is not excluded  -As per GI less likely cancerous, outpatient follow up for repeat MRI in 6months     -tumor markers CA 19-9, CA-125, CEA normal  - EGD/Colonoscopy tentative for Wednesday per GI  - Heme/onc consulted Dr. Roberts  - GI consulted Dr. Dubois

## 2023-07-05 DIAGNOSIS — D75.838 OTHER THROMBOCYTOSIS: ICD-10-CM

## 2023-07-05 LAB
ALBUMIN SERPL ELPH-MCNC: 2.8 G/DL — LOW (ref 3.5–5)
ALP SERPL-CCNC: 211 U/L — HIGH (ref 40–120)
ALT FLD-CCNC: 31 U/L DA — SIGNIFICANT CHANGE UP (ref 10–60)
ANION GAP SERPL CALC-SCNC: 7 MMOL/L — SIGNIFICANT CHANGE UP (ref 5–17)
APTT BLD: 31.3 SEC — SIGNIFICANT CHANGE UP (ref 27.5–35.5)
AST SERPL-CCNC: 20 U/L — SIGNIFICANT CHANGE UP (ref 10–40)
BILIRUB SERPL-MCNC: 0.6 MG/DL — SIGNIFICANT CHANGE UP (ref 0.2–1.2)
BUN SERPL-MCNC: 24 MG/DL — HIGH (ref 7–18)
CALCIUM SERPL-MCNC: 8.5 MG/DL — SIGNIFICANT CHANGE UP (ref 8.4–10.5)
CHLORIDE SERPL-SCNC: 97 MMOL/L — SIGNIFICANT CHANGE UP (ref 96–108)
CO2 SERPL-SCNC: 32 MMOL/L — HIGH (ref 22–31)
CREAT SERPL-MCNC: 0.65 MG/DL — SIGNIFICANT CHANGE UP (ref 0.5–1.3)
EGFR: 88 ML/MIN/1.73M2 — SIGNIFICANT CHANGE UP
GLUCOSE SERPL-MCNC: 110 MG/DL — HIGH (ref 70–99)
HCT VFR BLD CALC: 26 % — LOW (ref 34.5–45)
HGB BLD-MCNC: 8.5 G/DL — LOW (ref 11.5–15.5)
INR BLD: 0.94 RATIO — SIGNIFICANT CHANGE UP (ref 0.88–1.16)
MAGNESIUM SERPL-MCNC: 2.3 MG/DL — SIGNIFICANT CHANGE UP (ref 1.6–2.6)
MCHC RBC-ENTMCNC: 30 PG — SIGNIFICANT CHANGE UP (ref 27–34)
MCHC RBC-ENTMCNC: 32.7 GM/DL — SIGNIFICANT CHANGE UP (ref 32–36)
MCV RBC AUTO: 91.9 FL — SIGNIFICANT CHANGE UP (ref 80–100)
NRBC # BLD: 0 /100 WBCS — SIGNIFICANT CHANGE UP (ref 0–0)
PHOSPHATE SERPL-MCNC: 4.1 MG/DL — SIGNIFICANT CHANGE UP (ref 2.5–4.5)
PLATELET # BLD AUTO: 722 K/UL — HIGH (ref 150–400)
POTASSIUM SERPL-MCNC: 4 MMOL/L — SIGNIFICANT CHANGE UP (ref 3.5–5.3)
POTASSIUM SERPL-SCNC: 4 MMOL/L — SIGNIFICANT CHANGE UP (ref 3.5–5.3)
PROT SERPL-MCNC: 6.6 G/DL — SIGNIFICANT CHANGE UP (ref 6–8.3)
PROTHROM AB SERPL-ACNC: 11.2 SEC — SIGNIFICANT CHANGE UP (ref 10.5–13.4)
RBC # BLD: 2.83 M/UL — LOW (ref 3.8–5.2)
RBC # FLD: 14.8 % — HIGH (ref 10.3–14.5)
SARS-COV-2 RNA SPEC QL NAA+PROBE: SIGNIFICANT CHANGE UP
SODIUM SERPL-SCNC: 136 MMOL/L — SIGNIFICANT CHANGE UP (ref 135–145)
WBC # BLD: 6.61 K/UL — SIGNIFICANT CHANGE UP (ref 3.8–10.5)
WBC # FLD AUTO: 6.61 K/UL — SIGNIFICANT CHANGE UP (ref 3.8–10.5)

## 2023-07-05 RX ADMIN — LIDOCAINE 1 PATCH: 4 CREAM TOPICAL at 01:30

## 2023-07-05 RX ADMIN — Medication 50 MICROGRAM(S): at 05:30

## 2023-07-05 RX ADMIN — LIDOCAINE 1 PATCH: 4 CREAM TOPICAL at 11:56

## 2023-07-05 RX ADMIN — AMLODIPINE BESYLATE 10 MILLIGRAM(S): 2.5 TABLET ORAL at 05:29

## 2023-07-05 RX ADMIN — Medication 10 MILLIGRAM(S): at 05:30

## 2023-07-05 RX ADMIN — Medication 1 ENEMA: at 11:52

## 2023-07-05 RX ADMIN — LIDOCAINE 1 PATCH: 4 CREAM TOPICAL at 19:44

## 2023-07-05 RX ADMIN — SIMVASTATIN 10 MILLIGRAM(S): 20 TABLET, FILM COATED ORAL at 21:21

## 2023-07-05 RX ADMIN — Medication 81 MILLIGRAM(S): at 12:00

## 2023-07-05 RX ADMIN — LIDOCAINE 1 PATCH: 4 CREAM TOPICAL at 23:37

## 2023-07-05 RX ADMIN — SENNA PLUS 2 TABLET(S): 8.6 TABLET ORAL at 21:20

## 2023-07-05 RX ADMIN — POLYETHYLENE GLYCOL 3350 17 GRAM(S): 17 POWDER, FOR SOLUTION ORAL at 11:56

## 2023-07-05 NOTE — PROGRESS NOTE ADULT - SUBJECTIVE AND OBJECTIVE BOX
GI Progress Note    Patient is a 82y old  Female who presents with a chief complaint of pubic rami fracture (05 Jul 2023 13:16)    GI was consulted for rectal thickening, dilated esophagus, and pancreatic tail cyst.    24-HOUR INTERVAL EVENTS: Patient evaluated this AM, OOB in chair, tentatively planned for EGD/colonoscopy today however did not take golytely overnight and required encouragement from staff at bedside to complete bowel prep. Enema x 1. On patient arrival to endoscopy unit, attempted to obtain consent via telephone from patient's sister and HCP however provider was not convinced that the patient's HCP was indeed the proxy, subsequently requested 2-physician consent before proceeding with the procedures. Patient's EGD/colonoscopy was cancelled due to 1+ hr delay, primary team aware, tentatively rescheduled to Friday. Please keep patient on CLD. Patient offers no acute complaints, denies n/v, labs reviewed.     MEDICATIONS  (STANDING):  amLODIPine   Tablet 10 milliGRAM(s) Oral daily  aspirin  chewable 81 milliGRAM(s) Oral daily  enalapril 10 milliGRAM(s) Oral daily  levothyroxine 50 MICROGram(s) Oral <User Schedule>  levothyroxine 100 MICROGram(s) Oral <User Schedule>  lidocaine   4% Patch 1 Patch Transdermal daily  polyethylene glycol 3350 17 Gram(s) Oral daily  senna 2 Tablet(s) Oral at bedtime  simvastatin 10 milliGRAM(s) Oral at bedtime  sodium chloride 0.9%. 1000 milliLiter(s) (90 mL/Hr) IV Continuous <Continuous>    MEDICATIONS  (PRN):  acetaminophen     Tablet .. 1000 milliGRAM(s) Oral every 8 hours PRN Severe Pain (7 - 10)  melatonin 3 milliGRAM(s) Oral at bedtime PRN Insomnia  ondansetron Injectable 4 milliGRAM(s) IV Push every 8 hours PRN Nausea and/or Vomiting    __________________________________________________  REVIEW OF SYSTEMS:  Limited ROS iso AMS.   ________________________________________________  PHYSICAL EXAM    Vital Signs Last 24 Hrs  T(C): 36.7 (05 Jul 2023 15:49), Max: 36.9 (04 Jul 2023 23:38)  T(F): 98.1 (05 Jul 2023 15:49), Max: 98.4 (04 Jul 2023 23:38)  HR: 92 (05 Jul 2023 15:49) (90 - 100)  BP: 133/72 (05 Jul 2023 15:49) (124/71 - 134/79)  BP(mean): --  RR: 17 (05 Jul 2023 15:49) (17 - 20)  SpO2: 95% (05 Jul 2023 15:49) (93% - 98%)    Parameters below as of 05 Jul 2023 15:49  Patient On (Oxygen Delivery Method): room air        GEN: NAD, frail  HEENT: EOMI, conjunctivae anicteric, neck supple, dry mucous membranes  PULM: LCTAB, no wheezing, rales, or rhonchi  CV: RRR, no m/r/g  GI: soft, NT, ND; +BS in all four quadrants, no ascites, no Culp's sign  MSK: SUE, no edema  NEURO: A&O x 1-2  _________________________________________________  LABS:                        8.5    6.61  )-----------( 722      ( 05 Jul 2023 06:15 )             26.0     07-05    136  |  97  |  24<H>  ----------------------------<  110<H>  4.0   |  32<H>  |  0.65    Ca    8.5      05 Jul 2023 06:15  Phos  4.1     07-05  Mg     2.3     07-05    TPro  6.6  /  Alb  2.8<L>  /  TBili  0.6  /  DBili  x   /  AST  20  /  ALT  31  /  AlkPhos  211<H>  07-05    PT/INR - ( 05 Jul 2023 06:15 )   PT: 11.2 sec;   INR: 0.94 ratio         PTT - ( 05 Jul 2023 06:15 )  PTT:31.3 sec  Urinalysis Basic - ( 05 Jul 2023 06:15 )    Color: x / Appearance: x / SG: x / pH: x  Gluc: 110 mg/dL / Ketone: x  / Bili: x / Urobili: x   Blood: x / Protein: x / Nitrite: x   Leuk Esterase: x / RBC: x / WBC x   Sq Epi: x / Non Sq Epi: x / Bacteria: x      CAPILLARY BLOOD GLUCOSE        COVID-19 PCR: NotDetec (05 Jul 2023 12:29)  COVID-19 PCR: NotDetec (28 Jun 2023 10:29)      RADIOLOGY & ADDITIONAL TESTS:     MR ABDOMEN IC   ORDERED BY: ENRIQUE CARRANZA     PROCEDURE DATE:  06/30/2023          INTERPRETATION:  CLINICAL INFORMATION: Evaluate pancreatic cyst    COMPARISON: CT abdomen and pelvis 6/28/2023    CONTRAST/COMPLICATIONS:  IV Contrast: Gadavist  6 cc administered   1.5 cc discarded  Oral Contrast: NONE  Complications: None reported at time of study completion    PROCEDURE:  MRI of the abdomen was performed.  MRCP was performed.    FINDINGS:  LOWER CHEST: Small right pleural effusion.    LIVER: Normal.  BILE DUCTS: Nondilated. No stones.  GALLBLADDER: Small gallstones without wall thickening or inflammation.  SPLEEN: Normal.  PANCREAS: Segmental dilatation of the main pancreatic duct in the distal   body and tail measuring up to 7 mm. No discrete mass. 1.3 cm inferior   pancreatic body cyst.  ADRENALS: Normal.  KIDNEYS/URETERS: Symmetric nephrograms. No hydronephrosis. 4.0 cm cyst in   the left anterior midpole, corresponding to cystic lesion on prior CT.    VISUALIZED PORTIONS:  BOWEL: No dilated bowel. Moderate hiatal hernia.  PERITONEUM: No ascites.  VESSELS: Normal caliber aorta.  RETROPERITONEUM/LYMPH NODES: No adenopathy.  ABDOMINAL WALL: Normal.  BONES: No aggressive lesion.    IMPRESSION:  *  4.0 cm cyst seen on prior CT is likely arising from the left kidney   rather than the pancreas.  *  There is a 1.3 cm cyst in the inferior pancreatic body without   concerning features. Follow-up as per institutional/Society guidelines.  *  7 mm segmental dilatation of the main pancreatic duct in the distal   body and tail may represent sequela of chronic pancreatitis or main duct   IPMN. Occult obstructing mass is not excluded. Dedicated GI follow-up is   recommended.

## 2023-07-05 NOTE — PROGRESS NOTE ADULT - SUBJECTIVE AND OBJECTIVE BOX
PGY-1 Progress Note discussed with attending    PAGER #: [] TILL 5:00 PM  PLEASE CONTACT ON CALL TEAM:  - On Call Team (Please refer to Alissa) FROM 5:00 PM - 8:30PM  - Nightfloat Team FROM 8:30 -7:30 AM    CHIEF COMPLAINT & BRIEF HOSPITAL COURSE:    INTERVAL HPI/OVERNIGHT EVENTS:   No acute overnight events. On bedside exam pt stated they were feeling good.    REVIEW OF SYSTEMS:  CONSTITUTIONAL: No fever, weight loss, or fatigue  RESPIRATORY: No cough, wheezing, chills or hemoptysis; No shortness of breath  CARDIOVASCULAR: No chest pain, palpitations, dizziness, or leg swelling  GASTROINTESTINAL: No abdominal pain. No nausea, vomiting, or hematemesis; No diarrhea or constipation. No melena or hematochezia.  GENITOURINARY: No dysuria or hematuria, urinary frequency  NEUROLOGICAL: No headaches, memory loss, loss of strength, numbness, or tremors  SKIN: No itching, burning, rashes, or lesions     Vital Signs Last 24 Hrs  T(C): 36.8 (05 Jul 2023 11:00), Max: 36.9 (04 Jul 2023 23:38)  T(F): 98.2 (05 Jul 2023 11:00), Max: 98.4 (04 Jul 2023 23:38)  HR: 92 (05 Jul 2023 11:38) (90 - 100)  BP: 124/71 (05 Jul 2023 11:38) (124/66 - 134/79)  BP(mean): --  RR: 18 (05 Jul 2023 11:00) (18 - 18)  SpO2: 98% (05 Jul 2023 11:38) (93% - 98%)    Parameters below as of 05 Jul 2023 11:38  Patient On (Oxygen Delivery Method): room air        PHYSICAL EXAMINATION:  GENERAL: NAD  HEAD:  Atraumatic, Normocephalic  EYES:  conjunctiva and sclera clear  NECK: Supple, No JVD, Normal thyroid  CHEST/LUNG: Clear to auscultation. Clear to percussion bilaterally; No rales, rhonchi, wheezing, or rubs  HEART: Regular rate and rhythm; No murmurs, rubs, or gallops  ABDOMEN: Soft, Nontender, Nondistended; Bowel sounds present  NERVOUS SYSTEM:  Alert & Oriented X3,    EXTREMITIES:  2+ Peripheral Pulses, No clubbing, cyanosis, or edema  SKIN: warm dry                          8.5    6.61  )-----------( 722      ( 05 Jul 2023 06:15 )             26.0     07-05    136  |  97  |  24<H>  ----------------------------<  110<H>  4.0   |  32<H>  |  0.65    Ca    8.5      05 Jul 2023 06:15  Phos  4.1     07-05  Mg     2.3     07-05    TPro  6.6  /  Alb  2.8<L>  /  TBili  0.6  /  DBili  x   /  AST  20  /  ALT  31  /  AlkPhos  211<H>  07-05    LIVER FUNCTIONS - ( 05 Jul 2023 06:15 )  Alb: 2.8 g/dL / Pro: 6.6 g/dL / ALK PHOS: 211 U/L / ALT: 31 U/L DA / AST: 20 U/L / GGT: x               PT/INR - ( 05 Jul 2023 06:15 )   PT: 11.2 sec;   INR: 0.94 ratio         PTT - ( 05 Jul 2023 06:15 )  PTT:31.3 sec    CAPILLARY BLOOD GLUCOSE      RADIOLOGY & ADDITIONAL TESTS:

## 2023-07-05 NOTE — PRE-OP CHECKLIST - ISOLATION PRECAUTIONS
Patient obtained CT soft tissue neck today  He did not obtain the blood work yet  Called patient and left a voice message reminding him to complete the blood work asap  Requested for patient to call back  Provided my direct line  Tigertexted Dr Laxmi Aguilar regarding the above and for him to review CT soft tissue neck  Awaiting response  none

## 2023-07-05 NOTE — CHART NOTE - NSCHARTNOTEFT_GEN_A_CORE
Patient scheduled for EGD/Colonoscopy  She is unable to consent and attempt made to contact HCP-who is not available  Agree with proceeding with EGD/Colonoscopy in the best interest for patients medical care.
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
## 2023-07-05 NOTE — PROGRESS NOTE ADULT - ASSESSMENT
Patient is an 82F with a PMHx of thyroid and uterine CA, HTN, HLD, hypothyroidism, who presented with L arm and leg pain. GI was consulted for dilated esophagus, rectal wall thickening, and pancreatic tail cyst.     #Anemia  #Esophageal dilatation  #Rectal wall thickening  #Pancreas tail cyst  #Pancreas duct dilation  #R temporal lesion  MRCP done, showing small gallstones, 7mm segmental dilatation of main PD in distal body and tail may represent sequela of chronic pancreatitis or main duct IPMN, occult obstructing mass is not excluded, 1.3cm cyst in inferior PB without concerning features. Given patient's advanced age and pancreatic cyst without alarming features, recommend outpatient advanced GI follow up for further workup. Labs reviewed. Patient's EGD/colonoscopy for today was cancelled due to provider unconvinced that the patient's sister/HCP is indeed the proxy, subsequently requested 2-physician consent that was not obtained in time after 1+ hr delay. Tentatively rescheduled for Friday pending 2-MD consent in chart. Discussed with primary team.     	- Please obtain 2-physician consent prior to Friday  	- EGD/colonoscopy tentatively rescheduled to Friday, 7/7  	- Keep patient on CLD today and tomorrow (Thursday)  	- Thursday: NPO after midnight, please give 2L golytely at 6PM  	- Friday AM labs: CBC, CMP, PT/INR  	- Please notify GI Friday morning if rectal effluent is not clear  	- Maintain active T&S, 2 large bore peripheral IVs, transfuse for goal Hgb > 7 or if symptomatic  	- Trend H/H  	- Hold AC x 24 hrs of procedure  	- Covid swab within 72 hrs of procedure  	- Monitor for s/sx of bleeding  	- Medical optimization per primary team  	- Outpatient follow up with advanced GI for pancreatic tail cyst    This note and its recommendations herein are preliminary until such time as cosigned by an attending.

## 2023-07-05 NOTE — PROGRESS NOTE ADULT - PROBLEM SELECTOR PLAN 2
hx of SCC of cervix and benign goiter per her previous doctor     -CT C/A/P: 1.1cm pancreatic tail cyst and rectal wall thickening  -MRI A/P w/ contrast  4.0 cm cyst seen on prior CT is likely arising from the left kidney   rather than the pancreas. There is a 1.3 cm cyst in the inferior pancreatic body without   concerning features. Follow-up as per institutional/Society guidelines. 7 mm segmental dilatation of the main pancreatic duct in the distal body and tail may represent sequela of chronic pancreatitis or main duct   IPMN. Occult obstructing mass is not excluded  -As per GI less likely cancerous, outpatient follow up for repeat MRI in 6months    tumor markers CA 19-9, CA-125, CEA normal  - EGD/Colonoscopy scheduled for Wednesday per GI  - Heme/onc consulted Dr. Armando Apodaca 6pm 2lit 7/4 and 2 lit 6am 7/5 were administered  - GI consulted Dr. Dubois hx of SCC of cervix and benign goiter per her previous doctor     -CT C/A/P: 1.1cm pancreatic tail cyst and rectal wall thickening  -MRI A/P w/ contrast  4.0 cm cyst seen on prior CT is likely arising from the left kidney   rather than the pancreas. There is a 1.3 cm cyst in the inferior pancreatic body without   concerning features. Follow-up as per institutional/Society guidelines. 7 mm segmental dilatation of the main pancreatic duct in the distal body and tail may represent sequela of chronic pancreatitis or main duct   IPMN. Occult obstructing mass is not excluded  -As per GI less likely cancerous, outpatient follow up for repeat MRI in 6months    tumor markers CA 19-9, CA-125, CEA normal  -Outpatient follow up with advanced GI for pancreatic tail cyst  - Heme/onc consulted Dr. Roberts  - GI consulted Dr. Dubois

## 2023-07-05 NOTE — PROGRESS NOTE ADULT - PROBLEM SELECTOR PLAN 1
- presented with hbg of 9.4 and normal MCV  - hbg down trended to as low as 8.0  - total iron 33, % sat 12, TIBC 269  - borderline iron deficiency and normal MCV raise possible contribution of chronic disease (?malignancy)  - monitor CBC daily  - transfuse hbg<7  - B12/folate normal  - f/u methylmalonic acid   - homocysteine 18.8  - Heme/onc consulted Dr. Roberts  -Pt has colon thickening on CT A/P going for Endo/[place] 7/5 - presented with hbg of 9.4 and normal MCV  - hbg down trended to as low as 8.0  - total iron 33, % sat 12, TIBC 269  - borderline iron deficiency and normal MCV raise possible contribution of chronic disease (?malignancy)  - monitor CBC daily  - transfuse hbg<7  - B12/folate normal  - methylmalonic acid normal   - homocysteine 18.8  - Heme/onc consulted Dr. Roberts  -Pt has colon thickening on CT A/P going for Endo/[place] 7/5  -Cancelled EGD/[place], Daughter HCP in [place], GI unable to get consent   -CLD - presented with hbg of 9.4 and normal MCV  - hbg down trended to as low as 8.0  - total iron 33, % sat 12, TIBC 269  - borderline iron deficiency and normal MCV raise possible contribution of chronic disease (?malignancy)  - monitor CBC daily  - transfuse hbg<7  - B12/folate normal  - methylmalonic acid normal   - homocysteine 18.8  - Heme/onc consulted Dr. Roberts  -Pt has colon thickening on CT A/P going for Endo/[place] 7/5  -EGD/[place] today planned for friday

## 2023-07-05 NOTE — PROGRESS NOTE ADULT - PROBLEM SELECTOR PLAN 4
-CT head shows: Poor visualization of gray matter in the right lentiform nucleus is suspicious for an acute infarct.  A 2.6 x 1.9 cm hypodensity focus in the anterior right temporal lobe and a 1.2 x 1 cm hypoattenuating focus in   the lateral aspect of the left lentiform nucleus versus left subinsular region are suspicious for small acute or subacute infarcts.  -Neurology consulted: Dr. Romero  -lipid panel WNL (LDL 55), A1c 5.5  -c/w ASA and home simvastatin 10mg  -MR brain shows cystic lesion in left anterior temporal lobe as well as pontine and bihemispheric altered white matter signal likely chronic microvascular ischemic change. No acute infarct -CT head shows: Poor visualization of gray matter in the right lentiform nucleus is suspicious for an acute infarct.  A 2.6 x 1.9 cm hypodensity focus in the anterior right temporal lobe and a 1.2 x 1 cm hypoattenuating focus in   the lateral aspect of the left lentiform nucleus versus left subinsular region are suspicious for small acute or subacute infarcts.  -Neurology consulted: Dr. Romero  -lipid panel WNL (LDL 55), A1c 5.5  -c/w ASA and home simvastatin 10mg  -MR brain shows cystic lesion in left anterior temporal lobe as well as pontine and bihemispheric altered white matter signal likely chronic microvascular ischemic change. No acute infarct  -No further intervention as per Neuro
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
## 2023-07-05 NOTE — PROGRESS NOTE ADULT - ASSESSMENT
1. R Temporal Lobe Cystic Lesion     -- MRI w Non enhancing Lesion  -- Neuro following  -- would ask NeuroSx to see    2. Pancreatic Tail Cyst    -- CA 19-9, CEA and  not elevated  -- GI following   -- MRCP showed 4.0 cm cyst seen on prior CT is likely arising from the left kidney rather than the pancreas. There is a 1.3 cm cyst in the inferior pancreatic body without concerning features. Follow-up as per institutional/Society guidelines.  7 mm segmental dilatation of the main pancreatic duct in the distal body and tail may represent sequela of chronic pancreatitis or main duct IPMN. Occult obstructing mass is not excluded. Dedicated GI follow-up is recommended.  -- possible EGD/colonoscopy  -- f/u GI re MRCP finding as well    anemia -- hgb stable and adequate, EARLENE showed weak IgG kappa band, likely MGUS, monitor for now  -- outpt f/u after d/c    thrombocytosis -- acute, reactive, monitor for now    will follow. 1. R Temporal Lobe Cystic Lesion     -- MRI w Non enhancing Lesion  -- Neuro following  -- would ask NeuroSx to see    2. Pancreatic Tail Cyst    -- CA 19-9, CEA and  not elevated  -- GI following   -- MRCP showed 4.0 cm cyst seen on prior CT is likely arising from the left kidney rather than the pancreas. There is a 1.3 cm cyst in the inferior pancreatic body without concerning features. Follow-up as per institutional/Society guidelines.  7 mm segmental dilatation of the main pancreatic duct in the distal body and tail may represent sequela of chronic pancreatitis or main duct IPMN. Occult obstructing mass is not excluded. Dedicated GI follow-up is recommended.  -- EGD/colonoscopy,  needs to be done.  no family member available.  I will give my emergent consent.  -- f/u GI re MRCP finding as well    anemia -- hgb stable and adequate, EARLENE showed weak IgG kappa band, likely MGUS, monitor for now  -- outpt f/u after d/c    thrombocytosis -- acute, reactive, monitor for now    will follow.

## 2023-07-06 LAB
ALBUMIN SERPL ELPH-MCNC: 2.5 G/DL — LOW (ref 3.5–5)
ALP SERPL-CCNC: 168 U/L — HIGH (ref 40–120)
ALT FLD-CCNC: 26 U/L DA — SIGNIFICANT CHANGE UP (ref 10–60)
ANION GAP SERPL CALC-SCNC: 8 MMOL/L — SIGNIFICANT CHANGE UP (ref 5–17)
AST SERPL-CCNC: 13 U/L — SIGNIFICANT CHANGE UP (ref 10–40)
BILIRUB SERPL-MCNC: 0.5 MG/DL — SIGNIFICANT CHANGE UP (ref 0.2–1.2)
BUN SERPL-MCNC: 14 MG/DL — SIGNIFICANT CHANGE UP (ref 7–18)
CALCIUM SERPL-MCNC: 8.4 MG/DL — SIGNIFICANT CHANGE UP (ref 8.4–10.5)
CHLORIDE SERPL-SCNC: 99 MMOL/L — SIGNIFICANT CHANGE UP (ref 96–108)
CO2 SERPL-SCNC: 30 MMOL/L — SIGNIFICANT CHANGE UP (ref 22–31)
CREAT SERPL-MCNC: 0.58 MG/DL — SIGNIFICANT CHANGE UP (ref 0.5–1.3)
EGFR: 90 ML/MIN/1.73M2 — SIGNIFICANT CHANGE UP
GLUCOSE SERPL-MCNC: 92 MG/DL — SIGNIFICANT CHANGE UP (ref 70–99)
HCT VFR BLD CALC: 25 % — LOW (ref 34.5–45)
HGB BLD-MCNC: 8.1 G/DL — LOW (ref 11.5–15.5)
MAGNESIUM SERPL-MCNC: 2.1 MG/DL — SIGNIFICANT CHANGE UP (ref 1.6–2.6)
MCHC RBC-ENTMCNC: 30.1 PG — SIGNIFICANT CHANGE UP (ref 27–34)
MCHC RBC-ENTMCNC: 32.4 GM/DL — SIGNIFICANT CHANGE UP (ref 32–36)
MCV RBC AUTO: 92.9 FL — SIGNIFICANT CHANGE UP (ref 80–100)
NRBC # BLD: 0 /100 WBCS — SIGNIFICANT CHANGE UP (ref 0–0)
PHOSPHATE SERPL-MCNC: 4.4 MG/DL — SIGNIFICANT CHANGE UP (ref 2.5–4.5)
PLATELET # BLD AUTO: 789 K/UL — HIGH (ref 150–400)
POTASSIUM SERPL-MCNC: 3.4 MMOL/L — LOW (ref 3.5–5.3)
POTASSIUM SERPL-SCNC: 3.4 MMOL/L — LOW (ref 3.5–5.3)
PROT SERPL-MCNC: 6.2 G/DL — SIGNIFICANT CHANGE UP (ref 6–8.3)
RBC # BLD: 2.69 M/UL — LOW (ref 3.8–5.2)
RBC # FLD: 15 % — HIGH (ref 10.3–14.5)
SODIUM SERPL-SCNC: 137 MMOL/L — SIGNIFICANT CHANGE UP (ref 135–145)
WBC # BLD: 9.26 K/UL — SIGNIFICANT CHANGE UP (ref 3.8–10.5)
WBC # FLD AUTO: 9.26 K/UL — SIGNIFICANT CHANGE UP (ref 3.8–10.5)

## 2023-07-06 RX ORDER — SOD SULF/SODIUM/NAHCO3/KCL/PEG
2000 SOLUTION, RECONSTITUTED, ORAL ORAL ONCE
Refills: 0 | Status: COMPLETED | OUTPATIENT
Start: 2023-07-06 | End: 2023-07-06

## 2023-07-06 RX ORDER — ACETAMINOPHEN 500 MG
1000 TABLET ORAL EVERY 8 HOURS
Refills: 0 | Status: DISCONTINUED | OUTPATIENT
Start: 2023-07-06 | End: 2023-07-09

## 2023-07-06 RX ORDER — ENOXAPARIN SODIUM 100 MG/ML
40 INJECTION SUBCUTANEOUS ONCE
Refills: 0 | Status: COMPLETED | OUTPATIENT
Start: 2023-07-06 | End: 2023-07-06

## 2023-07-06 RX ORDER — POTASSIUM CHLORIDE 20 MEQ
40 PACKET (EA) ORAL ONCE
Refills: 0 | Status: COMPLETED | OUTPATIENT
Start: 2023-07-06 | End: 2023-07-06

## 2023-07-06 RX ADMIN — Medication 50 MICROGRAM(S): at 05:13

## 2023-07-06 RX ADMIN — Medication 81 MILLIGRAM(S): at 12:25

## 2023-07-06 RX ADMIN — Medication 2000 MILLILITER(S): at 17:40

## 2023-07-06 RX ADMIN — Medication 10 MILLIGRAM(S): at 05:14

## 2023-07-06 RX ADMIN — Medication 40 MILLIEQUIVALENT(S): at 08:16

## 2023-07-06 RX ADMIN — SENNA PLUS 2 TABLET(S): 8.6 TABLET ORAL at 21:40

## 2023-07-06 RX ADMIN — POLYETHYLENE GLYCOL 3350 17 GRAM(S): 17 POWDER, FOR SOLUTION ORAL at 12:25

## 2023-07-06 RX ADMIN — Medication 3 MILLIGRAM(S): at 21:41

## 2023-07-06 RX ADMIN — ENOXAPARIN SODIUM 40 MILLIGRAM(S): 100 INJECTION SUBCUTANEOUS at 08:16

## 2023-07-06 RX ADMIN — AMLODIPINE BESYLATE 10 MILLIGRAM(S): 2.5 TABLET ORAL at 05:48

## 2023-07-06 RX ADMIN — SIMVASTATIN 10 MILLIGRAM(S): 20 TABLET, FILM COATED ORAL at 21:41

## 2023-07-06 NOTE — PROGRESS NOTE ADULT - PROBLEM SELECTOR PLAN 6
TTE shows EF 55-60%, severely dilated LA, G1DD, and mild PHTN  -no clinical signs of CHF exacerbation  -d/c'd telemetry  -c/w current meds
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
## 2023-07-06 NOTE — CHART NOTE - NSCHARTNOTEFT_GEN_A_CORE
Assessment:     Factors impacting intake: [ ] none [ ] nausea  [ ] vomiting [ ] diarrhea [ ] constipation  [ ]chewing problems [ ] swallowing issues  [ ] other:     Diet Presciption: Diet, NPO after Midnight:      NPO Start Date: 2023,   NPO Start Time: 23:59 (23 @ 07:46)  Diet, Clear Liquid (23 @ 15:55)    Intake:     Daily Weight in k.2 (2023 05:00)  Weight in k.4 (2023 04:48)  Weight in k.5 (2023 04:40)  Weight in k.5 (2023 05:03)  Weight in k.5 (2023 05:11)    % Weight Change    Pertinent Medications: MEDICATIONS  (STANDING):  amLODIPine   Tablet 10 milliGRAM(s) Oral daily  aspirin  chewable 81 milliGRAM(s) Oral daily  enalapril 10 milliGRAM(s) Oral daily  levothyroxine 100 MICROGram(s) Oral <User Schedule>  levothyroxine 50 MICROGram(s) Oral <User Schedule>  lidocaine   4% Patch 1 Patch Transdermal daily  polyethylene glycol 3350 17 Gram(s) Oral daily  polyethylene glycol/electrolyte Solution. 2000 milliLiter(s) Oral once  senna 2 Tablet(s) Oral at bedtime  simvastatin 10 milliGRAM(s) Oral at bedtime    MEDICATIONS  (PRN):  acetaminophen     Tablet .. 1000 milliGRAM(s) Oral every 8 hours PRN Moderate Pain (4 - 6)  melatonin 3 milliGRAM(s) Oral at bedtime PRN Insomnia  ondansetron Injectable 4 milliGRAM(s) IV Push every 8 hours PRN Nausea and/or Vomiting    Pertinent Labs:  Na137 mmol/L Glu 92 mg/dL K+ 3.4 mmol/L<L> Cr  0.58 mg/dL BUN 14 mg/dL  Phos 4.4 mg/dL  Alb 2.5 g/dL<L>  Chol 137 mg/dL LDL --    HDL 60 mg/dL Trig 109 mg/dL     CAPILLARY BLOOD GLUCOSE          Skin:     Estimated Needs:   [ ] no change since previous assessment  [ ] recalculated:     Previous Nutrition Diagnosis:   [ ] Inadequate Energy Intake [ ]Inadequate Oral Intake [ ] Excessive Energy Intake   [ ] Underweight [ ] Increased Nutrient Needs [ ] Overweight/Obesity  [ ] Swallowing Difficult   [ ] Altered GI Function [ ] Unintended Weight Loss [ ] Food & Nutrition Related Knowledge Deficit [ ] Malnutrition   [ ] Not Ready for Diet/Life Style Changes     Nutrition Diagnosis is [ ] ongoing  [ ] Improving   [ ] resolved [ ] not applicable     New Nutrition Diagnosis: [ ] not applicable       Interventions:   Recommend  [ ] Change Diet To:  [ ] Nutrition Supplement  [ ] Nutrition Support  [ ] Other:     Monitoring and Evaluation:   [ ] PO intake [ x ] Tolerance to diet prescription [ x ] weights [ x ] labs[ x ] follow up per protocol  [ ] other: Assessment:      Nutrition reassessment for follow-up. Chart reviewed, pt visited, alert, verbally responsive, very weak, confused at times; changed to Clear Liquid diet 23, NPO after MN today for Endoscopy per team; Tolerating 100% breakfast liquids observed today; varied intake: 0-25%, 26-50%, 51-75% meal intake at times, 51-75% Ensure supplement intake per flowsheet; Pending discharge planning to skilled nursing facility for rehab when medically ready per team,      Factors impacting intake: [ x ] other: difficulty chewing; Evangelical/ethnic/cultural/personal food preferences; acute on chronic comorbidities    Diet Prescription:      Diet, NPO after Midnight:      NPO Start Date: 2023,   NPO Start Time: 23:59 (23 @ 07:46)  Diet, Clear Liquid (23 @ 15:55)    Intake: see above     Daily Weight in k.2 (2023 05:00)  Weight in k.4 (2023 04:48)  Weight in k.5 (2023 04:40)  Weight in k.5 (2023 05:03)  Weight in k.5 (2023 05:11)    % Weight Change: Wts in [facility] EMR reviewed, a bit fluctuated, may due to scale/fluid variance    Pertinent Medications: MEDICATIONS  (STANDING):  amLODIPine   Tablet 10 milliGRAM(s) Oral daily  aspirin  chewable 81 milliGRAM(s) Oral daily  enalapril 10 milliGRAM(s) Oral daily  levothyroxine 100 MICROGram(s) Oral <User Schedule>  levothyroxine 50 MICROGram(s) Oral <User Schedule>  lidocaine   4% Patch 1 Patch Transdermal daily  polyethylene glycol 3350 17 Gram(s) Oral daily  polyethylene glycol/electrolyte Solution. 2000 milliLiter(s) Oral once  senna 2 Tablet(s) Oral at bedtime  simvastatin 10 milliGRAM(s) Oral at bedtime    MEDICATIONS  (PRN):  acetaminophen     Tablet .. 1000 milliGRAM(s) Oral every 8 hours PRN Moderate Pain (4 - 6)  melatonin 3 milliGRAM(s) Oral at bedtime PRN Insomnia  ondansetron Injectable 4 milliGRAM(s) IV Push every 8 hours PRN Nausea and/or Vomiting    Pertinent Labs:  Na137 mmol/L Glu 92 mg/dL K+ 3.4 mmol/L<L> Cr  0.58 mg/dL BUN 14 mg/dL  Phos 4.4 mg/dL  Alb 2.5 g/dL<L>  Chol 137 mg/dL LDL --    HDL 60 mg/dL Trig 109 mg/dL     CAPILLARY BLOOD GLUCOSE    Skin: skin intact     Estimated Needs:   [ X ] no change since previous assessment  [ ] recalculated:     Previous Nutrition Diagnosis:   [ x ] Malnutrition ( SEVERE)    Nutrition Diagnosis is [ x ] ongoing  [ ] Improving   [ ] resolved [ ] not applicable     New Nutrition Diagnosis: [ x ] not applicable       Interventions:   Recommend  [ x ] Change Diet To: Advance diet or consider nutritional supplement if Clear liquid diet prolonged as medically feasible   [ ] Nutrition Supplement: Ensure Enlive  1can ( 240ml ) x bid ( 700 kcal, 40 g protein) when solid meals   [ ] Nutrition Support  [ x ] Other: Discussed with RN                  Nursing to continue feeding assistance and encouragement                  Provide food choices within diet Rx as available/updated when diet advanced     Monitoring and Evaluation:   [ x ] PO intake [ x ] Tolerance to diet prescription [ x ] weights [ x ] labs[ x ] follow up per protocol  [ ] other:

## 2023-07-06 NOTE — PROGRESS NOTE ADULT - PROBLEM SELECTOR PLAN 1
- presented with hbg of 9.4 and normal MCV  - hbg down trended to as low as 8.0  - total iron 33, % sat 12, TIBC 269  - borderline iron deficiency and normal MCV raise possible contribution of chronic disease (?malignancy)  - monitor CBC daily  - transfuse hbg<7  - B12/folate normal  - methylmalonic acid normal   - homocysteine 18.8  - Heme/onc consulted Dr. Roberts  -Pt has colon thickening on CT A/P going for Endo/[place] 7/5  -EGD/[place] today planned for Friday  -on clear liquid diet  -2 physician consent obtained from Dr Kirkpatrick and Dr Ledbetter as HCP was not available for consent  pre procedure labs- Cbc Cmp PT/INR

## 2023-07-06 NOTE — CHART NOTE - NSCHARTNOTEFT_GEN_A_CORE
Updated sister Ms Gimenez. Readdressed GOC . She will discuss with Nory daughter and will get back to team.

## 2023-07-06 NOTE — PROGRESS NOTE ADULT - PROBLEM SELECTOR PLAN 1
- presented with hbg of 9.4 and normal MCV  - hbg down trended to as low as 8.0  - total iron 33, % sat 12, TIBC 269  - borderline iron deficiency and normal MCV raise possible contribution of chronic disease (?malignancy)  - monitor CBC daily  - transfuse hbg<7  - B12/folate normal  - methylmalonic acid normal   - homocysteine 18.8  - Heme/onc consulted Dr. Roberts  -Pt has colon thickening on CT A/P going for Endo/[place] 7/5  -EGD/[place] today planned for friday  on clear liquid diet  2 physician consent required  pre procedure labs- Cbc Cmp PT/INR - presented with hbg of 9.4 and normal MCV  - hbg down trended to as low as 8.0  - total iron 33, % sat 12, TIBC 269  - borderline iron deficiency and normal MCV raise possible contribution of chronic disease (?malignancy)  - monitor CBC daily  - transfuse hbg<7  - B12/folate normal  - methylmalonic acid normal   - homocysteine 18.8  - Heme/onc consulted Dr. Roberts  -Pt has colon thickening on CT A/P going for Endo/[place] 7/5  -EGD/[place] today planned for Friday  -on clear liquid diet  -2 physician consent obtained from Dr Kirkpatrick and Dr Ledbetter as HCP was not available for consent  pre procedure labs- Cbc Cmp PT/INR

## 2023-07-06 NOTE — PROGRESS NOTE ADULT - PROBLEM SELECTOR PLAN 4
-CT head shows: Poor visualization of gray matter in the right lentiform nucleus is suspicious for an acute infarct.  A 2.6 x 1.9 cm hypodensity focus in the anterior right temporal lobe and a 1.2 x 1 cm hypoattenuating focus in   the lateral aspect of the left lentiform nucleus versus left subinsular region are suspicious for small acute or subacute infarcts.  -Neurology consulted: Dr. Romero  -lipid panel WNL (LDL 55), A1c 5.5  -c/w ASA and home simvastatin 10mg  -MR brain shows cystic lesion in left anterior temporal lobe as well as pontine and bihemispheric altered white matter signal likely chronic microvascular ischemic change. No acute infarct  -No further intervention as per Neuro

## 2023-07-06 NOTE — PROGRESS NOTE ADULT - PROBLEM SELECTOR PLAN 2
hx of SCC of cervix and benign goiter per her previous doctor     -CT C/A/P: 1.1cm pancreatic tail cyst and rectal wall thickening  -MRI A/P w/ contrast  4.0 cm cyst seen on prior CT is likely arising from the left kidney   rather than the pancreas. There is a 1.3 cm cyst in the inferior pancreatic body without   concerning features. Follow-up as per institutional/Society guidelines. 7 mm segmental dilatation of the main pancreatic duct in the distal body and tail may represent sequela of chronic pancreatitis or main duct   IPMN. Occult obstructing mass is not excluded  -As per GI less likely cancerous, outpatient follow up for repeat MRI in 6months    tumor markers CA 19-9, CA-125, CEA normal  -Outpatient follow up with [facility] for pancreatic tail cyst  - Heme/onc consulted Dr. Roberts  - GI consulted Dr. Dubois

## 2023-07-06 NOTE — PROGRESS NOTE ADULT - SUBJECTIVE AND OBJECTIVE BOX
PGY-1 Progress Note discussed with attending    PAGER #: [] TILL 5:00 PM  PLEASE CONTACT ON CALL TEAM:  - On Call Team (Please refer to Alissa) FROM 5:00 PM - 8:30PM  - Nightfloat Team FROM 8:30 -7:30 AM    CHIEF COMPLAINT & BRIEF HOSPITAL COURSE:    INTERVAL HPI/OVERNIGHT EVENTS:   No acute overnight events. On bedside exam pt stated she was feeling well.        REVIEW OF SYSTEMS:  CONSTITUTIONAL: No fever, weight loss, or fatigue  RESPIRATORY: No cough, wheezing, chills or hemoptysis; No shortness of breath  CARDIOVASCULAR: No chest pain, palpitations, dizziness, or leg swelling  GASTROINTESTINAL: No abdominal pain. No nausea, vomiting, or hematemesis; No diarrhea or constipation. No melena or hematochezia.  GENITOURINARY: No dysuria or hematuria, urinary frequency  NEUROLOGICAL: No headaches, memory loss, loss of strength, numbness, or tremors  SKIN: No itching, burning, rashes, or lesions     Vital Signs Last 24 Hrs  T(C): 36.9 (06 Jul 2023 11:40), Max: 37.3 (05 Jul 2023 20:39)  T(F): 98.4 (06 Jul 2023 11:40), Max: 99.1 (05 Jul 2023 20:39)  HR: 93 (06 Jul 2023 11:40) (86 - 95)  BP: 106/59 (06 Jul 2023 11:40) (106/59 - 143/73)  BP(mean): --  RR: 17 (06 Jul 2023 11:40) (17 - 20)  SpO2: 95% (06 Jul 2023 11:40) (93% - 96%)    Parameters below as of 06 Jul 2023 11:40  Patient On (Oxygen Delivery Method): room air        PHYSICAL EXAMINATION:  GENERAL: NAD  HEAD:  Atraumatic, Normocephalic  EYES:  conjunctiva and sclera clear  NECK: Supple, No JVD, Normal thyroid  CHEST/LUNG: Clear to auscultation. Clear to percussion bilaterally; No rales, rhonchi, wheezing, or rubs  HEART: Regular rate and rhythm; No murmurs, rubs, or gallops  ABDOMEN: Soft, Nontender, Nondistended; Bowel sounds present  NERVOUS SYSTEM:  Alert & Oriented X3,    EXTREMITIES:  2+ Peripheral Pulses, No clubbing, cyanosis, or edema  SKIN: warm dry                          8.1    9.26  )-----------( 789      ( 06 Jul 2023 06:22 )             25.0     07-06    137  |  99  |  14  ----------------------------<  92  3.4<L>   |  30  |  0.58    Ca    8.4      06 Jul 2023 06:22  Phos  4.4     07-06  Mg     2.1     07-06    TPro  6.2  /  Alb  2.5<L>  /  TBili  0.5  /  DBili  x   /  AST  13  /  ALT  26  /  AlkPhos  168<H>  07-06    LIVER FUNCTIONS - ( 06 Jul 2023 06:22 )  Alb: 2.5 g/dL / Pro: 6.2 g/dL / ALK PHOS: 168 U/L / ALT: 26 U/L DA / AST: 13 U/L / GGT: x               PT/INR - ( 05 Jul 2023 06:15 )   PT: 11.2 sec;   INR: 0.94 ratio         PTT - ( 05 Jul 2023 06:15 )  PTT:31.3 sec    CAPILLARY BLOOD GLUCOSE      RADIOLOGY & ADDITIONAL TESTS:                   PGY-1 Progress Note discussed with attending    PAGER #: [] TILL 5:00 PM  PLEASE CONTACT ON CALL TEAM:  - On Call Team (Please refer to Alissa) FROM 5:00 PM - 8:30PM  - Nightfloat Team FROM 8:30 -7:30 AM    CHIEF COMPLAINT & BRIEF HOSPITAL COURSE:    INTERVAL HPI/OVERNIGHT EVENTS:   No acute overnight events. On bedside exam pt stated she was feeling well.        REVIEW OF SYSTEMS:  CONSTITUTIONAL: No fever, weight loss, or fatigue  RESPIRATORY: No cough, wheezing, chills or hemoptysis; No shortness of breath  CARDIOVASCULAR: No chest pain, palpitations, dizziness, or leg swelling  NEUROLOGICAL: No headaches       Vital Signs Last 24 Hrs  T(C): 36.9 (06 Jul 2023 11:40), Max: 37.3 (05 Jul 2023 20:39)  T(F): 98.4 (06 Jul 2023 11:40), Max: 99.1 (05 Jul 2023 20:39)  HR: 93 (06 Jul 2023 11:40) (86 - 95)  BP: 106/59 (06 Jul 2023 11:40) (106/59 - 143/73)  BP(mean): --  RR: 17 (06 Jul 2023 11:40) (17 - 20)  SpO2: 95% (06 Jul 2023 11:40) (93% - 96%)    Parameters below as of 06 Jul 2023 11:40  Patient On (Oxygen Delivery Method): room air        PHYSICAL EXAMINATION:  GENERAL: NAD  HEAD:  Atraumatic, Normocephalic  CHEST/LUNG: Clear to auscultation. Clear to percussion bilaterally; No rales, rhonchi, wheezing, or rubs  HEART: Regular rate and rhythm; No murmurs, rubs, or gallops  NERVOUS SYSTEM:  Alert & Oriented X3,    EXTREMITIES:  2+ Peripheral Pulses, No clubbing, cyanosis, or edema  SKIN: warm dry                          8.1    9.26  )-----------( 789      ( 06 Jul 2023 06:22 )             25.0     07-06    137  |  99  |  14  ----------------------------<  92  3.4<L>   |  30  |  0.58    Ca    8.4      06 Jul 2023 06:22  Phos  4.4     07-06  Mg     2.1     07-06    TPro  6.2  /  Alb  2.5<L>  /  TBili  0.5  /  DBili  x   /  AST  13  /  ALT  26  /  AlkPhos  168<H>  07-06    LIVER FUNCTIONS - ( 06 Jul 2023 06:22 )  Alb: 2.5 g/dL / Pro: 6.2 g/dL / ALK PHOS: 168 U/L / ALT: 26 U/L DA / AST: 13 U/L / GGT: x               PT/INR - ( 05 Jul 2023 06:15 )   PT: 11.2 sec;   INR: 0.94 ratio         PTT - ( 05 Jul 2023 06:15 )  PTT:31.3 sec    CAPILLARY BLOOD GLUCOSE      RADIOLOGY & ADDITIONAL TESTS:
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
## 2023-07-07 LAB
ALBUMIN SERPL ELPH-MCNC: 2.6 G/DL — LOW (ref 3.5–5)
ALP SERPL-CCNC: 161 U/L — HIGH (ref 40–120)
ALT FLD-CCNC: 21 U/L DA — SIGNIFICANT CHANGE UP (ref 10–60)
ANION GAP SERPL CALC-SCNC: 7 MMOL/L — SIGNIFICANT CHANGE UP (ref 5–17)
APTT BLD: 31.4 SEC — SIGNIFICANT CHANGE UP (ref 27.5–35.5)
AST SERPL-CCNC: 13 U/L — SIGNIFICANT CHANGE UP (ref 10–40)
BILIRUB SERPL-MCNC: 0.5 MG/DL — SIGNIFICANT CHANGE UP (ref 0.2–1.2)
BUN SERPL-MCNC: 8 MG/DL — SIGNIFICANT CHANGE UP (ref 7–18)
CALCIUM SERPL-MCNC: 8.7 MG/DL — SIGNIFICANT CHANGE UP (ref 8.4–10.5)
CHLORIDE SERPL-SCNC: 100 MMOL/L — SIGNIFICANT CHANGE UP (ref 96–108)
CO2 SERPL-SCNC: 29 MMOL/L — SIGNIFICANT CHANGE UP (ref 22–31)
CREAT SERPL-MCNC: 0.62 MG/DL — SIGNIFICANT CHANGE UP (ref 0.5–1.3)
EGFR: 89 ML/MIN/1.73M2 — SIGNIFICANT CHANGE UP
GLUCOSE SERPL-MCNC: 94 MG/DL — SIGNIFICANT CHANGE UP (ref 70–99)
HCT VFR BLD CALC: 27.9 % — LOW (ref 34.5–45)
HGB BLD-MCNC: 8.9 G/DL — LOW (ref 11.5–15.5)
INR BLD: 1.02 RATIO — SIGNIFICANT CHANGE UP (ref 0.88–1.16)
MAGNESIUM SERPL-MCNC: 2 MG/DL — SIGNIFICANT CHANGE UP (ref 1.6–2.6)
MCHC RBC-ENTMCNC: 29.9 PG — SIGNIFICANT CHANGE UP (ref 27–34)
MCHC RBC-ENTMCNC: 31.9 GM/DL — LOW (ref 32–36)
MCV RBC AUTO: 93.6 FL — SIGNIFICANT CHANGE UP (ref 80–100)
NRBC # BLD: 0 /100 WBCS — SIGNIFICANT CHANGE UP (ref 0–0)
PHOSPHATE SERPL-MCNC: 3.4 MG/DL — SIGNIFICANT CHANGE UP (ref 2.5–4.5)
PLATELET # BLD AUTO: 902 K/UL — HIGH (ref 150–400)
POTASSIUM SERPL-MCNC: 4 MMOL/L — SIGNIFICANT CHANGE UP (ref 3.5–5.3)
POTASSIUM SERPL-SCNC: 4 MMOL/L — SIGNIFICANT CHANGE UP (ref 3.5–5.3)
PROT SERPL-MCNC: 6.4 G/DL — SIGNIFICANT CHANGE UP (ref 6–8.3)
PROTHROM AB SERPL-ACNC: 12.1 SEC — SIGNIFICANT CHANGE UP (ref 10.5–13.4)
RBC # BLD: 2.98 M/UL — LOW (ref 3.8–5.2)
RBC # FLD: 15 % — HIGH (ref 10.3–14.5)
SODIUM SERPL-SCNC: 136 MMOL/L — SIGNIFICANT CHANGE UP (ref 135–145)
WBC # BLD: 6.8 K/UL — SIGNIFICANT CHANGE UP (ref 3.8–10.5)
WBC # FLD AUTO: 6.8 K/UL — SIGNIFICANT CHANGE UP (ref 3.8–10.5)

## 2023-07-07 PROCEDURE — 45385 COLONOSCOPY W/LESION REMOVAL: CPT

## 2023-07-07 PROCEDURE — 45380 COLONOSCOPY AND BIOPSY: CPT | Mod: 59

## 2023-07-07 PROCEDURE — 88312 SPECIAL STAINS GROUP 1: CPT | Mod: 26

## 2023-07-07 PROCEDURE — 43239 EGD BIOPSY SINGLE/MULTIPLE: CPT | Mod: 59

## 2023-07-07 PROCEDURE — 88305 TISSUE EXAM BY PATHOLOGIST: CPT | Mod: 26

## 2023-07-07 RX ORDER — PREGABALIN 225 MG/1
1 CAPSULE ORAL
Qty: 30 | Refills: 0
Start: 2023-07-07 | End: 2023-08-05

## 2023-07-07 RX ORDER — NYSTATIN 500MM UNIT
5 POWDER (EA) MISCELLANEOUS
Qty: 280 | Refills: 0
Start: 2023-07-07 | End: 2023-07-20

## 2023-07-07 RX ORDER — ONDANSETRON 8 MG/1
0 TABLET, FILM COATED ORAL
Qty: 0 | Refills: 0 | DISCHARGE

## 2023-07-07 RX ORDER — ASPIRIN/CALCIUM CARB/MAGNESIUM 324 MG
1 TABLET ORAL
Qty: 30 | Refills: 0
Start: 2023-07-07 | End: 2023-08-05

## 2023-07-07 RX ORDER — PANTOPRAZOLE SODIUM 20 MG/1
1 TABLET, DELAYED RELEASE ORAL
Qty: 60 | Refills: 0
Start: 2023-07-07 | End: 2023-08-05

## 2023-07-07 RX ORDER — SUCRALFATE 1 G
1 TABLET ORAL EVERY 6 HOURS
Refills: 0 | Status: DISCONTINUED | OUTPATIENT
Start: 2023-07-07 | End: 2023-07-09

## 2023-07-07 RX ORDER — NYSTATIN 500MM UNIT
500000 POWDER (EA) MISCELLANEOUS
Refills: 0 | Status: DISCONTINUED | OUTPATIENT
Start: 2023-07-07 | End: 2023-07-09

## 2023-07-07 RX ORDER — ONDANSETRON 8 MG/1
1 TABLET, FILM COATED ORAL
Qty: 14 | Refills: 0
Start: 2023-07-07 | End: 2023-07-20

## 2023-07-07 RX ORDER — FERROUS SULFATE 325(65) MG
325 TABLET ORAL
Refills: 0 | Status: DISCONTINUED | OUTPATIENT
Start: 2023-07-07 | End: 2023-07-09

## 2023-07-07 RX ORDER — ENOXAPARIN SODIUM 100 MG/ML
40 INJECTION SUBCUTANEOUS EVERY 24 HOURS
Refills: 0 | Status: DISCONTINUED | OUTPATIENT
Start: 2023-07-07 | End: 2023-07-09

## 2023-07-07 RX ORDER — LEVOTHYROXINE SODIUM 125 MCG
1 TABLET ORAL
Qty: 30 | Refills: 0
Start: 2023-07-07 | End: 2023-08-05

## 2023-07-07 RX ORDER — PANTOPRAZOLE SODIUM 20 MG/1
40 TABLET, DELAYED RELEASE ORAL EVERY 12 HOURS
Refills: 0 | Status: DISCONTINUED | OUTPATIENT
Start: 2023-07-07 | End: 2023-07-09

## 2023-07-07 RX ORDER — SUCRALFATE 1 G
1 TABLET ORAL
Qty: 120 | Refills: 0
Start: 2023-07-07 | End: 2023-08-05

## 2023-07-07 RX ORDER — LEVOTHYROXINE SODIUM 125 MCG
0 TABLET ORAL
Qty: 0 | Refills: 0 | DISCHARGE

## 2023-07-07 RX ORDER — SENNA PLUS 8.6 MG/1
2 TABLET ORAL
Qty: 0 | Refills: 0 | DISCHARGE
Start: 2023-07-07

## 2023-07-07 RX ORDER — FERROUS SULFATE 325(65) MG
1 TABLET ORAL
Qty: 0 | Refills: 0 | DISCHARGE
Start: 2023-07-07

## 2023-07-07 RX ADMIN — Medication 500000 UNIT(S): at 17:29

## 2023-07-07 RX ADMIN — Medication 1 GRAM(S): at 17:28

## 2023-07-07 RX ADMIN — ENOXAPARIN SODIUM 40 MILLIGRAM(S): 100 INJECTION SUBCUTANEOUS at 15:51

## 2023-07-07 RX ADMIN — Medication 500000 UNIT(S): at 15:51

## 2023-07-07 RX ADMIN — Medication 325 MILLIGRAM(S): at 17:28

## 2023-07-07 RX ADMIN — Medication 1 GRAM(S): at 15:51

## 2023-07-07 RX ADMIN — Medication 10 MILLIGRAM(S): at 05:17

## 2023-07-07 RX ADMIN — SENNA PLUS 2 TABLET(S): 8.6 TABLET ORAL at 21:25

## 2023-07-07 RX ADMIN — Medication 3 MILLIGRAM(S): at 21:26

## 2023-07-07 RX ADMIN — SIMVASTATIN 10 MILLIGRAM(S): 20 TABLET, FILM COATED ORAL at 21:25

## 2023-07-07 RX ADMIN — Medication 50 MICROGRAM(S): at 05:17

## 2023-07-07 RX ADMIN — AMLODIPINE BESYLATE 10 MILLIGRAM(S): 2.5 TABLET ORAL at 05:16

## 2023-07-07 RX ADMIN — PANTOPRAZOLE SODIUM 40 MILLIGRAM(S): 20 TABLET, DELAYED RELEASE ORAL at 17:28

## 2023-07-07 NOTE — CHART NOTE - NSCHARTNOTESELECT_GEN_ALL_CORE
Event Note
Family update/Event Note
Nutrition Services
family update/Event Note
Event Note
Gastroenterology/Event Note

## 2023-07-07 NOTE — PROGRESS NOTE ADULT - SUBJECTIVE AND OBJECTIVE BOX
PGY-1 Progress Note discussed with attending    PAGER #: [] TILL 5:00 PM  PLEASE CONTACT ON CALL TEAM:  - On Call Team (Please refer to Alissa) FROM 5:00 PM - 8:30PM  - Nightfloat Team FROM 8:30 -7:30 AM    CHIEF COMPLAINT & BRIEF HOSPITAL COURSE:    INTERVAL HPI/OVERNIGHT EVENTS:   No acute overnight events. On bedside exam pt stated she was feeling good.      REVIEW OF SYSTEMS:  RESPIRATORY: No cough, wheezing, No shortness of breath  CARDIOVASCULAR: No chest pain, palpitations, dizziness, or leg swelling  GASTROINTESTINAL: No abdominal pain.     Vital Signs Last 24 Hrs  T(C): 36.7 (07 Jul 2023 12:01), Max: 37.3 (06 Jul 2023 16:07)  T(F): 98 (07 Jul 2023 12:01), Max: 99.2 (06 Jul 2023 16:07)  HR: 70 (07 Jul 2023 13:31) (70 - 91)  BP: 111/65 (07 Jul 2023 13:31) (96/61 - 138/72)  BP(mean): --  RR: 17 (07 Jul 2023 13:31) (17 - 20)  SpO2: 100% (07 Jul 2023 13:31) (93% - 100%)    Parameters below as of 07 Jul 2023 11:19  Patient On (Oxygen Delivery Method): room air        PHYSICAL EXAMINATION:  GENERAL: NAD  HEAD:  Atraumatic, Normocephalic  CHEST/LUNG: Clear to auscultation. No rales, rhonchi, wheezing, or rubs  HEART: Regular rate and rhythm; No murmurs, rubs, or gallops  NERVOUS SYSTEM:  Alert & Oriented X3,    EXTREMITIES:  2+ Peripheral Pulses, No clubbing, cyanosis, or edema  SKIN: warm dry                          8.9    6.80  )-----------( 902      ( 07 Jul 2023 06:05 )             27.9     07-07    136  |  100  |  8   ----------------------------<  94  4.0   |  29  |  0.62    Ca    8.7      07 Jul 2023 06:05  Phos  3.4     07-07  Mg     2.0     07-07    TPro  6.4  /  Alb  2.6<L>  /  TBili  0.5  /  DBili  x   /  AST  13  /  ALT  21  /  AlkPhos  161<H>  07-07    LIVER FUNCTIONS - ( 07 Jul 2023 06:05 )  Alb: 2.6 g/dL / Pro: 6.4 g/dL / ALK PHOS: 161 U/L / ALT: 21 U/L DA / AST: 13 U/L / GGT: x               PT/INR - ( 07 Jul 2023 06:05 )   PT: 12.1 sec;   INR: 1.02 ratio         PTT - ( 07 Jul 2023 06:05 )  PTT:31.4 sec    CAPILLARY BLOOD GLUCOSE      RADIOLOGY & ADDITIONAL TESTS:
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
## 2023-07-07 NOTE — PROGRESS NOTE ADULT - ASSESSMENT
· Assessment	  1. R Temporal Lobe Cystic Lesion     -- MRI w Non enhancing Lesion  -- Neuro following  -- would ask NeuroSx to see    2. Pancreatic Tail Cyst    -- CA 19-9, CEA and  not elevated  -- GI following   -- MRCP showed 4.0 cm cyst seen on prior CT is likely arising from the left kidney rather than the pancreas. There is a 1.3 cm cyst in the inferior pancreatic body without concerning features. Follow-up as per institutional/Society guidelines.  7 mm segmental dilatation of the main pancreatic duct in the distal body and tail may represent sequela of chronic pancreatitis or main duct IPMN. Occult obstructing mass is not excluded. Dedicated GI follow-up is recommended.-- EGD/colonoscopy,  needs to be done.  no family member available.  I will give my emergent consent.  -- f/u GI re MRCP finding as well    anemia -- hgb stable and adequate, EARLENE showed weak IgG kappa band, likely MGUS, monitor for now  -- outpt f/u after d/c    thrombocytosis -- acute, reactive, monitor for now    will follow.
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
## 2023-07-07 NOTE — PROGRESS NOTE ADULT - PROBLEM SELECTOR PLAN 1
- presented with hbg of 9.4 and normal MCV  - hbg down trended to as low as 8.0  - total iron 33, % sat 12, TIBC 269  - borderline iron deficiency and normal MCV raise possible contribution of chronic disease (?malignancy)  - monitor CBC daily  - transfuse hbg<7  - B12/folate normal  - methylmalonic acid normal   - homocysteine 18.8  - Heme/onc consulted Dr. Roberts  -Pt has colon thickening on CT A/P going for Endo/[place] 7/5  -EGD/[place] today planned for Friday  -on clear liquid diet  -2 physician consent obtained from Dr Kirkpatrick and Dr Ledbetter as HCP was not available for consent  pre procedure labs- Cbc Cmp PT/INR - presented with hbg of 9.4 and normal MCV  - hbg down trended to as low as 8.0  - total iron 33, % sat 12, TIBC 269  - borderline iron deficiency and normal MCV raise possible contribution of chronic disease (?malignancy)  - monitor CBC daily  - transfuse hbg<7  - B12/folate normal  - methylmalonic acid normal   - homocysteine 18.8  - Heme/onc consulted Dr. Roberts  -Pt has colon thickening on CT A/P   -2 physician consent obtained from Dr Kirkpatrick and Dr Ledbetter as HCP was not available for consent  pre procedure labs- Cbc Cmp PT/ INR    7/7 EGD- colonoscopy was performed.  EGD showed inflamed and ulcerated esophageal mucosa as well, external compression of distal esophagus as yeast  Colonoscopy showed a single benign appearing 5mm polyp in descending colon and irregular rectal mucosa, suspected to be due to radiation damage, which was biopsied. - presented with hbg of 9.4 and normal MCV  - hbg down trended to as low as 8.0  - total iron 33, % sat 12, TIBC 269  - borderline iron deficiency and normal MCV raise possible contribution of chronic disease (?malignancy)  - monitor CBC daily  - transfuse hbg<7  - B12/folate normal  - methylmalonic acid normal   - homocysteine 18.8  - Heme/onc consulted Dr. Roberts  -Pt has colon thickening on CT A/P   -2 physician consent obtained from Dr Kirkpatrick and Dr Ledbetter as HCP was not available for consent  pre procedure labs- Cbc Cmp PT/ INR    7/7 EGD- colonoscopy was performed.  EGD showed inflamed and ulcerated esophageal mucosa as well, external compression of distal esophagus as yeast  Colonoscopy showed a single benign appearing 5mm polyp in descending colon and irregular rectal mucosa, suspected to be due to radiation damage, which was biopsied.  carafate, PPI, and iron tab

## 2023-07-07 NOTE — PROGRESS NOTE ADULT - PROBLEM SELECTOR PLAN 1
- presented with hbg of 9.4 and normal MCV  - hbg down trended to as low as 8.0  - total iron 33, % sat 12, TIBC 269  - borderline iron deficiency and normal MCV raise possible contribution of chronic disease (?malignancy)  - monitor CBC daily  - transfuse hbg<7  - B12/folate normal  - methylmalonic acid normal   - homocysteine 18.8  - Heme/onc consulted Dr. Roberts  -Pt has colon thickening on CT A/P   -2 physician consent obtained from Dr Kirkpatrick and Dr Ledbetter as HCP was not available for consent  pre procedure labs- Cbc Cmp PT/ INR    7/7 EGD- colonoscopy was performed.  EGD showed inflamed and ulcerated esophageal mucosa as well, external compression of distal esophagus as yeast  Colonoscopy showed a single benign appearing 5mm polyp in descending colon and irregular rectal mucosa, suspected to be due to radiation damage, which was biopsied.

## 2023-07-07 NOTE — CHART NOTE - NSCHARTNOTEFT_GEN_A_CORE
Spoke to Ms Gimenez and updated about patient current condition . Also updated  patient needs follow up with Neuro oncologist, Hematologist/Oncologist, GI. Discussed in detail with Ms Gimenez.

## 2023-07-08 LAB
ALBUMIN SERPL ELPH-MCNC: 2.5 G/DL — LOW (ref 3.5–5)
ALP SERPL-CCNC: 141 U/L — HIGH (ref 40–120)
ALT FLD-CCNC: 19 U/L DA — SIGNIFICANT CHANGE UP (ref 10–60)
ANION GAP SERPL CALC-SCNC: 8 MMOL/L — SIGNIFICANT CHANGE UP (ref 5–17)
AST SERPL-CCNC: 11 U/L — SIGNIFICANT CHANGE UP (ref 10–40)
BILIRUB SERPL-MCNC: 0.5 MG/DL — SIGNIFICANT CHANGE UP (ref 0.2–1.2)
BUN SERPL-MCNC: 8 MG/DL — SIGNIFICANT CHANGE UP (ref 7–18)
CALCIUM SERPL-MCNC: 8.5 MG/DL — SIGNIFICANT CHANGE UP (ref 8.4–10.5)
CHLORIDE SERPL-SCNC: 98 MMOL/L — SIGNIFICANT CHANGE UP (ref 96–108)
CO2 SERPL-SCNC: 27 MMOL/L — SIGNIFICANT CHANGE UP (ref 22–31)
CREAT SERPL-MCNC: 0.66 MG/DL — SIGNIFICANT CHANGE UP (ref 0.5–1.3)
EGFR: 88 ML/MIN/1.73M2 — SIGNIFICANT CHANGE UP
GLUCOSE SERPL-MCNC: 97 MG/DL — SIGNIFICANT CHANGE UP (ref 70–99)
HCT VFR BLD CALC: 26.8 % — LOW (ref 34.5–45)
HGB BLD-MCNC: 8.5 G/DL — LOW (ref 11.5–15.5)
MAGNESIUM SERPL-MCNC: 1.9 MG/DL — SIGNIFICANT CHANGE UP (ref 1.6–2.6)
MCHC RBC-ENTMCNC: 29.7 PG — SIGNIFICANT CHANGE UP (ref 27–34)
MCHC RBC-ENTMCNC: 31.7 GM/DL — LOW (ref 32–36)
MCV RBC AUTO: 93.7 FL — SIGNIFICANT CHANGE UP (ref 80–100)
NRBC # BLD: 0 /100 WBCS — SIGNIFICANT CHANGE UP (ref 0–0)
PHOSPHATE SERPL-MCNC: 3.1 MG/DL — SIGNIFICANT CHANGE UP (ref 2.5–4.5)
PLATELET # BLD AUTO: 920 K/UL — HIGH (ref 150–400)
POTASSIUM SERPL-MCNC: 3.6 MMOL/L — SIGNIFICANT CHANGE UP (ref 3.5–5.3)
POTASSIUM SERPL-SCNC: 3.6 MMOL/L — SIGNIFICANT CHANGE UP (ref 3.5–5.3)
PROT SERPL-MCNC: 6 G/DL — SIGNIFICANT CHANGE UP (ref 6–8.3)
RBC # BLD: 2.86 M/UL — LOW (ref 3.8–5.2)
RBC # FLD: 14.9 % — HIGH (ref 10.3–14.5)
SODIUM SERPL-SCNC: 133 MMOL/L — LOW (ref 135–145)
WBC # BLD: 9.17 K/UL — SIGNIFICANT CHANGE UP (ref 3.8–10.5)
WBC # FLD AUTO: 9.17 K/UL — SIGNIFICANT CHANGE UP (ref 3.8–10.5)

## 2023-07-08 RX ADMIN — Medication 325 MILLIGRAM(S): at 17:30

## 2023-07-08 RX ADMIN — Medication 500000 UNIT(S): at 23:05

## 2023-07-08 RX ADMIN — Medication 500000 UNIT(S): at 17:31

## 2023-07-08 RX ADMIN — LIDOCAINE 1 PATCH: 4 CREAM TOPICAL at 11:52

## 2023-07-08 RX ADMIN — Medication 325 MILLIGRAM(S): at 05:40

## 2023-07-08 RX ADMIN — Medication 10 MILLIGRAM(S): at 05:40

## 2023-07-08 RX ADMIN — ENOXAPARIN SODIUM 40 MILLIGRAM(S): 100 INJECTION SUBCUTANEOUS at 15:48

## 2023-07-08 RX ADMIN — Medication 1 GRAM(S): at 17:30

## 2023-07-08 RX ADMIN — Medication 1 GRAM(S): at 05:40

## 2023-07-08 RX ADMIN — POLYETHYLENE GLYCOL 3350 17 GRAM(S): 17 POWDER, FOR SOLUTION ORAL at 11:51

## 2023-07-08 RX ADMIN — LIDOCAINE 1 PATCH: 4 CREAM TOPICAL at 21:30

## 2023-07-08 RX ADMIN — SIMVASTATIN 10 MILLIGRAM(S): 20 TABLET, FILM COATED ORAL at 21:57

## 2023-07-08 RX ADMIN — Medication 1 GRAM(S): at 23:05

## 2023-07-08 RX ADMIN — Medication 500000 UNIT(S): at 12:11

## 2023-07-08 RX ADMIN — AMLODIPINE BESYLATE 10 MILLIGRAM(S): 2.5 TABLET ORAL at 05:40

## 2023-07-08 RX ADMIN — SENNA PLUS 2 TABLET(S): 8.6 TABLET ORAL at 21:57

## 2023-07-08 RX ADMIN — PANTOPRAZOLE SODIUM 40 MILLIGRAM(S): 20 TABLET, DELAYED RELEASE ORAL at 05:41

## 2023-07-08 RX ADMIN — Medication 50 MICROGRAM(S): at 05:40

## 2023-07-08 RX ADMIN — Medication 1 GRAM(S): at 11:51

## 2023-07-08 RX ADMIN — PANTOPRAZOLE SODIUM 40 MILLIGRAM(S): 20 TABLET, DELAYED RELEASE ORAL at 17:30

## 2023-07-08 RX ADMIN — LIDOCAINE 1 PATCH: 4 CREAM TOPICAL at 23:12

## 2023-07-08 RX ADMIN — Medication 500000 UNIT(S): at 00:23

## 2023-07-08 RX ADMIN — Medication 500000 UNIT(S): at 05:40

## 2023-07-08 RX ADMIN — Medication 81 MILLIGRAM(S): at 11:51

## 2023-07-08 RX ADMIN — Medication 1 GRAM(S): at 00:23

## 2023-07-08 NOTE — PHYSICAL THERAPY INITIAL EVALUATION ADULT - MANUAL MUSCLE TESTING RESULTS, REHAB EVAL
both UE/LE grossly graded 4/5
both UE grossly 3+/5; right LE grossly 3+/5; left LE 3/5-left wrist not tested-splint in place

## 2023-07-08 NOTE — PHYSICAL THERAPY INITIAL EVALUATION ADULT - IMPAIRMENTS FOUND, PT EVAL
gait, locomotion, and balance/joint integrity and mobility/muscle strength
gait, locomotion, and balance/muscle strength/poor safety awareness

## 2023-07-08 NOTE — PHYSICAL THERAPY INITIAL EVALUATION ADULT - RANGE OF MOTION EXAMINATION, REHAB EVAL
left wrist NT- splint in place; except for sh flex to ~ 90 deg- (+) scoliosis noted/bilateral upper extremity ROM was WFL (within functional limits)/bilateral lower extremity ROM was WFL (within functional limits)
bilateral upper extremity ROM was WFL (within functional limits)/bilateral lower extremity ROM was WFL (within functional limits)

## 2023-07-08 NOTE — PROGRESS NOTE ADULT - PROBLEM SELECTOR PLAN 1
- presented with hbg of 9.4 and normal MCV  - hbg down trended to as low as 8.0  - total iron 33, % sat 12, TIBC 269  - borderline iron deficiency and normal MCV raise possible contribution of chronic disease (?malignancy)  - monitor CBC daily  - transfuse hbg<7  - B12/folate normal  - methylmalonic acid normal   - homocysteine 18.8  - Heme/onc consulted Dr. Roberts  -Pt has colon thickening on CT A/P   -2 physician consent obtained from Dr Kirkpatrick and Dr Ledbetter as HCP was not available for consent  pre procedure labs- Cbc Cmp PT/ INR    7/7 EGD- colonoscopy was performed.  EGD showed inflamed and ulcerated esophageal mucosa as well, external compression of distal esophagus as yeast  Colonoscopy showed a single benign appearing 5mm polyp in descending colon and irregular rectal mucosa, suspected to be due to radiation damage, which was biopsied.  carafate, PPI, and iron tab  7/8 s/p egd colonoscopy - presented with hbg of 9.4 and normal MCV  - hbg down trended to as low as 8.0  - total iron 33, % sat 12, TIBC 269  - borderline iron deficiency and normal MCV raise possible contribution of chronic disease (?malignancy)  - monitor CBC daily  - transfuse hbg<7  - B12/folate normal  - methylmalonic acid normal   - homocysteine 18.8  - Heme/onc consulted Dr. Roberts  -Pt has colon thickening on CT A/P   -2 physician consent obtained from Dr Kirkpatrick and Dr Ledbetter as HCP was not available for consent  pre procedure labs- Cbc Cmp PT/ INR    7/7 EGD- colonoscopy was performed.  EGD showed inflamed and ulcerated esophageal mucosa as well, external compression of distal esophagus as yeast  Colonoscopy showed a single benign appearing 5mm polyp in descending colon and irregular rectal mucosa, suspected to be due to radiation damage, which was biopsied.  carafate, PPI, and iron tab  7/8 s/p egd-colonoscopy, resumed on soft diet - presented with hbg of 9.4 and normal MCV  - hbg down trended to as low as 8.0  - total iron 33, % sat 12, TIBC 269  - borderline iron deficiency and normal MCV raise possible contribution of chronic disease (?malignancy)  - monitor CBC daily  - transfuse hbg<7  - B12/folate normal  - methylmalonic acid normal   - homocysteine 18.8  - Heme/onc consulted Dr. Roberts  -Pt has colon thickening on CT A/P   -2 physician consent obtained from Dr Krikpatrick and Dr Ledbetter as HCP was not available for consent  pre procedure labs- Cbc Cmp PT/ INR    7/7 EGD- colonoscopy was performed.  EGD showed inflamed and ulcerated esophageal mucosa as well, external compression of distal esophagus as yeast  Colonoscopy showed a single benign appearing 5mm polyp in descending colon and irregular rectal mucosa, suspected to be due to radiation damage, which was biopsied.  carafate, PPI, and iron tab  7/8 s/p egd-colonoscopy, resumed on soft diet  Pt to be discharged Sunday
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
## 2023-07-08 NOTE — PROGRESS NOTE ADULT - NUTRITIONAL ASSESSMENT
This patient has been assessed with a concern for Malnutrition and has been determined to have a diagnosis/diagnoses of Severe protein-calorie malnutrition and Underweight (BMI < 19).    This patient is being managed with:   Diet NPO after Midnight-     NPO Start Date: 06-Jul-2023   NPO Start Time: 23:59  Entered: Jul 6 2023  7:46AM    Diet Clear Liquid-  Entered: Jul 5 2023  3:55PM  
This patient has been assessed with a concern for Malnutrition and has been determined to have a diagnosis/diagnoses of Severe protein-calorie malnutrition and Underweight (BMI < 19).    This patient is being managed with:   Diet Soft and Bite Sized-  Supplement Feeding Modality:  Oral  Ensure Enlive Cans or Servings Per Day:  1       Frequency:  Three Times a day  Entered: Jun 29 2023 11:41AM  
This patient has been assessed with a concern for Malnutrition and has been determined to have a diagnosis/diagnoses of Severe protein-calorie malnutrition and Underweight (BMI < 19).    This patient is being managed with:   Diet NPO after Midnight-     NPO Start Date: 04-Jul-2023   NPO Start Time: 23:59  Entered: Jul 4 2023  8:02AM    Diet Soft and Bite Sized-  Supplement Feeding Modality:  Oral  Ensure Enlive Cans or Servings Per Day:  1       Frequency:  Three Times a day  Entered: Jun 29 2023 11:41AM  
This patient has been assessed with a concern for Malnutrition and has been determined to have a diagnosis/diagnoses of Severe protein-calorie malnutrition and Underweight (BMI < 19).    This patient is being managed with:   Diet NPO after Midnight-     NPO Start Date: 04-Jul-2023   NPO Start Time: 23:59  Entered: Jul 4 2023  8:02AM    Diet Soft and Bite Sized-  Supplement Feeding Modality:  Oral  Ensure Enlive Cans or Servings Per Day:  1       Frequency:  Three Times a day  Entered: Jun 29 2023 11:41AM  
This patient has been assessed with a concern for Malnutrition and has been determined to have a diagnosis/diagnoses of Severe protein-calorie malnutrition and Underweight (BMI < 19).    This patient is being managed with:   Diet Soft and Bite Sized-  Entered: Jul 8 2023 11:04AM  
This patient has been assessed with a concern for Malnutrition and has been determined to have a diagnosis/diagnoses of Severe protein-calorie malnutrition and Underweight (BMI < 19).    This patient is being managed with:   Diet Soft and Bite Sized-  Supplement Feeding Modality:  Oral  Ensure Enlive Cans or Servings Per Day:  1       Frequency:  Three Times a day  Entered: Jun 29 2023 11:41AM  
This patient has been assessed with a concern for Malnutrition and has been determined to have a diagnosis/diagnoses of Severe protein-calorie malnutrition and Underweight (BMI < 19).    This patient is being managed with:   Diet Soft and Bite Sized-  Supplement Feeding Modality:  Oral  Ensure Enlive Cans or Servings Per Day:  1       Frequency:  Three Times a day  Entered: Jun 29 2023 11:41AM  
This patient has been assessed with a concern for Malnutrition and has been determined to have a diagnosis/diagnoses of Severe protein-calorie malnutrition and Underweight (BMI < 19).    This patient is being managed with:   Diet NPO after Midnight-     NPO Start Date: 06-Jul-2023   NPO Start Time: 23:59  Entered: Jul 6 2023  7:46AM    Diet Clear Liquid-  Entered: Jul 5 2023  3:55PM  
This patient has been assessed with a concern for Malnutrition and has been determined to have a diagnosis/diagnoses of Severe protein-calorie malnutrition and Underweight (BMI < 19).    This patient is being managed with:   Diet Soft and Bite Sized-  Supplement Feeding Modality:  Oral  Ensure Enlive Cans or Servings Per Day:  1       Frequency:  Three Times a day  Entered: Jun 29 2023 11:41AM  
This patient has been assessed with a concern for Malnutrition and has been determined to have a diagnosis/diagnoses of Severe protein-calorie malnutrition and Underweight (BMI < 19).    This patient is being managed with:   Diet Clear Liquid-  DASH/TLC {Sodium & Cholesterol Restricted}  Entered: Jul 7 2023  1:42PM    Diet NPO after Midnight-     NPO Start Date: 06-Jul-2023   NPO Start Time: 23:59  Entered: Jul 6 2023  7:46AM  
This patient has been assessed with a concern for Malnutrition and has been determined to have a diagnosis/diagnoses of Severe protein-calorie malnutrition and Underweight (BMI < 19).    This patient is being managed with:   Diet Soft and Bite Sized-  Supplement Feeding Modality:  Oral  Ensure Enlive Cans or Servings Per Day:  1       Frequency:  Three Times a day  Entered: Jun 29 2023 11:41AM  
This patient has been assessed with a concern for Malnutrition and has been determined to have a diagnosis/diagnoses of Severe protein-calorie malnutrition and Underweight (BMI < 19).    This patient is being managed with:   Diet Clear Liquid-  DASH/TLC {Sodium & Cholesterol Restricted}  Entered: Jul 7 2023  1:42PM    Diet NPO after Midnight-     NPO Start Date: 06-Jul-2023   NPO Start Time: 23:59  Entered: Jul 6 2023  7:46AM  
This patient has been assessed with a concern for Malnutrition and has been determined to have a diagnosis/diagnoses of Severe protein-calorie malnutrition and Underweight (BMI < 19).    This patient is being managed with:   Diet NPO after Midnight-     NPO Start Date: 06-Jul-2023   NPO Start Time: 23:59  Entered: Jul 6 2023  7:46AM    Diet Clear Liquid-  Entered: Jul 5 2023  3:55PM  
This patient has been assessed with a concern for Malnutrition and has been determined to have a diagnosis/diagnoses of Severe protein-calorie malnutrition and Underweight (BMI < 19).    This patient is being managed with:   Diet NPO after Midnight-     NPO Start Date: 04-Jul-2023   NPO Start Time: 23:59  Entered: Jul 4 2023  8:02AM    Diet Soft and Bite Sized-  Supplement Feeding Modality:  Oral  Ensure Enlive Cans or Servings Per Day:  1       Frequency:  Three Times a day  Entered: Jun 29 2023 11:41AM  
This patient has been assessed with a concern for Malnutrition and has been determined to have a diagnosis/diagnoses of Severe protein-calorie malnutrition and Underweight (BMI < 19).    This patient is being managed with:   Diet Soft and Bite Sized-  Supplement Feeding Modality:  Oral  Ensure Enlive Cans or Servings Per Day:  1       Frequency:  Three Times a day  Entered: Jun 29 2023 11:41AM  

## 2023-07-08 NOTE — PROGRESS NOTE ADULT - PROVIDER SPECIALTY LIST ADULT
Gastroenterology
Heme/Onc
Neurology
Internal Medicine
Neurology
Neurology
Cardiology
Gastroenterology
Heme/Onc
Internal Medicine
Pain Medicine
Pain Medicine
Internal Medicine
Cardiology
Internal Medicine
Cardiology
Pain Medicine
Cardiology
Internal Medicine
Cardiology
Internal Medicine
Cardiology
Internal Medicine

## 2023-07-08 NOTE — PHYSICAL THERAPY INITIAL EVALUATION ADULT - NS ASR RISK AREAS PT EVAL
fall/impaired judgment/safety awareness/cognitive impairment
fall/impaired judgment/safety awareness

## 2023-07-08 NOTE — PHYSICAL THERAPY INITIAL EVALUATION ADULT - PERTINENT HX OF CURRENT PROBLEM, REHAB EVAL
Pt seen for 2weeks re-eval, admitted from custodial s/p fall and sustained Comminuted acute left superior ramus fracture, WBAT and conservative management per Ortho
Pt admitted with L arm and leg pain when pt was accidentally struck by serving cart, found to have Comminuted acute left superior ramus fracture. Pt with left thumb spica splint however imaging did not show fracture

## 2023-07-08 NOTE — PHYSICAL THERAPY INITIAL EVALUATION ADULT - FOLLOWS COMMANDS/ANSWERS QUESTIONS, REHAB EVAL
with verbal and tactile stimuli/able to follow single-step instructions
with verbal and tactile stimuli/able to follow single-step instructions

## 2023-07-08 NOTE — PHYSICAL THERAPY INITIAL EVALUATION ADULT - CRITERIA FOR SKILLED THERAPEUTIC INTERVENTIONS
SARAN/impairments found/functional limitations in following categories/anticipated discharge recommendation
SARAN/impairments found/functional limitations in following categories/anticipated discharge recommendation

## 2023-07-08 NOTE — PROGRESS NOTE ADULT - TIME BILLING
- Review of records, telemetry, vital signs and daily labs.   - General and cardiovascular physical examination.  - Generation of cardiovascular treatment plan.  - Coordination of care.      Patient was seen and examined by me on 7/1/2023,interim events noted,labs and radiology studies reviewed.  Iker Kirkpatrick MD,FACC.  [address] [place], [place] [id number].  [phone]
I counseled the primary team about the further testing indicated to help determine the etiology of the patient's abnormal neuroimaging.
I counseled the primary team about the testing and treatment indicated for workup and treatment of the patient's findings.
I counseled the patient and primary team about the further testing indicated to help determine the etiology of her abnormal neuroimaging finding.
- Review of records, telemetry, vital signs and daily labs.   - General and cardiovascular physical examination.  - Generation of cardiovascular treatment plan.  - Coordination of care.      Patient was seen and examined by me on 7/6/23,interim events noted,labs and radiology studies reviewed.  Iker Kirkpatrick MD,FACC.  [address] [place], [place] [id number].  [phone]
- Review of records, telemetry, vital signs and daily labs.   - General and cardiovascular physical examination.  - Generation of cardiovascular treatment plan.  - Coordination of care.      Patient was seen and examined by me on 6/30/2023,interim events noted,labs and radiology studies reviewed.  Iker Kirkpatrick MD,FACC.  [address] [place], [place] [id number].  [phone]
- Review of records, telemetry, vital signs and daily labs.   - General and cardiovascular physical examination.  - Generation of cardiovascular treatment plan.  - Coordination of care.      Patient was seen and examined by me on 6/25/23,interim events noted,labs and radiology studies reviewed.  Iker Kirkpatrick MD,FACC.  [address] [place], [place] [id number].  [phone]
- Review of records, telemetry, vital signs and daily labs.   - General and cardiovascular physical examination.  - Generation of cardiovascular treatment plan.  - Coordination of care.      Patient was seen and examined by me on 6/28/23,interim events noted,labs and radiology studies reviewed.  Iker Kirkpatrick MD,FACC.  [address] [place], [place] [id number].  [phone]
- Review of records, telemetry, vital signs and daily labs.   - General and cardiovascular physical examination.  - Generation of cardiovascular treatment plan.  - Coordination of care.      Patient was seen and examined by me on 6/27/23,interim events noted,labs and radiology studies reviewed.  Iker Kirkpartick MD,FACC.  [address] [place], [place] [id number].  [phone]
- Review of records, telemetry, vital signs and daily labs.   - General and cardiovascular physical examination.  - Generation of cardiovascular treatment plan.  - Coordination of care.      Patient was seen and examined by me on 6/26/23,interim events noted,labs and radiology studies reviewed.  Iker Kirkpatrick MD,FACC.  [address] [place], [place] [id number].  [phone]
- Review of records, telemetry, vital signs and daily labs.   - General and cardiovascular physical examination.  - Generation of cardiovascular treatment plan.  - Coordination of care.      Patient was seen and examined by me on 7/2/23,interim events noted,labs and radiology studies reviewed.  Iker Kirkpatrick MD,FACC.  [address] [place], [place] [id number].  [phone]
- Review of records, telemetry, vital signs and daily labs.   - General and cardiovascular physical examination.  - Generation of cardiovascular treatment plan.  - Coordination of care.      Patient was seen and examined by me on7/7/2023,interim events noted,labs and radiology studies reviewed.  Iker Kirkpatrick MD,FACC.  [address] [place], [place] [id number].  [phone]
- Review of records, telemetry, vital signs and daily labs.   - General and cardiovascular physical examination.  - Generation of cardiovascular treatment plan.  - Coordination of care.      Patient was seen and examined by me on 6/29/23,interim events noted,labs and radiology studies reviewed.  Iker Kirkpatrick MD,FACC.  [address] [place], [place] [id number].  [phone]
- Review of records, telemetry, vital signs and daily labs.   - General and cardiovascular physical examination.  - Generation of cardiovascular treatment plan.  - Coordination of care.      Patient was seen and examined by me on 7/8/23,interim events noted,labs and radiology studies reviewed.  Iker Kirkpatrick MD,FACC.  [address] [place], [place] [id number].  [phone]    Discussed with resident
- Review of records, telemetry, vital signs and daily labs.   - General and cardiovascular physical examination.  - Generation of cardiovascular treatment plan.  - Coordination of care.      Patient was seen and examined by me on 7/1/23,interim events noted,labs and radiology studies reviewed.  Iker Kirkpatrick MD,FACC.  [address] [place], [place] [id number].  [phone]
- Review of records, telemetry, vital signs and daily labs.   - General and cardiovascular physical examination.  - Generation of cardiovascular treatment plan.  - Coordination of care.      Patient was seen and examined by me on 7/5/23,interim events noted,labs and radiology studies reviewed.  Iker Kirkpatrick MD,FACC.  [address] [place], [place] [id number].  [phone]
- Review of records, telemetry, vital signs and daily labs.   - General and cardiovascular physical examination.  - Generation of cardiovascular treatment plan.  - Coordination of care.      Patient was seen and examined by me on 7/4/23,interim events noted,labs and radiology studies reviewed.  Iker Kirkpatrick MD,FACC.  [address] [place], [place] [id number].  [phone]

## 2023-07-08 NOTE — PHYSICAL THERAPY INITIAL EVALUATION ADULT - LIVES WITH, PROFILE
[facility] admitted [facility], ambulates with [facility]
Pt admitted from [facility], mod (I) in basic ADLs, ambulates adlib with st cane

## 2023-07-08 NOTE — PHYSICAL THERAPY INITIAL EVALUATION ADULT - DIAGNOSIS, PT EVAL
4/30 the patient was seen by the diabetic educator.  Feels proficient in her diabetes management.  Continue to monitor blood sugars while inpatient.  
(ICF Model) Pt. present w/deficits in Body Structures/Function (Impairments), incl: Strength, Balance, endurance, leading to deficits in performing the below noted Activities (Limitations).
(ICF Model) Pt. present w/deficits in Body Structures/Function (Impairments), incl: Strength, Balance, endurance, leading to deficits in performing the below noted Activities (Limitations).

## 2023-07-08 NOTE — PHYSICAL THERAPY INITIAL EVALUATION ADULT - GENERAL OBSERVATIONS, REHAB EVAL
Pt seen supine in bed w/ IV, drowsy, oriented to self and place, was agreeable to PT assessment
Pt received supine in bed w/no lines attached, AOx2, denied c/o pain/discomfort

## 2023-07-08 NOTE — PHYSICAL THERAPY INITIAL EVALUATION ADULT - PATIENT/FAMILY/SIGNIFICANT OTHER GOALS STATEMENT, PT EVAL
to feel better
You can access the FollowMyHealth Patient Portal offered by [facility] by registering at the following website: [email or web address]. By joining [facility]’s FollowMyHealth portal, you will also be able to view your health information using other applications (apps) compatible with our system.
to feel better

## 2023-07-08 NOTE — PROGRESS NOTE ADULT - SUBJECTIVE AND OBJECTIVE BOX
PGY-1 Progress Note discussed with attending    PAGER #: [] TILL 5:00 PM  PLEASE CONTACT ON CALL TEAM:  - On Call Team (Please refer to Alissa) FROM 5:00 PM - 8:30PM  - Nightfloat Team FROM 8:30 -7:30 AM    CHIEF COMPLAINT & BRIEF HOSPITAL COURSE:    INTERVAL HPI/OVERNIGHT EVENTS:   No acute overnight events. On bedside exam patient stated she was feeling good.    REVIEW OF SYSTEMS:  CONSTITUTIONAL: No fever, weight loss, or fatigue  RESPIRATORY: No cough. No shortness of breath  CARDIOVASCULAR: No chest pain, dizziness, or leg swelling  GASTROINTESTINAL: No abdominal pain.  NEUROLOGICAL: No headaches     Vital Signs Last 24 Hrs  T(C): 36.7 (08 Jul 2023 11:22), Max: 37.3 (08 Jul 2023 04:36)  T(F): 98.1 (08 Jul 2023 11:22), Max: 99.1 (08 Jul 2023 04:36)  HR: 91 (08 Jul 2023 11:22) (70 - 102)  BP: 106/58 (08 Jul 2023 11:22) (96/61 - 128/65)  BP(mean): --  RR: 17 (08 Jul 2023 11:22) (16 - 20)  SpO2: 96% (08 Jul 2023 11:22) (93% - 100%)    Parameters below as of 08 Jul 2023 11:22  Patient On (Oxygen Delivery Method): room air        PHYSICAL EXAMINATION:  GENERAL: NAD  HEAD:  Atraumatic, Normocephalic  CHEST/LUNG: Clear to auscultation. No rales, rhonchi, wheezing, or rubs  HEART: Regular rate and rhythm; No murmurs, rubs, or gallops  ABDOMEN: Soft, Nontender, Nondistended; Bowel sounds present  NERVOUS SYSTEM:  Alert & Oriented X3,    EXTREMITIES:  2+ Peripheral Pulses, No clubbing, cyanosis, or edema  SKIN: warm dry                          8.5    9.17  )-----------( 920      ( 08 Jul 2023 05:50 )             26.8     07-08    133<L>  |  98  |  8   ----------------------------<  97  3.6   |  27  |  0.66    Ca    8.5      08 Jul 2023 05:50  Phos  3.1     07-08  Mg     1.9     07-08    TPro  6.0  /  Alb  2.5<L>  /  TBili  0.5  /  DBili  x   /  AST  11  /  ALT  19  /  AlkPhos  141<H>  07-08    LIVER FUNCTIONS - ( 08 Jul 2023 05:50 )  Alb: 2.5 g/dL / Pro: 6.0 g/dL / ALK PHOS: 141 U/L / ALT: 19 U/L DA / AST: 11 U/L / GGT: x               PT/INR - ( 07 Jul 2023 06:05 )   PT: 12.1 sec;   INR: 1.02 ratio         PTT - ( 07 Jul 2023 06:05 )  PTT:31.4 sec    CAPILLARY BLOOD GLUCOSE      RADIOLOGY & ADDITIONAL TESTS:                   PGY-1 Progress Note discussed with attending    PAGER #: [] TILL 5:00 PM  PLEASE CONTACT ON CALL TEAM:  - On Call Team (Please refer to Alissa) FROM 5:00 PM - 8:30PM  - Nightfloat Team FROM 8:30 -7:30 AM    CHIEF COMPLAINT & BRIEF HOSPITAL COURSE:    INTERVAL HPI/OVERNIGHT EVENTS:   No acute overnight events. On bedside exam patient stated she was feeling good.    REVIEW OF SYSTEMS:  CONSTITUTIONAL: No fever, weight loss, or fatigue  RESPIRATORY: No cough. No shortness of breath  CARDIOVASCULAR: No chest pain, dizziness, or leg swelling  GASTROINTESTINAL: No abdominal pain.  NEUROLOGICAL: No headaches     Vital Signs Last 24 Hrs  T(C): 36.7 (08 Jul 2023 11:22), Max: 37.3 (08 Jul 2023 04:36)  T(F): 98.1 (08 Jul 2023 11:22), Max: 99.1 (08 Jul 2023 04:36)  HR: 91 (08 Jul 2023 11:22) (70 - 102)  BP: 106/58 (08 Jul 2023 11:22) (96/61 - 128/65)  BP(mean): --  RR: 17 (08 Jul 2023 11:22) (16 - 20)  SpO2: 96% (08 Jul 2023 11:22) (93% - 100%)    Parameters below as of 08 Jul 2023 11:22  Patient On (Oxygen Delivery Method): room air        PHYSICAL EXAMINATION:  GENERAL: NAD  HEAD:  Atraumatic, Normocephalic  CHEST/LUNG: Clear to auscultation. No rales, rhonchi, wheezing, or rubs  HEART: Regular rate and rhythm; No murmurs, rubs, or gallops  ABDOMEN: Soft, Nontender,; Bowel sounds present  NERVOUS SYSTEM:  Alert & Oriented X3,    EXTREMITIES:  2+ Peripheral Pulses, No clubbing, cyanosis, or edema  SKIN: warm dry                          8.5    9.17  )-----------( 920      ( 08 Jul 2023 05:50 )             26.8     07-08    133<L>  |  98  |  8   ----------------------------<  97  3.6   |  27  |  0.66    Ca    8.5      08 Jul 2023 05:50  Phos  3.1     07-08  Mg     1.9     07-08    TPro  6.0  /  Alb  2.5<L>  /  TBili  0.5  /  DBili  x   /  AST  11  /  ALT  19  /  AlkPhos  141<H>  07-08    LIVER FUNCTIONS - ( 08 Jul 2023 05:50 )  Alb: 2.5 g/dL / Pro: 6.0 g/dL / ALK PHOS: 141 U/L / ALT: 19 U/L DA / AST: 11 U/L / GGT: x               PT/INR - ( 07 Jul 2023 06:05 )   PT: 12.1 sec;   INR: 1.02 ratio         PTT - ( 07 Jul 2023 06:05 )  PTT:31.4 sec    CAPILLARY BLOOD GLUCOSE      RADIOLOGY & ADDITIONAL TESTS:

## 2023-07-09 VITALS
RESPIRATION RATE: 19 BRPM | HEART RATE: 86 BPM | OXYGEN SATURATION: 95 % | DIASTOLIC BLOOD PRESSURE: 44 MMHG | SYSTOLIC BLOOD PRESSURE: 90 MMHG | TEMPERATURE: 99 F

## 2023-07-09 LAB
ALBUMIN SERPL ELPH-MCNC: 2.2 G/DL — LOW (ref 3.5–5)
ALP SERPL-CCNC: 129 U/L — HIGH (ref 40–120)
ALT FLD-CCNC: 15 U/L DA — SIGNIFICANT CHANGE UP (ref 10–60)
ANION GAP SERPL CALC-SCNC: 7 MMOL/L — SIGNIFICANT CHANGE UP (ref 5–17)
AST SERPL-CCNC: 11 U/L — SIGNIFICANT CHANGE UP (ref 10–40)
BILIRUB SERPL-MCNC: 0.4 MG/DL — SIGNIFICANT CHANGE UP (ref 0.2–1.2)
BUN SERPL-MCNC: 7 MG/DL — SIGNIFICANT CHANGE UP (ref 7–18)
CALCIUM SERPL-MCNC: 8.5 MG/DL — SIGNIFICANT CHANGE UP (ref 8.4–10.5)
CHLORIDE SERPL-SCNC: 99 MMOL/L — SIGNIFICANT CHANGE UP (ref 96–108)
CO2 SERPL-SCNC: 27 MMOL/L — SIGNIFICANT CHANGE UP (ref 22–31)
CREAT SERPL-MCNC: 0.73 MG/DL — SIGNIFICANT CHANGE UP (ref 0.5–1.3)
EGFR: 82 ML/MIN/1.73M2 — SIGNIFICANT CHANGE UP
GLUCOSE SERPL-MCNC: 106 MG/DL — HIGH (ref 70–99)
HCT VFR BLD CALC: 24.3 % — LOW (ref 34.5–45)
HGB BLD-MCNC: 7.9 G/DL — LOW (ref 11.5–15.5)
MAGNESIUM SERPL-MCNC: 1.9 MG/DL — SIGNIFICANT CHANGE UP (ref 1.6–2.6)
MCHC RBC-ENTMCNC: 29.8 PG — SIGNIFICANT CHANGE UP (ref 27–34)
MCHC RBC-ENTMCNC: 32.5 GM/DL — SIGNIFICANT CHANGE UP (ref 32–36)
MCV RBC AUTO: 91.7 FL — SIGNIFICANT CHANGE UP (ref 80–100)
NRBC # BLD: 0 /100 WBCS — SIGNIFICANT CHANGE UP (ref 0–0)
PHOSPHATE SERPL-MCNC: 3.3 MG/DL — SIGNIFICANT CHANGE UP (ref 2.5–4.5)
PLATELET # BLD AUTO: 795 K/UL — HIGH (ref 150–400)
POTASSIUM SERPL-MCNC: 3.8 MMOL/L — SIGNIFICANT CHANGE UP (ref 3.5–5.3)
POTASSIUM SERPL-SCNC: 3.8 MMOL/L — SIGNIFICANT CHANGE UP (ref 3.5–5.3)
PROT SERPL-MCNC: 5.9 G/DL — LOW (ref 6–8.3)
RBC # BLD: 2.65 M/UL — LOW (ref 3.8–5.2)
RBC # FLD: 14.6 % — HIGH (ref 10.3–14.5)
SODIUM SERPL-SCNC: 133 MMOL/L — LOW (ref 135–145)
WBC # BLD: 6.36 K/UL — SIGNIFICANT CHANGE UP (ref 3.8–10.5)
WBC # FLD AUTO: 6.36 K/UL — SIGNIFICANT CHANGE UP (ref 3.8–10.5)

## 2023-07-09 PROCEDURE — 85027 COMPLETE CBC AUTOMATED: CPT

## 2023-07-09 PROCEDURE — 84100 ASSAY OF PHOSPHORUS: CPT

## 2023-07-09 PROCEDURE — 88305 TISSUE EXAM BY PATHOLOGIST: CPT

## 2023-07-09 PROCEDURE — 83615 LACTATE (LD) (LDH) ENZYME: CPT

## 2023-07-09 PROCEDURE — 80053 COMPREHEN METABOLIC PANEL: CPT

## 2023-07-09 PROCEDURE — 83090 ASSAY OF HOMOCYSTEINE: CPT

## 2023-07-09 PROCEDURE — 86334 IMMUNOFIX E-PHORESIS SERUM: CPT

## 2023-07-09 PROCEDURE — 93005 ELECTROCARDIOGRAM TRACING: CPT

## 2023-07-09 PROCEDURE — 97116 GAIT TRAINING THERAPY: CPT

## 2023-07-09 PROCEDURE — 83540 ASSAY OF IRON: CPT

## 2023-07-09 PROCEDURE — 86304 IMMUNOASSAY TUMOR CA 125: CPT

## 2023-07-09 PROCEDURE — 82607 VITAMIN B-12: CPT

## 2023-07-09 PROCEDURE — 70450 CT HEAD/BRAIN W/O DYE: CPT | Mod: MA

## 2023-07-09 PROCEDURE — A9585: CPT

## 2023-07-09 PROCEDURE — 29125 APPL SHORT ARM SPLINT STATIC: CPT

## 2023-07-09 PROCEDURE — 83010 ASSAY OF HAPTOGLOBIN QUANT: CPT

## 2023-07-09 PROCEDURE — 87086 URINE CULTURE/COLONY COUNT: CPT

## 2023-07-09 PROCEDURE — 86301 IMMUNOASSAY TUMOR CA 19-9: CPT

## 2023-07-09 PROCEDURE — 83036 HEMOGLOBIN GLYCOSYLATED A1C: CPT

## 2023-07-09 PROCEDURE — 83921 ORGANIC ACID SINGLE QUANT: CPT

## 2023-07-09 PROCEDURE — 82784 ASSAY IGA/IGD/IGG/IGM EACH: CPT

## 2023-07-09 PROCEDURE — 85610 PROTHROMBIN TIME: CPT

## 2023-07-09 PROCEDURE — 97162 PT EVAL MOD COMPLEX 30 MIN: CPT

## 2023-07-09 PROCEDURE — 87186 SC STD MICRODIL/AGAR DIL: CPT

## 2023-07-09 PROCEDURE — 97164 PT RE-EVAL EST PLAN CARE: CPT

## 2023-07-09 PROCEDURE — 85025 COMPLETE CBC W/AUTO DIFF WBC: CPT

## 2023-07-09 PROCEDURE — 71250 CT THORAX DX C-: CPT | Mod: MA

## 2023-07-09 PROCEDURE — 74177 CT ABD & PELVIS W/CONTRAST: CPT

## 2023-07-09 PROCEDURE — 97530 THERAPEUTIC ACTIVITIES: CPT

## 2023-07-09 PROCEDURE — 82746 ASSAY OF FOLIC ACID SERUM: CPT

## 2023-07-09 PROCEDURE — 72192 CT PELVIS W/O DYE: CPT | Mod: MA

## 2023-07-09 PROCEDURE — 82728 ASSAY OF FERRITIN: CPT

## 2023-07-09 PROCEDURE — 81001 URINALYSIS AUTO W/SCOPE: CPT

## 2023-07-09 PROCEDURE — 73090 X-RAY EXAM OF FOREARM: CPT

## 2023-07-09 PROCEDURE — 87077 CULTURE AEROBIC IDENTIFY: CPT

## 2023-07-09 PROCEDURE — 88312 SPECIAL STAINS GROUP 1: CPT

## 2023-07-09 PROCEDURE — 93306 TTE W/DOPPLER COMPLETE: CPT

## 2023-07-09 PROCEDURE — 80061 LIPID PANEL: CPT

## 2023-07-09 PROCEDURE — 82378 CARCINOEMBRYONIC ANTIGEN: CPT

## 2023-07-09 PROCEDURE — 70551 MRI BRAIN STEM W/O DYE: CPT

## 2023-07-09 PROCEDURE — 82550 ASSAY OF CK (CPK): CPT

## 2023-07-09 PROCEDURE — 70552 MRI BRAIN STEM W/DYE: CPT

## 2023-07-09 PROCEDURE — 36415 COLL VENOUS BLD VENIPUNCTURE: CPT

## 2023-07-09 PROCEDURE — 85730 THROMBOPLASTIN TIME PARTIAL: CPT

## 2023-07-09 PROCEDURE — 87635 SARS-COV-2 COVID-19 AMP PRB: CPT

## 2023-07-09 PROCEDURE — 84443 ASSAY THYROID STIM HORMONE: CPT

## 2023-07-09 PROCEDURE — 99285 EMERGENCY DEPT VISIT HI MDM: CPT | Mod: 25

## 2023-07-09 PROCEDURE — 83735 ASSAY OF MAGNESIUM: CPT

## 2023-07-09 PROCEDURE — 73110 X-RAY EXAM OF WRIST: CPT

## 2023-07-09 PROCEDURE — 80048 BASIC METABOLIC PNL TOTAL CA: CPT

## 2023-07-09 PROCEDURE — 71260 CT THORAX DX C+: CPT

## 2023-07-09 PROCEDURE — 74182 MRI ABDOMEN W/CONTRAST: CPT

## 2023-07-09 PROCEDURE — 82803 BLOOD GASES ANY COMBINATION: CPT

## 2023-07-09 PROCEDURE — 83550 IRON BINDING TEST: CPT

## 2023-07-09 RX ORDER — ACETAMINOPHEN 500 MG
1 TABLET ORAL
Qty: 56 | Refills: 0
Start: 2023-07-09 | End: 2023-07-22

## 2023-07-09 RX ORDER — LEVOTHYROXINE SODIUM 125 MCG
1 TABLET ORAL
Qty: 30 | Refills: 0
Start: 2023-07-09 | End: 2023-08-07

## 2023-07-09 RX ADMIN — Medication 500000 UNIT(S): at 11:17

## 2023-07-09 RX ADMIN — Medication 1 GRAM(S): at 06:02

## 2023-07-09 RX ADMIN — Medication 10 MILLIGRAM(S): at 06:01

## 2023-07-09 RX ADMIN — Medication 1 GRAM(S): at 11:17

## 2023-07-09 RX ADMIN — Medication 100 MICROGRAM(S): at 06:02

## 2023-07-09 RX ADMIN — POLYETHYLENE GLYCOL 3350 17 GRAM(S): 17 POWDER, FOR SOLUTION ORAL at 11:17

## 2023-07-09 RX ADMIN — Medication 500000 UNIT(S): at 06:01

## 2023-07-09 RX ADMIN — Medication 325 MILLIGRAM(S): at 06:01

## 2023-07-09 RX ADMIN — PANTOPRAZOLE SODIUM 40 MILLIGRAM(S): 20 TABLET, DELAYED RELEASE ORAL at 06:02

## 2023-07-09 RX ADMIN — AMLODIPINE BESYLATE 10 MILLIGRAM(S): 2.5 TABLET ORAL at 06:02

## 2023-07-09 RX ADMIN — Medication 81 MILLIGRAM(S): at 11:17

## 2023-07-09 RX ADMIN — ENOXAPARIN SODIUM 40 MILLIGRAM(S): 100 INJECTION SUBCUTANEOUS at 16:26

## 2023-07-09 NOTE — DISCHARGE NOTE NURSING/CASE MANAGEMENT/SOCIAL WORK - NSDCPEFALRISK_GEN_ALL_CORE
33.6 For information on Fall & Injury Prevention, visit: [email or web address] OR  [email or web address] OR  https://www.cdc.gov/steadi/patient.html

## 2023-07-09 NOTE — DISCHARGE NOTE NURSING/CASE MANAGEMENT/SOCIAL WORK - PATIENT PORTAL LINK FT
You can access the FollowMyHealth Patient Portal offered by [facility] by registering at the following website: [email or web address]. By joining [facility]’s FollowMyHealth portal, you will also be able to view your health information using other applications (apps) compatible with our system.

## 2023-07-11 LAB — SURGICAL PATHOLOGY STUDY: SIGNIFICANT CHANGE UP

## 2023-07-25 NOTE — PROGRESS NOTE ADULT - PROBLEM SELECTOR PROBLEM 6
7/25/23 Chaparro called for apt - states he was in [facility] and d/c 7/21/23 reviewed with  NB he was transferred to [facility] from ER  per his request - he can follow up with them or us but he would have to come to us in [place] over [place] hour away - he will f./u with them I googled number for [facility] and gave him number to call _BA Prophylactic measure

## 2024-02-04 ENCOUNTER — INPATIENT (INPATIENT)
Facility: HOSPITAL | Age: 84
LOS: 5 days | Discharge: NURSING, REHAB OR LONG-TERM CARE | DRG: 871 | End: 2024-02-10
Attending: INTERNAL MEDICINE | Admitting: INTERNAL MEDICINE
Payer: MEDICARE

## 2024-02-04 VITALS
RESPIRATION RATE: 17 BRPM | SYSTOLIC BLOOD PRESSURE: 127 MMHG | TEMPERATURE: 100 F | OXYGEN SATURATION: 92 % | DIASTOLIC BLOOD PRESSURE: 60 MMHG | WEIGHT: 100.09 LBS | HEIGHT: 58 IN | HEART RATE: 105 BPM

## 2024-02-04 DIAGNOSIS — I10 ESSENTIAL (PRIMARY) HYPERTENSION: ICD-10-CM

## 2024-02-04 DIAGNOSIS — J18.9 PNEUMONIA, UNSPECIFIED ORGANISM: ICD-10-CM

## 2024-02-04 DIAGNOSIS — A41.9 SEPSIS, UNSPECIFIED ORGANISM: ICD-10-CM

## 2024-02-04 DIAGNOSIS — Z29.9 ENCOUNTER FOR PROPHYLACTIC MEASURES, UNSPECIFIED: ICD-10-CM

## 2024-02-04 DIAGNOSIS — N39.0 URINARY TRACT INFECTION, SITE NOT SPECIFIED: ICD-10-CM

## 2024-02-04 DIAGNOSIS — N17.9 ACUTE KIDNEY FAILURE, UNSPECIFIED: ICD-10-CM

## 2024-02-04 DIAGNOSIS — E03.9 HYPOTHYROIDISM, UNSPECIFIED: ICD-10-CM

## 2024-02-04 DIAGNOSIS — K29.70 GASTRITIS, UNSPECIFIED, WITHOUT BLEEDING: ICD-10-CM

## 2024-02-04 DIAGNOSIS — E78.5 HYPERLIPIDEMIA, UNSPECIFIED: ICD-10-CM

## 2024-02-04 DIAGNOSIS — R82.71 BACTERIURIA: ICD-10-CM

## 2024-02-04 LAB
ALBUMIN SERPL ELPH-MCNC: 2.6 G/DL — LOW (ref 3.5–5)
ALP SERPL-CCNC: 72 U/L — SIGNIFICANT CHANGE UP (ref 40–120)
ALT FLD-CCNC: 13 U/L DA — SIGNIFICANT CHANGE UP (ref 10–60)
ANION GAP SERPL CALC-SCNC: 8 MMOL/L — SIGNIFICANT CHANGE UP (ref 5–17)
APPEARANCE UR: ABNORMAL
APTT BLD: 32.6 SEC — SIGNIFICANT CHANGE UP (ref 24.5–35.6)
AST SERPL-CCNC: 17 U/L — SIGNIFICANT CHANGE UP (ref 10–40)
BASOPHILS # BLD AUTO: 0.22 K/UL — HIGH (ref 0–0.2)
BASOPHILS NFR BLD AUTO: 1 % — SIGNIFICANT CHANGE UP (ref 0–2)
BILIRUB SERPL-MCNC: 0.8 MG/DL — SIGNIFICANT CHANGE UP (ref 0.2–1.2)
BILIRUB UR-MCNC: NEGATIVE — SIGNIFICANT CHANGE UP
BUN SERPL-MCNC: 24 MG/DL — HIGH (ref 7–18)
CALCIUM SERPL-MCNC: 8.3 MG/DL — LOW (ref 8.4–10.5)
CHLORIDE SERPL-SCNC: 91 MMOL/L — LOW (ref 96–108)
CO2 SERPL-SCNC: 29 MMOL/L — SIGNIFICANT CHANGE UP (ref 22–31)
COLOR SPEC: SIGNIFICANT CHANGE UP
CREAT SERPL-MCNC: 1.67 MG/DL — HIGH (ref 0.5–1.3)
DIFF PNL FLD: ABNORMAL
EGFR: 30 ML/MIN/1.73M2 — LOW
EOSINOPHIL # BLD AUTO: 0 K/UL — SIGNIFICANT CHANGE UP (ref 0–0.5)
EOSINOPHIL NFR BLD AUTO: 0 % — SIGNIFICANT CHANGE UP (ref 0–6)
FLUAV AG NPH QL: SIGNIFICANT CHANGE UP
FLUBV AG NPH QL: SIGNIFICANT CHANGE UP
GLUCOSE SERPL-MCNC: 106 MG/DL — HIGH (ref 70–99)
GLUCOSE UR QL: NEGATIVE MG/DL — SIGNIFICANT CHANGE UP
HCT VFR BLD CALC: 32.2 % — LOW (ref 34.5–45)
HGB BLD-MCNC: 10.6 G/DL — LOW (ref 11.5–15.5)
INR BLD: 1.19 RATIO — HIGH (ref 0.85–1.18)
KETONES UR-MCNC: ABNORMAL MG/DL
LACTATE SERPL-SCNC: 1.5 MMOL/L — SIGNIFICANT CHANGE UP (ref 0.7–2)
LEUKOCYTE ESTERASE UR-ACNC: ABNORMAL
LYMPHOCYTES # BLD AUTO: 0.87 K/UL — LOW (ref 1–3.3)
LYMPHOCYTES # BLD AUTO: 4 % — LOW (ref 13–44)
MAGNESIUM SERPL-MCNC: 1.1 MG/DL — LOW (ref 1.6–2.6)
MCHC RBC-ENTMCNC: 28.1 PG — SIGNIFICANT CHANGE UP (ref 27–34)
MCHC RBC-ENTMCNC: 32.9 GM/DL — SIGNIFICANT CHANGE UP (ref 32–36)
MCV RBC AUTO: 85.4 FL — SIGNIFICANT CHANGE UP (ref 80–100)
MONOCYTES # BLD AUTO: 2.61 K/UL — HIGH (ref 0–0.9)
MONOCYTES NFR BLD AUTO: 12 % — SIGNIFICANT CHANGE UP (ref 2–14)
NEUTROPHILS # BLD AUTO: 17.59 K/UL — HIGH (ref 1.8–7.4)
NEUTROPHILS NFR BLD AUTO: 81 % — HIGH (ref 43–77)
NITRITE UR-MCNC: NEGATIVE — SIGNIFICANT CHANGE UP
NT-PROBNP SERPL-SCNC: 4244 PG/ML — HIGH (ref 0–450)
PH UR: 5.5 — SIGNIFICANT CHANGE UP (ref 5–8)
PLATELET # BLD AUTO: 404 K/UL — HIGH (ref 150–400)
POTASSIUM SERPL-MCNC: 3.6 MMOL/L — SIGNIFICANT CHANGE UP (ref 3.5–5.3)
POTASSIUM SERPL-SCNC: 3.6 MMOL/L — SIGNIFICANT CHANGE UP (ref 3.5–5.3)
PROT SERPL-MCNC: 6.7 G/DL — SIGNIFICANT CHANGE UP (ref 6–8.3)
PROT UR-MCNC: 300 MG/DL
PROTHROM AB SERPL-ACNC: 13.5 SEC — HIGH (ref 9.5–13)
RBC # BLD: 3.77 M/UL — LOW (ref 3.8–5.2)
RBC # FLD: 14.9 % — HIGH (ref 10.3–14.5)
SARS-COV-2 RNA SPEC QL NAA+PROBE: SIGNIFICANT CHANGE UP
SODIUM SERPL-SCNC: 128 MMOL/L — LOW (ref 135–145)
SP GR SPEC: 1.02 — SIGNIFICANT CHANGE UP (ref 1–1.03)
TROPONIN I, HIGH SENSITIVITY RESULT: 32.8 NG/L — SIGNIFICANT CHANGE UP
UROBILINOGEN FLD QL: 0.2 MG/DL — SIGNIFICANT CHANGE UP (ref 0.2–1)
WBC # BLD: 21.72 K/UL — HIGH (ref 3.8–10.5)
WBC # FLD AUTO: 21.72 K/UL — HIGH (ref 3.8–10.5)

## 2024-02-04 PROCEDURE — 99285 EMERGENCY DEPT VISIT HI MDM: CPT

## 2024-02-04 PROCEDURE — 71045 X-RAY EXAM CHEST 1 VIEW: CPT | Mod: 26

## 2024-02-04 RX ORDER — SUCRALFATE 1 G
1 TABLET ORAL
Refills: 0 | Status: DISCONTINUED | OUTPATIENT
Start: 2024-02-04 | End: 2024-02-10

## 2024-02-04 RX ORDER — AMLODIPINE BESYLATE 2.5 MG/1
1 TABLET ORAL
Qty: 0 | Refills: 0 | DISCHARGE

## 2024-02-04 RX ORDER — SODIUM CHLORIDE 9 MG/ML
500 INJECTION, SOLUTION INTRAVENOUS ONCE
Refills: 0 | Status: COMPLETED | OUTPATIENT
Start: 2024-02-04 | End: 2024-02-04

## 2024-02-04 RX ORDER — ACETAMINOPHEN 500 MG
650 TABLET ORAL EVERY 6 HOURS
Refills: 0 | Status: DISCONTINUED | OUTPATIENT
Start: 2024-02-04 | End: 2024-02-10

## 2024-02-04 RX ORDER — PIPERACILLIN AND TAZOBACTAM 4; .5 G/20ML; G/20ML
3.38 INJECTION, POWDER, LYOPHILIZED, FOR SOLUTION INTRAVENOUS ONCE
Refills: 0 | Status: COMPLETED | OUTPATIENT
Start: 2024-02-04 | End: 2024-02-04

## 2024-02-04 RX ORDER — ACETAMINOPHEN 500 MG
650 TABLET ORAL ONCE
Refills: 0 | Status: COMPLETED | OUTPATIENT
Start: 2024-02-04 | End: 2024-02-04

## 2024-02-04 RX ORDER — LANOLIN ALCOHOL/MO/W.PET/CERES
3 CREAM (GRAM) TOPICAL AT BEDTIME
Refills: 0 | Status: DISCONTINUED | OUTPATIENT
Start: 2024-02-04 | End: 2024-02-10

## 2024-02-04 RX ORDER — SODIUM CHLORIDE 9 MG/ML
1000 INJECTION INTRAMUSCULAR; INTRAVENOUS; SUBCUTANEOUS ONCE
Refills: 0 | Status: COMPLETED | OUTPATIENT
Start: 2024-02-04 | End: 2024-02-04

## 2024-02-04 RX ORDER — SIMVASTATIN 20 MG/1
1 TABLET, FILM COATED ORAL
Refills: 0 | DISCHARGE

## 2024-02-04 RX ORDER — KETOROLAC TROMETHAMINE 30 MG/ML
15 SYRINGE (ML) INJECTION ONCE
Refills: 0 | Status: DISCONTINUED | OUTPATIENT
Start: 2024-02-04 | End: 2024-02-04

## 2024-02-04 RX ORDER — SIMVASTATIN 20 MG/1
1 TABLET, FILM COATED ORAL
Qty: 0 | Refills: 0 | DISCHARGE

## 2024-02-04 RX ORDER — ASCORBIC ACID 60 MG
1 TABLET,CHEWABLE ORAL
Refills: 0 | DISCHARGE

## 2024-02-04 RX ORDER — ASPIRIN/CALCIUM CARB/MAGNESIUM 324 MG
81 TABLET ORAL DAILY
Refills: 0 | Status: DISCONTINUED | OUTPATIENT
Start: 2024-02-04 | End: 2024-02-10

## 2024-02-04 RX ORDER — ATORVASTATIN CALCIUM 80 MG/1
10 TABLET, FILM COATED ORAL AT BEDTIME
Refills: 0 | Status: DISCONTINUED | OUTPATIENT
Start: 2024-02-04 | End: 2024-02-10

## 2024-02-04 RX ORDER — SENNA PLUS 8.6 MG/1
2 TABLET ORAL AT BEDTIME
Refills: 0 | Status: DISCONTINUED | OUTPATIENT
Start: 2024-02-04 | End: 2024-02-10

## 2024-02-04 RX ORDER — SUCRALFATE 1 G
1 TABLET ORAL
Refills: 0 | DISCHARGE

## 2024-02-04 RX ORDER — LEVOTHYROXINE SODIUM 125 MCG
50 TABLET ORAL DAILY
Refills: 0 | Status: DISCONTINUED | OUTPATIENT
Start: 2024-02-05 | End: 2024-02-10

## 2024-02-04 RX ORDER — HEPARIN SODIUM 5000 [USP'U]/ML
5000 INJECTION INTRAVENOUS; SUBCUTANEOUS EVERY 12 HOURS
Refills: 0 | Status: DISCONTINUED | OUTPATIENT
Start: 2024-02-04 | End: 2024-02-10

## 2024-02-04 RX ORDER — PIPERACILLIN AND TAZOBACTAM 4; .5 G/20ML; G/20ML
3.38 INJECTION, POWDER, LYOPHILIZED, FOR SOLUTION INTRAVENOUS EVERY 12 HOURS
Refills: 0 | Status: DISCONTINUED | OUTPATIENT
Start: 2024-02-05 | End: 2024-02-06

## 2024-02-04 RX ORDER — CEFTRIAXONE 500 MG/1
1000 INJECTION, POWDER, FOR SOLUTION INTRAMUSCULAR; INTRAVENOUS ONCE
Refills: 0 | Status: COMPLETED | OUTPATIENT
Start: 2024-02-04 | End: 2024-02-04

## 2024-02-04 RX ORDER — ONDANSETRON 8 MG/1
4 TABLET, FILM COATED ORAL EVERY 8 HOURS
Refills: 0 | Status: DISCONTINUED | OUTPATIENT
Start: 2024-02-04 | End: 2024-02-10

## 2024-02-04 RX ORDER — AMLODIPINE BESYLATE 2.5 MG/1
1 TABLET ORAL
Refills: 0 | DISCHARGE

## 2024-02-04 RX ORDER — PANTOPRAZOLE SODIUM 20 MG/1
40 TABLET, DELAYED RELEASE ORAL
Refills: 0 | Status: DISCONTINUED | OUTPATIENT
Start: 2024-02-04 | End: 2024-02-10

## 2024-02-04 RX ORDER — PANTOPRAZOLE SODIUM 20 MG/1
40 TABLET, DELAYED RELEASE ORAL ONCE
Refills: 0 | Status: COMPLETED | OUTPATIENT
Start: 2024-02-04 | End: 2024-02-04

## 2024-02-04 RX ADMIN — CEFTRIAXONE 100 MILLIGRAM(S): 500 INJECTION, POWDER, FOR SOLUTION INTRAMUSCULAR; INTRAVENOUS at 14:55

## 2024-02-04 RX ADMIN — Medication 15 MILLIGRAM(S): at 15:16

## 2024-02-04 RX ADMIN — Medication 650 MILLIGRAM(S): at 15:16

## 2024-02-04 RX ADMIN — PANTOPRAZOLE SODIUM 40 MILLIGRAM(S): 20 TABLET, DELAYED RELEASE ORAL at 20:04

## 2024-02-04 RX ADMIN — PIPERACILLIN AND TAZOBACTAM 200 GRAM(S): 4; .5 INJECTION, POWDER, LYOPHILIZED, FOR SOLUTION INTRAVENOUS at 19:59

## 2024-02-04 RX ADMIN — SENNA PLUS 2 TABLET(S): 8.6 TABLET ORAL at 22:51

## 2024-02-04 RX ADMIN — SODIUM CHLORIDE 1000 MILLILITER(S): 9 INJECTION INTRAMUSCULAR; INTRAVENOUS; SUBCUTANEOUS at 14:55

## 2024-02-04 RX ADMIN — ATORVASTATIN CALCIUM 10 MILLIGRAM(S): 80 TABLET, FILM COATED ORAL at 22:50

## 2024-02-04 RX ADMIN — Medication 81 MILLIGRAM(S): at 22:51

## 2024-02-04 RX ADMIN — PIPERACILLIN AND TAZOBACTAM 25 GRAM(S): 4; .5 INJECTION, POWDER, LYOPHILIZED, FOR SOLUTION INTRAVENOUS at 23:03

## 2024-02-04 RX ADMIN — SODIUM CHLORIDE 500 MILLILITER(S): 9 INJECTION, SOLUTION INTRAVENOUS at 23:35

## 2024-02-04 RX ADMIN — Medication 1 GRAM(S): at 20:34

## 2024-02-04 RX ADMIN — SODIUM CHLORIDE 500 MILLILITER(S): 9 INJECTION, SOLUTION INTRAVENOUS at 19:35

## 2024-02-04 RX ADMIN — Medication 15 MILLIGRAM(S): at 14:55

## 2024-02-04 RX ADMIN — Medication 650 MILLIGRAM(S): at 14:55

## 2024-02-04 NOTE — ED ADULT NURSE NOTE - OBJECTIVE STATEMENT
[facility] with c/o chills, shortness of breath. Patient disoriented on time place and situation, unable to provide PMHx denies any discomfort at present time .

## 2024-02-04 NOTE — H&P ADULT - NSHPPHYSICALEXAM_GEN_ALL_CORE
GENERAL: NAD, well-groomed, well-developed  HEAD:  Atraumatic, Normocephalic  EYES: EOMI, PERRLA, conjunctiva and sclera clear  ENMT: No tonsillar erythema, exudates, or enlargement; Moist mucous membranes, No lesions  NECK: Supple, normal appearance, No JVD; Normal thyroid; Trachea midline  NERVOUS SYSTEM:  Alert & Oriented X3, No focal neuro deficits  CHEST/LUNG: L sided rale present on expiration. No rhonchi, wheezing   HEART: Regular rate and rhythm; No murmurs, rubs, or gallops  ABDOMEN: Soft, Nontender, Nondistended; Bowel sounds present  EXTREMITIES:  2+ Peripheral Pulses, No clubbing, cyanosis, or edema  SKIN: No rashes or lesions;  Good capillary refill

## 2024-02-04 NOTE — H&P ADULT - ASSESSMENT
Patient is a 83F, uses cane, AAOx3, from [facility], with PMHx HTN, HLD, hypothyroidism, brain mass, cervical cancer, sent for shortness of breath and chills. Admitted for sepsis, r/o pneumonia.

## 2024-02-04 NOTE — H&P ADULT - PROBLEM SELECTOR PLAN 5
- Hold home Amlodipine 10mg, Enalapril 10mg in the setting of sepsis - Has hx of GI bleeding, on PPI BID and carafate  - P/w Hgb 10.6 (baseline ~9)   - Likely hemoconcentrated due to dehydration  - No sign of bleeding  - Hold iron tab in the setting of sepsis

## 2024-02-04 NOTE — ED ADULT NURSE NOTE - NSFALLHARMRISKINTERV_ED_ALL_ED
Assistance OOB with selected safe patient handling equipment if applicable/Assistance with ambulation/Communicate risk of Fall with Harm to all staff, patient, and family/Monitor for mental status changes and reorient to person, place, and time, as needed/Move patient closer to nursing station/within visual sight of ED staff/Provide visual cue: red socks, yellow wristband, yellow gown, etc/Reinforce activity limits and safety measures with patient and family/Toileting schedule using arm’s reach rule for commode and bathroom/Use of alarms - bed, stretcher, chair and/or video monitoring/Bed in lowest position, wheels locked, appropriate side rails in place/Call bell, personal items and telephone in reach/Instruct patient to call for assistance before getting out of bed/chair/stretcher/Non-slip footwear applied when patient is off stretcher/[place] to call system/Physically safe environment - no spills, clutter or unnecessary equipment/Purposeful Proactive Rounding/Room/bathroom lighting operational, light cord in reach

## 2024-02-04 NOTE — H&P ADULT - PROBLEM SELECTOR PLAN 2
- CXR = Nonspecific airspace opacities left midlung zone and right lung base.   - PE with left sided rale  - Starting Zosyn  - F/u CT chest - CXR = Nonspecific airspace opacities left midlung zone and right lung base.   - Physical exam with left sided rale  - Starting Zosyn  - F/u CT chest

## 2024-02-04 NOTE — ED PROVIDER NOTE - NS_BEDUNITTYPES_ED_ALL_ED
[facility] [place] ADMISSION NOTE    Patient admitted to room 2404 at approximately 2115 via cart from emergency room. Patient was accompanied by transport tech.     Verbal SBAR report received from ESA Rossi prior to patient arrival.     Patient ambulated to bed independently. Patient alert and oriented X 3. The patient is not having any pain.  . Admission vital signs: Blood pressure (!) 130/92, pulse 77, temperature 97.8  F (36.6  C), temperature source Oral, resp. rate 16, height 1.829 m (6'), weight 78 kg (171 lb 15.3 oz), SpO2 96 %. Patient was oriented to plan of care, call light, bed controls, tv, telephone, bathroom, and visiting hours.     Risk Assessment    The following safety risks were identified during admission: none. Yellow risk band applied: NO.     Skin Initial Assessment    This writer admitted this patient and declined a full skin assessment and Waldemar score in the Adult PCS flowsheet. Appropriate interventions initiated as needed.            Education    Patient has a [place] to Observation order: No  Observation education completed and documented: N/A      MELISSA OATES RN      
MEDICINE

## 2024-02-04 NOTE — ED PROVIDER NOTE - OBJECTIVE STATEMENT
83 year old female DNR/DNI PMH HTN, HLD, hypothyroid, cervical cancer, brain mass BIBA from NH for SOB, chills. pt denies all complaints.

## 2024-02-04 NOTE — ED PROVIDER NOTE - CLINICAL SUMMARY MEDICAL DECISION MAKING FREE TEXT BOX
Detail Level: Detailed
Detail Level: Simple
83 year old female with SOB from NH. PE as above.  febrile in the ED.  sepsis workup, reassess

## 2024-02-04 NOTE — H&P ADULT - PROBLEM SELECTOR PLAN 1
- P/w shortness of breath with chills  - , fever 102, WBC 21.7  - S/p sepsis bundle IVF  - R/o pneumonia, given multilobar infiltrates on CXR  - Starting Zosyn  - F/u CT chest  - F/u UCx, BCx  - ID Dr. Cardoza consulted - P/w shortness of breath with chills  - , fever 102, WBC 21.7  - RVP negative  - S/p sepsis bundle IVF  - R/o pneumonia, given multilobar infiltrates on CXR  - Starting Zosyn  - F/u CT chest  - F/u UCx, BCx  - ID Dr. Cardoza consulted

## 2024-02-04 NOTE — H&P ADULT - HISTORY OF PRESENT ILLNESS
Patient is a 83F, uses cane, AAOx3, from [facility], with PMHx HTN, HLD, hypothyroidism, brain mass, cervical cancer, sent for shortness of breath and chills. She is AAOx3 and says nothing is happening. She just endorses being hungry as she has not eaten anything. She denies any complaints. Patient denies headache, nausea, vomit, chest pain, shortness of breath, cough, lightheadedness, abdominal pain, diarrhea, constipation, dark/bloody stool, dysuria, hematuria, loss of strength, or loss of sensation. Patient is a 83F, uses cane, AAOx3, from [facility], with PMHx HTN, HLD, hypothyroidism, brain mass, cervical cancer, sent for shortness of breath and chills. She is AAOx3 and says nothing is happening. She just endorses being hungry as she has not eaten anything. She denies any complaints. Patient denies headache, nausea, vomit, chest pain, shortness of breath, cough, lightheadedness, abdominal pain, diarrhea, constipation, dark/bloody stool, dysuria, hematuria, loss of strength, or loss of sensation. Patient is a 83F, uses cane, AAOx3, from [facility], with PMHx HTN, HLD, hypothyroidism, brain mass, cervical cancer, sent for shortness of breath and chills. She is AAOx3 and says nothing is happening. She just endorses being hungry as she has not eaten anything. She denies any complaints. Patient denies headache, nausea, vomit, chest pain, shortness of breath, cough, lightheadedness, abdominal pain, diarrhea, constipation, dark/bloody stool, dysuria, hematuria, loss of strength, or loss of sensation.  Called patient's daughter Nory [phone] and provided update.

## 2024-02-05 DIAGNOSIS — E87.6 HYPOKALEMIA: ICD-10-CM

## 2024-02-05 DIAGNOSIS — Z02.9 ENCOUNTER FOR ADMINISTRATIVE EXAMINATIONS, UNSPECIFIED: ICD-10-CM

## 2024-02-05 DIAGNOSIS — R78.81 BACTEREMIA: ICD-10-CM

## 2024-02-05 PROBLEM — D64.9 ANEMIA, UNSPECIFIED: Chronic | Status: ACTIVE | Noted: 2023-06-24

## 2024-02-05 PROBLEM — E03.9 HYPOTHYROIDISM, UNSPECIFIED: Chronic | Status: ACTIVE | Noted: 2023-06-24

## 2024-02-05 PROBLEM — I10 ESSENTIAL (PRIMARY) HYPERTENSION: Chronic | Status: ACTIVE | Noted: 2023-06-24

## 2024-02-05 PROBLEM — E78.00 PURE HYPERCHOLESTEROLEMIA, UNSPECIFIED: Chronic | Status: ACTIVE | Noted: 2023-06-24

## 2024-02-05 PROBLEM — M19.90 UNSPECIFIED OSTEOARTHRITIS, UNSPECIFIED SITE: Chronic | Status: ACTIVE | Noted: 2023-06-24

## 2024-02-05 LAB
ALBUMIN SERPL ELPH-MCNC: 2 G/DL — LOW (ref 3.5–5)
ALP SERPL-CCNC: 58 U/L — SIGNIFICANT CHANGE UP (ref 40–120)
ALT FLD-CCNC: 11 U/L DA — SIGNIFICANT CHANGE UP (ref 10–60)
ANION GAP SERPL CALC-SCNC: 9 MMOL/L — SIGNIFICANT CHANGE UP (ref 5–17)
AST SERPL-CCNC: 14 U/L — SIGNIFICANT CHANGE UP (ref 10–40)
BILIRUB SERPL-MCNC: 0.5 MG/DL — SIGNIFICANT CHANGE UP (ref 0.2–1.2)
BUN SERPL-MCNC: 25 MG/DL — HIGH (ref 7–18)
CALCIUM SERPL-MCNC: 7.5 MG/DL — LOW (ref 8.4–10.5)
CHLORIDE SERPL-SCNC: 96 MMOL/L — SIGNIFICANT CHANGE UP (ref 96–108)
CO2 SERPL-SCNC: 27 MMOL/L — SIGNIFICANT CHANGE UP (ref 22–31)
CREAT SERPL-MCNC: 1.58 MG/DL — HIGH (ref 0.5–1.3)
E COLI DNA BLD POS QL NAA+NON-PROBE: SIGNIFICANT CHANGE UP
EGFR: 32 ML/MIN/1.73M2 — LOW
GLUCOSE SERPL-MCNC: 111 MG/DL — HIGH (ref 70–99)
GRAM STN FLD: ABNORMAL
HCT VFR BLD CALC: 25.4 % — LOW (ref 34.5–45)
HGB BLD-MCNC: 8.5 G/DL — LOW (ref 11.5–15.5)
MCHC RBC-ENTMCNC: 27.9 PG — SIGNIFICANT CHANGE UP (ref 27–34)
MCHC RBC-ENTMCNC: 33.5 GM/DL — SIGNIFICANT CHANGE UP (ref 32–36)
MCV RBC AUTO: 83.3 FL — SIGNIFICANT CHANGE UP (ref 80–100)
METHOD TYPE: SIGNIFICANT CHANGE UP
NRBC # BLD: 0 /100 WBCS — SIGNIFICANT CHANGE UP (ref 0–0)
PHOSPHATE SERPL-MCNC: 3.5 MG/DL — SIGNIFICANT CHANGE UP (ref 2.5–4.5)
PLATELET # BLD AUTO: 312 K/UL — SIGNIFICANT CHANGE UP (ref 150–400)
POTASSIUM SERPL-MCNC: 3.2 MMOL/L — LOW (ref 3.5–5.3)
POTASSIUM SERPL-SCNC: 3.2 MMOL/L — LOW (ref 3.5–5.3)
PROT SERPL-MCNC: 5.4 G/DL — LOW (ref 6–8.3)
RBC # BLD: 3.05 M/UL — LOW (ref 3.8–5.2)
RBC # FLD: 14.9 % — HIGH (ref 10.3–14.5)
SODIUM SERPL-SCNC: 132 MMOL/L — LOW (ref 135–145)
SPECIMEN SOURCE: SIGNIFICANT CHANGE UP
SPECIMEN SOURCE: SIGNIFICANT CHANGE UP
WBC # BLD: 19.01 K/UL — HIGH (ref 3.8–10.5)
WBC # FLD AUTO: 19.01 K/UL — HIGH (ref 3.8–10.5)

## 2024-02-05 PROCEDURE — 71250 CT THORAX DX C-: CPT | Mod: 26

## 2024-02-05 RX ORDER — SODIUM CHLORIDE 9 MG/ML
1000 INJECTION INTRAMUSCULAR; INTRAVENOUS; SUBCUTANEOUS
Refills: 0 | Status: DISCONTINUED | OUTPATIENT
Start: 2024-02-05 | End: 2024-02-10

## 2024-02-05 RX ORDER — SODIUM CHLORIDE 9 MG/ML
250 INJECTION INTRAMUSCULAR; INTRAVENOUS; SUBCUTANEOUS ONCE
Refills: 0 | Status: COMPLETED | OUTPATIENT
Start: 2024-02-05 | End: 2024-02-05

## 2024-02-05 RX ORDER — POTASSIUM CHLORIDE 20 MEQ
40 PACKET (EA) ORAL EVERY 4 HOURS
Refills: 0 | Status: COMPLETED | OUTPATIENT
Start: 2024-02-05 | End: 2024-02-05

## 2024-02-05 RX ORDER — SODIUM CHLORIDE 9 MG/ML
1000 INJECTION INTRAMUSCULAR; INTRAVENOUS; SUBCUTANEOUS
Refills: 0 | Status: DISCONTINUED | OUTPATIENT
Start: 2024-02-05 | End: 2024-02-05

## 2024-02-05 RX ADMIN — SODIUM CHLORIDE 500 MILLILITER(S): 9 INJECTION INTRAMUSCULAR; INTRAVENOUS; SUBCUTANEOUS at 08:00

## 2024-02-05 RX ADMIN — Medication 40 MILLIEQUIVALENT(S): at 13:28

## 2024-02-05 RX ADMIN — Medication 40 MILLIEQUIVALENT(S): at 08:41

## 2024-02-05 RX ADMIN — PIPERACILLIN AND TAZOBACTAM 25 GRAM(S): 4; .5 INJECTION, POWDER, LYOPHILIZED, FOR SOLUTION INTRAVENOUS at 11:19

## 2024-02-05 RX ADMIN — Medication 50 MICROGRAM(S): at 05:31

## 2024-02-05 RX ADMIN — Medication 650 MILLIGRAM(S): at 09:48

## 2024-02-05 RX ADMIN — SODIUM CHLORIDE 60 MILLILITER(S): 9 INJECTION INTRAMUSCULAR; INTRAVENOUS; SUBCUTANEOUS at 08:02

## 2024-02-05 RX ADMIN — PANTOPRAZOLE SODIUM 40 MILLIGRAM(S): 20 TABLET, DELAYED RELEASE ORAL at 17:37

## 2024-02-05 RX ADMIN — SODIUM CHLORIDE 60 MILLILITER(S): 9 INJECTION INTRAMUSCULAR; INTRAVENOUS; SUBCUTANEOUS at 05:30

## 2024-02-05 RX ADMIN — Medication 650 MILLIGRAM(S): at 08:48

## 2024-02-05 RX ADMIN — Medication 81 MILLIGRAM(S): at 11:20

## 2024-02-05 RX ADMIN — PANTOPRAZOLE SODIUM 40 MILLIGRAM(S): 20 TABLET, DELAYED RELEASE ORAL at 05:32

## 2024-02-05 RX ADMIN — SODIUM CHLORIDE 75 MILLILITER(S): 9 INJECTION INTRAMUSCULAR; INTRAVENOUS; SUBCUTANEOUS at 17:37

## 2024-02-05 RX ADMIN — HEPARIN SODIUM 5000 UNIT(S): 5000 INJECTION INTRAVENOUS; SUBCUTANEOUS at 05:32

## 2024-02-05 RX ADMIN — SENNA PLUS 2 TABLET(S): 8.6 TABLET ORAL at 21:08

## 2024-02-05 RX ADMIN — PIPERACILLIN AND TAZOBACTAM 25 GRAM(S): 4; .5 INJECTION, POWDER, LYOPHILIZED, FOR SOLUTION INTRAVENOUS at 23:30

## 2024-02-05 RX ADMIN — Medication 1 GRAM(S): at 21:07

## 2024-02-05 RX ADMIN — HEPARIN SODIUM 5000 UNIT(S): 5000 INJECTION INTRAVENOUS; SUBCUTANEOUS at 17:37

## 2024-02-05 RX ADMIN — ATORVASTATIN CALCIUM 10 MILLIGRAM(S): 80 TABLET, FILM COATED ORAL at 21:08

## 2024-02-05 RX ADMIN — Medication 1 GRAM(S): at 10:32

## 2024-02-05 RX ADMIN — Medication 1 GRAM(S): at 13:28

## 2024-02-05 NOTE — PROGRESS NOTE ADULT - PROBLEM SELECTOR PLAN 5
P/w SCr 1.67 (baseline 0.6)  Likely prerenal due to sepsis  Continue IVF for now  Cr today is 1.58  monitor BMP

## 2024-02-05 NOTE — CONSULT NOTE ADULT - SUBJECTIVE AND OBJECTIVE BOX
HPI:  Patient is a 83F, uses cane, AAOx3, from [facility], with PMHx HTN, HLD, hypothyroidism, brain mass, cervical cancer, sent for shortness of breath and chills. She is AAOx3 and says nothing is happening. She just endorses being hungry as she has not eaten anything. She denies any complaints. Patient denies headache, nausea, vomit, chest pain, shortness of breath, cough, lightheadedness, abdominal pain, diarrhea, constipation, dark/bloody stool, dysuria, hematuria, loss of strength, or loss of sensation.  Called patient's daughter Nory [phone] and provided update. (04 Feb 2024 19:10)      PAST MEDICAL & SURGICAL HISTORY:  Hypercholesterolemia      HTN (hypertension)      Hypothyroidism      Osteoarthritis      Anemia      No significant past surgical history          No Known Allergies      Meds:  acetaminophen     Tablet .. 650 milliGRAM(s) Oral every 6 hours PRN  aluminum hydroxide/magnesium hydroxide/simethicone Suspension 30 milliLiter(s) Oral every 4 hours PRN  aspirin  chewable 81 milliGRAM(s) Oral daily  atorvastatin 10 milliGRAM(s) Oral at bedtime  heparin   Injectable 5000 Unit(s) SubCutaneous every 12 hours  levothyroxine 50 MICROGram(s) Oral daily  melatonin 3 milliGRAM(s) Oral at bedtime PRN  ondansetron Injectable 4 milliGRAM(s) IV Push every 8 hours PRN  pantoprazole    Tablet 40 milliGRAM(s) Oral <User Schedule>  piperacillin/tazobactam IVPB.. 3.375 Gram(s) IV Intermittent every 12 hours  senna 2 Tablet(s) Oral at bedtime  sodium chloride 0.9%. 1000 milliLiter(s) IV Continuous <Continuous>  sucralfate 1 Gram(s) Oral <User Schedule>      SOCIAL HISTORY:  Smoker:  YES / NO        PACK YEARS:                         WHEN QUIT?  ETOH use:  YES / NO               FREQUENCY / QUANTITY:  Ilicit Drug use:  YES / NO  Occupation:  Assisted device use (Cane / Walker):  Live with:    FAMILY HISTORY:      VITALS:  Vital Signs Last 24 Hrs  T(C): 37 (05 Feb 2024 13:41), Max: 38 (05 Feb 2024 08:53)  T(F): 98.6 (05 Feb 2024 13:41), Max: 100.4 (05 Feb 2024 08:53)  HR: 75 (05 Feb 2024 13:41) (60 - 100)  BP: 95/42 (05 Feb 2024 13:41) (86/46 - 141/54)  BP(mean): --  RR: 17 (05 Feb 2024 13:41) (17 - 17)  SpO2: 96% (05 Feb 2024 13:41) (91% - 98%)    Parameters below as of 05 Feb 2024 13:41  Patient On (Oxygen Delivery Method): nasal cannula  O2 Flow (L/min): 2      LABS/DIAGNOSTIC TESTS:                          8.5    19.01 )-----------( 312      ( 05 Feb 2024 06:25 )             25.4     WBC Count: 19.01 K/uL (02-05 @ 06:25)  WBC Count: 21.72 K/uL (02-04 @ 14:20)      02-05    132<L>  |  96  |  25<H>  ----------------------------<  111<H>  3.2<L>   |  27  |  1.58<H>    Ca    7.5<L>      05 Feb 2024 06:25  Phos  3.5     02-05  Mg     1.1     02-04    TPro  5.4<L>  /  Alb  2.0<L>  /  TBili  0.5  /  DBili  x   /  AST  14  /  ALT  11  /  AlkPhos  58  02-05      Urine Microscopic-Add On (NC) (02.04.24 @ 14:49)   White Blood Cell - Urine: 15 /HPF  Red Blood Cell - Urine: 2 /HPF  Granular Cast: Present  Bacteria: Moderate /HPF  Squamous Epithelial Cells: PresentUrinalysis (02.04.24 @ 14:49)   Blood, Urine: Non Hemolyzed Trace  Glucose Qualitative, Urine: Negative mg/dL  pH Urine: 5.5  Color: Dark Yellow  Urine Appearance: Turbid  Bilirubin: Negative  Ketone - Urine: Trace mg/dL  Specific Gravity: 1.017  Protein, Urine: 300 mg/dL  Urobilinogen: 0.2 mg/dL  Nitrite: Negative  Leukocyte Esterase Concentration: Moderate      LIVER FUNCTIONS - ( 05 Feb 2024 06:25 )  Alb: 2.0 g/dL / Pro: 5.4 g/dL / ALK PHOS: 58 U/L / ALT: 11 U/L DA / AST: 14 U/L / GGT: x             PT/INR - ( 04 Feb 2024 14:20 )   PT: 13.5 sec;   INR: 1.19 ratio         PTT - ( 04 Feb 2024 14:20 )  PTT:32.6 sec    LACTATE:    ABG -     CULTURES:   Clean Catch Clean Catch (Midstream)  02-04 @ 14:49   >100,000 CFU/ml Escherichia coli  --  --      .Blood Blood-Peripheral  02-04 @ 14:25   Growth in aerobic bottle: Gram Negative Rods  Growth in anaerobic bottle: Gram Negative Rods  Direct identification is available within approximately 3-5  hours either by Blood Panel Multiplexed PCR or Direct  MALDI-TOF. Details: [email or web address]  --  Blood Culture PCR      .Blood Blood-Peripheral  02-04 @ 14:20   Growth in aerobic bottle: Gram Negative Rods  Growth in anaerobic bottle: Gram Negative Rods  --    Growth in aerobic bottle: Gram Negative Rods  Growth in anaerobic bottle: Gram Negative Rods          RADIOLOGY:  < from: CT Chest No Cont (02.05.24 @ 09:46) >  ACC: [id number] EXAM:  CT CHEST   ORDERED BY: SELENE PISANO     PROCEDURE DATE:  02/05/2024          INTERPRETATION:  EXAMINATION: CT CHEST    CLINICAL INDICATION: Dyspnea.    TECHNIQUE: Noncontrast CT of the chest was obtained.    COMPARISON: 6/20/2023, 6/24/2023.    FINDINGS:    AIRWAYS AND LUNGS: Scattered bronchial secretions.  Patchy and linear   bilateral lung opacities.    MEDIASTINUM AND PLEURA: There are no enlarged mediastinal, hilar or   axillary lymph nodes. The visualized portion of the thyroid gland is   unremarkable. Trace bilateral pleural effusion There is no pneumothorax.    HEART AND VESSELS: There is mild cardiomegaly.  There are atherosclerotic   calcifications of the aorta and coronary arteries.  There is no   pericardial effusion.    UPPER ABDOMEN: Images of the upper abdomen demonstrate left renal cyst.    BONES AND SOFT TISSUES: Scoliosis  The soft tissues are unremarkable.    TUBES/LINES: None.    IMPRESSION:  Multifocal atelectasis and pneumonia.    --- End of Report ---            ANGELA CARREON MD; Attending Radiologist  This document has been electronically signed. Feb 5 2024  8:50AM    < end of copied text >  --------------------------------------------------------------------------------------------------------------------------------------    ACC: [id number] EXAM:  XR CHEST PORTABLE URGENT 1V   ORDERED BY: JADEN FINN     PROCEDURE DATE:  02/04/2024          INTERPRETATION:  CLINICAL STATEMENT: Chest pain.    TECHNIQUE: AP view of the chest.    COMPARISON: None available.    FINDINGS/  IMPRESSION:  Nonspecific airspace opacities left midlung zone and right lung base.   Follow-up recommended. Retrocardiac density noted likely related to   hiatal hernia.    No pleural effusion.    Scoliosis.    Heart size cannot be accurately assessed in this projection, but appear   enlarged.    --- End of Report ---            DAFNE POLLACK MD; Attending Radiologist  This document has been electronically signed. Feb 4 2024  4:49PM    < end of copied text >      ROS  [  ] UNABLE TO ELICIT               HPI:  Patient is a 83F, uses cane, AAOx3, from [facility], with PMHx HTN, HLD, hypothyroidism, brain mass, cervical cancer, sent for shortness of breath and chills. She is AAOx3 and says nothing is happening. She just endorses being hungry as she has not eaten anything. She denies any complaints. Patient denies headache, nausea, vomit, chest pain, shortness of breath, cough, lightheadedness, abdominal pain, diarrhea, constipation, dark/bloody stool, dysuria, hematuria, loss of strength, or loss of sensation.  Called patient's daughter Nory [phone] and provided update. (04 Feb 2024 19:10)        History as above, asked to see this patient who was admitted from an assisted living facility with SOB and chills, she was found to have a UTI here along with Bacteremia and Pneumonia, she also was found to be febrile and have an elevated WBC count. She is confused and her history is not reliable at all, she is saying no to all questions posed to her. She is coughing in my presence but is not SOB with oxygen on.        PAST MEDICAL & SURGICAL HISTORY:  Hypercholesterolemia      HTN (hypertension)      Hypothyroidism      Osteoarthritis      Anemia      No significant past surgical history          No Known Allergies      Meds:  acetaminophen     Tablet .. 650 milliGRAM(s) Oral every 6 hours PRN  aluminum hydroxide/magnesium hydroxide/simethicone Suspension 30 milliLiter(s) Oral every 4 hours PRN  aspirin  chewable 81 milliGRAM(s) Oral daily  atorvastatin 10 milliGRAM(s) Oral at bedtime  heparin   Injectable 5000 Unit(s) SubCutaneous every 12 hours  levothyroxine 50 MICROGram(s) Oral daily  melatonin 3 milliGRAM(s) Oral at bedtime PRN  ondansetron Injectable 4 milliGRAM(s) IV Push every 8 hours PRN  pantoprazole    Tablet 40 milliGRAM(s) Oral <User Schedule>  piperacillin/tazobactam IVPB.. 3.375 Gram(s) IV Intermittent every 12 hours  senna 2 Tablet(s) Oral at bedtime  sodium chloride 0.9%. 1000 milliLiter(s) IV Continuous <Continuous>  sucralfate 1 Gram(s) Oral <User Schedule>      SOCIAL HISTORY:  Smoker:  denies  ETOH use:  denies      FAMILY HISTORY: unknown      VITALS:  Vital Signs Last 24 Hrs  T(C): 37 (05 Feb 2024 13:41), Max: 38 (05 Feb 2024 08:53)  T(F): 98.6 (05 Feb 2024 13:41), Max: 100.4 (05 Feb 2024 08:53)  HR: 75 (05 Feb 2024 13:41) (60 - 100)  BP: 95/42 (05 Feb 2024 13:41) (86/46 - 141/54)  BP(mean): --  RR: 17 (05 Feb 2024 13:41) (17 - 17)  SpO2: 96% (05 Feb 2024 13:41) (91% - 98%)    Parameters below as of 05 Feb 2024 13:41  Patient On (Oxygen Delivery Method): nasal cannula  O2 Flow (L/min): 2      LABS/DIAGNOSTIC TESTS:                          8.5    19.01 )-----------( 312      ( 05 Feb 2024 06:25 )             25.4     WBC Count: 19.01 K/uL (02-05 @ 06:25)  WBC Count: 21.72 K/uL (02-04 @ 14:20)      02-05    132<L>  |  96  |  25<H>  ----------------------------<  111<H>  3.2<L>   |  27  |  1.58<H>    Ca    7.5<L>      05 Feb 2024 06:25  Phos  3.5     02-05  Mg     1.1     02-04    TPro  5.4<L>  /  Alb  2.0<L>  /  TBili  0.5  /  DBili  x   /  AST  14  /  ALT  11  /  AlkPhos  58  02-05      Urine Microscopic-Add On (NC) (02.04.24 @ 14:49)   White Blood Cell - Urine: 15 /HPF  Red Blood Cell - Urine: 2 /HPF  Granular Cast: Present  Bacteria: Moderate /HPF  Squamous Epithelial Cells: PresentUrinalysis (02.04.24 @ 14:49)   Blood, Urine: Non Hemolyzed Trace  Glucose Qualitative, Urine: Negative mg/dL  pH Urine: 5.5  Color: Dark Yellow  Urine Appearance: Turbid  Bilirubin: Negative  Ketone - Urine: Trace mg/dL  Specific Gravity: 1.017  Protein, Urine: 300 mg/dL  Urobilinogen: 0.2 mg/dL  Nitrite: Negative  Leukocyte Esterase Concentration: Moderate      LIVER FUNCTIONS - ( 05 Feb 2024 06:25 )  Alb: 2.0 g/dL / Pro: 5.4 g/dL / ALK PHOS: 58 U/L / ALT: 11 U/L DA / AST: 14 U/L / GGT: x             PT/INR - ( 04 Feb 2024 14:20 )   PT: 13.5 sec;   INR: 1.19 ratio         PTT - ( 04 Feb 2024 14:20 )  PTT:32.6 sec    LACTATE:    ABG -     CULTURES:   Clean Catch Clean Catch (Midstream)  02-04 @ 14:49   >100,000 CFU/ml Escherichia coli  --  --      .Blood Blood-Peripheral  02-04 @ 14:25   Growth in aerobic bottle: Gram Negative Rods  Growth in anaerobic bottle: Gram Negative Rods  Direct identification is available within approximately 3-5  hours either by Blood Panel Multiplexed PCR or Direct  MALDI-TOF. Details: [email or web address]  --  Blood Culture PCR      .Blood Blood-Peripheral  02-04 @ 14:20   Growth in aerobic bottle: Gram Negative Rods  Growth in anaerobic bottle: Gram Negative Rods  --    Growth in aerobic bottle: Gram Negative Rods  Growth in anaerobic bottle: Gram Negative Rods          RADIOLOGY:  < from: CT Chest No Cont (02.05.24 @ 09:46) >  ACC: [id number] EXAM:  CT CHEST   ORDERED BY: SELENE DOMINGUEZ DATE:  02/05/2024          INTERPRETATION:  EXAMINATION: CT CHEST    CLINICAL INDICATION: Dyspnea.    TECHNIQUE: Noncontrast CT of the chest was obtained.    COMPARISON: 6/20/2023, 6/24/2023.    FINDINGS:    AIRWAYS AND LUNGS: Scattered bronchial secretions.  Patchy and linear   bilateral lung opacities.    MEDIASTINUM AND PLEURA: There are no enlarged mediastinal, hilar or   axillary lymph nodes. The visualized portion of the thyroid gland is   unremarkable. Trace bilateral pleural effusion There is no pneumothorax.    HEART AND VESSELS: There is mild cardiomegaly.  There are atherosclerotic   calcifications of the aorta and coronary arteries.  There is no   pericardial effusion.    UPPER ABDOMEN: Images of the upper abdomen demonstrate left renal cyst.    BONES AND SOFT TISSUES: Scoliosis  The soft tissues are unremarkable.    TUBES/LINES: None.    IMPRESSION:  Multifocal atelectasis and pneumonia.    --- End of Report ---            ANGELA CARREON MD; Attending Radiologist  This document has been electronically signed. Feb 5 2024  8:50AM    < end of copied text >  --------------------------------------------------------------------------------------------------------------------------------------    ACC: [id number] EXAM:  XR CHEST PORTABLE URGENT 1V   ORDERED BY: JADEN FINN     PROCEDURE DATE:  02/04/2024          INTERPRETATION:  CLINICAL STATEMENT: Chest pain.    TECHNIQUE: AP view of the chest.    COMPARISON: None available.    FINDINGS/  IMPRESSION:  Nonspecific airspace opacities left midlung zone and right lung base.   Follow-up recommended. Retrocardiac density noted likely related to   hiatal hernia.    No pleural effusion.    Scoliosis.    Heart size cannot be accurately assessed in this projection, but appear   enlarged.    --- End of Report ---            DAFNE POLLACK MD; Attending Radiologist  This document has been electronically signed. Feb 4 2024  4:49PM    < end of copied text >      ROS  [ x ] UNABLE TO ELICIT, unreliable as she denies all complaints

## 2024-02-05 NOTE — PATIENT PROFILE ADULT - SW.
"2/15/2022      RE: Kimberley Mullins  [id number]  [facility] [id number]       Dear Colleague,    Thank you for the opportunity to participate in the care of your patient, Kimberley Mullins, at the [facility] [place] [facility]. Please see a copy of my visit note below.    CARDIOTHORACIC SURGERY FOLLOW-UP VISIT     Kimberley Mullins   1939   [id number]      Reason for visit: Post-Op VSD repair with Dr. Whitney on 2/4/2022    HPI: Kimberley Mullins is a 82 year old year old female with a PMH of HTN, hypothyroidism and recent hospitalization for hyponatremia at [facility] She was found to have a new apical, septal VSD.  Presents to clinic for a routine follow-up appointment after surgery.     Per discharge summary: \"Kimberley Mullins is a 82 year old female who on 1/24/2022 underwent the above-named procedures.  she tolerated the operation well.   Postoperatively was admitted to the CVICU.  Patient was extubated within protocol on POD #1.  Blood pressure and cardiac index were managed with vasopressors and inotropic agents which were continuously weaned until no longer needed.  Patient was subsequently  transferred to the surgical telemetry floor.   While on the surgical unit, the patient continued to progress well. Chest tubes and temporary pacemaker wires were removed when deemed appropriate. Cardiac Medications were gradually reintroduced as tolerated. These medications included Metoprolol Tartrate 12.5 bid, Simvastatin 20mg daily, and ASA 81mg.  CT chest pulmonary embolism on 1/24 found a small segmental pulmonary embolism of indeterminant and possibly chronic age.  For this indication Mireya was placed on a heparin drip once her chest tubes were removed.  Heparin was bridged to warfarin with therapeutic INR of 2-3.  Plan as of discharge is to maintain warfarin therapy for 3 months with an INR goal of 2-3.  Reevaluation at that time " can be determined for further imaging and anticoagulation. She was discharged with an INR of 2.06  A left-sided pleural effusion was noted on chest x-ray prior to chest tube removal.  However chest tubes were not eliminating the effusion adequately.  Patient was not short of breath nor had any increased work of breathing.  Chest tubes were removed and the internal medicine procedure team was consulted for elimination of the left pleural effusion by means of thoracentesis.  She was discharged status post thoracentesis and improved pleural effusion after removal of about 300mL of serosanguinous fluid.  Patient was fluid overloaded and treated with diuretics. She was discharged 5.6 Kg below preoperative weight and will discharge with 5 days of gentle diuretic therapy in the form of furosemide 20mg by mouth daily with potassium supplementation. Patient will have re-evaluation for further Diuretic therapy need in clinic follow-up.   If any dental procedures are to be performed it is recommended to use prophylactic antibiotics to prevent serious infections from occurring.  This includes any dental procedure including routine cleanings.   Patient was transiently hyperglycemic and treated with insulin infusion then transitioned to sliding scale insulin per protocol. Most recent Hemoglobin A1c is 5.6, she has no history of Diabetes mellitus, and her blood sugars remained stable. No Further glycemic control was necessary at discharge.  Nutrition consultation was provided in the context of Malnutrition based on the following clinical evaluations: % Intake: </= 50% for >/= 5 days (severe)  % Weight Loss: Difficult to assess wt changes with changes in fluid status and diuresis.   Subcutaneous Fat Loss: None observed  Muscle Loss: Temporal:  Mild vs age related, Upper arm (bicep, tricep):  Mild-Moderate and Dorsal hand:  Mild-moderate vs age related  Fluid Accumulation/Edema: Trace.  Based on the above findings, the diagnosis of  "Severe malnutrition in the context of acute illness on chronic illness was determined. Recommendations include   1. Continue regular diet, liberalized to help encourage oral intake. Encourage intake of oral supplements and as able, snacks. Rec pt take medications with food, as able.   2. Continue thera-vit, as ordered, to help meet micronutrient needs. Consider modifying to a multivitamin with minerals to help better meet micronutrient needs.    Prior to discharge, her pain was controlled well, she was working well with therapies, able to perform most ADLs, ambulate with assistance, and had full return of bowel and bladder function.  On February 10th, 2022, she was discharged to home in stable condition. Follow up with cardiology and cardiac surgery have been arranged. Pt encouraged to follow up with PCP and cardiac rehab upon discharge.\"     Returns to clinic today for postoperative visit.    Since discharge, has been recovering well  at her son's house.    Patient is on Coumadin and INR is therapeutic with home finger stick checks.  States pain is well controlled. She continues to need robaxin and tylenol for pain management. Sleep is good. Also having some anxiety but has not used clonazepam or atarax. Scheduled to start home care tomorrow (PT/OT). Has been active around the house with the assistance of a walker but noted today that she has not yet showered.  Breathing has been stable/improving. Continues to work on C &DB, has not been using IS. Denies SOB at rest, PND, orthopnea. No cough. Today in clinic patient sounds to be in a  regular rhythm with HR <100 bpm. Patient denies any fever, chills, chest pain, palpitations, edema, SOB, lightheadedness, nausea and vomiting.  Discharge weight 138 lbs; weight today 135 lbs; Appears  euvolemic upon examinination with no appreciable JVD, BLE edema, abdominal bloating. LS are CTA. Has had a reduced appetite, is supplementing with Boost. Discussed the importance of " nutrition in healing and staying adequately hydrated. Having regular bowel movements and voiding without difficulty. Midline sternal incision healing well with no noted erythema or drainage. Denies  sternal popping or clicking.    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     Thyroid disease      Transient global amnesia      Unspecified tinnitus     Tinnitus       PAST SURGICAL HISTORY:  Past Surgical History:   Procedure Laterality Date     ARTHROPLASTY KNEE  10/25/2011    Procedure:ARTHROPLASTY KNEE; Right Total Knee Arthroplasty   ; Surgeon:BRYCE ATWOOD; Location:[facility] OR     ARTHROPLASTY KNEE  6/2/2014    Procedure: ARTHROPLASTY KNEE;  Surgeon: Bryce Atwood MD;  Location: [facility] OR     CV CORONARY ANGIOGRAM N/A 1/25/2022    Procedure: Coronary Angiogram; diagnostic;  Surgeon: Sreekanth Delong MD;  Location:  HEART CARDIAC CATH LAB     CV RIGHT HEART CATH MEASUREMENTS RECORDED N/A 1/25/2022    Procedure: Right Heart Cath;  Surgeon: Sreekanth Delong MD;  Location:  HEART CARDIAC CATH LAB     CV VSD CLOSURE N/A 1/31/2022    Procedure: CV VSD CLOSURE W/KATIE;  Surgeon: Daniel Cerda MD;  Location:  HEART CARDIAC CATH LAB     HC DILATION/CURETTAGE DIAG/THER NON OB  2006    D & C     HC KNEE SCOPE, DIAGNOSTIC      Arthroscopy, Knee     REPAIR VENTRICULAR SEPTAL DEFECT N/A 2/4/2022    Procedure: Median Sternotomy, cardiopulmonary bypass, Ischemic Ventricular Septal Defect repair, tranesophageal echocardiogram performed by anesthesia;  Surgeon: Aldair Whitney MD;  Location:  OR     [facility]  FOREARM/WRIST SURGERY UNLISTED      orif radius fx distal left     Z APPENDECTOMY  1955     [facility] OPEN JESSI FIXATN HUMERAL SHAFT FX  12/2007    Dr Massey     [facility] COLONOSCOPY W SNARE REMOVAL TUMOR/POLYP/LESION  9/03 & 3/2011    polyp removal during screening colonoscopy       CURRENT MEDICATIONS:   Current Outpatient Medications   Medication     acetaminophen (TYLENOL) 325 MG tablet     aspirin (ASA) 81 MG chewable tablet     furosemide (LASIX) 20 MG tablet     levothyroxine (SYNTHROID/LEVOTHROID) 50 MCG tablet     methocarbamol (ROBAXIN) 500 MG tablet     metoprolol tartrate (LOPRESSOR) 25 MG tablet     Multiple Vitamins-Minerals (PRESERVISION AREDS PO)     multivitamin, therapeutic (THERA-VIT) TABS tablet     pantoprazole (PROTONIX) 40 MG EC tablet     potassium chloride ER (K-TAB) 20 MEQ CR tablet     simvastatin (ZOCOR) 20 MG tablet     warfarin ANTICOAGULANT (COUMADIN) 2 MG tablet     Warfarin Therapy Reminder     hydrOXYzine (ATARAX) 25 MG tablet     polyethylene glycol (MIRALAX) 17 GM/Dose powder     No current facility-administered medications for this visit.       ALLERGIES:      Allergies   Allergen Reactions     No Known Allergies        ROS:  Review of symptoms otherwise negative unless commented about in HPI.     LABS:  Last Basic Metabolic Panel:  Lab Results   Component Value Date     02/10/2022    .9 01/19/2022      Lab Results   Component Value Date    POTASSIUM 3.7 02/10/2022    POTASSIUM 4.75 01/19/2022     Lab Results   Component Value Date    CHLORIDE 103 02/10/2022    CHLORIDE 100.4 01/19/2022     Lab Results   Component Value Date    ANANDA 8.1 02/10/2022    ANANDA 8.7 01/19/2022     Lab Results   Component Value Date    CO2 25 02/10/2022    CO2 25.6 01/19/2022     Lab Results   Component Value Date    BUN 12 02/10/2022    BUN 13 01/19/2022    BUN 17.8 01/19/2022     Lab Results   Component Value Date    CR 0.98 02/10/2022    CR 0.73 01/19/2022     Lab Results   Component Value Date     GLC 86 02/10/2022     01/19/2022       Last CBC:   Lab Results   Component Value Date    WBC 7.6 02/10/2022    WBC 9.6 01/24/2022     Lab Results   Component Value Date    RBC 2.46 02/10/2022    RBC 3.48 01/24/2022     Lab Results   Component Value Date    HGB 7.8 02/10/2022    HGB 11.7 01/24/2022     Lab Results   Component Value Date    HCT 23.9 02/10/2022    HCT 35.3 01/24/2022     No components found for: MCT  Lab Results   Component Value Date    MCV 97 02/10/2022    .5 01/24/2022     Lab Results   Component Value Date    MCH 31.7 02/10/2022    MCH 33.6 01/24/2022     Lab Results   Component Value Date    MCHC 32.6 02/10/2022    MCHC 33.1 01/24/2022     Lab Results   Component Value Date    RDW 17.0 02/10/2022    RDW 11.9 01/14/2022     Lab Results   Component Value Date     02/10/2022     01/24/2022     02/21/2012       INR:  Lab Results   Component Value Date    INR 2.3 02/11/2022    INR 2.06 02/10/2022    INR 2.46 02/09/2022    INR 3.00 02/08/2022    INR 3.28 02/08/2022    INR 1.41 02/07/2022    INR 1.34 02/06/2022    INR 1.34 02/04/2022    INR 1.54 02/04/2022    INR 1.21 02/04/2022    INR 1.08 01/24/2022    INR 1.09 01/24/2022    INR 0.9 06/05/2017    INR 2.21 06/05/2014    INR 2.01 06/04/2014    INR 1.12 06/03/2014    INR 0.94 05/05/2008         IMAGING:  None    PHYSICAL EXAM:   Blood Pressure 122/83 (BP Location: Right arm, Patient Position: Chair, Cuff Size: Adult Regular)   Pulse 104   Weight 61.2 kg (135 lb)   Last Menstrual Period  (LMP Unknown)   Oxygen Saturation 98%   Body Mass Index 23.91 kg/m    General: alert and oriented x 3, pleasant, no acute distress, normal mood and affect  Neuro: Intact, no focal deficits   CV: S1 S2, no murmurs, rubs or gallops, regular rate and rhythm, no peripheral edema  Pulm: bilateral breath sounds, clear to auscultation, easy work of breathing  Incision: incisions clean dry and intact without erythema, swelling or  drainage    PROCEDURES: None       ASSESSMENT/PLAN:  Kimberley Mullins is a 82 year old year old female status post VSD repair who returns to clinic for postop visit.     Ventricular septal defect S/P repair and removal of Amplatzer device on 2/4/22  Noted mild RV dysfunction on postoperative echocardiogram  Anticoagulation - Warfarin therapy with goal INR 2-3    1. Surgically doing well overall.  Incisions are healing well with no signs of infection. Increasing activity and strength overall.   2. Hemodynamics are stable.  3. Follow up with your cardiologist as scheduled in March  4. Recommended starting Outpatient Cardiac Rehab until completed instead of home care   5. Continue sternal precautions for 12 weeks from surgery date.   6. No driving for 4 weeks from surgery date.   7. Anticoagulation referral placed - should not be utilizing finger sticks until well established with warfarin therapy  8. Complete diuretics as ordered this Thursday and then continue to monitor weights and for e/o edema, SOB  9. Plavix discontinued - continue warfarin and baby aspirin  10. Discontinued clonazepam in favor of atarax for now. Follow up with PCP if ongoing concerns for anxiety    Postoperative restrictions have been reviewed and all of the patient's questions were answered. Our contact information was given to the patient if he should have any further questions/concerns. No further follow up is needed with us.  The total time spent with the patient was 75 minutes, > 50% of which was spent in counseling and coordination of care.        JEANINE Haider, ACNPC-AG, CCRN  Nurse Practitioner  Cardiothoracic Surgery  Pager: [phone]        Please do not hesitate to contact me if you have any questions/concerns.     Sincerely,     Cardiovascular Thoracic Surgery     social work

## 2024-02-05 NOTE — PROGRESS NOTE ADULT - SUBJECTIVE AND OBJECTIVE BOX
NP Note discussed with  Primary Attending    Patient is a 83y old  Female who presents with a chief complaint of Sepsis (05 Feb 2024 10:33)      INTERVAL HPI/OVERNIGHT EVENTS: no new complaints    MEDICATIONS  (STANDING):  aspirin  chewable 81 milliGRAM(s) Oral daily  atorvastatin 10 milliGRAM(s) Oral at bedtime  heparin   Injectable 5000 Unit(s) SubCutaneous every 12 hours  levothyroxine 50 MICROGram(s) Oral daily  pantoprazole    Tablet 40 milliGRAM(s) Oral <User Schedule>  piperacillin/tazobactam IVPB.. 3.375 Gram(s) IV Intermittent every 12 hours  senna 2 Tablet(s) Oral at bedtime  sodium chloride 0.9%. 1000 milliLiter(s) (60 mL/Hr) IV Continuous <Continuous>  sucralfate 1 Gram(s) Oral <User Schedule>    MEDICATIONS  (PRN):  acetaminophen     Tablet .. 650 milliGRAM(s) Oral every 6 hours PRN Temp greater or equal to 38C (100.4F), Mild Pain (1 - 3)  aluminum hydroxide/magnesium hydroxide/simethicone Suspension 30 milliLiter(s) Oral every 4 hours PRN Dyspepsia  melatonin 3 milliGRAM(s) Oral at bedtime PRN Insomnia  ondansetron Injectable 4 milliGRAM(s) IV Push every 8 hours PRN Nausea and/or Vomiting      __________________________________________________  REVIEW OF SYSTEMS:    CONSTITUTIONAL: No fever,   EYES: no acute visual disturbances  NECK: No pain or stiffness  RESPIRATORY: No cough; No shortness of breath  CARDIOVASCULAR: No chest pain, no palpitations  GASTROINTESTINAL: No pain. No nausea or vomiting; No diarrhea   NEUROLOGICAL: No headache or numbness, no tremors  MUSCULOSKELETAL: No joint pain, no muscle pain  GENITOURINARY: no dysuria, no frequency, no hesitancy  PSYCHIATRY: no depression , no anxiety  ALL OTHER  ROS negative        Vital Signs Last 24 Hrs  T(C): 38 (05 Feb 2024 08:53), Max: 38.9 (04 Feb 2024 14:55)  T(F): 100.4 (05 Feb 2024 08:53), Max: 102 (04 Feb 2024 14:55)  HR: 83 (05 Feb 2024 05:10) (60 - 105)  BP: 94/39 (05 Feb 2024 05:10) (86/46 - 141/54)  BP(mean): --  RR: 17 (05 Feb 2024 05:10) (17 - 17)  SpO2: 98% (05 Feb 2024 05:10) (91% - 98%)    Parameters below as of 05 Feb 2024 05:10  Patient On (Oxygen Delivery Method): nasal cannula  O2 Flow (L/min): 2      ________________________________________________  PHYSICAL EXAM:  GENERAL: NAD  HEENT: Normocephalic;  conjunctivae and sclerae clear; moist mucous membranes;   NECK : supple  CHEST/LUNG: No wheezing, expiratory rales O2 2L/Min via N-C  HEART: S1 S2  regular; no murmurs, gallops or rubs  ABDOMEN: Soft, Nontender, Nondistended; Bowel sounds present  EXTREMITIES: no cyanosis; no edema; no calf tenderness  SKIN: warm and dry; no rash  NERVOUS SYSTEM:  Awake and alert; Oriented  to place, person  ; no new deficits    _________________________________________________  LABS:                        8.5    19.01 )-----------( 312      ( 05 Feb 2024 06:25 )             25.4     02-05    132<L>  |  96  |  25<H>  ----------------------------<  111<H>  3.2<L>   |  27  |  1.58<H>    Ca    7.5<L>      05 Feb 2024 06:25  Phos  3.5     02-05  Mg     1.1     02-04    TPro  5.4<L>  /  Alb  2.0<L>  /  TBili  0.5  /  DBili  x   /  AST  14  /  ALT  11  /  AlkPhos  58  02-05    PT/INR - ( 04 Feb 2024 14:20 )   PT: 13.5 sec;   INR: 1.19 ratio         PTT - ( 04 Feb 2024 14:20 )  PTT:32.6 sec  Urinalysis Basic - ( 05 Feb 2024 06:25 )    Color: x / Appearance: x / SG: x / pH: x  Gluc: 111 mg/dL / Ketone: x  / Bili: x / Urobili: x   Blood: x / Protein: x / Nitrite: x   Leuk Esterase: x / RBC: x / WBC x   Sq Epi: x / Non Sq Epi: x / Bacteria: x      CAPILLARY BLOOD GLUCOSE      POCT Blood Glucose.: 114 mg/dL (04 Feb 2024 14:22)        RADIOLOGY & ADDITIONAL TESTS:  < from: CT Chest No Cont (02.05.24 @ 09:46) >    ACC: [id number] EXAM:  CT CHEST   ORDERED BY: SELENE PISANO     PROCEDURE DATE:  02/05/2024          INTERPRETATION:  EXAMINATION: CT CHEST    CLINICAL INDICATION: Dyspnea.    TECHNIQUE: Noncontrast CT of the chest was obtained.    COMPARISON: 6/20/2023, 6/24/2023.    FINDINGS:    AIRWAYS AND LUNGS: Scattered bronchial secretions.  Patchy and linear   bilateral lung opacities.    MEDIASTINUM AND PLEURA: There are no enlarged mediastinal, hilar or   axillary lymph nodes. The visualized portion of the thyroid gland is   unremarkable. Trace bilateral pleural effusion There is no pneumothorax.    HEART AND VESSELS: There is mild cardiomegaly.  There are atherosclerotic   calcifications of the aorta and coronary arteries.  There is no   pericardial effusion.    UPPER ABDOMEN: Images of the upper abdomen demonstrate left renal cyst.    BONES AND SOFT TISSUES: Scoliosis  The soft tissues are unremarkable.    TUBES/LINES: None.    IMPRESSION:  Multifocal atelectasis and pneumonia.    --- End of Report ---            ANGELA CARREON MD; Attending Radiologist  This document has been electronically signed. Feb 5 2024  8:50AM    < end of copied text >    Imaging Personally Reviewed:  YES/    Consultant(s) Notes Reviewed:   YES/  Care Discussed with Consultants : ID     Plan of care was discussed with patient and /or primary care giver; all questions and concerns were addressed and care was aligned with patient's wishes.

## 2024-02-05 NOTE — PATIENT PROFILE ADULT - FALL HARM RISK - HARM RISK INTERVENTIONS
Assistance with ambulation/Assistance OOB with selected safe patient handling equipment/Communicate Risk of Fall with Harm to all staff/Discuss with provider need for PT consult/Monitor for mental status changes/Monitor gait and stability/Move patient closer to nurses' station/Reinforce activity limits and safety measures with patient and family/Reorient to person, place and time as needed/Tailored Fall Risk Interventions/Toileting schedule using arm’s reach rule for commode and bathroom/Use of alarms - bed, chair and/or voice tab/Visual Cue: Yellow wristband and red socks/Bed in lowest position, wheels locked, appropriate side rails in place/Call bell, personal items and telephone in reach/Instruct patient to call for assistance before getting out of bed or chair/Non-slip footwear when patient is out of bed/[place] to call system/Physically safe environment - no spills, clutter or unnecessary equipment/Purposeful Proactive Rounding/Room/bathroom lighting operational, light cord in reach

## 2024-02-05 NOTE — PROGRESS NOTE ADULT - PROBLEM SELECTOR PLAN 6
likely due to decreased PO intake given current illness  Potassium 3.2 today  supplement given  monitor BMP

## 2024-02-05 NOTE — PROGRESS NOTE ADULT - PROBLEM SELECTOR PLAN 7
Has hx of GI bleeding, on PPI BID and carafate  P/w Hgb 10.6 (baseline ~9)   Likely hemoconcentrated due to dehydration  No sign of bleeding  Hold iron tab in the setting of sepsis

## 2024-02-05 NOTE — CONSULT NOTE ADULT - SUBJECTIVE AND OBJECTIVE BOX
Patient is a 83y old  Female who presents with a chief complaint of Sepsis (04 Feb 2024 19:10)      HPI:  Patient is a 83F, uses cane, AAOx3, from [facility], with PMHx HTN, HLD, hypothyroidism, brain mass, cervical cancer, sent for shortness of breath and chills. She is AAOx3 and says nothing is happening. She just endorses being hungry as she has not eaten anything. She denies any complaints. Patient denies headache, nausea, vomit, chest pain, shortness of breath, cough, lightheadedness, abdominal pain, diarrhea, constipation, dark/bloody stool, dysuria, hematuria, loss of strength, or loss of sensation.  Called patient's daughter Nory [phone] and provided update. (04 Feb 2024 19:10)       ROS:  Negative except for:    PAST MEDICAL & SURGICAL HISTORY:  Hypercholesterolemia      HTN (hypertension)      Hypothyroidism      Osteoarthritis      Anemia      No significant past surgical history          SOCIAL HISTORY:    FAMILY HISTORY:      MEDICATIONS  (STANDING):  aspirin  chewable 81 milliGRAM(s) Oral daily  atorvastatin 10 milliGRAM(s) Oral at bedtime  heparin   Injectable 5000 Unit(s) SubCutaneous every 12 hours  levothyroxine 50 MICROGram(s) Oral daily  pantoprazole    Tablet 40 milliGRAM(s) Oral <User Schedule>  piperacillin/tazobactam IVPB.. 3.375 Gram(s) IV Intermittent every 12 hours  potassium chloride   Powder 40 milliEquivalent(s) Oral every 4 hours  senna 2 Tablet(s) Oral at bedtime  sodium chloride 0.9%. 1000 milliLiter(s) (60 mL/Hr) IV Continuous <Continuous>  sucralfate 1 Gram(s) Oral <User Schedule>    MEDICATIONS  (PRN):  acetaminophen     Tablet .. 650 milliGRAM(s) Oral every 6 hours PRN Temp greater or equal to 38C (100.4F), Mild Pain (1 - 3)  aluminum hydroxide/magnesium hydroxide/simethicone Suspension 30 milliLiter(s) Oral every 4 hours PRN Dyspepsia  melatonin 3 milliGRAM(s) Oral at bedtime PRN Insomnia  ondansetron Injectable 4 milliGRAM(s) IV Push every 8 hours PRN Nausea and/or Vomiting      Allergies    No Known Allergies    Intolerances        Vital Signs Last 24 Hrs  T(C): 38 (05 Feb 2024 08:53), Max: 38.9 (04 Feb 2024 14:55)  T(F): 100.4 (05 Feb 2024 08:53), Max: 102 (04 Feb 2024 14:55)  HR: 83 (05 Feb 2024 05:10) (60 - 105)  BP: 94/39 (05 Feb 2024 05:10) (86/46 - 141/54)  BP(mean): --  RR: 17 (05 Feb 2024 05:10) (17 - 17)  SpO2: 98% (05 Feb 2024 05:10) (91% - 98%)    Parameters below as of 05 Feb 2024 05:10  Patient On (Oxygen Delivery Method): nasal cannula  O2 Flow (L/min): 2      PHYSICAL EXAM  General: adult in NAD  HEENT: clear oropharynx, anicteric sclera, pink conjunctiva  Neck: supple  CV: normal S1/S2 with no murmur rubs or gallops  Lungs: positive air movement b/l ant lungs,clear to auscultation, no wheezes, no rales  Abdomen: soft non-tender non-distended, no hepatosplenomegaly  Ext: no clubbing cyanosis or edema  Skin: no rashes and no petechiae  Neuro: alert and oriented X 4, no focal deficits      LABS:                          8.5    19.01 )-----------( 312      ( 05 Feb 2024 06:25 )             25.4         Mean Cell Volume : 83.3 fl  Mean Cell Hemoglobin : 27.9 pg  Mean Cell Hemoglobin Concentration : 33.5 gm/dL  Auto Neutrophil # : x  Auto Lymphocyte # : x  Auto Monocyte # : x  Auto Eosinophil # : x  Auto Basophil # : x  Auto Neutrophil % : x  Auto Lymphocyte % : x  Auto Monocyte % : x  Auto Eosinophil % : x  Auto Basophil % : x      Serial CBC's  02-05 @ 06:25  Hct-25.4 / Hgb-8.5 / Plat-312 / RBC-3.05 / WBC-19.01  Serial CBC's  02-04 @ 14:20  Hct-32.2 / Hgb-10.6 / Plat-404 / RBC-3.77 / WBC-21.72      02-05    132<L>  |  96  |  25<H>  ----------------------------<  111<H>  3.2<L>   |  27  |  1.58<H>    Ca    7.5<L>      05 Feb 2024 06:25  Phos  3.5     02-05  Mg     1.1     02-04    TPro  5.4<L>  /  Alb  2.0<L>  /  TBili  0.5  /  DBili  x   /  AST  14  /  ALT  11  /  AlkPhos  58  02-05      PT/INR - ( 04 Feb 2024 14:20 )   PT: 13.5 sec;   INR: 1.19 ratio         PTT - ( 04 Feb 2024 14:20 )  PTT:32.6 sec                BLOOD SMEAR INTERPRETATION:       RADIOLOGY & ADDITIONAL STUDIES:

## 2024-02-05 NOTE — CONSULT NOTE ADULT - ASSESSMENT
83 year old lady with 2 brain masses, pancreatic cyst and dilated pancreatic duct, cervical ca s/p chemoRT 17 years ago.  she was admitted for UTI and sepsis    1 brain masses with edema  she is A and Ox3  not on any meds for that    2 h/o cervical ca s/p chemoRT  no sign of recurrence    3 discussed with her and her daughter  she does not want any further imaging or treatment of cancer  f/u with primary team and assisted living

## 2024-02-05 NOTE — CONSULT NOTE ADULT - ASSESSMENT
Sepsis  Pneumonia  UTI  Bacteremia - with E. Coli  Fevers  Leukocytosis       Plan - Cont Zosyn 3.375 gms iv q8hrs for now Sepsis  Pneumonia  UTI  Bacteremia - with E. Coli  Fevers  Leukocytosis       Plan - Cont Zosyn 3.375 gms iv but increase to q8hrs   repeat blood cultures in 24 - 48 hrs

## 2024-02-05 NOTE — PROGRESS NOTE ADULT - PROBLEM SELECTOR PLAN 8
C/w home Levothyroxine 50mcg Hold home Amlodipine 10mg, Enalapril 10mg in the setting of sepsis  BP soft

## 2024-02-05 NOTE — CONSULT NOTE ADULT - LYMPHATIC
Physical Therapy Daily Treatment       Visit Count: 10  Plan of Care: 2/6/2019 Through: 4/17/2019  Insurance Information: [employer]  80/20  BOMN    Precautions: None    SUBJECTIVE   I moved to a new job yesterday, where I am not doing any heavy lifting. It is much easier now. Current Pain (0-10 scale): 6  Functional Change: less pain with lighter duty work. OBJECTIVE       Treatment   Therapeutic Exercise:   Nustep L4 x 12 minutes  LAQ x 10, 2 lbs x 10  Sitting knee flexion   HS curls bilaterally    Tandem walking in parallel bars  Side stepping on foam pad  Step ups 6 inch stair - painful  Standing hip abd/ext  Stepping forward-retro over foam pad   Mini squats  Side stepping in parallel bars orange theraband around knees  Mini lunge on step    Skilled input: cues for proper form with stretches and exericses    Home Program:   Sitting LAQ  Standing hamstring curl  Standing hip ext/abd  Step ups on stair        Writer verbally educated the patient and received verbal consent from the patient on hand placement, positioning of patient, and techniques to be performed today including exercises, activity progression and how they are pertinent to the patient's plan of care. Suggestions for next session as indicated: progress per plan of care, LE strengthening, balance. Stretching as needed    ASSESSMENT   Improved activity tolerance. Mild discomfort at end of session, but no pain after exercises. Pain after treatment (patient reported, 0-10 scale) slight decrease  Result of above outlined education: Verbalizes understanding    THERAPY DAILY BILLING   Insurance: [employer] 2.  N/A    Evaluation Procedures:  No evaluation codes were used on this date of service    Timed Procedures:  Therapeutic Exercise, 40 minutes    Untimed Procedures:  No untimed codes were used on this date of service    Total Treatment Time: 40 minutes No lymphadedenopathy

## 2024-02-05 NOTE — PROGRESS NOTE ADULT - PROBLEM SELECTOR PLAN 10
Detail Level: Generalized Detail Level: Zone Detail Level: Detailed Hep SC for DVT ppx   PPI for GI ppx C/w simvastatin 10mg

## 2024-02-05 NOTE — CHART NOTE - NSCHARTNOTEFT_GEN_A_CORE
EVENT:   2/5/24, 5:50am, Called by RN re: patient's blood culture sent on 2/4/24 - growth in aerobic bottle: gram(-) negative rods. Pt.'s on Zosyn IV WBC - 21.72, T - 99F. Iker Mcguire - sent text message. Morning shift provider - made aware.   HPI:  Patient is a 83F, uses cane, AAOx3, from [facility], with PMHx HTN, HLD, hypothyroidism, brain mass, cervical cancer, sent for shortness of breath and chills. She is AAOx3 and says nothing is happening. She just endorses being hungry as she has not eaten anything. She denies any complaints. Patient denies headache, nausea, vomit, chest pain, shortness of breath, cough, lightheadedness, abdominal pain, diarrhea, constipation, dark/bloody stool, dysuria, hematuria, loss of strength, or loss of sensation.  Called patient's daughter Nory [phone] and provided update. (04 Feb 2024 19:10)    OBJECTIVE:  Vital Signs Last 24 Hrs  T(C): 37.2 (05 Feb 2024 05:10), Max: 38.9 (04 Feb 2024 14:55)  T(F): 99 (05 Feb 2024 05:10), Max: 102 (04 Feb 2024 14:55)  HR: 83 (05 Feb 2024 05:10) (60 - 105)  BP: 94/39 (05 Feb 2024 05:10) (86/46 - 141/54)  BP(mean): --  RR: 17 (05 Feb 2024 05:10) (17 - 17)  SpO2: 98% (05 Feb 2024 05:10) (91% - 98%)    Parameters below as of 05 Feb 2024 05:10  Patient On (Oxygen Delivery Method): nasal cannula  O2 Flow (L/min): 2      FOCUSED PHYSICAL EXAM:    LABS:                        10.6   21.72 )-----------( 404      ( 04 Feb 2024 14:20 )             32.2     02-04    128<L>  |  91<L>  |  24<H>  ----------------------------<  106<H>  3.6   |  29  |  1.67<H>    Ca    8.3<L>      04 Feb 2024 14:20  Mg     1.1     02-04    TPro  6.7  /  Alb  2.6<L>  /  TBili  0.8  /  DBili  x   /  AST  17  /  ALT  13  /  AlkPhos  72  02-04

## 2024-02-05 NOTE — PROGRESS NOTE ADULT - PROBLEM SELECTOR PLAN 1
improving leukocytosis  Afebrile VSS   Continue Zosyn D2   1st Blood cultures from 2/4- growing GNR/ E.Coli   DR. Olvera is following  repeat blood cultures in AM   CT chest with multifocal atelectasis with pneumonia  incentive spirometry   c/w with IVF

## 2024-02-05 NOTE — PHARMACOTHERAPY INTERVENTION NOTE - COMMENTS
Pt is on [facility] LIST.  Biofire/culture  results indicate E. Coli.  Recommendation is to de-escalate Zosyn to Ceftriaxone. No other recommendations at this time. 
Culture Date/Time: 2024-02-04 14:25  BCID: -  Escherichia coli  Detec    No prior infectious disease consultation    Antibiotics:  piperacillin-tazobactam    Recommend to change Zozyn to Ceftriaxone.

## 2024-02-05 NOTE — PROGRESS NOTE ADULT - PROBLEM SELECTOR PLAN 7
Hold home Amlodipine 10mg, Enalapril 10mg in the setting of sepsis  BP soft Has hx of GI bleeding, on PPI BID and carafate  P/w Hgb 10.6 (baseline ~9)   Likely hemoconcentrated due to dehydration  No sign of bleeding  Hold iron tab in the setting of sepsis

## 2024-02-05 NOTE — PROGRESS NOTE ADULT - ASSESSMENT
Patient is a 83F, uses cane, AAOx2-3, from [facility], with PMHx HTN, HLD, hypothyroidism, brain mass, cervical cancer, sent for shortness of breath and chills. Admitted for sepsis 2/2 pneumonia.  seen and examined at the bedside, reports she feels better, no SOB on O2. CT chest with pneumonia, on Zosyn, 1st Blood cultures growing GNR / E. Coli, repeat blood cultures will sent in AM

## 2024-02-05 NOTE — PROGRESS NOTE ADULT - PROBLEM SELECTOR PLAN 11
pending PT eval- pt from [facility]  pending final blood culture sensitivities  repeat blood cultures on 2/6 Hep SC for DVT ppx   PPI for GI ppx

## 2024-02-05 NOTE — PROGRESS NOTE ADULT - SUBJECTIVE AND OBJECTIVE BOX
MR#[id number]  PATIENT NAME:CHUNG VENTURA    DATE OF SERVICE: 02-05-24   Patient was seen and examined by Iker Kirkpatrick MD on    02-05-24   Interim events noted.Consultant notes ,Labs,Telemetry reviewed by me       HOSPITAL COURSE: HPI:  Patient is a 83F, uses cane, AAOx3, from [facility], with PMHx HTN, HLD, hypothyroidism, brain mass, cervical cancer, sent for shortness of breath and chills. She is AAOx3 and says nothing is happening. She just endorses being hungry as she has not eaten anything. She denies any complaints. Patient denies headache, nausea, vomit, chest pain, shortness of breath, cough, lightheadedness, abdominal pain, diarrhea, constipation, dark/bloody stool, dysuria, hematuria, loss of strength, or loss of sensation.  Called patient's daughter Nory [phone] and provided update. (04 Feb 2024 19:10)      INTERIM EVENTS:Patient seen at bedside ,interim events noted.      PMH -reviewed admission note, no change since admission  HEART FAILURE: Acute[ ]Chronic[ ] Systolic[ ] Diastolic[ ] Combined Systolic and Diastolic[ ]  CAD[ ] CABG[ ] PCI[ ]  DEVICES[ ] PPM[ ] ICD[ ] ILR[ ]  ATRIAL FIBRILLATION[ ] Paroxysmal[ ] Permanent[ ] CHADS2-[  ]  ROXI[ ] CKD1[ ] CKD2[ ] CKD3[ ] CKD4[ ] ESRD[ ]  COPD[ ] HTN[ ]   DM[ ] Type1[ ] Type 2[ ]   CVA[ ] Paresis[ ]    AMBULATION: Assisted[ ] Cane/walker[ ] Independent[ ]    MEDICATIONS  (STANDING):  aspirin  chewable 81 milliGRAM(s) Oral daily  atorvastatin 10 milliGRAM(s) Oral at bedtime  heparin   Injectable 5000 Unit(s) SubCutaneous every 12 hours  levothyroxine 50 MICROGram(s) Oral daily  pantoprazole    Tablet 40 milliGRAM(s) Oral <User Schedule>  piperacillin/tazobactam IVPB.. 3.375 Gram(s) IV Intermittent every 12 hours  senna 2 Tablet(s) Oral at bedtime  sodium chloride 0.9%. 1000 milliLiter(s) (75 mL/Hr) IV Continuous <Continuous>  sucralfate 1 Gram(s) Oral <User Schedule>    MEDICATIONS  (PRN):  acetaminophen     Tablet .. 650 milliGRAM(s) Oral every 6 hours PRN Temp greater or equal to 38C (100.4F), Mild Pain (1 - 3)  aluminum hydroxide/magnesium hydroxide/simethicone Suspension 30 milliLiter(s) Oral every 4 hours PRN Dyspepsia  melatonin 3 milliGRAM(s) Oral at bedtime PRN Insomnia  ondansetron Injectable 4 milliGRAM(s) IV Push every 8 hours PRN Nausea and/or Vomiting            REVIEW OF SYSTEMS:  Constitutional: [ ] fever, [ ]weight loss,  [ ]fatigue [ ]weight gain  Eyes: [ ] visual changes  Respiratory: [ ]shortness of breath;  [ ] cough, [ ]wheezing, [ ]chills, [ ]hemoptysis  Cardiovascular: [ ] chest pain, [ ]palpitations, [ ]dizziness,  [ ]leg swelling[ ]orthopnea[ ]PND  Gastrointestinal: [ ] abdominal pain, [ ]nausea, [ ]vomiting,  [ ]diarrhea [ ]Constipation [ ]Melena  Genitourinary: [ ] dysuria, [ ] hematuria [ ]Love  Neurologic: [ ] headaches [ ] tremors[ ]weakness [ ]Paralysis Right[ ] Left[ ]  Skin: [ ] itching, [ ]burning, [ ] rashes  Endocrine: [ ] heat or cold intolerance  Musculoskeletal: [ ] joint pain or swelling; [ ] muscle, back, or extremity pain  Psychiatric: [ ] depression, [ ]anxiety, [ ]mood swings, or [ ]difficulty sleeping  Hematologic: [ ] easy bruising, [ ] bleeding gums    [ ] All remaining systems negative except as per above.   [ ]Unable to obtain.  [x] No change in ROS since admission      Vital Signs Last 24 Hrs  T(C): 36.9 (05 Feb 2024 20:45), Max: 38 (05 Feb 2024 08:53)  T(F): 98.5 (05 Feb 2024 20:45), Max: 100.4 (05 Feb 2024 08:53)  HR: 93 (05 Feb 2024 20:45) (75 - 100)  BP: 118/69 (05 Feb 2024 20:45) (94/39 - 141/54)  BP(mean): 89 (05 Feb 2024 20:45) (89 - 89)  RR: 20 (05 Feb 2024 20:45) (17 - 20)  SpO2: 91% (05 Feb 2024 20:45) (91% - 98%)    Parameters below as of 05 Feb 2024 20:45  Patient On (Oxygen Delivery Method): nasal cannula  O2 Flow (L/min): 2    I&O's Summary      PHYSICAL EXAM:  General: No acute distress BMI-  HEENT: EOMI, PERRL  Neck: Supple, [ ] JVD  Lungs: Equal air entry bilaterally; [ ] rales [ ] wheezing [ ] rhonchi  Heart: Regular rate and rhythm; [x ] murmur   2/6 [ x] systolic [ ] diastolic [ ] radiation[ ] rubs [ ]  gallops  Abdomen: Nontender, bowel sounds present  Extremities: No clubbing, cyanosis, [ ] edema [ ]Pulses  equal and intact  Nervous system:  Alert & Oriented X3, no focal deficits  Psychiatric: Normal affect  Skin: No rashes or lesions    LABS:  02-05    132<L>  |  96  |  25<H>  ----------------------------<  111<H>  3.2<L>   |  27  |  1.58<H>    Ca    7.5<L>      05 Feb 2024 06:25  Phos  3.5     02-05  Mg     1.1     02-04    TPro  5.4<L>  /  Alb  2.0<L>  /  TBili  0.5  /  DBili  x   /  AST  14  /  ALT  11  /  AlkPhos  58  02-05    Creatinine Trend: 1.58<--, 1.67<--                        8.5    19.01 )-----------( 312      ( 05 Feb 2024 06:25 )             25.4     PT/INR - ( 04 Feb 2024 14:20 )   PT: 13.5 sec;   INR: 1.19 ratio         PTT - ( 04 Feb 2024 14:20 )  PTT:32.6 sec

## 2024-02-05 NOTE — PROGRESS NOTE ADULT - ASSESSMENT
Patient is a 83F, uses cane, AAOx2-3, from [facility], with PMHx HTN, HLD, hypothyroidism, brain mass, cervical cancer, sent for shortness of breath and chills. Admitted for sepsis 2/2 pneumonia.  seen and examined at the bedside, reports she feels better, no SOB on O2. CT chest with pneumonia, on Zosyn, 1st Blood cultures growing GNR / E. Coli, repeat blood cultures will sent in AM

## 2024-02-05 NOTE — PROGRESS NOTE ADULT - PROBLEM SELECTOR PLAN 6
Has hx of GI bleeding, on PPI BID and carafate  P/w Hgb 10.6 (baseline ~9)   Likely hemoconcentrated due to dehydration  No sign of bleeding  Hold iron tab in the setting of sepsis likely due to decreased PO intake given current illness  Potassium 3.2 today  supplement given  monitor BMP

## 2024-02-06 LAB
-  AMOXICILLIN/CLAVULANIC ACID: SIGNIFICANT CHANGE UP
-  AMPICILLIN/SULBACTAM: SIGNIFICANT CHANGE UP
-  AMPICILLIN/SULBACTAM: SIGNIFICANT CHANGE UP
-  AMPICILLIN: SIGNIFICANT CHANGE UP
-  AMPICILLIN: SIGNIFICANT CHANGE UP
-  AZTREONAM: SIGNIFICANT CHANGE UP
-  AZTREONAM: SIGNIFICANT CHANGE UP
-  CEFAZOLIN: SIGNIFICANT CHANGE UP
-  CEFAZOLIN: SIGNIFICANT CHANGE UP
-  CEFEPIME: SIGNIFICANT CHANGE UP
-  CEFEPIME: SIGNIFICANT CHANGE UP
-  CEFOXITIN: SIGNIFICANT CHANGE UP
-  CEFOXITIN: SIGNIFICANT CHANGE UP
-  CEFTRIAXONE: SIGNIFICANT CHANGE UP
-  CEFTRIAXONE: SIGNIFICANT CHANGE UP
-  CEFUROXIME: SIGNIFICANT CHANGE UP
-  CIPROFLOXACIN: SIGNIFICANT CHANGE UP
-  CIPROFLOXACIN: SIGNIFICANT CHANGE UP
-  ERTAPENEM: SIGNIFICANT CHANGE UP
-  ERTAPENEM: SIGNIFICANT CHANGE UP
-  GENTAMICIN: SIGNIFICANT CHANGE UP
-  GENTAMICIN: SIGNIFICANT CHANGE UP
-  IMIPENEM: SIGNIFICANT CHANGE UP
-  IMIPENEM: SIGNIFICANT CHANGE UP
-  LEVOFLOXACIN: SIGNIFICANT CHANGE UP
-  LEVOFLOXACIN: SIGNIFICANT CHANGE UP
-  MEROPENEM: SIGNIFICANT CHANGE UP
-  MEROPENEM: SIGNIFICANT CHANGE UP
-  NITROFURANTOIN: SIGNIFICANT CHANGE UP
-  PIPERACILLIN/TAZOBACTAM: SIGNIFICANT CHANGE UP
-  PIPERACILLIN/TAZOBACTAM: SIGNIFICANT CHANGE UP
-  TOBRAMYCIN: SIGNIFICANT CHANGE UP
-  TOBRAMYCIN: SIGNIFICANT CHANGE UP
-  TRIMETHOPRIM/SULFAMETHOXAZOLE: SIGNIFICANT CHANGE UP
-  TRIMETHOPRIM/SULFAMETHOXAZOLE: SIGNIFICANT CHANGE UP
ANION GAP SERPL CALC-SCNC: 5 MMOL/L — SIGNIFICANT CHANGE UP (ref 5–17)
BUN SERPL-MCNC: 22 MG/DL — HIGH (ref 7–18)
CALCIUM SERPL-MCNC: 8.1 MG/DL — LOW (ref 8.4–10.5)
CHLORIDE SERPL-SCNC: 103 MMOL/L — SIGNIFICANT CHANGE UP (ref 96–108)
CO2 SERPL-SCNC: 27 MMOL/L — SIGNIFICANT CHANGE UP (ref 22–31)
CREAT SERPL-MCNC: 1.47 MG/DL — HIGH (ref 0.5–1.3)
CULTURE RESULTS: ABNORMAL
EGFR: 35 ML/MIN/1.73M2 — LOW
GLUCOSE SERPL-MCNC: 101 MG/DL — HIGH (ref 70–99)
HCT VFR BLD CALC: 30.2 % — LOW (ref 34.5–45)
HGB BLD-MCNC: 9.7 G/DL — LOW (ref 11.5–15.5)
MCHC RBC-ENTMCNC: 27.3 PG — SIGNIFICANT CHANGE UP (ref 27–34)
MCHC RBC-ENTMCNC: 32.1 GM/DL — SIGNIFICANT CHANGE UP (ref 32–36)
MCV RBC AUTO: 85.1 FL — SIGNIFICANT CHANGE UP (ref 80–100)
METHOD TYPE: SIGNIFICANT CHANGE UP
METHOD TYPE: SIGNIFICANT CHANGE UP
NRBC # BLD: 0 /100 WBCS — SIGNIFICANT CHANGE UP (ref 0–0)
ORGANISM # SPEC MICROSCOPIC CNT: ABNORMAL
PLATELET # BLD AUTO: 370 K/UL — SIGNIFICANT CHANGE UP (ref 150–400)
POTASSIUM SERPL-MCNC: 4.3 MMOL/L — SIGNIFICANT CHANGE UP (ref 3.5–5.3)
POTASSIUM SERPL-SCNC: 4.3 MMOL/L — SIGNIFICANT CHANGE UP (ref 3.5–5.3)
RBC # BLD: 3.55 M/UL — LOW (ref 3.8–5.2)
RBC # FLD: 15.2 % — HIGH (ref 10.3–14.5)
SODIUM SERPL-SCNC: 135 MMOL/L — SIGNIFICANT CHANGE UP (ref 135–145)
SPECIMEN SOURCE: SIGNIFICANT CHANGE UP
WBC # BLD: 11.09 K/UL — HIGH (ref 3.8–10.5)
WBC # FLD AUTO: 11.09 K/UL — HIGH (ref 3.8–10.5)

## 2024-02-06 RX ORDER — PIPERACILLIN AND TAZOBACTAM 4; .5 G/20ML; G/20ML
3.38 INJECTION, POWDER, LYOPHILIZED, FOR SOLUTION INTRAVENOUS EVERY 8 HOURS
Refills: 0 | Status: DISCONTINUED | OUTPATIENT
Start: 2024-02-06 | End: 2024-02-07

## 2024-02-06 RX ADMIN — PANTOPRAZOLE SODIUM 40 MILLIGRAM(S): 20 TABLET, DELAYED RELEASE ORAL at 05:22

## 2024-02-06 RX ADMIN — HEPARIN SODIUM 5000 UNIT(S): 5000 INJECTION INTRAVENOUS; SUBCUTANEOUS at 17:35

## 2024-02-06 RX ADMIN — PIPERACILLIN AND TAZOBACTAM 25 GRAM(S): 4; .5 INJECTION, POWDER, LYOPHILIZED, FOR SOLUTION INTRAVENOUS at 13:06

## 2024-02-06 RX ADMIN — PANTOPRAZOLE SODIUM 40 MILLIGRAM(S): 20 TABLET, DELAYED RELEASE ORAL at 17:36

## 2024-02-06 RX ADMIN — Medication 1 GRAM(S): at 21:04

## 2024-02-06 RX ADMIN — PIPERACILLIN AND TAZOBACTAM 25 GRAM(S): 4; .5 INJECTION, POWDER, LYOPHILIZED, FOR SOLUTION INTRAVENOUS at 21:09

## 2024-02-06 RX ADMIN — Medication 81 MILLIGRAM(S): at 13:06

## 2024-02-06 RX ADMIN — HEPARIN SODIUM 5000 UNIT(S): 5000 INJECTION INTRAVENOUS; SUBCUTANEOUS at 05:26

## 2024-02-06 RX ADMIN — Medication 1 GRAM(S): at 13:06

## 2024-02-06 RX ADMIN — SENNA PLUS 2 TABLET(S): 8.6 TABLET ORAL at 21:09

## 2024-02-06 RX ADMIN — Medication 50 MICROGRAM(S): at 05:24

## 2024-02-06 RX ADMIN — Medication 3 MILLIGRAM(S): at 21:09

## 2024-02-06 RX ADMIN — ATORVASTATIN CALCIUM 10 MILLIGRAM(S): 80 TABLET, FILM COATED ORAL at 21:09

## 2024-02-06 RX ADMIN — Medication 1 GRAM(S): at 08:23

## 2024-02-06 NOTE — PHYSICAL THERAPY INITIAL EVALUATION ADULT - ADDITIONAL COMMENTS
pt is poor historian but reports that she previously ambulated with cane and was able to complete ADLs and ambulate independently

## 2024-02-06 NOTE — PROGRESS NOTE ADULT - SUBJECTIVE AND OBJECTIVE BOX
NP Note discussed with  Primary Attending    Patient is a 83y old  Female who presents with a chief complaint of Sepsis (05 Feb 2024 15:31)      INTERVAL HPI/OVERNIGHT EVENTS: no new complaints    MEDICATIONS  (STANDING):  aspirin  chewable 81 milliGRAM(s) Oral daily  atorvastatin 10 milliGRAM(s) Oral at bedtime  heparin   Injectable 5000 Unit(s) SubCutaneous every 12 hours  levothyroxine 50 MICROGram(s) Oral daily  pantoprazole    Tablet 40 milliGRAM(s) Oral <User Schedule>  piperacillin/tazobactam IVPB.. 3.375 Gram(s) IV Intermittent every 12 hours  senna 2 Tablet(s) Oral at bedtime  sodium chloride 0.9%. 1000 milliLiter(s) (75 mL/Hr) IV Continuous <Continuous>  sucralfate 1 Gram(s) Oral <User Schedule>    MEDICATIONS  (PRN):  acetaminophen     Tablet .. 650 milliGRAM(s) Oral every 6 hours PRN Temp greater or equal to 38C (100.4F), Mild Pain (1 - 3)  aluminum hydroxide/magnesium hydroxide/simethicone Suspension 30 milliLiter(s) Oral every 4 hours PRN Dyspepsia  melatonin 3 milliGRAM(s) Oral at bedtime PRN Insomnia  ondansetron Injectable 4 milliGRAM(s) IV Push every 8 hours PRN Nausea and/or Vomiting      __________________________________________________  REVIEW OF SYSTEMS:    CONSTITUTIONAL: No fever,   EYES: no acute visual disturbances  NECK: No pain or stiffness  RESPIRATORY: No cough; No shortness of breath  CARDIOVASCULAR: No chest pain, no palpitations  GASTROINTESTINAL: No pain. No nausea or vomiting; No diarrhea   NEUROLOGICAL: No headache or numbness, no tremors  MUSCULOSKELETAL: No joint pain, no muscle pain  GENITOURINARY: no dysuria, no frequency, no hesitancy  PSYCHIATRY: no depression , no anxiety  ALL OTHER  ROS negative        Vital Signs Last 24 Hrs  T(C): 36.9 (06 Feb 2024 04:35), Max: 37 (05 Feb 2024 13:41)  T(F): 98.5 (06 Feb 2024 04:35), Max: 98.6 (05 Feb 2024 13:41)  HR: 82 (06 Feb 2024 04:35) (75 - 93)  BP: 119/63 (06 Feb 2024 04:35) (95/42 - 119/63)  BP(mean): 75 (06 Feb 2024 04:35) (75 - 89)  RR: 19 (06 Feb 2024 04:35) (17 - 20)  SpO2: 92% (06 Feb 2024 04:35) (91% - 96%)    Parameters below as of 06 Feb 2024 04:35  Patient On (Oxygen Delivery Method): nasal cannula  O2 Flow (L/min): 2      ________________________________________________  PHYSICAL EXAM:  GENERAL: NAD  HEENT: Normocephalic;  conjunctivae and sclerae clear; moist mucous membranes;   NECK : supple  CHEST/LUNG: no wheezing, non labored on 2L Oxygen support via N-C   HEART: S1 S2  regular; no murmurs, gallops or rubs  ABDOMEN: Soft, Nontender, Nondistended; Bowel sounds present  EXTREMITIES: no cyanosis; no edema; no calf tenderness  SKIN: warm and dry; no rash  NERVOUS SYSTEM:  Awake and alert; Oriented  to place, person  ; no new deficits    _________________________________________________  LABS:                        9.7    11.09 )-----------( 370      ( 06 Feb 2024 05:18 )             30.2     02-06    135  |  103  |  22<H>  ----------------------------<  101<H>  4.3   |  27  |  1.47<H>    Ca    8.1<L>      06 Feb 2024 05:18  Phos  3.5     02-05  Mg     1.1     02-04    TPro  5.4<L>  /  Alb  2.0<L>  /  TBili  0.5  /  DBili  x   /  AST  14  /  ALT  11  /  AlkPhos  58  02-05    PT/INR - ( 04 Feb 2024 14:20 )   PT: 13.5 sec;   INR: 1.19 ratio         PTT - ( 04 Feb 2024 14:20 )  PTT:32.6 sec  Urinalysis Basic - ( 06 Feb 2024 05:18 )    Color: x / Appearance: x / SG: x / pH: x  Gluc: 101 mg/dL / Ketone: x  / Bili: x / Urobili: x   Blood: x / Protein: x / Nitrite: x   Leuk Esterase: x / RBC: x / WBC x   Sq Epi: x / Non Sq Epi: x / Bacteria: x      CAPILLARY BLOOD GLUCOSE            RADIOLOGY & ADDITIONAL TESTS:  < from: CT Chest No Cont (02.05.24 @ 09:46) >    ACC: [id number] EXAM:  CT CHEST   ORDERED BY: SELENE PISANO     PROCEDURE DATE:  02/05/2024          INTERPRETATION:  EXAMINATION: CT CHEST    CLINICAL INDICATION: Dyspnea.    TECHNIQUE: Noncontrast CT of the chest was obtained.    COMPARISON: 6/20/2023, 6/24/2023.    FINDINGS:    AIRWAYS AND LUNGS: Scattered bronchial secretions.  Patchy and linear   bilateral lung opacities.    MEDIASTINUM AND PLEURA: There are no enlarged mediastinal, hilar or   axillary lymph nodes. The visualized portion of the thyroid gland is   unremarkable. Trace bilateral pleural effusion There is no pneumothorax.    HEART AND VESSELS: There is mild cardiomegaly.  There are atherosclerotic   calcifications of the aorta and coronary arteries.  There is no   pericardial effusion.    UPPER ABDOMEN: Images of the upper abdomen demonstrate left renal cyst.    BONES AND SOFT TISSUES: Scoliosis  The soft tissues are unremarkable.    TUBES/LINES: None.    IMPRESSION:  Multifocal atelectasis and pneumonia.    --- End of Report ---            ANGELA CARREON MD; Attending Radiologist  This document has been electronically signed. Feb 5 2024  8:50AM    < end of copied text >    Imaging Personally Reviewed:  YES/NO    Consultant(s) Notes Reviewed:   YES/ No    Care Discussed with Consultants :     Plan of care was discussed with patient and /or primary care giver; all questions and concerns were addressed and care was aligned with patient's wishes.

## 2024-02-06 NOTE — PROGRESS NOTE ADULT - ASSESSMENT
Patient is a 83F, uses cane, AAOx2-3, from [facility], with PMHx HTN, HLD, hypothyroidism, brain mass, cervical cancer, sent for shortness of breath and chills. Admitted for sepsis 2/2 pneumonia.  seen and examined at the bedside, reports she feels better, no SOB on O2. CT chest with pneumonia, on Zosyn, 1st Blood cultures growing GNR / E. Coli, repeat blood cultures sent this morning.

## 2024-02-06 NOTE — PROGRESS NOTE ADULT - PROBLEM SELECTOR PLAN 3
2/4 - GNR EColi detected from 1 bottle  repeat Blood cultures sent 2/6 - f/u results   ID Dr. Cardoza

## 2024-02-06 NOTE — PHYSICAL THERAPY INITIAL EVALUATION ADULT - DIAGNOSIS, PT EVAL
pt presents with generalized weakness and poor balance possibly due to diagnosis of PNA/sepsis impacting her ability to ambulate and perform bed mobility independently as per her PLOF

## 2024-02-06 NOTE — PROGRESS NOTE ADULT - PROBLEM SELECTOR PLAN 1
improving leukocytosis  Afebrile VSS   Continue Zosyn D2   1st Blood cultures from 2/4- growing GNR/ E.Coli   DR. Olvera is following  repeat blood cultures in AM   CT chest with multifocal atelectasis with pneumonia  incentive spirometry   c/w with IVF improving leukocytosis  resolving   Afebrile VSS   Continue Zosyn D3  1st Blood cultures from 2/4- growing GNR/ E.Coli   DR. Olvera is following  repeat blood cultures  sent 2/6   CT chest with multifocal atelectasis with pneumonia  incentive spirometry   c/w with IVF

## 2024-02-06 NOTE — PROGRESS NOTE ADULT - SUBJECTIVE AND OBJECTIVE BOX
MR#[id number]  PATIENT NAME:CHUNG VENTURA    DATE OF SERVICE: 02-06-24   Patient was seen and examined by Iker Kirkpatrick MD on    02-06-24   Interim events noted.Consultant notes ,Labs,Telemetry reviewed by me       HOSPITAL COURSE: HPI:  Patient is a 83F, uses cane, AAOx3, from [facility], with PMHx HTN, HLD, hypothyroidism, brain mass, cervical cancer, sent for shortness of breath and chills. She is AAOx3 and says nothing is happening. She just endorses being hungry as she has not eaten anything. She denies any complaints. Patient denies headache, nausea, vomit, chest pain, shortness of breath, cough, lightheadedness, abdominal pain, diarrhea, constipation, dark/bloody stool, dysuria, hematuria, loss of strength, or loss of sensation.  Called patient's daughter Nory [phone] and provided update. (04 Feb 2024 19:10)      INTERIM EVENTS:Patient seen at bedside ,interim events noted.      PMH -reviewed admission note, no change since admission  HEART FAILURE: Acute[ ]Chronic[ ] Systolic[ ] Diastolic[ ] Combined Systolic and Diastolic[ ]  CAD[ ] CABG[ ] PCI[ ]  DEVICES[ ] PPM[ ] ICD[ ] ILR[ ]  ATRIAL FIBRILLATION[ ] Paroxysmal[ ] Permanent[ ] CHADS2-[  ]  ROXI[ ] CKD1[ ] CKD2[ ] CKD3[ ] CKD4[ ] ESRD[ ]  COPD[ ] HTN[ ]   DM[ ] Type1[ ] Type 2[ ]   CVA[ ] Paresis[ ]    AMBULATION: Assisted[ ] Cane/walker[ ] Independent[ ]    MEDICATIONS  (STANDING):  aspirin  chewable 81 milliGRAM(s) Oral daily  atorvastatin 10 milliGRAM(s) Oral at bedtime  heparin   Injectable 5000 Unit(s) SubCutaneous every 12 hours  levothyroxine 50 MICROGram(s) Oral daily  pantoprazole    Tablet 40 milliGRAM(s) Oral <User Schedule>  piperacillin/tazobactam IVPB.. 3.375 Gram(s) IV Intermittent every 8 hours  senna 2 Tablet(s) Oral at bedtime  sodium chloride 0.9%. 1000 milliLiter(s) (75 mL/Hr) IV Continuous <Continuous>  sucralfate 1 Gram(s) Oral <User Schedule>    MEDICATIONS  (PRN):  acetaminophen     Tablet .. 650 milliGRAM(s) Oral every 6 hours PRN Temp greater or equal to 38C (100.4F), Mild Pain (1 - 3)  aluminum hydroxide/magnesium hydroxide/simethicone Suspension 30 milliLiter(s) Oral every 4 hours PRN Dyspepsia  melatonin 3 milliGRAM(s) Oral at bedtime PRN Insomnia  ondansetron Injectable 4 milliGRAM(s) IV Push every 8 hours PRN Nausea and/or Vomiting            REVIEW OF SYSTEMS:  Constitutional: [ ] fever, [ ]weight loss,  [ ]fatigue [ ]weight gain  Eyes: [ ] visual changes  Respiratory: [ ]shortness of breath;  [ ] cough, [ ]wheezing, [ ]chills, [ ]hemoptysis  Cardiovascular: [ ] chest pain, [ ]palpitations, [ ]dizziness,  [ ]leg swelling[ ]orthopnea[ ]PND  Gastrointestinal: [ ] abdominal pain, [ ]nausea, [ ]vomiting,  [ ]diarrhea [ ]Constipation [ ]Melena  Genitourinary: [ ] dysuria, [ ] hematuria [ ]Love  Neurologic: [ ] headaches [ ] tremors[ ]weakness [ ]Paralysis Right[ ] Left[ ]  Skin: [ ] itching, [ ]burning, [ ] rashes  Endocrine: [ ] heat or cold intolerance  Musculoskeletal: [ ] joint pain or swelling; [ ] muscle, back, or extremity pain  Psychiatric: [ ] depression, [ ]anxiety, [ ]mood swings, or [ ]difficulty sleeping  Hematologic: [ ] easy bruising, [ ] bleeding gums    [ ] All remaining systems negative except as per above.   [ ]Unable to obtain.  [x] No change in ROS since admission      Vital Signs Last 24 Hrs  T(C): 37.2 (06 Feb 2024 13:30), Max: 37.2 (06 Feb 2024 13:30)  T(F): 99 (06 Feb 2024 13:30), Max: 99 (06 Feb 2024 13:30)  HR: 90 (06 Feb 2024 13:59) (82 - 100)  BP: 135/64 (06 Feb 2024 13:59) (118/69 - 135/64)  BP(mean): 94 (06 Feb 2024 13:30) (75 - 94)  RR: 18 (06 Feb 2024 13:30) (18 - 20)  SpO2: 93% (06 Feb 2024 13:59) (91% - 93%)    Parameters below as of 06 Feb 2024 13:59  Patient On (Oxygen Delivery Method): nasal cannula  O2 Flow (L/min): 3    I&O's Summary      PHYSICAL EXAM:  General: No acute distress BMI-  HEENT: EOMI, PERRL  Neck: Supple, [ ] JVD  Lungs: Equal air entry bilaterally; [ ] rales [ ] wheezing [ ] rhonchi  Heart: Regular rate and rhythm; [x ] murmur   2/6 [ x] systolic [ ] diastolic [ ] radiation[ ] rubs [ ]  gallops  Abdomen: Nontender, bowel sounds present  Extremities: No clubbing, cyanosis, [ ] edema [ ]Pulses  equal and intact  Nervous system:  Alert & Oriented X3, no focal deficits  Psychiatric: Normal affect  Skin: No rashes or lesions    LABS:  02-06    135  |  103  |  22<H>  ----------------------------<  101<H>  4.3   |  27  |  1.47<H>    Ca    8.1<L>      06 Feb 2024 05:18  Phos  3.5     02-05    TPro  5.4<L>  /  Alb  2.0<L>  /  TBili  0.5  /  DBili  x   /  AST  14  /  ALT  11  /  AlkPhos  58  02-05    Creatinine Trend: 1.47<--, 1.58<--, 1.67<--                        9.7    11.09 )-----------( 370      ( 06 Feb 2024 05:18 )             30.2

## 2024-02-06 NOTE — PROGRESS NOTE ADULT - PROBLEM SELECTOR PLAN 3
2/4 - GNR EColi detected from 1 bottle  repeat Blood cultures in AM   ID Dr. Cardoza 2/4 - GNR EColi detected from 1 bottle  repeat Blood cultures sent 2/6 - f/u results   ID Dr. Cardoza

## 2024-02-06 NOTE — PROGRESS NOTE ADULT - PROBLEM SELECTOR PLAN 1
improving leukocytosis  resolving   Afebrile VSS   Continue Zosyn D3  1st Blood cultures from 2/4- growing GNR/ E.Coli   DR. Olvera is following  repeat blood cultures  sent 2/6   CT chest with multifocal atelectasis with pneumonia  incentive spirometry   c/w with IVF

## 2024-02-06 NOTE — PHYSICAL THERAPY INITIAL EVALUATION ADULT - PERTINENT HX OF CURRENT PROBLEM, REHAB EVAL
Patient is a 83F, uses cane, AAOx3, from [facility], with PMHx HTN, HLD, hypothyroidism, brain mass, cervical cancer, sent for shortness of breath and chills. She is AAOx3 and says nothing is happening. She just endorses being hungry as she has not eaten anything. She denies any complaints. Patient denies headache, nausea, vomit, chest pain, shortness of breath, cough, lightheadedness, abdominal pain, diarrhea, constipation, dark/bloody stool, dysuria, hematuria, loss of strength, or loss of sensation.

## 2024-02-06 NOTE — PROGRESS NOTE ADULT - ASSESSMENT
Patient is a 83F, uses cane, AAOx2-3, from [facility], with PMHx HTN, HLD, hypothyroidism, brain mass, cervical cancer, sent for shortness of breath and chills. Admitted for sepsis 2/2 pneumonia.  seen and examined at the bedside, reports she feels better, no SOB on O2. CT chest with pneumonia, on Zosyn, 1st Blood cultures growing GNR / E. Coli, repeat blood cultures will sent in AM   Patient is a 83F, uses cane, AAOx2-3, from [facility], with PMHx HTN, HLD, hypothyroidism, brain mass, cervical cancer, sent for shortness of breath and chills. Admitted for sepsis 2/2 pneumonia.  seen and examined at the bedside, reports she feels better, no SOB on O2. CT chest with pneumonia, on Zosyn, 1st Blood cultures growing GNR / E. Coli, repeat blood cultures sent this morning.

## 2024-02-07 DIAGNOSIS — G93.89 OTHER SPECIFIED DISORDERS OF BRAIN: ICD-10-CM

## 2024-02-07 RX ORDER — CEFTRIAXONE 500 MG/1
1000 INJECTION, POWDER, FOR SOLUTION INTRAMUSCULAR; INTRAVENOUS EVERY 24 HOURS
Refills: 0 | Status: DISCONTINUED | OUTPATIENT
Start: 2024-02-07 | End: 2024-02-10

## 2024-02-07 RX ADMIN — Medication 1 GRAM(S): at 10:59

## 2024-02-07 RX ADMIN — SENNA PLUS 2 TABLET(S): 8.6 TABLET ORAL at 21:09

## 2024-02-07 RX ADMIN — CEFTRIAXONE 100 MILLIGRAM(S): 500 INJECTION, POWDER, FOR SOLUTION INTRAMUSCULAR; INTRAVENOUS at 21:09

## 2024-02-07 RX ADMIN — Medication 1 GRAM(S): at 21:08

## 2024-02-07 RX ADMIN — HEPARIN SODIUM 5000 UNIT(S): 5000 INJECTION INTRAVENOUS; SUBCUTANEOUS at 05:22

## 2024-02-07 RX ADMIN — PIPERACILLIN AND TAZOBACTAM 25 GRAM(S): 4; .5 INJECTION, POWDER, LYOPHILIZED, FOR SOLUTION INTRAVENOUS at 05:23

## 2024-02-07 RX ADMIN — PANTOPRAZOLE SODIUM 40 MILLIGRAM(S): 20 TABLET, DELAYED RELEASE ORAL at 05:23

## 2024-02-07 RX ADMIN — Medication 50 MICROGRAM(S): at 05:23

## 2024-02-07 RX ADMIN — PANTOPRAZOLE SODIUM 40 MILLIGRAM(S): 20 TABLET, DELAYED RELEASE ORAL at 18:32

## 2024-02-07 RX ADMIN — Medication 81 MILLIGRAM(S): at 18:30

## 2024-02-07 RX ADMIN — PIPERACILLIN AND TAZOBACTAM 25 GRAM(S): 4; .5 INJECTION, POWDER, LYOPHILIZED, FOR SOLUTION INTRAVENOUS at 13:24

## 2024-02-07 RX ADMIN — ATORVASTATIN CALCIUM 10 MILLIGRAM(S): 80 TABLET, FILM COATED ORAL at 21:09

## 2024-02-07 RX ADMIN — Medication 1 GRAM(S): at 13:24

## 2024-02-07 RX ADMIN — Medication 3 MILLIGRAM(S): at 21:09

## 2024-02-07 RX ADMIN — HEPARIN SODIUM 5000 UNIT(S): 5000 INJECTION INTRAVENOUS; SUBCUTANEOUS at 18:30

## 2024-02-07 NOTE — PROGRESS NOTE ADULT - ASSESSMENT
declines 83 year old, Female, from [facility], uses cane, AAOx2-3, with PMH of HTN, HLD, hypothyroidism, brain mass, & cervical cancer.  Presented for shortness of breath and chills. Admitted for Sepsis 2/2 Asymptomatic UTI & E. Coli Bacteremia.

## 2024-02-07 NOTE — PROGRESS NOTE ADULT - PROBLEM SELECTOR PLAN 4
- 2/4 (+) Bld cx- GNR EColi   - 2/6 Repeat blood cultures pending-f/u results   - ID-Dr. Cardoza consulted, appreciated

## 2024-02-07 NOTE — PROGRESS NOTE ADULT - PROBLEM SELECTOR PLAN 7
- Resolved at last check   - Attributed to decreased PO intake   - Continue to monitor BMP in AM-f/u results

## 2024-02-07 NOTE — DIETITIAN INITIAL EVALUATION ADULT - MALNUTRITION
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

## 2024-02-07 NOTE — PROGRESS NOTE ADULT - ASSESSMENT
83 year old lady with 2 brain masses, pancreatic cyst and dilated pancreatic duct, cervical ca s/p chemoRT 17 years ago.  she was admitted for UTI and sepsis    1.  brain masses with edema  she is somnolent today.  Consider neuro consult  cont to monitor    2. h/o cervical ca s/p chemoRT  no sign of recurrence    3. Discussion with her and her daughter  she does not want any further imaging or treatment of cancer  f/u with primary team and assisted living    4. UTI and sepsis  Management per primary team  Continues on zosyn    Will cont to follow

## 2024-02-07 NOTE — DIETITIAN INITIAL EVALUATION ADULT - PERTINENT MEDS FT
MEDICATIONS  (STANDING):  aspirin  chewable 81 milliGRAM(s) Oral daily  atorvastatin 10 milliGRAM(s) Oral at bedtime  cefTRIAXone   IVPB 1000 milliGRAM(s) IV Intermittent every 24 hours  heparin   Injectable 5000 Unit(s) SubCutaneous every 12 hours  levothyroxine 50 MICROGram(s) Oral daily  pantoprazole    Tablet 40 milliGRAM(s) Oral <User Schedule>  senna 2 Tablet(s) Oral at bedtime  sodium chloride 0.9%. 1000 milliLiter(s) (75 mL/Hr) IV Continuous <Continuous>  sucralfate 1 Gram(s) Oral <User Schedule>    MEDICATIONS  (PRN):  acetaminophen     Tablet .. 650 milliGRAM(s) Oral every 6 hours PRN Temp greater or equal to 38C (100.4F), Mild Pain (1 - 3)  aluminum hydroxide/magnesium hydroxide/simethicone Suspension 30 milliLiter(s) Oral every 4 hours PRN Dyspepsia  melatonin 3 milliGRAM(s) Oral at bedtime PRN Insomnia  ondansetron Injectable 4 milliGRAM(s) IV Push every 8 hours PRN Nausea and/or Vomiting

## 2024-02-07 NOTE — DISCHARGE NOTE PROVIDER - NSDCMRMEDTOKEN_GEN_ALL_CORE_FT
amLODIPine 5 mg oral tablet: 1 tab(s) orally once a day  ascorbic acid 500 mg oral tablet: 1 tab(s) orally once a day  aspirin 81 mg oral tablet, chewable: 1 tab(s) orally once a day  enalapril 10 mg oral tablet: 1 tab(s) orally once a day  ferrous sulfate 325 mg (65 mg elemental iron) oral tablet: 1 tab(s) orally 2 times a day  levothyroxine 50 mcg (0.05 mg) oral tablet: 1 tab(s) orally once a day Take in empty stomach one hour before breakfast. Do not take with vitamins or iron pills.   TAKE 2 TABS ON SUNDAY  Multiple Vitamins oral capsule: 1 cap(s) orally once a day  nystatin 100,000 units/mL oral suspension: 5 milliliter(s) orally 4 times a day  pantoprazole 40 mg oral delayed release tablet: 1 tab(s) orally every 12 hours  senna leaf extract oral tablet: 2 tab(s) orally once a day (at bedtime)  simvastatin 10 mg oral tablet: 1 tab(s) orally once a day  sucralfate 1 g oral tablet: 1 tab(s) orally 4 times a day   amLODIPine 5 mg oral tablet: 1 tab(s) orally once a day  ascorbic acid 500 mg oral tablet: 1 tab(s) orally once a day  aspirin 81 mg oral tablet, chewable: 1 tab(s) orally once a day  cefuroxime 500 mg oral tablet: 1 tab(s) orally 2 times a day Start on 2/11  enalapril 10 mg oral tablet: 1 tab(s) orally once a day  levothyroxine 50 mcg (0.05 mg) oral tablet: 1 tab(s) orally once a day Take in empty stomach one hour before breakfast. Do not take with vitamins or iron pills.   TAKE 2 TABS ON SUNDAY  Multiple Vitamins oral capsule: 1 cap(s) orally once a day  nystatin 100,000 units/mL oral suspension: 5 milliliter(s) orally 4 times a day  pantoprazole 40 mg oral delayed release tablet: 1 tab(s) orally every 12 hours  senna leaf extract oral tablet: 2 tab(s) orally once a day (at bedtime)  simvastatin 10 mg oral tablet: 1 tab(s) orally once a day  sucralfate 1 g oral tablet: 1 tab(s) orally 4 times a day

## 2024-02-07 NOTE — PROGRESS NOTE ADULT - PROBLEM SELECTOR PLAN 1
H/o Known brain mass   - Per report dtr does not want any further imaging or cancer treatment   - C/w Supportive care   - [facility]-Dr. Ledbetter/Edith consulted, appreciated

## 2024-02-07 NOTE — DIETITIAN INITIAL EVALUATION ADULT - PERTINENT LABORATORY DATA
02-06    135  |  103  |  22<H>  ----------------------------<  101<H>  4.3   |  27  |  1.47<H>    Ca    8.1<L>      06 Feb 2024 05:18    A1C with Estimated Average Glucose Result: 5.5 % (06-26-23 @ 05:35)

## 2024-02-07 NOTE — DISCHARGE NOTE PROVIDER - HOSPITAL COURSE
83 year old, Female, from [facility], uses cane, AAOx2-3, with PMH of HTN, HLD, hypothyroidism, brain mass, & cervical cancer.  Presented for shortness of breath and chills. Admitted for Sepsis 2/2 Asymptomatic UTI & E. Coli Bacteremia.     THIS IS A BRIEF SUMMARY.  FOR A FULL HOSPITAL COURSE SEE MEDICAL RECORDS OR [facility].    Patient is medically cleared by Attending for discharge.    a/o 2/7 83F, uses cane, AAOx3, from [facility], with PMHx HTN, HLD, hypothyroidism, brain mass, cervical cancer, sent for shortness of breath and chills. Presented for shortness of breath and chills. Admitted for Sepsis 2/2 PNA & E. Coli Bacteremia, UTI.    CT chest showed multifocal atelectasis and PNA. ID followed, initially started on zosyn, de-escalated to IV ceftriaxone. Pt will be going with Ceftin 500mg BID for 7 more days. Repeat Bcx on 2/6: NGTD  Hem/onc followed for brain masses with edema -- Dr Sharma Discussion with her and her daughter. she does not want any further imaging or treatment of cancer  PT recs SARAN    Pt is medically optimized for discharge, discussed with the attending Dr Kirkpatrick  This is just a brief hospital course, please refer to the progress notes for detailed information

## 2024-02-07 NOTE — DISCHARGE NOTE PROVIDER - NSDCCPCAREPLAN_GEN_ALL_CORE_FT
PRINCIPAL DISCHARGE DIAGNOSIS  Diagnosis: Sepsis due to urinary tract infection  Assessment and Plan of Treatment: You presented to the hospital with a urine and blood infection.  You presented with an asymptomatic UTI.  You were treated with IV antibiotics.         SECONDARY DISCHARGE DIAGNOSES  Diagnosis: Pneumonia  Assessment and Plan of Treatment: Your CT chest showed PNA.  You were treated with IV antibiotics.        Diagnosis: Bacteremia  Assessment and Plan of Treatment: You presented to the hospital with a urine and blood infection.  You presented with an E. Coli blood infection.  You were treated with IV antibiotics.        Diagnosis: ROXI (acute kidney injury)  Assessment and Plan of Treatment: You presented with decreased kidney function.  You were treated with IV fluids and your kidney function normalized....or.....remains elevated.  Please follow up with your PCP and/or Nephrologist within three days for continued evaluation and management.        Diagnosis: Hypothyroidism  Assessment and Plan of Treatment: You have a history of hypothyroidism for which you take Levothyroxine.  Please continue taking your medication as prescribed.       Diagnosis: HTN (hypertension)  Assessment and Plan of Treatment: You have a history of high blood pressure for which you take Amlodipine, Enalapril   While in the hospital your blood pressure medication was not given to you b/c you had an infection and your blood pressure was normal.  Please resume your home medications.  Please follow up with your PCP within one week to inform of your hospitalization.        Diagnosis: Hypokalemia  Assessment and Plan of Treatment: Your potassium was supplemented.  You had hypokalemia that means your blood was low in potassium.  Potassium helps your muscle cells work properly, including your heart muscle.  Monitor for symtoms such as muscle cramps, or muscle twitches.  You are encouraged to have your potassium checked regularly since you've had hypokalemia.      Diagnosis: Gastritis  Assessment and Plan of Treatment: You have a history of GI bleeding for which you take PPI BID and Carafate.  Please continue taking your medication as prescribed.       Diagnosis: Brain mass  Assessment and Plan of Treatment: You presented with a  known brain mass.  Please follow up with your PCP and/or Hematologit-Oncologist for continued evaluation and management.         PRINCIPAL DISCHARGE DIAGNOSIS  Diagnosis: Sepsis due to urinary tract infection  Assessment and Plan of Treatment: You presented to the hospital with a urine and blood infection.  You presented with an asymptomatic UTI.  You were treated with IV antibiotics.  Finish the medication even if you feel better after you have only taken some of the medication.  Drink enough water and fluids to keep your urine clear or pale yellow.  Avoid caffeine, tea, and carbonated beverages. They tend to irritate your bladder.  Empty your bladder often. Avoid holding urine for long periods of time.  SEEK MEDICAL CARE IF:  You have back pain.  You develop a fever.  Your symptoms do not begin to resolve within 3 days.  SEEK IMMEDIATE MEDICAL CARE IF:  You have severe back pain or lower abdominal pain.  You develop chills.  You have nausea or vomiting.  You have continued burning or discomfort with urination.  You are to complete oral antibiotics for 7 more days after discharge        SECONDARY DISCHARGE DIAGNOSES  Diagnosis: Brain mass  Assessment and Plan of Treatment: You presented with a  known brain mass.  Please follow up with your PCP and/or Hematologit-Oncologist for continued evaluation and management.  Discussion with you and your daughter  does not want any further imaging or treatment of cancer      Diagnosis: Pneumonia  Assessment and Plan of Treatment: Your CT chest showed PNA.  You were treated with IV antibiotics. You are to complete oral antibiotics for 7 more days after discharge  Pneumonia is a lung infection that can cause a fever, cough, and trouble breathing.  Nutrition is important, eat small frequent meals.  Get lots of rest and drink fluids.  Call your health care provider upon arrival home from hospital and make a follow up appointment for one week.  If your cough worsens, you develop fever greater than 101', you have shaking chills, a fast heartbeat, trouble breathing and/or feel your are breathing much faster than usual, call your healthcare provider.  Make sure you wash your hands frequently.      Diagnosis: Bacteremia  Assessment and Plan of Treatment: You presented to the hospital with a urine and blood infection.  You presented with an E. Coli blood infection.  You were treated with IV antibiotics.    You are to complete oral antibiotics for 7 more days after discharge  Repeat bcx on 2/6: no growth to date  bacteremia is the presence of bacteria in the bloodstream. Complete antibiotic course as infectious doctor recommended. If you have fever, chills, loss of appetite of nausea/ vomiting, call health care provider and follow the instruction.   follow up with your primary doctor in community within 2 weeks from discharge from hospital.    Diagnosis: ROXI (acute kidney injury)  Assessment and Plan of Treatment: You presented with decreased kidney function. You were treated with IV fluids and your kidney function improved.  Please follow up with your PCP and/or Nephrologist within three days for continued evaluation and management.    Diagnosis: Hypokalemia  Assessment and Plan of Treatment: Your potassium was supplemented.  You had hypokalemia that means your blood was low in potassium.  Potassium helps your muscle cells work properly, including your heart muscle.  Monitor for symtoms such as muscle cramps, or muscle twitches. You are encouraged to have your potassium checked regularly since you've had hypokalemia.  Low potassium symptoms may include:  Weakness  Fatigue  Muscle cramps  Constipation  Abnormal heart rhythms (arrhythmias) are the most worrisome complication of very low potassium levels  foods rich in potassium include: Bananas, oranges, cantaloupe, honeydew, apricots, grapefruit (some dried fruits, such as prunes, raisins, and dates, are also high in potassium)  Cooked spinach.  Cooked broccoli.  Potatoes.  Sweet potatoes.  Mushrooms.  Peas.  Cucumbers.      Diagnosis: Gastritis  Assessment and Plan of Treatment: You have a history of GI bleeding for which you take PPI twice a day and Carafate.  Please continue taking your medication as prescribed.    Diagnosis: HTN (hypertension)  Assessment and Plan of Treatment: You have a history of high blood pressure for which you take Amlodipine, Enalapril   While in the hospital your blood pressure medication was not given to you because you had an infection and your blood pressure was normal.  Please resume your home medications.  Please follow up with your PCP within one week to inform of your hospitalization.        Diagnosis: HLD (hyperlipidemia)  Assessment and Plan of Treatment: Continue to take your statin    Diagnosis: Hypothyroidism  Assessment and Plan of Treatment: You have a history of hypothyroidism for which you take Levothyroxine.  Please continue taking your medication as prescribed.

## 2024-02-07 NOTE — PROGRESS NOTE ADULT - PROBLEM SELECTOR PLAN 6
- P/w SCr 1.67 (baseline 0.6)  - Likely prerenal due to sepsis  - Continue to monitor BMP in AM-f/u results

## 2024-02-07 NOTE — PROGRESS NOTE ADULT - ASSESSMENT
83 year old, Female, from [facility], uses cane, AAOx2-3, with PMH of HTN, HLD, hypothyroidism, brain mass, & cervical cancer.  Presented for shortness of breath and chills. Admitted for Sepsis 2/2 Asymptomatic UTI & E. Coli Bacteremia.

## 2024-02-07 NOTE — DIETITIAN INITIAL EVALUATION ADULT - OTHER INFO
Pt seen, difficulty obtaining verbal info. Pt seems confused . Pt's dinner tray observed, <50% taken. RM reports pt ate better at lunch. Observed pt w/ severe PCM (sever muscle wasting (distal hand) and severe loss body fat. Pt identified w/ severe PCM by JOSEE (X.Y) June 29, 2023 and noted as 5'5/ 86.6#. ?ht/ ?wt. Pt was recommended for Enlive BID (pt denies Ensure)

## 2024-02-07 NOTE — PROGRESS NOTE ADULT - SUBJECTIVE AND OBJECTIVE BOX
Patient is a 83y old  Female who presents with a chief complaint of Sepsis (04 Feb 2024 19:10)      HPI:  Patient is a 83F, uses cane, AAOx3, from [facility], with PMHx HTN, HLD, hypothyroidism, brain mass, cervical cancer, sent for shortness of breath and chills. She is AAOx3 and says nothing is happening. She just endorses being hungry as she has not eaten anything. She denies any complaints. Patient denies headache, nausea, vomit, chest pain, shortness of breath, cough, lightheadedness, abdominal pain, diarrhea, constipation, dark/bloody stool, dysuria, hematuria, loss of strength, or loss of sensation.  Called patient's daughter Nory [phone] and provided update. (04 Feb 2024 19:10)     Subjective:  Patient seen at bedside.  Somnolent, unable to answer questions appropriately    ROS:  Unable to obtain    MEDICATIONS  (STANDING):  aspirin  chewable 81 milliGRAM(s) Oral daily  atorvastatin 10 milliGRAM(s) Oral at bedtime  heparin   Injectable 5000 Unit(s) SubCutaneous every 12 hours  levothyroxine 50 MICROGram(s) Oral daily  pantoprazole    Tablet 40 milliGRAM(s) Oral <User Schedule>  piperacillin/tazobactam IVPB.. 3.375 Gram(s) IV Intermittent every 12 hours  potassium chloride   Powder 40 milliEquivalent(s) Oral every 4 hours  senna 2 Tablet(s) Oral at bedtime  sodium chloride 0.9%. 1000 milliLiter(s) (60 mL/Hr) IV Continuous <Continuous>  sucralfate 1 Gram(s) Oral <User Schedule>    MEDICATIONS  (PRN):  acetaminophen     Tablet .. 650 milliGRAM(s) Oral every 6 hours PRN Temp greater or equal to 38C (100.4F), Mild Pain (1 - 3)  aluminum hydroxide/magnesium hydroxide/simethicone Suspension 30 milliLiter(s) Oral every 4 hours PRN Dyspepsia  melatonin 3 milliGRAM(s) Oral at bedtime PRN Insomnia  ondansetron Injectable 4 milliGRAM(s) IV Push every 8 hours PRN Nausea and/or Vomiting      Allergies    No Known Allergies    Intolerances    Vital Signs Last 24 Hrs  T(C): 36.6 (07 Feb 2024 05:40), Max: 37.2 (06 Feb 2024 13:30)  T(F): 97.8 (07 Feb 2024 05:40), Max: 99 (06 Feb 2024 13:30)  HR: 89 (07 Feb 2024 05:40) (89 - 100)  BP: 153/72 (07 Feb 2024 05:40) (132/75 - 153/72)  BP(mean): 92 (07 Feb 2024 05:40) (90 - 94)  RR: 18 (07 Feb 2024 05:40) (18 - 18)  SpO2: 95% (07 Feb 2024 05:40) (92% - 96%)    Parameters below as of 07 Feb 2024 05:40  Patient On (Oxygen Delivery Method): nasal cannula  O2 Flow (L/min): 2    PHYSICAL EXAM  General: adult in NAD  HEENT: clear oropharynx, anicteric sclera, pink conjunctiva  Neck: supple  CV: normal S1/S2 with no murmur rubs or gallops  Lungs: positive air movement b/l ant lungs,clear to auscultation poor inspiratory effort  Abdomen: soft non-tender non-distended, no hepatosplenomegaly  Ext: no clubbing cyanosis or edema  Skin: no rashes and no petechiae  Neuro: Somnolent.      LABS:                                       9.7    11.09 )-----------( 370      ( 06 Feb 2024 05:18 )             30.2   02-06    135  |  103  |  22<H>  ----------------------------<  101<H>  4.3   |  27  |  1.47<H>    Ca    8.1<L>      06 Feb 2024 05:18

## 2024-02-07 NOTE — PROGRESS NOTE ADULT - SUBJECTIVE AND OBJECTIVE BOX
MR#[id number]  PATIENT NAME:CHUNG VENTURA    DATE OF SERVICE: 02-07-24   Patient was seen and examined by Iker Kirkpatrick MD on    02-07-24   Interim events noted.Consultant notes ,Labs,Telemetry reviewed by me       HOSPITAL COURSE: HPI:  Patient is a 83F, uses cane, AAOx3, from [facility], with PMHx HTN, HLD, hypothyroidism, brain mass, cervical cancer, sent for shortness of breath and chills. She is AAOx3 and says nothing is happening. She just endorses being hungry as she has not eaten anything. She denies any complaints. Patient denies headache, nausea, vomit, chest pain, shortness of breath, cough, lightheadedness, abdominal pain, diarrhea, constipation, dark/bloody stool, dysuria, hematuria, loss of strength, or loss of sensation.  Called patient's daughter Nory [phone] and provided update. (04 Feb 2024 19:10)      INTERIM EVENTS:Patient seen at bedside ,interim events noted.      PMH -reviewed admission note, no change since admission  HEART FAILURE: Acute[ ]Chronic[ ] Systolic[ ] Diastolic[ ] Combined Systolic and Diastolic[ ]  CAD[ ] CABG[ ] PCI[ ]  DEVICES[ ] PPM[ ] ICD[ ] ILR[ ]  ATRIAL FIBRILLATION[ ] Paroxysmal[ ] Permanent[ ] CHADS2-[  ]  ROXI[ ] CKD1[ ] CKD2[ ] CKD3[ ] CKD4[ ] ESRD[ ]  COPD[ ] HTN[ ]   DM[ ] Type1[ ] Type 2[ ]   CVA[ ] Paresis[ ]    AMBULATION: Assisted[ ] Cane/walker[ ] Independent[ ]    MEDICATIONS  (STANDING):  aspirin  chewable 81 milliGRAM(s) Oral daily  atorvastatin 10 milliGRAM(s) Oral at bedtime  cefTRIAXone   IVPB 1000 milliGRAM(s) IV Intermittent every 24 hours  heparin   Injectable 5000 Unit(s) SubCutaneous every 12 hours  levothyroxine 50 MICROGram(s) Oral daily  pantoprazole    Tablet 40 milliGRAM(s) Oral <User Schedule>  senna 2 Tablet(s) Oral at bedtime  sodium chloride 0.9%. 1000 milliLiter(s) (75 mL/Hr) IV Continuous <Continuous>  sucralfate 1 Gram(s) Oral <User Schedule>    MEDICATIONS  (PRN):  acetaminophen     Tablet .. 650 milliGRAM(s) Oral every 6 hours PRN Temp greater or equal to 38C (100.4F), Mild Pain (1 - 3)  aluminum hydroxide/magnesium hydroxide/simethicone Suspension 30 milliLiter(s) Oral every 4 hours PRN Dyspepsia  melatonin 3 milliGRAM(s) Oral at bedtime PRN Insomnia  ondansetron Injectable 4 milliGRAM(s) IV Push every 8 hours PRN Nausea and/or Vomiting            REVIEW OF SYSTEMS:  Constitutional: [ ] fever, [ ]weight loss,  [ ]fatigue [ ]weight gain  Eyes: [ ] visual changes  Respiratory: [ ]shortness of breath;  [ ] cough, [ ]wheezing, [ ]chills, [ ]hemoptysis  Cardiovascular: [ ] chest pain, [ ]palpitations, [ ]dizziness,  [ ]leg swelling[ ]orthopnea[ ]PND  Gastrointestinal: [ ] abdominal pain, [ ]nausea, [ ]vomiting,  [ ]diarrhea [ ]Constipation [ ]Melena  Genitourinary: [ ] dysuria, [ ] hematuria [ ]Love  Neurologic: [ ] headaches [ ] tremors[ ]weakness [ ]Paralysis Right[ ] Left[ ]  Skin: [ ] itching, [ ]burning, [ ] rashes  Endocrine: [ ] heat or cold intolerance  Musculoskeletal: [ ] joint pain or swelling; [ ] muscle, back, or extremity pain  Psychiatric: [ ] depression, [ ]anxiety, [ ]mood swings, or [ ]difficulty sleeping  Hematologic: [ ] easy bruising, [ ] bleeding gums    [ ] All remaining systems negative except as per above.   [ ]Unable to obtain.  [x] No change in ROS since admission      Vital Signs Last 24 Hrs  T(C): 36.9 (07 Feb 2024 20:16), Max: 37 (07 Feb 2024 14:25)  T(F): 98.5 (07 Feb 2024 20:16), Max: 98.6 (07 Feb 2024 14:25)  HR: 87 (07 Feb 2024 20:16) (87 - 89)  BP: 130/56 (07 Feb 2024 20:16) (130/56 - 153/72)  BP(mean): 80 (07 Feb 2024 20:16) (80 - 92)  RR: 18 (07 Feb 2024 20:16) (17 - 18)  SpO2: 94% (07 Feb 2024 20:16) (94% - 96%)    Parameters below as of 07 Feb 2024 20:16  Patient On (Oxygen Delivery Method): room air      I&O's Summary      PHYSICAL EXAM:  General: No acute distress BMI-  HEENT: EOMI, PERRL  Neck: Supple, [ ] JVD  Lungs: Equal air entry bilaterally; [ ] rales [ ] wheezing [ ] rhonchi  Heart: Regular rate and rhythm; [x ] murmur   2/6 [ x] systolic [ ] diastolic [ ] radiation[ ] rubs [ ]  gallops  Abdomen: Nontender, bowel sounds present  Extremities: No clubbing, cyanosis, [ ] edema [ ]Pulses  equal and intact  Nervous system:  Alert & Oriented X3, no focal deficits  Psychiatric: Normal affect  Skin: No rashes or lesions    LABS:  02-06    135  |  103  |  22<H>  ----------------------------<  101<H>  4.3   |  27  |  1.47<H>    Ca    8.1<L>      06 Feb 2024 05:18      Creatinine Trend: 1.47<--, 1.58<--, 1.67<--                        9.7    11.09 )-----------( 370      ( 06 Feb 2024 05:18 )             30.2

## 2024-02-07 NOTE — PROGRESS NOTE ADULT - PROBLEM SELECTOR PLAN 1
H/o Known brain mass   - Per report dtr does not want any further imaging or cancer treatment   - C/w Supportive care   - [facility]-Dr. Ledbetter/Edith consulted, appreciated

## 2024-02-07 NOTE — DISCHARGE NOTE PROVIDER - CARE PROVIDER_API CALL
Tor Carroll  Internal Medicine  [address] [address] [place] [id number]  Phone: [phone]  Fax: [phone]  Follow Up Time:     [facility]  Medical Oncology  [address] [place] [address]  Phone: [phone]  Fax: [phone]  Follow Up Time:

## 2024-02-07 NOTE — PROGRESS NOTE ADULT - PROBLEM SELECTOR PLAN 9
H/o HTN on Amlodipine, Enalapril   - BP stable   - Continue holding BP meds in s/o infection  - Continue to monitor BP and resume BP meds accordingly

## 2024-02-07 NOTE — PROGRESS NOTE ADULT - PROBLEM SELECTOR PLAN 3
- S/p CT chest showed PNA   - Documented NC but pt found in RA  - C/w Zosyn   - ID-Dr. Cardoza consulted, appreciated

## 2024-02-07 NOTE — DISCHARGE NOTE PROVIDER - CARE PROVIDERS DIRECT ADDRESSES
[email or web address]

## 2024-02-07 NOTE — PROGRESS NOTE ADULT - SUBJECTIVE AND OBJECTIVE BOX
NP Note discussed with  primary attending    Patient is a 83y old  Female who presents with a chief complaint of Sepsis (07 Feb 2024 10:25)      INTERVAL HPI/OVERNIGHT EVENTS: No new complaints.  Pt found in RA.  Pt aroused from sleep.  No new complaints or concerns.      MEDICATIONS  (STANDING):  aspirin  chewable 81 milliGRAM(s) Oral daily  atorvastatin 10 milliGRAM(s) Oral at bedtime  heparin   Injectable 5000 Unit(s) SubCutaneous every 12 hours  levothyroxine 50 MICROGram(s) Oral daily  pantoprazole    Tablet 40 milliGRAM(s) Oral <User Schedule>  piperacillin/tazobactam IVPB.. 3.375 Gram(s) IV Intermittent every 8 hours  senna 2 Tablet(s) Oral at bedtime  sodium chloride 0.9%. 1000 milliLiter(s) (75 mL/Hr) IV Continuous <Continuous>  sucralfate 1 Gram(s) Oral <User Schedule>    MEDICATIONS  (PRN):  acetaminophen     Tablet .. 650 milliGRAM(s) Oral every 6 hours PRN Temp greater or equal to 38C (100.4F), Mild Pain (1 - 3)  aluminum hydroxide/magnesium hydroxide/simethicone Suspension 30 milliLiter(s) Oral every 4 hours PRN Dyspepsia  melatonin 3 milliGRAM(s) Oral at bedtime PRN Insomnia  ondansetron Injectable 4 milliGRAM(s) IV Push every 8 hours PRN Nausea and/or Vomiting      __________________________________________________  REVIEW OF SYSTEMS:    CONSTITUTIONAL: No fever  EYES: No acute visual disturbances  NECK: No pain or stiffness  RESPIRATORY: No cough; No shortness of breath  CARDIOVASCULAR: No chest pain, no palpitations  GASTROINTESTINAL: No pain. No nausea or vomiting.  No diarrhea   NEUROLOGICAL: No headache or numbness, no tremors  MUSCULOSKELETAL: No joint pain, no muscle pain  GENITOURINARY: No dysuria, no frequency, no hesitancy  PSYCHIATRY: No depression , no anxiety  ALL OTHER  ROS negative        Vital Signs Last 24 Hrs  T(C): 36.6 (07 Feb 2024 05:40), Max: 37.2 (06 Feb 2024 13:30)  T(F): 97.8 (07 Feb 2024 05:40), Max: 99 (06 Feb 2024 13:30)  HR: 89 (07 Feb 2024 05:40) (89 - 100)  BP: 153/72 (07 Feb 2024 05:40) (132/75 - 153/72)  BP(mean): 92 (07 Feb 2024 05:40) (90 - 94)  RR: 18 (07 Feb 2024 05:40) (18 - 18)  SpO2: 95% (07 Feb 2024 05:40) (92% - 96%)    Parameters below as of 07 Feb 2024 05:40  Patient On (Oxygen Delivery Method): nasal cannula  O2 Flow (L/min): 2      ________________________________________________  PHYSICAL EXAM:  GENERAL: NAD  HEENT: Normocephalic;  conjunctivae and sclerae clear; moist mucous membranes   NECK : Supple  CHEST/LUNG: Clear to auscultation bilaterally with good air entry   HEART: S1 S2  regular; no murmurs, gallops or rubs  ABDOMEN: Soft, Nontender, Nondistended; Bowel sounds present x 4 quad  EXTREMITIES: No cyanosis; no edema; no calf tenderness  SKIN: Warm and dry; no rash  NERVOUS SYSTEM:  Lethargic Oriented to person, not place and time; no new deficits    _________________________________________________  LABS:                        9.7    11.09 )-----------( 370      ( 06 Feb 2024 05:18 )             30.2     02-06    135  |  103  |  22<H>  ----------------------------<  101<H>  4.3   |  27  |  1.47<H>    Ca    8.1<L>      06 Feb 2024 05:18        Urinalysis Basic - ( 06 Feb 2024 05:18 )    Color: x / Appearance: x / SG: x / pH: x  Gluc: 101 mg/dL / Ketone: x  / Bili: x / Urobili: x   Blood: x / Protein: x / Nitrite: x   Leuk Esterase: x / RBC: x / WBC x   Sq Epi: x / Non Sq Epi: x / Bacteria: x      CAPILLARY BLOOD GLUCOSE            RADIOLOGY & ADDITIONAL TESTS:    Imaging Personally Reviewed:  YES    Consultant(s) Notes Reviewed:   YES    Care Discussed with Consultants :     Plan of care was discussed with patient and /or primary care giver; all questions and concerns were addressed and care was aligned with patient's wishes.     normal...

## 2024-02-07 NOTE — DIETITIAN INITIAL EVALUATION ADULT - PROBLEM SELECTOR PLAN 1
- P/w shortness of breath with chills  - , fever 102, WBC 21.7  - RVP negative  - S/p sepsis bundle IVF  - R/o pneumonia, given multilobar infiltrates on CXR  - Starting Zosyn  - F/u CT chest  - F/u UCx, BCx  - ID Dr. Cardoza consulted

## 2024-02-07 NOTE — DIETITIAN INITIAL EVALUATION ADULT - PROBLEM SELECTOR PLAN 2
- CXR = Nonspecific airspace opacities left midlung zone and right lung base.   - Physical exam with left sided rale  - Starting Zosyn  - F/u CT chest

## 2024-02-07 NOTE — DIETITIAN INITIAL EVALUATION ADULT - PROBLEM SELECTOR PLAN 5
- Has hx of GI bleeding, on PPI BID and carafate  - P/w Hgb 10.6 (baseline ~9)   - Likely hemoconcentrated due to dehydration  - No sign of bleeding  - Hold iron tab in the setting of sepsis

## 2024-02-08 LAB
ANION GAP SERPL CALC-SCNC: 6 MMOL/L — SIGNIFICANT CHANGE UP (ref 5–17)
BUN SERPL-MCNC: 8 MG/DL — SIGNIFICANT CHANGE UP (ref 7–18)
CALCIUM SERPL-MCNC: 8.4 MG/DL — SIGNIFICANT CHANGE UP (ref 8.4–10.5)
CHLORIDE SERPL-SCNC: 99 MMOL/L — SIGNIFICANT CHANGE UP (ref 96–108)
CO2 SERPL-SCNC: 31 MMOL/L — SIGNIFICANT CHANGE UP (ref 22–31)
CREAT SERPL-MCNC: 1.18 MG/DL — SIGNIFICANT CHANGE UP (ref 0.5–1.3)
EGFR: 46 ML/MIN/1.73M2 — LOW
GLUCOSE SERPL-MCNC: 106 MG/DL — HIGH (ref 70–99)
HCT VFR BLD CALC: 31.4 % — LOW (ref 34.5–45)
HGB BLD-MCNC: 10.2 G/DL — LOW (ref 11.5–15.5)
MCHC RBC-ENTMCNC: 27.2 PG — SIGNIFICANT CHANGE UP (ref 27–34)
MCHC RBC-ENTMCNC: 32.5 GM/DL — SIGNIFICANT CHANGE UP (ref 32–36)
MCV RBC AUTO: 83.7 FL — SIGNIFICANT CHANGE UP (ref 80–100)
NRBC # BLD: 0 /100 WBCS — SIGNIFICANT CHANGE UP (ref 0–0)
PLATELET # BLD AUTO: 425 K/UL — HIGH (ref 150–400)
POTASSIUM SERPL-MCNC: 3.6 MMOL/L — SIGNIFICANT CHANGE UP (ref 3.5–5.3)
POTASSIUM SERPL-SCNC: 3.6 MMOL/L — SIGNIFICANT CHANGE UP (ref 3.5–5.3)
RBC # BLD: 3.75 M/UL — LOW (ref 3.8–5.2)
RBC # FLD: 15.5 % — HIGH (ref 10.3–14.5)
SODIUM SERPL-SCNC: 136 MMOL/L — SIGNIFICANT CHANGE UP (ref 135–145)
WBC # BLD: 7.12 K/UL — SIGNIFICANT CHANGE UP (ref 3.8–10.5)
WBC # FLD AUTO: 7.12 K/UL — SIGNIFICANT CHANGE UP (ref 3.8–10.5)

## 2024-02-08 PROCEDURE — 99232 SBSQ HOSP IP/OBS MODERATE 35: CPT | Mod: FS

## 2024-02-08 RX ADMIN — Medication 1 GRAM(S): at 08:12

## 2024-02-08 RX ADMIN — HEPARIN SODIUM 5000 UNIT(S): 5000 INJECTION INTRAVENOUS; SUBCUTANEOUS at 18:44

## 2024-02-08 RX ADMIN — CEFTRIAXONE 100 MILLIGRAM(S): 500 INJECTION, POWDER, FOR SOLUTION INTRAMUSCULAR; INTRAVENOUS at 20:54

## 2024-02-08 RX ADMIN — Medication 1 GRAM(S): at 13:52

## 2024-02-08 RX ADMIN — SENNA PLUS 2 TABLET(S): 8.6 TABLET ORAL at 21:07

## 2024-02-08 RX ADMIN — PANTOPRAZOLE SODIUM 40 MILLIGRAM(S): 20 TABLET, DELAYED RELEASE ORAL at 18:43

## 2024-02-08 RX ADMIN — ATORVASTATIN CALCIUM 10 MILLIGRAM(S): 80 TABLET, FILM COATED ORAL at 21:07

## 2024-02-08 RX ADMIN — Medication 1 GRAM(S): at 20:54

## 2024-02-08 RX ADMIN — Medication 81 MILLIGRAM(S): at 12:51

## 2024-02-08 RX ADMIN — HEPARIN SODIUM 5000 UNIT(S): 5000 INJECTION INTRAVENOUS; SUBCUTANEOUS at 05:16

## 2024-02-08 RX ADMIN — Medication 50 MICROGRAM(S): at 05:16

## 2024-02-08 RX ADMIN — PANTOPRAZOLE SODIUM 40 MILLIGRAM(S): 20 TABLET, DELAYED RELEASE ORAL at 05:16

## 2024-02-08 NOTE — PROGRESS NOTE ADULT - ASSESSMENT
83 year old lady with 2 brain masses, pancreatic cyst and dilated pancreatic duct, cervical ca s/p chemoRT 17 years ago.  she was admitted for UTI and sepsis    1.  brain masses with edema  she is more awake today.  cont to monitor    2. h/o cervical ca s/p chemoRT  no sign of recurrence    3. Discussion with her and her daughter  she does not want any further imaging or treatment of cancer  f/u with primary team and assisted living    4. UTI and sepsis  Management per primary team  Continues on zosyn    Will cont to follow

## 2024-02-08 NOTE — PROGRESS NOTE ADULT - NSPROGADDITIONALINFOA_GEN_ALL_CORE
Problem/Plan - 13:  ·  Problem: Discharge planning issues.   ·  Plan:   - Pt from [facility]  - PT recommended [facility]   - 2/6 Repeat blood cultures pending-f/u results
- Pt from [facility]  - PT recommended SARAN.  DC Planning to [place], per CM  - Awaiting ID abx plan for DC

## 2024-02-08 NOTE — PROGRESS NOTE ADULT - ASSESSMENT
Pneumonia  UTI  Bacteremia - with E. Coli  Fevers - resolved  Leukocytosis - normalized      Plan - Cont Rocephin 1gm iv q24hrs  can plan to DC on Ceftin 500mgs po BID x 8 days from tomorrow or till 2/17/24.

## 2024-02-08 NOTE — PROGRESS NOTE ADULT - SUBJECTIVE AND OBJECTIVE BOX
83y Female    Meds:  cefTRIAXone   IVPB 1000 milliGRAM(s) IV Intermittent every 24 hours    Allergies    No Known Allergies    Intolerances        VITALS:  Vital Signs Last 24 Hrs  T(C): 37.1 (08 Feb 2024 13:10), Max: 37.1 (08 Feb 2024 13:10)  T(F): 98.8 (08 Feb 2024 13:10), Max: 98.8 (08 Feb 2024 13:10)  HR: 95 (08 Feb 2024 15:00) (87 - 107)  BP: 142/84 (08 Feb 2024 15:00) (128/73 - 145/76)  BP(mean): 99 (08 Feb 2024 05:18) (80 - 99)  RR: 18 (08 Feb 2024 15:00) (18 - 18)  SpO2: 93% (08 Feb 2024 15:00) (93% - 95%)    Parameters below as of 08 Feb 2024 15:00  Patient On (Oxygen Delivery Method): room air        LABS/DIAGNOSTIC TESTS:                          10.2   7.12  )-----------( 425      ( 08 Feb 2024 05:56 )             31.4         02-08    136  |  99  |  8   ----------------------------<  106<H>  3.6   |  31  |  1.18    Ca    8.4      08 Feb 2024 05:56            CULTURES: .Blood Blood-Peripheral  02-06 @ 05:21   No growth at 48 Hours  --  --      .Blood Blood-Peripheral  02-06 @ 05:18   No growth at 48 Hours  --  --      Clean Catch Clean Catch (Midstream)  02-04 @ 14:49   >100,000 CFU/ml Escherichia coli  --  Escherichia coli      .Blood Blood-Peripheral  02-04 @ 14:25   Growth in aerobic and anaerobic bottles: Escherichia coli  Direct identification is available within approximately 3-5  hours either by Blood Panel Multiplexed PCR or Direct  MALDI-TOF. Details: [email or web address]  --  Blood Culture PCR  Escherichia coli      .Blood Blood-Peripheral  02-04 @ 14:20   Growth in aerobic and anaerobic bottles: Escherichia coli  See previous culture [id number]  --    Growth in aerobic bottle: Gram Negative Rods  Growth in anaerobic bottle: Gram Negative Rods            RADIOLOGY:      ROS:  [  ] UNABLE TO ELICIT 83y Female who is confused and lethargic , she has no fevers or chills,     Meds:  cefTRIAXone   IVPB 1000 milliGRAM(s) IV Intermittent every 24 hours    Allergies    No Known Allergies    Intolerances        VITALS:  Vital Signs Last 24 Hrs  T(C): 37.1 (08 Feb 2024 13:10), Max: 37.1 (08 Feb 2024 13:10)  T(F): 98.8 (08 Feb 2024 13:10), Max: 98.8 (08 Feb 2024 13:10)  HR: 95 (08 Feb 2024 15:00) (87 - 107)  BP: 142/84 (08 Feb 2024 15:00) (128/73 - 145/76)  BP(mean): 99 (08 Feb 2024 05:18) (80 - 99)  RR: 18 (08 Feb 2024 15:00) (18 - 18)  SpO2: 93% (08 Feb 2024 15:00) (93% - 95%)    Parameters below as of 08 Feb 2024 15:00  Patient On (Oxygen Delivery Method): room air        LABS/DIAGNOSTIC TESTS:                          10.2   7.12  )-----------( 425      ( 08 Feb 2024 05:56 )             31.4         02-08    136  |  99  |  8   ----------------------------<  106<H>  3.6   |  31  |  1.18    Ca    8.4      08 Feb 2024 05:56            CULTURES: .Blood Blood-Peripheral  02-06 @ 05:21   No growth at 48 Hours  --  --      .Blood Blood-Peripheral  02-06 @ 05:18   No growth at 48 Hours  --  --      Clean Catch Clean Catch (Midstream)  02-04 @ 14:49   >100,000 CFU/ml Escherichia coli  --  Escherichia coli      .Blood Blood-Peripheral  02-04 @ 14:25   Growth in aerobic and anaerobic bottles: Escherichia coli  Direct identification is available within approximately 3-5  hours either by Blood Panel Multiplexed PCR or Direct  MALDI-TOF. Details: [email or web address]  --  Blood Culture PCR  Escherichia coli      .Blood Blood-Peripheral  02-04 @ 14:20   Growth in aerobic and anaerobic bottles: Escherichia coli  See previous culture [id number]  --    Growth in aerobic bottle: Gram Negative Rods  Growth in anaerobic bottle: Gram Negative Rods            RADIOLOGY:      ROS:  [  ] UNABLE TO ELICIT 83y Female who is doing so much better , she is awake , alert and talking and answering questions though appears mildly confused. She is afebrile and her WBC count is WNL, her repeat blood cultures are negative.     Meds:  cefTRIAXone   IVPB 1000 milliGRAM(s) IV Intermittent every 24 hours    Allergies    No Known Allergies    Intolerances        VITALS:  Vital Signs Last 24 Hrs  T(C): 37.1 (08 Feb 2024 13:10), Max: 37.1 (08 Feb 2024 13:10)  T(F): 98.8 (08 Feb 2024 13:10), Max: 98.8 (08 Feb 2024 13:10)  HR: 95 (08 Feb 2024 15:00) (87 - 107)  BP: 142/84 (08 Feb 2024 15:00) (128/73 - 145/76)  BP(mean): 99 (08 Feb 2024 05:18) (80 - 99)  RR: 18 (08 Feb 2024 15:00) (18 - 18)  SpO2: 93% (08 Feb 2024 15:00) (93% - 95%)    Parameters below as of 08 Feb 2024 15:00  Patient On (Oxygen Delivery Method): room air        LABS/DIAGNOSTIC TESTS:                          10.2   7.12  )-----------( 425      ( 08 Feb 2024 05:56 )             31.4         02-08    136  |  99  |  8   ----------------------------<  106<H>  3.6   |  31  |  1.18    Ca    8.4      08 Feb 2024 05:56            CULTURES: .Blood Blood-Peripheral  02-06 @ 05:21   No growth at 48 Hours  --  --      .Blood Blood-Peripheral  02-06 @ 05:18   No growth at 48 Hours  --  --      Clean Catch Clean Catch (Midstream)  02-04 @ 14:49   >100,000 CFU/ml Escherichia coli  --  Escherichia coli      .Blood Blood-Peripheral  02-04 @ 14:25   Growth in aerobic and anaerobic bottles: Escherichia coli  Direct identification is available within approximately 3-5  hours either by Blood Panel Multiplexed PCR or Direct  MALDI-TOF. Details: [email or web address]  --  Blood Culture PCR  Escherichia coli      .Blood Blood-Peripheral  02-04 @ 14:20   Growth in aerobic and anaerobic bottles: Escherichia coli  See previous culture [id number]  --    Growth in aerobic bottle: Gram Negative Rods  Growth in anaerobic bottle: Gram Negative Rods            RADIOLOGY:      ROS:  [  ] UNABLE TO ELICIT

## 2024-02-08 NOTE — PROGRESS NOTE ADULT - PROBLEM SELECTOR PLAN 1
H/o Known brain mass   - Per report dtr does not want any further imaging or cancer treatment   - C/w Supportive care   - [facility]-Dr. Ledbetter/Edith consulted, appreciated

## 2024-02-08 NOTE — PROGRESS NOTE ADULT - PROBLEM SELECTOR PLAN 9
H/o HTN on Amlodipine & Enalapril   - BP stable   - Continue holding BP meds in s/o infection  - Continue to monitor BP and resume BP meds accordingly

## 2024-02-08 NOTE — PROGRESS NOTE ADULT - PROBLEM SELECTOR PLAN 4
- 2/4 (+) Bld cx- GNR EColi   - 2/6 Repeat blood cultures negative   - Currently on Ceftriaxone   - ID-Dr. Cardoza consulted, appreciated

## 2024-02-08 NOTE — PROGRESS NOTE ADULT - SUBJECTIVE AND OBJECTIVE BOX
NP Note discussed with  primary attending    Patient is a 83y old  Female who presents with a chief complaint of Sepsis (08 Feb 2024 10:23)      INTERVAL HPI/OVERNIGHT EVENTS: :  Pt found eating breakfast upright in bed.  Denies new complaints or concerns.      MEDICATIONS  (STANDING):  aspirin  chewable 81 milliGRAM(s) Oral daily  atorvastatin 10 milliGRAM(s) Oral at bedtime  cefTRIAXone   IVPB 1000 milliGRAM(s) IV Intermittent every 24 hours  heparin   Injectable 5000 Unit(s) SubCutaneous every 12 hours  levothyroxine 50 MICROGram(s) Oral daily  pantoprazole    Tablet 40 milliGRAM(s) Oral <User Schedule>  senna 2 Tablet(s) Oral at bedtime  sodium chloride 0.9%. 1000 milliLiter(s) (75 mL/Hr) IV Continuous <Continuous>  sucralfate 1 Gram(s) Oral <User Schedule>    MEDICATIONS  (PRN):  acetaminophen     Tablet .. 650 milliGRAM(s) Oral every 6 hours PRN Temp greater or equal to 38C (100.4F), Mild Pain (1 - 3)  aluminum hydroxide/magnesium hydroxide/simethicone Suspension 30 milliLiter(s) Oral every 4 hours PRN Dyspepsia  melatonin 3 milliGRAM(s) Oral at bedtime PRN Insomnia  ondansetron Injectable 4 milliGRAM(s) IV Push every 8 hours PRN Nausea and/or Vomiting      __________________________________________________  REVIEW OF SYSTEMS:    CONSTITUTIONAL: No fever  EYES: No acute visual disturbances  NECK: No pain or stiffness  RESPIRATORY: No cough; No shortness of breath  CARDIOVASCULAR: No chest pain, no palpitations  GASTROINTESTINAL: No pain. No nausea or vomiting.  No diarrhea   NEUROLOGICAL: No headache or numbness, no tremors  MUSCULOSKELETAL: No joint pain, no muscle pain  GENITOURINARY: No dysuria, no frequency, no hesitancy  PSYCHIATRY: No depression, no anxiety  ALL OTHER  ROS negative        Vital Signs Last 24 Hrs  T(C): 36.7 (08 Feb 2024 05:18), Max: 37 (07 Feb 2024 14:25)  T(F): 98.1 (08 Feb 2024 05:18), Max: 98.6 (07 Feb 2024 14:25)  HR: 89 (08 Feb 2024 05:18) (87 - 89)  BP: 145/76 (08 Feb 2024 05:18) (130/56 - 145/76)  BP(mean): 99 (08 Feb 2024 05:18) (80 - 99)  RR: 18 (08 Feb 2024 05:18) (17 - 18)  SpO2: 95% (08 Feb 2024 05:18) (94% - 96%)    Parameters below as of 08 Feb 2024 05:18  Patient On (Oxygen Delivery Method): room air        ________________________________________________  PHYSICAL EXAM:  GENERAL: NAD  HEENT: Normocephalic;  conjunctivae and sclerae clear; moist mucous membranes   NECK : Supple  CHEST/LUNG: Clear to auscultation bilaterally with good air entry   HEART: S1 S2  regular; no murmurs, gallops or rubs  ABDOMEN: Soft, Nontender, Nondistended; Bowel sounds present x 4 quad  EXTREMITIES: No cyanosis; no edema; no calf tenderness  SKIN: Warm and dry; no rash  NERVOUS SYSTEM:  Lethargic Oriented to person, not place and time; no new deficits  _________________________________________________  LABS:                        10.2   7.12  )-----------( 425      ( 08 Feb 2024 05:56 )             31.4     02-08    136  |  99  |  8   ----------------------------<  106<H>  3.6   |  31  |  1.18    Ca    8.4      08 Feb 2024 05:56        Urinalysis Basic - ( 08 Feb 2024 05:56 )    Color: x / Appearance: x / SG: x / pH: x  Gluc: 106 mg/dL / Ketone: x  / Bili: x / Urobili: x   Blood: x / Protein: x / Nitrite: x   Leuk Esterase: x / RBC: x / WBC x   Sq Epi: x / Non Sq Epi: x / Bacteria: x      CAPILLARY BLOOD GLUCOSE            RADIOLOGY & ADDITIONAL TESTS:    Imaging Personally Reviewed:  YES    Consultant(s) Notes Reviewed:   YES    Care Discussed with Consultants :     Plan of care was discussed with patient and /or primary care giver; all questions and concerns were addressed and care was aligned with patient's wishes.

## 2024-02-08 NOTE — PROGRESS NOTE ADULT - SUBJECTIVE AND OBJECTIVE BOX
Patient is a 83y old  Female who presents with a chief complaint of Sepsis (04 Feb 2024 19:10)      HPI:  Patient is a 83F, uses cane, AAOx3, from [facility], with PMHx HTN, HLD, hypothyroidism, brain mass, cervical cancer, sent for shortness of breath and chills. She is AAOx3 and says nothing is happening. She just endorses being hungry as she has not eaten anything. She denies any complaints. Patient denies headache, nausea, vomit, chest pain, shortness of breath, cough, lightheadedness, abdominal pain, diarrhea, constipation, dark/bloody stool, dysuria, hematuria, loss of strength, or loss of sensation.  Called patient's daughter Nory [phone] and provided update. (04 Feb 2024 19:10)     Subjective:  Patient seen at bedside.  More awake today.  NO overnight events.      ROS:  14 point ROS negative except for above.    MEDICATIONS  (STANDING):  aspirin  chewable 81 milliGRAM(s) Oral daily  atorvastatin 10 milliGRAM(s) Oral at bedtime  heparin   Injectable 5000 Unit(s) SubCutaneous every 12 hours  levothyroxine 50 MICROGram(s) Oral daily  pantoprazole    Tablet 40 milliGRAM(s) Oral <User Schedule>  piperacillin/tazobactam IVPB.. 3.375 Gram(s) IV Intermittent every 12 hours  potassium chloride   Powder 40 milliEquivalent(s) Oral every 4 hours  senna 2 Tablet(s) Oral at bedtime  sodium chloride 0.9%. 1000 milliLiter(s) (60 mL/Hr) IV Continuous <Continuous>  sucralfate 1 Gram(s) Oral <User Schedule>    MEDICATIONS  (PRN):  acetaminophen     Tablet .. 650 milliGRAM(s) Oral every 6 hours PRN Temp greater or equal to 38C (100.4F), Mild Pain (1 - 3)  aluminum hydroxide/magnesium hydroxide/simethicone Suspension 30 milliLiter(s) Oral every 4 hours PRN Dyspepsia  melatonin 3 milliGRAM(s) Oral at bedtime PRN Insomnia  ondansetron Injectable 4 milliGRAM(s) IV Push every 8 hours PRN Nausea and/or Vomiting      Allergies    No Known Allergies    Intolerances    Vital Signs Last 24 Hrs  T(C): 36.7 (08 Feb 2024 05:18), Max: 37 (07 Feb 2024 14:25)  T(F): 98.1 (08 Feb 2024 05:18), Max: 98.6 (07 Feb 2024 14:25)  HR: 89 (08 Feb 2024 05:18) (87 - 89)  BP: 145/76 (08 Feb 2024 05:18) (130/56 - 145/76)  BP(mean): 99 (08 Feb 2024 05:18) (80 - 99)  RR: 18 (08 Feb 2024 05:18) (17 - 18)  SpO2: 95% (08 Feb 2024 05:18) (94% - 96%)    Parameters below as of 08 Feb 2024 05:18  Patient On (Oxygen Delivery Method): room air      PHYSICAL EXAM  General: adult in NAD  HEENT: clear oropharynx, anicteric sclera, pink conjunctiva  Neck: supple  CV: normal S1/S2 with no murmur rubs or gallops  Lungs: positive air movement b/l ant lungs,clear to auscultation poor inspiratory effort  Abdomen: soft non-tender non-distended, no hepatosplenomegaly  Ext: no clubbing cyanosis or edema  Skin: no rashes and no petechiae  Neuro: Somnolent.      LABS:                                       10.2   7.12  )-----------( 425      ( 08 Feb 2024 05:56 )             31.4   02-08    136  |  99  |  8   ----------------------------<  106<H>  3.6   |  31  |  1.18    Ca    8.4      08 Feb 2024 05:56

## 2024-02-08 NOTE — PROGRESS NOTE ADULT - PROBLEM SELECTOR PLAN 1
H/o Known brain mass   - Per report dtr does not want any further imaging or cancer treatment   - C/w Supportive care   - [facility]-Dr. Ledbetter/Edith consulted, appreciated

## 2024-02-08 NOTE — PROGRESS NOTE ADULT - ASSESSMENT
83 year old, Female, from [facility], uses cane, AAOx2-3, with PMH of HTN, HLD, hypothyroidism, brain mass, & cervical cancer.  Presented for shortness of breath and chills. Admitted for Sepsis 2/2 Asymptomatic UTI & E. Coli Bacteremia.

## 2024-02-08 NOTE — PROGRESS NOTE ADULT - PROBLEM SELECTOR PLAN 6
- Resolved   - P/w SCr 1.67 (baseline 0.6)  - Likely prerenal due to sepsis  - Continue to monitor BMP in AM-f/u results

## 2024-02-08 NOTE — PROGRESS NOTE ADULT - ASSESSMENT
83 year old, Female, from [facility], uses cane, AAOx2-3, with PMH of HTN, HLD, hypothyroidism, brain mass, & cervical cancer.  Presented for shortness of breath and chills. Admitted for Sepsis 2/2 Asymptomatic UTI & E. Coli Bacteremia.

## 2024-02-08 NOTE — GOALS OF CARE CONVERSATION - ADVANCED CARE PLANNING - CONVERSATION DETAILS
Discussed with (HCP, surrogate, legal guardian): Nory Anne  About: Cardiac and respiratory resuscitative measures including CPR, shock, vasopressors, invasive ventilatory support via intubation.  All risks and benefits discussed. All questions answered.         [    ]  Health Care Proxy form filled out.   [ x   ]   [facility] forms were filled out.   [    ]   Please refer to [facility] documentation in EMR for further details.       Daughter Cullen expressed Ms. Justa Davalos is DNR/DNI.

## 2024-02-08 NOTE — PROGRESS NOTE ADULT - SUBJECTIVE AND OBJECTIVE BOX
MR#[id number]  PATIENT NAME:CHUNG VENTURA    DATE OF SERVICE: 02-08-24   Patient was seen and examined by Iker Kirkpatrick MD on    02-08-24   Interim events noted.Consultant notes ,Labs,Telemetry reviewed by me       HOSPITAL COURSE: HPI:  Patient is a 83F, uses cane, AAOx3, from [facility], with PMHx HTN, HLD, hypothyroidism, brain mass, cervical cancer, sent for shortness of breath and chills. She is AAOx3 and says nothing is happening. She just endorses being hungry as she has not eaten anything. She denies any complaints. Patient denies headache, nausea, vomit, chest pain, shortness of breath, cough, lightheadedness, abdominal pain, diarrhea, constipation, dark/bloody stool, dysuria, hematuria, loss of strength, or loss of sensation.  Called patient's daughter Nory [phone] and provided update. (04 Feb 2024 19:10)      INTERIM EVENTS:Patient seen at bedside ,interim events noted.      PMH -reviewed admission note, no change since admission  HEART FAILURE: Acute[ ]Chronic[ ] Systolic[ ] Diastolic[ ] Combined Systolic and Diastolic[ ]  CAD[ ] CABG[ ] PCI[ ]  DEVICES[ ] PPM[ ] ICD[ ] ILR[ ]  ATRIAL FIBRILLATION[ ] Paroxysmal[ ] Permanent[ ] CHADS2-[  ]  ROXI[ ] CKD1[ ] CKD2[ ] CKD3[ ] CKD4[ ] ESRD[ ]  COPD[ ] HTN[ ]   DM[ ] Type1[ ] Type 2[ ]   CVA[ ] Paresis[ ]    AMBULATION: Assisted[ ] Cane/walker[ ] Independent[ ]    MEDICATIONS  (STANDING):  aspirin  chewable 81 milliGRAM(s) Oral daily  atorvastatin 10 milliGRAM(s) Oral at bedtime  cefTRIAXone   IVPB 1000 milliGRAM(s) IV Intermittent every 24 hours  heparin   Injectable 5000 Unit(s) SubCutaneous every 12 hours  levothyroxine 50 MICROGram(s) Oral daily  pantoprazole    Tablet 40 milliGRAM(s) Oral <User Schedule>  senna 2 Tablet(s) Oral at bedtime  sodium chloride 0.9%. 1000 milliLiter(s) (75 mL/Hr) IV Continuous <Continuous>  sucralfate 1 Gram(s) Oral <User Schedule>    MEDICATIONS  (PRN):  acetaminophen     Tablet .. 650 milliGRAM(s) Oral every 6 hours PRN Temp greater or equal to 38C (100.4F), Mild Pain (1 - 3)  aluminum hydroxide/magnesium hydroxide/simethicone Suspension 30 milliLiter(s) Oral every 4 hours PRN Dyspepsia  melatonin 3 milliGRAM(s) Oral at bedtime PRN Insomnia  ondansetron Injectable 4 milliGRAM(s) IV Push every 8 hours PRN Nausea and/or Vomiting            REVIEW OF SYSTEMS:  Constitutional: [ ] fever, [ ]weight loss,  [ ]fatigue [ ]weight gain  Eyes: [ ] visual changes  Respiratory: [ ]shortness of breath;  [ ] cough, [ ]wheezing, [ ]chills, [ ]hemoptysis  Cardiovascular: [ ] chest pain, [ ]palpitations, [ ]dizziness,  [ ]leg swelling[ ]orthopnea[ ]PND  Gastrointestinal: [ ] abdominal pain, [ ]nausea, [ ]vomiting,  [ ]diarrhea [ ]Constipation [ ]Melena  Genitourinary: [ ] dysuria, [ ] hematuria [ ]Love  Neurologic: [ ] headaches [ ] tremors[ ]weakness [ ]Paralysis Right[ ] Left[ ]  Skin: [ ] itching, [ ]burning, [ ] rashes  Endocrine: [ ] heat or cold intolerance  Musculoskeletal: [ ] joint pain or swelling; [ ] muscle, back, or extremity pain  Psychiatric: [ ] depression, [ ]anxiety, [ ]mood swings, or [ ]difficulty sleeping  Hematologic: [ ] easy bruising, [ ] bleeding gums    [ ] All remaining systems negative except as per above.   [ ]Unable to obtain.  [x] No change in ROS since admission      Vital Signs Last 24 Hrs  T(C): 37.1 (08 Feb 2024 13:10), Max: 37.1 (08 Feb 2024 13:10)  T(F): 98.8 (08 Feb 2024 13:10), Max: 98.8 (08 Feb 2024 13:10)  HR: 95 (08 Feb 2024 15:00) (87 - 107)  BP: 142/84 (08 Feb 2024 15:00) (128/73 - 145/76)  BP(mean): 99 (08 Feb 2024 05:18) (80 - 99)  RR: 18 (08 Feb 2024 15:00) (18 - 18)  SpO2: 93% (08 Feb 2024 15:00) (93% - 95%)    Parameters below as of 08 Feb 2024 15:00  Patient On (Oxygen Delivery Method): room air      I&O's Summary      PHYSICAL EXAM:  General: No acute distress BMI-  HEENT: EOMI, PERRL  Neck: Supple, [ ] JVD  Lungs: Equal air entry bilaterally; [ ] rales [ ] wheezing [ ] rhonchi  Heart: Regular rate and rhythm; [x ] murmur   2/6 [ x] systolic [ ] diastolic [ ] radiation[ ] rubs [ ]  gallops  Abdomen: Nontender, bowel sounds present  Extremities: No clubbing, cyanosis, [ ] edema [ ]Pulses  equal and intact  Nervous system:  Alert & Oriented X3, no focal deficits  Psychiatric: Normal affect  Skin: No rashes or lesions    LABS:  02-08    136  |  99  |  8   ----------------------------<  106<H>  3.6   |  31  |  1.18    Ca    8.4      08 Feb 2024 05:56      Creatinine Trend: 1.18<--, 1.47<--, 1.58<--, 1.67<--                        10.2   7.12  )-----------( 425      ( 08 Feb 2024 05:56 )             31.4

## 2024-02-08 NOTE — PROGRESS NOTE ADULT - PROBLEM SELECTOR PLAN 5
- Asymptomatic.  - (+) UA.  (+) Ucx-E. Coli   - Currently on Ceftriaxone   - ID-Dr. Cardoza consulted, appreciated

## 2024-02-09 LAB
ANION GAP SERPL CALC-SCNC: 7 MMOL/L — SIGNIFICANT CHANGE UP (ref 5–17)
BUN SERPL-MCNC: 9 MG/DL — SIGNIFICANT CHANGE UP (ref 7–18)
CALCIUM SERPL-MCNC: 8.6 MG/DL — SIGNIFICANT CHANGE UP (ref 8.4–10.5)
CHLORIDE SERPL-SCNC: 100 MMOL/L — SIGNIFICANT CHANGE UP (ref 96–108)
CO2 SERPL-SCNC: 30 MMOL/L — SIGNIFICANT CHANGE UP (ref 22–31)
CREAT SERPL-MCNC: 1.1 MG/DL — SIGNIFICANT CHANGE UP (ref 0.5–1.3)
EGFR: 50 ML/MIN/1.73M2 — LOW
GLUCOSE SERPL-MCNC: 95 MG/DL — SIGNIFICANT CHANGE UP (ref 70–99)
HCT VFR BLD CALC: 33.2 % — LOW (ref 34.5–45)
HGB BLD-MCNC: 10.8 G/DL — LOW (ref 11.5–15.5)
MCHC RBC-ENTMCNC: 27.2 PG — SIGNIFICANT CHANGE UP (ref 27–34)
MCHC RBC-ENTMCNC: 32.5 GM/DL — SIGNIFICANT CHANGE UP (ref 32–36)
MCV RBC AUTO: 83.6 FL — SIGNIFICANT CHANGE UP (ref 80–100)
NRBC # BLD: 0 /100 WBCS — SIGNIFICANT CHANGE UP (ref 0–0)
PLATELET # BLD AUTO: 502 K/UL — HIGH (ref 150–400)
POTASSIUM SERPL-MCNC: 3.5 MMOL/L — SIGNIFICANT CHANGE UP (ref 3.5–5.3)
POTASSIUM SERPL-SCNC: 3.5 MMOL/L — SIGNIFICANT CHANGE UP (ref 3.5–5.3)
RBC # BLD: 3.97 M/UL — SIGNIFICANT CHANGE UP (ref 3.8–5.2)
RBC # FLD: 15.3 % — HIGH (ref 10.3–14.5)
SODIUM SERPL-SCNC: 137 MMOL/L — SIGNIFICANT CHANGE UP (ref 135–145)
WBC # BLD: 7.11 K/UL — SIGNIFICANT CHANGE UP (ref 3.8–10.5)
WBC # FLD AUTO: 7.11 K/UL — SIGNIFICANT CHANGE UP (ref 3.8–10.5)

## 2024-02-09 RX ADMIN — Medication 1 GRAM(S): at 20:55

## 2024-02-09 RX ADMIN — PANTOPRAZOLE SODIUM 40 MILLIGRAM(S): 20 TABLET, DELAYED RELEASE ORAL at 05:04

## 2024-02-09 RX ADMIN — Medication 1 GRAM(S): at 08:12

## 2024-02-09 RX ADMIN — Medication 1 GRAM(S): at 15:28

## 2024-02-09 RX ADMIN — Medication 50 MICROGRAM(S): at 05:04

## 2024-02-09 RX ADMIN — CEFTRIAXONE 100 MILLIGRAM(S): 500 INJECTION, POWDER, FOR SOLUTION INTRAMUSCULAR; INTRAVENOUS at 20:55

## 2024-02-09 RX ADMIN — PANTOPRAZOLE SODIUM 40 MILLIGRAM(S): 20 TABLET, DELAYED RELEASE ORAL at 17:42

## 2024-02-09 RX ADMIN — HEPARIN SODIUM 5000 UNIT(S): 5000 INJECTION INTRAVENOUS; SUBCUTANEOUS at 05:04

## 2024-02-09 RX ADMIN — Medication 81 MILLIGRAM(S): at 12:27

## 2024-02-09 RX ADMIN — SENNA PLUS 2 TABLET(S): 8.6 TABLET ORAL at 21:10

## 2024-02-09 RX ADMIN — HEPARIN SODIUM 5000 UNIT(S): 5000 INJECTION INTRAVENOUS; SUBCUTANEOUS at 17:42

## 2024-02-09 RX ADMIN — ATORVASTATIN CALCIUM 10 MILLIGRAM(S): 80 TABLET, FILM COATED ORAL at 21:15

## 2024-02-09 NOTE — PROGRESS NOTE ADULT - SUBJECTIVE AND OBJECTIVE BOX
MR#[id number]  PATIENT NAME:CHUNG VENTURA    DATE OF SERVICE: 02-09-24   Patient was seen and examined by Iker Kirkpatrick MD on    02-09-24  Interim events noted.Consultant notes ,Labs,Telemetry reviewed by me       HOSPITAL COURSE: HPI:  Patient is a 83F, uses cane, AAOx3, from [facility], with PMHx HTN, HLD, hypothyroidism, brain mass, cervical cancer, sent for shortness of breath and chills. She is AAOx3 and says nothing is happening. She just endorses being hungry as she has not eaten anything. She denies any complaints. Patient denies headache, nausea, vomit, chest pain, shortness of breath, cough, lightheadedness, abdominal pain, diarrhea, constipation, dark/bloody stool, dysuria, hematuria, loss of strength, or loss of sensation.  Called patient's daughter Nory [phone] and provided update. (04 Feb 2024 19:10)      INTERIM EVENTS:Patient seen at bedside ,interim events noted.      PMH -reviewed admission note, no change since admission  HEART FAILURE: Acute[ ]Chronic[ ] Systolic[ ] Diastolic[ ] Combined Systolic and Diastolic[ ]  CAD[ ] CABG[ ] PCI[ ]  DEVICES[ ] PPM[ ] ICD[ ] ILR[ ]  ATRIAL FIBRILLATION[ ] Paroxysmal[ ] Permanent[ ] CHADS2-[  ]  ROXI[ ] CKD1[ ] CKD2[ ] CKD3[ ] CKD4[ ] ESRD[ ]  COPD[ ] HTN[ ]   DM[ ] Type1[ ] Type 2[ ]   CVA[ ] Paresis[ ]    AMBULATION: Assisted[ ] Cane/walker[ ] Independent[ ]    MEDICATIONS  (STANDING):  aspirin  chewable 81 milliGRAM(s) Oral daily  atorvastatin 10 milliGRAM(s) Oral at bedtime  cefTRIAXone   IVPB 1000 milliGRAM(s) IV Intermittent every 24 hours  heparin   Injectable 5000 Unit(s) SubCutaneous every 12 hours  levothyroxine 50 MICROGram(s) Oral daily  pantoprazole    Tablet 40 milliGRAM(s) Oral <User Schedule>  senna 2 Tablet(s) Oral at bedtime  sodium chloride 0.9%. 1000 milliLiter(s) (75 mL/Hr) IV Continuous <Continuous>  sucralfate 1 Gram(s) Oral <User Schedule>    MEDICATIONS  (PRN):  acetaminophen     Tablet .. 650 milliGRAM(s) Oral every 6 hours PRN Temp greater or equal to 38C (100.4F), Mild Pain (1 - 3)  aluminum hydroxide/magnesium hydroxide/simethicone Suspension 30 milliLiter(s) Oral every 4 hours PRN Dyspepsia  melatonin 3 milliGRAM(s) Oral at bedtime PRN Insomnia  ondansetron Injectable 4 milliGRAM(s) IV Push every 8 hours PRN Nausea and/or Vomiting            REVIEW OF SYSTEMS:  Constitutional: [ ] fever, [ ]weight loss,  [ ]fatigue [ ]weight gain  Eyes: [ ] visual changes  Respiratory: [ ]shortness of breath;  [ ] cough, [ ]wheezing, [ ]chills, [ ]hemoptysis  Cardiovascular: [ ] chest pain, [ ]palpitations, [ ]dizziness,  [ ]leg swelling[ ]orthopnea[ ]PND  Gastrointestinal: [ ] abdominal pain, [ ]nausea, [ ]vomiting,  [ ]diarrhea [ ]Constipation [ ]Melena  Genitourinary: [ ] dysuria, [ ] hematuria [ ]Love  Neurologic: [ ] headaches [ ] tremors[ ]weakness [ ]Paralysis Right[ ] Left[ ]  Skin: [ ] itching, [ ]burning, [ ] rashes  Endocrine: [ ] heat or cold intolerance  Musculoskeletal: [ ] joint pain or swelling; [ ] muscle, back, or extremity pain  Psychiatric: [ ] depression, [ ]anxiety, [ ]mood swings, or [ ]difficulty sleeping  Hematologic: [ ] easy bruising, [ ] bleeding gums    [ ] All remaining systems negative except as per above.   [ ]Unable to obtain.  [x] No change in ROS since admission      Vital Signs Last 24 Hrs  T(C): 36.9 (09 Feb 2024 20:11), Max: 37.5 (09 Feb 2024 13:09)  T(F): 98.4 (09 Feb 2024 20:11), Max: 99.5 (09 Feb 2024 13:09)  HR: 73 (09 Feb 2024 20:11) (73 - 86)  BP: 127/53 (09 Feb 2024 20:11) (127/53 - 145/89)  BP(mean): 78 (09 Feb 2024 20:11) (78 - 78)  RR: 18 (09 Feb 2024 20:11) (18 - 18)  SpO2: 95% (09 Feb 2024 20:11) (95% - 95%)    Parameters below as of 09 Feb 2024 20:11  Patient On (Oxygen Delivery Method): room air      I&O's Summary      PHYSICAL EXAM:  General: No acute distress BMI-  HEENT: EOMI, PERRL  Neck: Supple, [ ] JVD  Lungs: Equal air entry bilaterally; [ ] rales [ ] wheezing [ ] rhonchi  Heart: Regular rate and rhythm; [x ] murmur   2/6 [ x] systolic [ ] diastolic [ ] radiation[ ] rubs [ ]  gallops  Abdomen: Nontender, bowel sounds present  Extremities: No clubbing, cyanosis, [ ] edema [ ]Pulses  equal and intact  Nervous system:  Alert & Oriented X3, no focal deficits  Psychiatric: Normal affect  Skin: No rashes or lesions    LABS:  02-09    137  |  100  |  9   ----------------------------<  95  3.5   |  30  |  1.10    Ca    8.6      09 Feb 2024 05:13      Creatinine Trend: 1.10<--, 1.18<--, 1.47<--, 1.58<--, 1.67<--                        10.8   7.11  )-----------( 502      ( 09 Feb 2024 05:13 )             33.2

## 2024-02-09 NOTE — PROGRESS NOTE ADULT - SUBJECTIVE AND OBJECTIVE BOX
NP Note discussed with  primary attending    Patient is a 83y old  Female who presents with a chief complaint of Sepsis (09 Feb 2024 09:48)      INTERVAL HPI/OVERNIGHT EVENTS: Pt aroused from sleep.  Denies pain or new complaints.      MEDICATIONS  (STANDING):  aspirin  chewable 81 milliGRAM(s) Oral daily  atorvastatin 10 milliGRAM(s) Oral at bedtime  cefTRIAXone   IVPB 1000 milliGRAM(s) IV Intermittent every 24 hours  heparin   Injectable 5000 Unit(s) SubCutaneous every 12 hours  levothyroxine 50 MICROGram(s) Oral daily  pantoprazole    Tablet 40 milliGRAM(s) Oral <User Schedule>  senna 2 Tablet(s) Oral at bedtime  sodium chloride 0.9%. 1000 milliLiter(s) (75 mL/Hr) IV Continuous <Continuous>  sucralfate 1 Gram(s) Oral <User Schedule>    MEDICATIONS  (PRN):  acetaminophen     Tablet .. 650 milliGRAM(s) Oral every 6 hours PRN Temp greater or equal to 38C (100.4F), Mild Pain (1 - 3)  aluminum hydroxide/magnesium hydroxide/simethicone Suspension 30 milliLiter(s) Oral every 4 hours PRN Dyspepsia  melatonin 3 milliGRAM(s) Oral at bedtime PRN Insomnia  ondansetron Injectable 4 milliGRAM(s) IV Push every 8 hours PRN Nausea and/or Vomiting      __________________________________________________  REVIEW OF SYSTEMS:    CONSTITUTIONAL: No fever  EYES: No acute visual disturbances  NECK: No pain or stiffness  RESPIRATORY: No cough; No shortness of breath  CARDIOVASCULAR: No chest pain, no palpitations  GASTROINTESTINAL: No pain. No nausea or vomiting.  No diarrhea   NEUROLOGICAL: No headache or numbness, no tremors  MUSCULOSKELETAL: No joint pain, no muscle pain  GENITOURINARY: No dysuria, no frequency, no hesitancy  PSYCHIATRY: No depression , no anxiety  ALL OTHER  ROS negative        Vital Signs Last 24 Hrs  T(C): 36.7 (09 Feb 2024 04:48), Max: 37.1 (08 Feb 2024 13:10)  T(F): 98.1 (09 Feb 2024 04:48), Max: 98.8 (08 Feb 2024 13:10)  HR: 80 (09 Feb 2024 04:48) (80 - 107)  BP: 145/89 (09 Feb 2024 04:48) (128/73 - 145/89)  RR: 18 (09 Feb 2024 04:48) (18 - 18)  SpO2: 95% (09 Feb 2024 04:48) (93% - 95%)    Parameters below as of 09 Feb 2024 04:48  Patient On (Oxygen Delivery Method): room air        ________________________________________________  PHYSICAL EXAM:  GENERAL: NAD  HEENT: Normocephalic;  conjunctivae and sclerae clear; moist mucous membranes   NECK : Supple  CHEST/LUNG: Clear to auscultation bilaterally with good air entry   HEART: S1 S2  regular; no murmurs, gallops or rubs  ABDOMEN: Soft, Nontender, Nondistended; Bowel sounds present x 4 quad  EXTREMITIES: No cyanosis; no edema; no calf tenderness  SKIN: Warm and dry; no rash  NERVOUS SYSTEM:  Lethargic Oriented to person, not place and time; no new deficits  _________________________________________________  LABS:                        10.8   7.11  )-----------( 502      ( 09 Feb 2024 05:13 )             33.2     02-09    137  |  100  |  9   ----------------------------<  95  3.5   |  30  |  1.10    Ca    8.6      09 Feb 2024 05:13        Urinalysis Basic - ( 09 Feb 2024 05:13 )    Color: x / Appearance: x / SG: x / pH: x  Gluc: 95 mg/dL / Ketone: x  / Bili: x / Urobili: x   Blood: x / Protein: x / Nitrite: x   Leuk Esterase: x / RBC: x / WBC x   Sq Epi: x / Non Sq Epi: x / Bacteria: x      CAPILLARY BLOOD GLUCOSE            RADIOLOGY & ADDITIONAL TESTS:    Imaging Personally Reviewed:  YES    Consultant(s) Notes Reviewed:   YES    Care Discussed with Consultants :     Plan of care was discussed with patient and /or primary care giver; all questions and concerns were addressed and care was aligned with patient's wishes.

## 2024-02-09 NOTE — PROGRESS NOTE ADULT - NUTRITIONAL ASSESSMENT
This patient has been assessed with a concern for Malnutrition and has been determined to have a diagnosis/diagnoses of Severe protein-calorie malnutrition and Underweight (BMI < 19).    This patient is being managed with:   Diet Soft and Bite Sized-  Supplement Feeding Modality:  Oral  Ensure Compact Cans or Servings Per Day:  1       Frequency:  Two Times a day  Entered: Feb 8 2024  8:24AM  
This patient has been assessed with a concern for Malnutrition and has been determined to have a diagnosis/diagnoses of Severe protein-calorie malnutrition and Underweight (BMI < 19).    This patient is being managed with:   Diet Soft and Bite Sized-  Entered: Feb 4 2024  7:09PM

## 2024-02-09 NOTE — PROGRESS NOTE ADULT - PROBLEM SELECTOR PROBLEM 4
Bacteremia
Asymptomatic bacteriuria
Bacteremia
Bacteremia
Asymptomatic bacteriuria
Bacteremia

## 2024-02-09 NOTE — PROGRESS NOTE ADULT - ASSESSMENT
83 year old, Female, from [facility], uses cane, AAOx2-3, with PMH of HTN, HLD, hypothyroidism, brain mass, & cervical cancer.  Presented for shortness of breath and chills. Admitted for Sepsis 2/2 Asymptomatic UTI & E. Coli Bacteremia.

## 2024-02-09 NOTE — PROGRESS NOTE ADULT - PROBLEM SELECTOR PROBLEM 12
Discharge planning issues
Prophylactic measure
Discharge planning issues
Discharge planning issues
Prophylactic measure
Discharge planning issues

## 2024-02-09 NOTE — PROGRESS NOTE ADULT - TIME BILLING
- Review of records, telemetry, vital signs and daily labs.   - General and cardiovascular physical examination.  - Generation of cardiovascular treatment plan.  - Coordination of care.      Patient was seen and examined by me on 2/8/24,interim events noted,labs and radiology studies reviewed.  Iekr Kirkpatrick MD,FACC.  [address] [place], [place] [id number].  [phone]
- Review of records, telemetry, vital signs and daily labs.   - General and cardiovascular physical examination.  - Generation of cardiovascular treatment plan.  - Coordination of care.      Patient was seen and examined by me on 2/5/24,interim events noted,labs and radiology studies reviewed.  Iker Kirkpatrick MD,FACC.  [address] [place], [place] [id number].  [phone]
- Review of records, telemetry, vital signs and daily labs.   - General and cardiovascular physical examination.  - Generation of cardiovascular treatment plan.  - Coordination of care.      Patient was seen and examined by me on 2/7/24,interim events noted,labs and radiology studies reviewed.  Iker Kirkpatrick MD,FACC.  [address] [place], [place] [id number].  [phone]
- Review of records, telemetry, vital signs and daily labs.   - General and cardiovascular physical examination.  - Generation of cardiovascular treatment plan.  - Coordination of care.      Patient was seen and examined by me on 9/6/24,interim events noted,labs and radiology studies reviewed.  Iker Kirkpatrick MD,FACC.  [address] [place], [place] [id number].  [phone]
- Review of records, telemetry, vital signs and daily labs.   - General and cardiovascular physical examination.  - Generation of cardiovascular treatment plan.  - Coordination of care.      Patient was seen and examined by me on 2/6/24,interim events noted,labs and radiology studies reviewed.  Iker Kirkpatrick MD,FACC.  [address] [place], [place] [id number].  [phone]

## 2024-02-09 NOTE — PROGRESS NOTE ADULT - PROBLEM SELECTOR PLAN 2
CT chest with pneumonia  currently on O2 2L/min via N-C - titrate down as tolerated  continue chandni for now   ID Dr. Cardoza
CT chest with pneumonia  currently on O2 2L/min via N-C - titrate down as tolerated  continue chandni for now   ID Dr. Cardoza
- C/w Zosyn   - Rest below   - ID-Dr. Cardoza consulted, appreciated
CT chest with pneumonia  currently on O2 2L/min via N-C - titrate down as tolerated  continue chandni for now   ID Dr. Cardoza
- C/w Zosyn   - Rest below   - ID-Dr. Cardoza consulted, appreciated
- C/w Zosyn   - Rest below   - ID-Dr. Cardoza consulted, appreciated
CT chest with pneumonia  currently on O2 2L/min via N-C - titrate down as tolerated  continue chandni for now   ID Dr. Cardoza
- C/w Zosyn   - Rest below   - ID-Dr. Cardoza consulted, appreciated

## 2024-02-09 NOTE — PROGRESS NOTE ADULT - PROBLEM SELECTOR PROBLEM 3
Pneumonia
Pneumonia
Bacteremia
Pneumonia
Bacteremia
Pneumonia

## 2024-02-09 NOTE — PROGRESS NOTE ADULT - SUBJECTIVE AND OBJECTIVE BOX
Patient is a 83y old  Female who presents with a chief complaint of Sepsis (04 Feb 2024 19:10)      HPI:  Patient is a 83F, uses cane, AAOx3, from [facility], with PMHx HTN, HLD, hypothyroidism, brain mass, cervical cancer, sent for shortness of breath and chills. She is AAOx3 and says nothing is happening. She just endorses being hungry as she has not eaten anything. She denies any complaints. Patient denies headache, nausea, vomit, chest pain, shortness of breath, cough, lightheadedness, abdominal pain, diarrhea, constipation, dark/bloody stool, dysuria, hematuria, loss of strength, or loss of sensation.  Called patient's daughter Nory [phone] and provided update. (04 Feb 2024 19:10)     Subjective:  Patient seen at bedside.  No acute overnight events.      ROS:  14 point ROS negative except for above.    MEDICATIONS  (STANDING):  aspirin  chewable 81 milliGRAM(s) Oral daily  atorvastatin 10 milliGRAM(s) Oral at bedtime  heparin   Injectable 5000 Unit(s) SubCutaneous every 12 hours  levothyroxine 50 MICROGram(s) Oral daily  pantoprazole    Tablet 40 milliGRAM(s) Oral <User Schedule>  piperacillin/tazobactam IVPB.. 3.375 Gram(s) IV Intermittent every 12 hours  potassium chloride   Powder 40 milliEquivalent(s) Oral every 4 hours  senna 2 Tablet(s) Oral at bedtime  sodium chloride 0.9%. 1000 milliLiter(s) (60 mL/Hr) IV Continuous <Continuous>  sucralfate 1 Gram(s) Oral <User Schedule>    MEDICATIONS  (PRN):  acetaminophen     Tablet .. 650 milliGRAM(s) Oral every 6 hours PRN Temp greater or equal to 38C (100.4F), Mild Pain (1 - 3)  aluminum hydroxide/magnesium hydroxide/simethicone Suspension 30 milliLiter(s) Oral every 4 hours PRN Dyspepsia  melatonin 3 milliGRAM(s) Oral at bedtime PRN Insomnia  ondansetron Injectable 4 milliGRAM(s) IV Push every 8 hours PRN Nausea and/or Vomiting      Allergies    No Known Allergies    Intolerances    Vital Signs Last 24 Hrs  T(C): 36.7 (09 Feb 2024 04:48), Max: 37.1 (08 Feb 2024 13:10)  T(F): 98.1 (09 Feb 2024 04:48), Max: 98.8 (08 Feb 2024 13:10)  HR: 80 (09 Feb 2024 04:48) (80 - 107)  BP: 145/89 (09 Feb 2024 04:48) (128/73 - 145/89)  BP(mean): --  RR: 18 (09 Feb 2024 04:48) (18 - 18)  SpO2: 95% (09 Feb 2024 04:48) (93% - 95%)    Parameters below as of 09 Feb 2024 04:48  Patient On (Oxygen Delivery Method): room air      PHYSICAL EXAM  General: adult in NAD  HEENT: clear oropharynx, anicteric sclera, pink conjunctiva  Neck: supple  CV: normal S1/S2 with no murmur rubs or gallops  Lungs: positive air movement b/l ant lungs,clear to auscultation poor inspiratory effort  Abdomen: soft non-tender non-distended, no hepatosplenomegaly  Ext: no clubbing cyanosis or edema  Skin: no rashes and no petechiae  Neuro: Somnolent.      LABS:                                                  10.8   7.11  )-----------( 502      ( 09 Feb 2024 05:13 )             33.2   02-09    137  |  100  |  9   ----------------------------<  95  3.5   |  30  |  1.10    Ca    8.6      09 Feb 2024 05:13

## 2024-02-09 NOTE — PROGRESS NOTE ADULT - PROBLEM SELECTOR PROBLEM 5
Asymptomatic bacteriuria
ROXI (acute kidney injury)
ROXI (acute kidney injury)
Asymptomatic bacteriuria
ROXI (acute kidney injury)
ROXI (acute kidney injury)
Asymptomatic bacteriuria

## 2024-02-09 NOTE — PROGRESS NOTE ADULT - PROBLEM SELECTOR PLAN 5
- Asymptomatic.  - (+) UA.  (+) Ucx-E. Coli   - Currently on Ceftriaxone.  Awaiting ID DC plan.   - ID-Dr. Cardoza consulted, appreciated - Asymptomatic.  - (+) UA.  (+) Ucx-E. Coli   - Currently on Ceftriaxone.   ID verbally provided DC ABX PLAN: Ceftin 500mg PO BID x 8d more.   - ID-Dr. Cardoza consulted, appreciated

## 2024-02-09 NOTE — PROGRESS NOTE ADULT - PROBLEM SELECTOR PLAN 12
pending PT eval- pt from [facility]  pending final blood culture sensitivities  repeat blood cultures on 2/6
- C/w Heparin for DVT ppx   - C/w Protonix for GI ppx
pending PT eval- pt from [facility]  pending final blood culture sensitivities  repeat blood cultures on 2/6
pending PT eval- pt from [facility]  pending final blood culture sensitivities  repeat blood cultures on 2/6
- C/w Heparin for DVT ppx   - C/w Protonix for GI ppx
pending PT eval- pt from [facility]  pending final blood culture sensitivities  repeat blood cultures on 2/6

## 2024-02-09 NOTE — PROGRESS NOTE ADULT - PROBLEM SELECTOR PLAN 4
- 2/4 (+) Bld cx- GNR EColi   - 2/6 Repeat blood cultures negative   - Currently on Ceftriaxone.  Awaiting ID DC plan.  - ID-Dr. Cardoza consulted, appreciated - 2/4 (+) Bld cx- GNR EColi   - 2/6 Repeat blood cultures negative   - Currently on Ceftriaxone.  ID verbally provided DC ABX PLAN: Ceftin 500mg PO BID x 8d more.    - ID-Dr. Cardoza consulted, appreciated

## 2024-02-09 NOTE — PROGRESS NOTE ADULT - PROBLEM SELECTOR PLAN 1
H/o Known brain mass   - Per report dtr does not want any further imaging or cancer treatment   - C/w Supportive care   - [facility]-Dr. Ledbetter/Edith consulted, appreciated

## 2024-02-09 NOTE — PROGRESS NOTE ADULT - PROBLEM SELECTOR PLAN 1
H/o Known brain mass   - Per report dtr does not want any further imaging or cancer treatment   - C/w Supportive care   - [facility]-Dr. Ledbetter/Edith consulted, appreciated

## 2024-02-09 NOTE — PROGRESS NOTE ADULT - ASSESSMENT
83 year old lady with 2 brain masses, pancreatic cyst and dilated pancreatic duct, cervical ca s/p chemoRT 17 years ago.  she was admitted for UTI and sepsis    1.  brain masses with edema  Discussion with her and her daughter  she does not want any further imaging or treatment of cancer  f/u with primary team and assisted living  cont to monitor    2. h/o cervical ca s/p chemoRT  no sign of recurrence    3. UTI and sepsis  Management per primary team  Continues on ceftriaxone    Will cont to follow

## 2024-02-09 NOTE — PROGRESS NOTE ADULT - ASSESSMENT
83 year old, Female, from [facility], uses cane, AAOx2-3, with PMH of HTN, HLD, hypothyroidism, brain mass, & cervical cancer.  Presented for shortness of breath and chills. Admitted for Sepsis 2/2 Asymptomatic UTI & E. Coli Bacteremia.

## 2024-02-09 NOTE — PROGRESS NOTE ADULT - PROBLEM SELECTOR PLAN 4
- 2/4 (+) Bld cx- GNR EColi   - 2/6 Repeat blood cultures negative   - Currently on Ceftriaxone.  ID verbally provided DC ABX PLAN: Ceftin 500mg PO BID x 8d more.    - ID-Dr. Cardoza consulted, appreciated

## 2024-02-09 NOTE — PROGRESS NOTE ADULT - PROBLEM SELECTOR PLAN 5
- Asymptomatic.  - (+) UA.  (+) Ucx-E. Coli   - Currently on Ceftriaxone.   ID verbally provided DC ABX PLAN: Ceftin 500mg PO BID x 8d more.   - ID-Dr. Cardoza consulted, appreciated

## 2024-02-10 VITALS
RESPIRATION RATE: 16 BRPM | DIASTOLIC BLOOD PRESSURE: 83 MMHG | HEART RATE: 93 BPM | SYSTOLIC BLOOD PRESSURE: 152 MMHG | OXYGEN SATURATION: 94 % | TEMPERATURE: 99 F

## 2024-02-10 LAB — SARS-COV-2 RNA SPEC QL NAA+PROBE: SIGNIFICANT CHANGE UP

## 2024-02-10 PROCEDURE — 84484 ASSAY OF TROPONIN QUANT: CPT

## 2024-02-10 PROCEDURE — 96374 THER/PROPH/DIAG INJ IV PUSH: CPT

## 2024-02-10 PROCEDURE — 85025 COMPLETE CBC W/AUTO DIFF WBC: CPT

## 2024-02-10 PROCEDURE — 81001 URINALYSIS AUTO W/SCOPE: CPT

## 2024-02-10 PROCEDURE — 80053 COMPREHEN METABOLIC PANEL: CPT

## 2024-02-10 PROCEDURE — 87186 SC STD MICRODIL/AGAR DIL: CPT

## 2024-02-10 PROCEDURE — 99285 EMERGENCY DEPT VISIT HI MDM: CPT

## 2024-02-10 PROCEDURE — 97162 PT EVAL MOD COMPLEX 30 MIN: CPT

## 2024-02-10 PROCEDURE — 84100 ASSAY OF PHOSPHORUS: CPT

## 2024-02-10 PROCEDURE — 85730 THROMBOPLASTIN TIME PARTIAL: CPT

## 2024-02-10 PROCEDURE — 80048 BASIC METABOLIC PNL TOTAL CA: CPT

## 2024-02-10 PROCEDURE — 85027 COMPLETE CBC AUTOMATED: CPT

## 2024-02-10 PROCEDURE — 83880 ASSAY OF NATRIURETIC PEPTIDE: CPT

## 2024-02-10 PROCEDURE — 36415 COLL VENOUS BLD VENIPUNCTURE: CPT

## 2024-02-10 PROCEDURE — 71250 CT THORAX DX C-: CPT

## 2024-02-10 PROCEDURE — 83735 ASSAY OF MAGNESIUM: CPT

## 2024-02-10 PROCEDURE — 87635 SARS-COV-2 COVID-19 AMP PRB: CPT

## 2024-02-10 PROCEDURE — 87086 URINE CULTURE/COLONY COUNT: CPT

## 2024-02-10 PROCEDURE — 93005 ELECTROCARDIOGRAM TRACING: CPT

## 2024-02-10 PROCEDURE — 0225U NFCT DS DNA&RNA 21 SARSCOV2: CPT

## 2024-02-10 PROCEDURE — 87077 CULTURE AEROBIC IDENTIFY: CPT

## 2024-02-10 PROCEDURE — 71045 X-RAY EXAM CHEST 1 VIEW: CPT

## 2024-02-10 PROCEDURE — 85610 PROTHROMBIN TIME: CPT

## 2024-02-10 PROCEDURE — 87637 SARSCOV2&INF A&B&RSV AMP PRB: CPT

## 2024-02-10 PROCEDURE — 82962 GLUCOSE BLOOD TEST: CPT

## 2024-02-10 PROCEDURE — 87040 BLOOD CULTURE FOR BACTERIA: CPT

## 2024-02-10 PROCEDURE — 97530 THERAPEUTIC ACTIVITIES: CPT

## 2024-02-10 PROCEDURE — 87150 DNA/RNA AMPLIFIED PROBE: CPT

## 2024-02-10 PROCEDURE — 83605 ASSAY OF LACTIC ACID: CPT

## 2024-02-10 RX ORDER — CEFUROXIME AXETIL 250 MG
1 TABLET ORAL
Qty: 14 | Refills: 0
Start: 2024-02-10 | End: 2024-02-16

## 2024-02-10 RX ADMIN — HEPARIN SODIUM 5000 UNIT(S): 5000 INJECTION INTRAVENOUS; SUBCUTANEOUS at 05:03

## 2024-02-10 RX ADMIN — Medication 1 GRAM(S): at 09:13

## 2024-02-10 RX ADMIN — Medication 81 MILLIGRAM(S): at 12:42

## 2024-02-10 RX ADMIN — CEFTRIAXONE 100 MILLIGRAM(S): 500 INJECTION, POWDER, FOR SOLUTION INTRAMUSCULAR; INTRAVENOUS at 12:42

## 2024-02-10 RX ADMIN — Medication 1 GRAM(S): at 14:32

## 2024-02-10 RX ADMIN — Medication 50 MICROGRAM(S): at 05:03

## 2024-02-10 RX ADMIN — PANTOPRAZOLE SODIUM 40 MILLIGRAM(S): 20 TABLET, DELAYED RELEASE ORAL at 05:03

## 2024-02-10 NOTE — DISCHARGE NOTE NURSING/CASE MANAGEMENT/SOCIAL WORK - NSDCPEFALRISK_GEN_ALL_CORE
For information on Fall & Injury Prevention, visit: [email or web address] OR  [email or web address] OR  https://www.cdc.gov/steadi/patient.html

## 2024-02-10 NOTE — DISCHARGE NOTE NURSING/CASE MANAGEMENT/SOCIAL WORK - PATIENT PORTAL LINK FT
You can access the FollowMyHealth Patient Portal offered by [facility] by registering at the following website: [email or web address]. By joining [facility]’s FollowMyHealth portal, you will also be able to view your health information using other applications (apps) compatible with our system.

## 2024-02-10 NOTE — PROGRESS NOTE ADULT - PROVIDER SPECIALTY LIST ADULT
Heme/Onc
Infectious Disease
Infectious Disease
Heme/Onc
Heme/Onc
Cardiology
Cardiology
Internal Medicine
Cardiology
Internal Medicine
Cardiology
Internal Medicine
Cardiology

## 2024-02-10 NOTE — PROGRESS NOTE ADULT - ASSESSMENT
Pneumonia  UTI  Bacteremia - with E. Coli    Plan:  ·	dc planning today   ·	cont Rocephin 1gm iv q24hrs  ·	Upon dc, can go on Ceftin 500mgs po BID x 8 days from tomorrow or till 2/17/24.

## 2024-02-10 NOTE — PROGRESS NOTE ADULT - SUBJECTIVE AND OBJECTIVE BOX
83y Female is under our care for pneumonia, UTI and bacteremia with E. Coli. Patient was seen at bedside. Patient is also hard of hearing and not able to answer questions properly. Remains afebrile with normal wbc count. Due to dc today to STR on PO antibiotics.     MEDS:  cefTRIAXone   IVPB 1000 milliGRAM(s) IV Intermittent every 24 hours    ALLERGIES: Allergies    No Known Allergies    Intolerances    REVIEW OF SYSTEMS:  [ X ] Not able to elicit  General:	  Chest:	  GI:	  :  Skin:	  Musculoskeletal:	  Neuro:	    VITALS:  Vital Signs Last 24 Hrs  T(C): 36.8 (10 Feb 2024 05:18), Max: 36.9 (09 Feb 2024 20:11)  T(F): 98.3 (10 Feb 2024 05:18), Max: 98.4 (09 Feb 2024 20:11)  HR: 95 (10 Feb 2024 05:18) (73 - 95)  BP: 159/89 (10 Feb 2024 05:18) (127/53 - 159/89)  BP(mean): 78 (09 Feb 2024 20:11) (78 - 78)  RR: 18 (10 Feb 2024 05:18) (18 - 18)  SpO2: 93% (10 Feb 2024 05:18) (93% - 95%)    PHYSICAL EXAM:  HEENT: +NC, nasal bridge wound is healing  Neck: supple no LN's   Respiratory: lungs clear no rales  Cardiovascular: S1 S2 reg +loud sys  Gastrointestinal: +BS with soft, nondistended abdomen; nontender  Extremities: no edema  Skin: no rashes  Ortho: n/a  Neuro: AAO x 1    LABS/DIAGNOSTIC TESTS:  No new labs                         10.8   7.11  )-----------( 502      ( 09 Feb 2024 05:13 )             33.2     WBC Count: 7.11 K/uL (02-09 @ 05:13)  WBC Count: 7.12 K/uL (02-08 @ 05:56)  WBC Count: 11.09 K/uL (02-06 @ 05:18)    02-09    137  |  100  |  9   ----------------------------<  95  3.5   |  30  |  1.10    Ca    8.6      09 Feb 2024 05:13        CULTURES:   .Blood Blood-Peripheral  02-06 @ 05:21   No growth at 4 days  --  --      .Blood Blood-Peripheral  02-06 @ 05:18   No growth at 4 days  --  --      Clean Catch Clean Catch (Midstream)  02-04 @ 14:49   >100,000 CFU/ml Escherichia coli  --  Escherichia coli      .Blood Blood-Peripheral  02-04 @ 14:25   Growth in aerobic and anaerobic bottles: Escherichia coli  Direct identification is available within approximately 3-5  hours either by Blood Panel Multiplexed PCR or Direct  MALDI-TOF. Details: [email or web address]  --  Blood Culture PCR  Escherichia coli      .Blood Blood-Peripheral  02-04 @ 14:20   Growth in aerobic and anaerobic bottles: Escherichia coli  See previous culture [id number]  --    Growth in aerobic bottle: Gram Negative Rods  Growth in anaerobic bottle: Gram Negative Rods        RADIOLOGY:  no new studies 83y Female is under our care for pneumonia, UTI and bacteremia with E. Coli. Patient was seen at bedside. Patient is also hard of hearing and not able to answer questions properly. Remains afebrile with normal wbc count. Due to dc today to STR on PO antibiotics.     MEDS:  cefTRIAXone   IVPB 1000 milliGRAM(s) IV Intermittent every 24 hours    ALLERGIES: Allergies    No Known Allergies    Intolerances    REVIEW OF SYSTEMS:  [ X ] Not able to elicit  General:	  Chest:	  GI:	  :  Skin:	  Musculoskeletal:	  Neuro:	    VITALS:  Vital Signs Last 24 Hrs  T(C): 36.8 (10 Feb 2024 05:18), Max: 36.9 (09 Feb 2024 20:11)  T(F): 98.3 (10 Feb 2024 05:18), Max: 98.4 (09 Feb 2024 20:11)  HR: 95 (10 Feb 2024 05:18) (73 - 95)  BP: 159/89 (10 Feb 2024 05:18) (127/53 - 159/89)  BP(mean): 78 (09 Feb 2024 20:11) (78 - 78)  RR: 18 (10 Feb 2024 05:18) (18 - 18)  SpO2: 93% (10 Feb 2024 05:18) (93% - 95%)    PHYSICAL EXAM:  HEENT: n/a  Neck: supple no LN's   Respiratory: scattered few crepes, no rales  Cardiovascular: S1 S2 reg +loud sys  Gastrointestinal: +BS with soft, nondistended abdomen; nontender  Extremities: no edema  Skin: no rashes  Ortho: n/a  Neuro: AAO x 1    LABS/DIAGNOSTIC TESTS:  No new labs                         10.8   7.11  )-----------( 502      ( 09 Feb 2024 05:13 )             33.2     WBC Count: 7.11 K/uL (02-09 @ 05:13)  WBC Count: 7.12 K/uL (02-08 @ 05:56)  WBC Count: 11.09 K/uL (02-06 @ 05:18)    02-09    137  |  100  |  9   ----------------------------<  95  3.5   |  30  |  1.10    Ca    8.6      09 Feb 2024 05:13        CULTURES:   .Blood Blood-Peripheral  02-06 @ 05:21   No growth at 4 days  --  --      .Blood Blood-Peripheral  02-06 @ 05:18   No growth at 4 days  --  --      Clean Catch Clean Catch (Midstream)  02-04 @ 14:49   >100,000 CFU/ml Escherichia coli  --  Escherichia coli      .Blood Blood-Peripheral  02-04 @ 14:25   Growth in aerobic and anaerobic bottles: Escherichia coli  Direct identification is available within approximately 3-5  hours either by Blood Panel Multiplexed PCR or Direct  MALDI-TOF. Details: [email or web address]  --  Blood Culture PCR  Escherichia coli      .Blood Blood-Peripheral  02-04 @ 14:20   Growth in aerobic and anaerobic bottles: Escherichia coli  See previous culture [id number]  --    Growth in aerobic bottle: Gram Negative Rods  Growth in anaerobic bottle: Gram Negative Rods        RADIOLOGY:  no new studies

## 2024-02-11 LAB
CULTURE RESULTS: SIGNIFICANT CHANGE UP
CULTURE RESULTS: SIGNIFICANT CHANGE UP
SPECIMEN SOURCE: SIGNIFICANT CHANGE UP
SPECIMEN SOURCE: SIGNIFICANT CHANGE UP

## 2024-02-29 NOTE — PROGRESS NOTE ADULT - PROBLEM SELECTOR PROBLEM 6
A stent catheter was inserted. Supply used: (STENT RESOLUTE YAJAIRA 2.5MM 12MM RX DLV SYS RADOPQ ZOTAROLIMUS - [id number]). ROXI (acute kidney injury)

## 2024-04-04 NOTE — DIETITIAN INITIAL EVALUATION ADULT - CALCULATED TO (G/KG)
ID band verified and applied.  Plan of care reviewed with patient.  Patient verbalized understanding.   
63.42

## 2025-04-15 ENCOUNTER — INPATIENT (INPATIENT)
Facility: HOSPITAL | Age: 85
LOS: 6 days | Discharge: NURSING, REHAB OR LONG-TERM CARE | DRG: 390 | End: 2025-04-22
Attending: INTERNAL MEDICINE | Admitting: INTERNAL MEDICINE
Payer: MEDICARE

## 2025-04-15 VITALS
RESPIRATION RATE: 17 BRPM | HEART RATE: 104 BPM | SYSTOLIC BLOOD PRESSURE: 112 MMHG | OXYGEN SATURATION: 93 % | DIASTOLIC BLOOD PRESSURE: 75 MMHG

## 2025-04-15 LAB
APTT BLD: 31.7 SEC — SIGNIFICANT CHANGE UP (ref 24.5–35.6)
BASOPHILS # BLD AUTO: 0.05 K/UL — SIGNIFICANT CHANGE UP (ref 0–0.2)
BASOPHILS NFR BLD AUTO: 0.4 % — SIGNIFICANT CHANGE UP (ref 0–2)
EOSINOPHIL # BLD AUTO: 0 K/UL — SIGNIFICANT CHANGE UP (ref 0–0.5)
EOSINOPHIL NFR BLD AUTO: 0 % — SIGNIFICANT CHANGE UP (ref 0–6)
HCT VFR BLD CALC: 42 % — SIGNIFICANT CHANGE UP (ref 34.5–45)
HGB BLD-MCNC: 14 G/DL — SIGNIFICANT CHANGE UP (ref 11.5–15.5)
IMM GRANULOCYTES NFR BLD AUTO: 0.5 % — SIGNIFICANT CHANGE UP (ref 0–0.9)
INR BLD: 0.88 RATIO — SIGNIFICANT CHANGE UP (ref 0.85–1.16)
LYMPHOCYTES # BLD AUTO: 0.56 K/UL — LOW (ref 1–3.3)
LYMPHOCYTES # BLD AUTO: 4.3 % — LOW (ref 13–44)
MCHC RBC-ENTMCNC: 30 PG — SIGNIFICANT CHANGE UP (ref 27–34)
MCHC RBC-ENTMCNC: 33.3 G/DL — SIGNIFICANT CHANGE UP (ref 32–36)
MCV RBC AUTO: 89.9 FL — SIGNIFICANT CHANGE UP (ref 80–100)
MONOCYTES # BLD AUTO: 0.43 K/UL — SIGNIFICANT CHANGE UP (ref 0–0.9)
MONOCYTES NFR BLD AUTO: 3.3 % — SIGNIFICANT CHANGE UP (ref 2–14)
NEUTROPHILS # BLD AUTO: 11.86 K/UL — HIGH (ref 1.8–7.4)
NEUTROPHILS NFR BLD AUTO: 91.5 % — HIGH (ref 43–77)
NRBC BLD AUTO-RTO: 0 /100 WBCS — SIGNIFICANT CHANGE UP (ref 0–0)
PLATELET # BLD AUTO: 332 K/UL — SIGNIFICANT CHANGE UP (ref 150–400)
PROTHROM AB SERPL-ACNC: 10.2 SEC — SIGNIFICANT CHANGE UP (ref 9.9–13.4)
RBC # BLD: 4.67 M/UL — SIGNIFICANT CHANGE UP (ref 3.8–5.2)
RBC # FLD: 14.2 % — SIGNIFICANT CHANGE UP (ref 10.3–14.5)
WBC # BLD: 12.96 K/UL — HIGH (ref 3.8–10.5)
WBC # FLD AUTO: 12.96 K/UL — HIGH (ref 3.8–10.5)

## 2025-04-15 PROCEDURE — 99285 EMERGENCY DEPT VISIT HI MDM: CPT

## 2025-04-15 RX ADMIN — Medication 1500 MILLILITER(S): at 23:37

## 2025-04-15 NOTE — ED ADULT NURSE NOTE - DRUG PRE-SCREENING (DAST -1)
Assumed care of pt at 0700. Pt A&Ox4. NSR on tele. Lung sounds clear and diminished bilaterally on room air. Pt denies pain and SOB at this time. Pt ambulating in room and hallways independently without difficulty. Bowel prep completed at 0300.  Consents si injections, enemas and rectal medication administration  - Ensure safe mobilization of patient  - Hold pressure on venipuncture sites to achieve adequate hemostasis  - Assess for signs and symptoms of internal bleeding  - Monitor lab trends  - Patient is t Statement Selected

## 2025-04-15 NOTE — ED ADULT TRIAGE NOTE - NS ED NURSE AMBULANCES
Hello, one dose of fluconazole has been sent to the pharmacy. She can use monistat or clotrimazole cream from OTC if additional in topical therapy as well    Best  Shruthi rice, DO [facility]

## 2025-04-15 NOTE — ED ADULT TRIAGE NOTE - NS ED NURSE BANDS TYPE
Subjective:  Audrey Caruso is a 5 year old ***, ***,  who presents to the [facility] today with complaints of ***. ***    REVIEW OF SYSTEMS:    A review of systems was performed and findings relevant to these symptoms are included in the History of Present Illness.     PAST HISTORIES:  ALLERGIES:   Allergen Reactions   • Seasonal Cough and Runny Nose       Current Outpatient Medications   Medication Sig Dispense Refill   • cetirizine (ZyrTEC Childrens Allergy) 5 MG/5ML solution Take 5 mLs by mouth daily. 236 mL 1   • nystatin (MYCOSTATIN) 409716 UNIT/GM ointment Apply topically 2 times daily. 30 g 3   • polyethylene glycol (MIRALAX) 17 GM/SCOOP powder Take 8 g by mouth daily. 255 g 0   • albuterol 108 (90 Base) MCG/ACT inhaler Inhale 2 puffs into the lungs every 4 hours as needed for Shortness of Breath or Wheezing. 18 g 0   • Spacer/Aero-Hold Chamber Mask Misc Use as directed with albuterol.  Provide medium mask. 1 each 0     No current facility-administered medications for this visit.       {HISTORIES:[id number]}      Objective: General appearance: Audrey Caruso is a well-developed, well-nourished, ***, in no apparent distress, alert and oriented x3***, happy and pleasant.  ***    In Office Testing with Results and Treatment: ***.      Assessment and Plan: #1. ***  I discussed with the patient***     Prescriptions/Supplies/Orders/Letters: ***.    See AVS For:  Educational References*** and see discussion and my recommendations as above .    Audrey Caruso*** understood the plan of care, had all questions answered to *** satisfaction and *** left the [facility] in satisfactory condition accompanied by ***no one.     Name band;

## 2025-04-15 NOTE — ED ADULT NURSE NOTE - OBJECTIVE STATEMENT
pt reports she is coming from [facility] assisted living for abdominal pain x today. pt reports pain to lower quadrants. pt denies fever or chills. pt denies N/V. Pt reports last BM x today.

## 2025-04-16 DIAGNOSIS — I10 ESSENTIAL (PRIMARY) HYPERTENSION: ICD-10-CM

## 2025-04-16 DIAGNOSIS — G92.8 OTHER TOXIC ENCEPHALOPATHY: ICD-10-CM

## 2025-04-16 DIAGNOSIS — E03.9 HYPOTHYROIDISM, UNSPECIFIED: ICD-10-CM

## 2025-04-16 DIAGNOSIS — K55.1 CHRONIC VASCULAR DISORDERS OF INTESTINE: ICD-10-CM

## 2025-04-16 DIAGNOSIS — A41.9 SEPSIS, UNSPECIFIED ORGANISM: ICD-10-CM

## 2025-04-16 DIAGNOSIS — C53.9 MALIGNANT NEOPLASM OF CERVIX UTERI, UNSPECIFIED: ICD-10-CM

## 2025-04-16 DIAGNOSIS — J18.9 PNEUMONIA, UNSPECIFIED ORGANISM: ICD-10-CM

## 2025-04-16 DIAGNOSIS — J96.01 ACUTE RESPIRATORY FAILURE WITH HYPOXIA: ICD-10-CM

## 2025-04-16 DIAGNOSIS — K56.609 UNSPECIFIED INTESTINAL OBSTRUCTION, UNSPECIFIED AS TO PARTIAL VERSUS COMPLETE OBSTRUCTION: ICD-10-CM

## 2025-04-16 DIAGNOSIS — N17.9 ACUTE KIDNEY FAILURE, UNSPECIFIED: ICD-10-CM

## 2025-04-16 DIAGNOSIS — Z29.9 ENCOUNTER FOR PROPHYLACTIC MEASURES, UNSPECIFIED: ICD-10-CM

## 2025-04-16 LAB
ALBUMIN SERPL ELPH-MCNC: 3.6 G/DL — SIGNIFICANT CHANGE UP (ref 3.5–5)
ALBUMIN SERPL ELPH-MCNC: 4.1 G/DL — SIGNIFICANT CHANGE UP (ref 3.5–5)
ALP SERPL-CCNC: 82 U/L — SIGNIFICANT CHANGE UP (ref 40–120)
ALP SERPL-CCNC: 84 U/L — SIGNIFICANT CHANGE UP (ref 40–120)
ALT FLD-CCNC: 37 U/L DA — SIGNIFICANT CHANGE UP (ref 10–60)
ALT FLD-CCNC: 86 U/L DA — HIGH (ref 10–60)
ANION GAP SERPL CALC-SCNC: 11 MMOL/L — SIGNIFICANT CHANGE UP (ref 5–17)
ANION GAP SERPL CALC-SCNC: 11 MMOL/L — SIGNIFICANT CHANGE UP (ref 5–17)
APPEARANCE UR: CLEAR — SIGNIFICANT CHANGE UP
AST SERPL-CCNC: 100 U/L — HIGH (ref 10–40)
AST SERPL-CCNC: 54 U/L — HIGH (ref 10–40)
BACTERIA # UR AUTO: ABNORMAL /HPF
BASE EXCESS BLDV CALC-SCNC: 5.2 MMOL/L — SIGNIFICANT CHANGE UP
BASOPHILS # BLD AUTO: 0.02 K/UL — SIGNIFICANT CHANGE UP (ref 0–0.2)
BASOPHILS NFR BLD AUTO: 0.2 % — SIGNIFICANT CHANGE UP (ref 0–2)
BILIRUB SERPL-MCNC: 0.6 MG/DL — SIGNIFICANT CHANGE UP (ref 0.2–1.2)
BILIRUB SERPL-MCNC: 0.6 MG/DL — SIGNIFICANT CHANGE UP (ref 0.2–1.2)
BILIRUB UR-MCNC: NEGATIVE — SIGNIFICANT CHANGE UP
BUN SERPL-MCNC: 20 MG/DL — HIGH (ref 7–18)
BUN SERPL-MCNC: 20 MG/DL — HIGH (ref 7–18)
CA-I SERPL-SCNC: SIGNIFICANT CHANGE UP MMOL/L (ref 1.15–1.33)
CALCIUM SERPL-MCNC: 8.8 MG/DL — SIGNIFICANT CHANGE UP (ref 8.4–10.5)
CALCIUM SERPL-MCNC: 9.5 MG/DL — SIGNIFICANT CHANGE UP (ref 8.4–10.5)
CHLORIDE SERPL-SCNC: 99 MMOL/L — SIGNIFICANT CHANGE UP (ref 96–108)
CHLORIDE SERPL-SCNC: 99 MMOL/L — SIGNIFICANT CHANGE UP (ref 96–108)
CO2 SERPL-SCNC: 24 MMOL/L — SIGNIFICANT CHANGE UP (ref 22–31)
CO2 SERPL-SCNC: 24 MMOL/L — SIGNIFICANT CHANGE UP (ref 22–31)
COLOR SPEC: YELLOW — SIGNIFICANT CHANGE UP
CREAT SERPL-MCNC: 1.06 MG/DL — SIGNIFICANT CHANGE UP (ref 0.5–1.3)
CREAT SERPL-MCNC: 1.33 MG/DL — HIGH (ref 0.5–1.3)
DIFF PNL FLD: NEGATIVE — SIGNIFICANT CHANGE UP
EGFR: 39 ML/MIN/1.73M2 — LOW
EGFR: 39 ML/MIN/1.73M2 — LOW
EGFR: 52 ML/MIN/1.73M2 — LOW
EGFR: 52 ML/MIN/1.73M2 — LOW
EOSINOPHIL # BLD AUTO: 0 K/UL — SIGNIFICANT CHANGE UP (ref 0–0.5)
EOSINOPHIL NFR BLD AUTO: 0 % — SIGNIFICANT CHANGE UP (ref 0–6)
EPI CELLS # UR: PRESENT
FLUAV AG NPH QL: SIGNIFICANT CHANGE UP
FLUBV AG NPH QL: SIGNIFICANT CHANGE UP
GAS PNL BLDV: 134 MMOL/L — LOW (ref 136–145)
GAS PNL BLDV: SIGNIFICANT CHANGE UP
GLUCOSE BLDV-MCNC: 85 MG/DL — SIGNIFICANT CHANGE UP (ref 70–99)
GLUCOSE SERPL-MCNC: 162 MG/DL — HIGH (ref 70–99)
GLUCOSE SERPL-MCNC: 91 MG/DL — SIGNIFICANT CHANGE UP (ref 70–99)
GLUCOSE UR QL: NEGATIVE MG/DL — SIGNIFICANT CHANGE UP
HCO3 BLDV-SCNC: 32 MMOL/L — HIGH (ref 22–29)
HCT VFR BLD CALC: 39.2 % — SIGNIFICANT CHANGE UP (ref 34.5–45)
HGB BLD-MCNC: 13.2 G/DL — SIGNIFICANT CHANGE UP (ref 11.5–15.5)
HIV 1 & 2 AB SERPL IA.RAPID: SIGNIFICANT CHANGE UP
IMM GRANULOCYTES NFR BLD AUTO: 0.3 % — SIGNIFICANT CHANGE UP (ref 0–0.9)
KETONES UR-MCNC: ABNORMAL MG/DL
LACTATE BLDV-MCNC: 2.4 MMOL/L — HIGH (ref 0.5–2)
LACTATE SERPL-SCNC: 0.9 MMOL/L — SIGNIFICANT CHANGE UP (ref 0.7–2)
LACTATE SERPL-SCNC: 1.7 MMOL/L — SIGNIFICANT CHANGE UP (ref 0.7–2)
LACTATE SERPL-SCNC: 2.4 MMOL/L — HIGH (ref 0.7–2)
LACTATE SERPL-SCNC: 2.9 MMOL/L — HIGH (ref 0.7–2)
LACTATE SERPL-SCNC: 3.3 MMOL/L — HIGH (ref 0.7–2)
LEUKOCYTE ESTERASE UR-ACNC: ABNORMAL
LYMPHOCYTES # BLD AUTO: 0.55 K/UL — LOW (ref 1–3.3)
LYMPHOCYTES # BLD AUTO: 4.2 % — LOW (ref 13–44)
MAGNESIUM SERPL-MCNC: 2.3 MG/DL — SIGNIFICANT CHANGE UP (ref 1.6–2.6)
MCHC RBC-ENTMCNC: 30.7 PG — SIGNIFICANT CHANGE UP (ref 27–34)
MCHC RBC-ENTMCNC: 33.7 G/DL — SIGNIFICANT CHANGE UP (ref 32–36)
MCV RBC AUTO: 91.2 FL — SIGNIFICANT CHANGE UP (ref 80–100)
MONOCYTES # BLD AUTO: 0.51 K/UL — SIGNIFICANT CHANGE UP (ref 0–0.9)
MONOCYTES NFR BLD AUTO: 3.9 % — SIGNIFICANT CHANGE UP (ref 2–14)
NEUTROPHILS # BLD AUTO: 12.04 K/UL — HIGH (ref 1.8–7.4)
NEUTROPHILS NFR BLD AUTO: 91.4 % — HIGH (ref 43–77)
NITRITE UR-MCNC: POSITIVE
NRBC BLD AUTO-RTO: 0 /100 WBCS — SIGNIFICANT CHANGE UP (ref 0–0)
PCO2 BLDV: 50 MMHG — HIGH (ref 39–42)
PH BLDV: 7.41 — SIGNIFICANT CHANGE UP (ref 7.32–7.43)
PH UR: 5.5 — SIGNIFICANT CHANGE UP (ref 5–8)
PHOSPHATE SERPL-MCNC: 4.6 MG/DL — HIGH (ref 2.5–4.5)
PLATELET # BLD AUTO: 290 K/UL — SIGNIFICANT CHANGE UP (ref 150–400)
PO2 BLDV: 44 MMHG — SIGNIFICANT CHANGE UP
POTASSIUM BLDV-SCNC: 4.9 MMOL/L — SIGNIFICANT CHANGE UP (ref 3.5–5.1)
POTASSIUM SERPL-MCNC: 3.7 MMOL/L — SIGNIFICANT CHANGE UP (ref 3.5–5.3)
POTASSIUM SERPL-MCNC: 4.5 MMOL/L — SIGNIFICANT CHANGE UP (ref 3.5–5.3)
POTASSIUM SERPL-SCNC: 3.7 MMOL/L — SIGNIFICANT CHANGE UP (ref 3.5–5.3)
POTASSIUM SERPL-SCNC: 4.5 MMOL/L — SIGNIFICANT CHANGE UP (ref 3.5–5.3)
PROT SERPL-MCNC: 7.3 G/DL — SIGNIFICANT CHANGE UP (ref 6–8.3)
PROT SERPL-MCNC: 7.9 G/DL — SIGNIFICANT CHANGE UP (ref 6–8.3)
PROT UR-MCNC: 100 MG/DL
RBC # BLD: 4.3 M/UL — SIGNIFICANT CHANGE UP (ref 3.8–5.2)
RBC # FLD: 14.1 % — SIGNIFICANT CHANGE UP (ref 10.3–14.5)
RBC CASTS # UR COMP ASSIST: 1 /HPF — SIGNIFICANT CHANGE UP (ref 0–4)
RSV RNA NPH QL NAA+NON-PROBE: SIGNIFICANT CHANGE UP
SAO2 % BLDV: 77.5 % — SIGNIFICANT CHANGE UP
SARS-COV-2 RNA SPEC QL NAA+PROBE: SIGNIFICANT CHANGE UP
SODIUM SERPL-SCNC: 134 MMOL/L — LOW (ref 135–145)
SODIUM SERPL-SCNC: 134 MMOL/L — LOW (ref 135–145)
SOURCE RESPIRATORY: SIGNIFICANT CHANGE UP
SP GR SPEC: 1.01 — SIGNIFICANT CHANGE UP (ref 1–1.03)
UROBILINOGEN FLD QL: 0.2 MG/DL — SIGNIFICANT CHANGE UP (ref 0.2–1)
WBC # BLD: 13.16 K/UL — HIGH (ref 3.8–10.5)
WBC # FLD AUTO: 13.16 K/UL — HIGH (ref 3.8–10.5)
WBC UR QL: 2 /HPF — SIGNIFICANT CHANGE UP (ref 0–5)

## 2025-04-16 PROCEDURE — 99223 1ST HOSP IP/OBS HIGH 75: CPT | Mod: GC

## 2025-04-16 PROCEDURE — 76705 ECHO EXAM OF ABDOMEN: CPT | Mod: 26

## 2025-04-16 PROCEDURE — 71250 CT THORAX DX C-: CPT | Mod: 26

## 2025-04-16 PROCEDURE — 71045 X-RAY EXAM CHEST 1 VIEW: CPT | Mod: 26,77

## 2025-04-16 PROCEDURE — 74176 CT ABD & PELVIS W/O CONTRAST: CPT | Mod: 26

## 2025-04-16 PROCEDURE — 71045 X-RAY EXAM CHEST 1 VIEW: CPT | Mod: 26

## 2025-04-16 RX ORDER — METOPROLOL SUCCINATE 50 MG/1
2.5 TABLET, EXTENDED RELEASE ORAL EVERY 6 HOURS
Refills: 0 | Status: DISCONTINUED | OUTPATIENT
Start: 2025-04-16 | End: 2025-04-17

## 2025-04-16 RX ORDER — LEVOTHYROXINE SODIUM 300 MCG
37.5 TABLET ORAL AT BEDTIME
Refills: 0 | Status: DISCONTINUED | OUTPATIENT
Start: 2025-04-16 | End: 2025-04-17

## 2025-04-16 RX ORDER — CEFTRIAXONE 500 MG/1
1000 INJECTION, POWDER, FOR SOLUTION INTRAMUSCULAR; INTRAVENOUS ONCE
Refills: 0 | Status: COMPLETED | OUTPATIENT
Start: 2025-04-16 | End: 2025-04-16

## 2025-04-16 RX ORDER — SENNA 187 MG
1 TABLET ORAL
Refills: 0 | DISCHARGE

## 2025-04-16 RX ORDER — SODIUM NITROPRUSSIDE INJECTION 50 MG/2ML
0.5 INJECTION, SOLUTION, CONCENTRATE INTRAVENOUS
Qty: 100 | Refills: 0 | Status: DISCONTINUED | OUTPATIENT
Start: 2025-04-16 | End: 2025-04-16

## 2025-04-16 RX ORDER — METOPROLOL SUCCINATE 50 MG/1
2.5 TABLET, EXTENDED RELEASE ORAL ONCE
Refills: 0 | Status: COMPLETED | OUTPATIENT
Start: 2025-04-16 | End: 2025-04-16

## 2025-04-16 RX ORDER — PIPERACILLIN-TAZO-DEXTROSE,ISO 2.25G/50ML
3.38 IV SOLUTION, PIGGYBACK PREMIX FROZEN(ML) INTRAVENOUS EVERY 12 HOURS
Refills: 0 | Status: DISCONTINUED | OUTPATIENT
Start: 2025-04-16 | End: 2025-04-22

## 2025-04-16 RX ORDER — ENALAPRIL MALEATE 20 MG
1.25 TABLET ORAL EVERY 6 HOURS
Refills: 0 | Status: DISCONTINUED | OUTPATIENT
Start: 2025-04-16 | End: 2025-04-16

## 2025-04-16 RX ORDER — FUROSEMIDE 10 MG/ML
40 INJECTION INTRAMUSCULAR; INTRAVENOUS ONCE
Refills: 0 | Status: COMPLETED | OUTPATIENT
Start: 2025-04-16 | End: 2025-04-16

## 2025-04-16 RX ORDER — METHYLPREDNISOLONE ACETATE 80 MG/ML
60 INJECTION, SUSPENSION INTRA-ARTICULAR; INTRALESIONAL; INTRAMUSCULAR; SOFT TISSUE ONCE
Refills: 0 | Status: COMPLETED | OUTPATIENT
Start: 2025-04-16 | End: 2025-04-16

## 2025-04-16 RX ORDER — SODIUM CHLORIDE 9 G/1000ML
1000 INJECTION, SOLUTION INTRAVENOUS
Refills: 0 | Status: DISCONTINUED | OUTPATIENT
Start: 2025-04-16 | End: 2025-04-19

## 2025-04-16 RX ORDER — OLANZAPINE 10 MG/1
5 TABLET ORAL ONCE
Refills: 0 | Status: DISCONTINUED | OUTPATIENT
Start: 2025-04-16 | End: 2025-04-20

## 2025-04-16 RX ORDER — IPRATROPIUM BROMIDE AND ALBUTEROL SULFATE .5; 2.5 MG/3ML; MG/3ML
3 SOLUTION RESPIRATORY (INHALATION) ONCE
Refills: 0 | Status: COMPLETED | OUTPATIENT
Start: 2025-04-16 | End: 2025-04-16

## 2025-04-16 RX ORDER — AMLODIPINE BESYLATE 10 MG/1
10 TABLET ORAL ONCE
Refills: 0 | Status: COMPLETED | OUTPATIENT
Start: 2025-04-16 | End: 2025-04-16

## 2025-04-16 RX ORDER — PIPERACILLIN-TAZO-DEXTROSE,ISO 2.25G/50ML
3.38 IV SOLUTION, PIGGYBACK PREMIX FROZEN(ML) INTRAVENOUS ONCE
Refills: 0 | Status: COMPLETED | OUTPATIENT
Start: 2025-04-16 | End: 2025-04-16

## 2025-04-16 RX ORDER — ONDANSETRON HCL/PF 4 MG/2 ML
4 VIAL (ML) INJECTION EVERY 8 HOURS
Refills: 0 | Status: DISCONTINUED | OUTPATIENT
Start: 2025-04-16 | End: 2025-04-22

## 2025-04-16 RX ORDER — NITROGLYCERIN 20 MG/G
0.5 OINTMENT TOPICAL
Qty: 50 | Refills: 0 | Status: DISCONTINUED | OUTPATIENT
Start: 2025-04-16 | End: 2025-04-16

## 2025-04-16 RX ORDER — HEPARIN SODIUM 1000 [USP'U]/ML
5000 INJECTION INTRAVENOUS; SUBCUTANEOUS EVERY 12 HOURS
Refills: 0 | Status: DISCONTINUED | OUTPATIENT
Start: 2025-04-16 | End: 2025-04-17

## 2025-04-16 RX ORDER — NITROGLYCERIN 20 MG/G
0.4 OINTMENT TOPICAL ONCE
Refills: 0 | Status: COMPLETED | OUTPATIENT
Start: 2025-04-16 | End: 2025-04-16

## 2025-04-16 RX ORDER — PIPERACILLIN-TAZO-DEXTROSE,ISO 2.25G/50ML
3.38 IV SOLUTION, PIGGYBACK PREMIX FROZEN(ML) INTRAVENOUS EVERY 8 HOURS
Refills: 0 | Status: DISCONTINUED | OUTPATIENT
Start: 2025-04-16 | End: 2025-04-16

## 2025-04-16 RX ORDER — FERROUS SULFATE 137(45) MG
1 TABLET, EXTENDED RELEASE ORAL
Refills: 0 | DISCHARGE

## 2025-04-16 RX ORDER — MAGNESIUM SULFATE 500 MG/ML
2 SYRINGE (ML) INJECTION ONCE
Refills: 0 | Status: COMPLETED | OUTPATIENT
Start: 2025-04-16 | End: 2025-04-16

## 2025-04-16 RX ORDER — FERROUS SULFATE 137(45) MG
0 TABLET, EXTENDED RELEASE ORAL
Refills: 0 | DISCHARGE

## 2025-04-16 RX ORDER — ENALAPRIL MALEATE 20 MG
10 TABLET ORAL ONCE
Refills: 0 | Status: COMPLETED | OUTPATIENT
Start: 2025-04-16 | End: 2025-04-16

## 2025-04-16 RX ADMIN — METOPROLOL SUCCINATE 2.5 MILLIGRAM(S): 50 TABLET, EXTENDED RELEASE ORAL at 09:18

## 2025-04-16 RX ADMIN — NITROGLYCERIN 0.4 MILLIGRAM(S): 20 OINTMENT TOPICAL at 04:37

## 2025-04-16 RX ADMIN — Medication 25 GRAM(S): at 18:20

## 2025-04-16 RX ADMIN — METHYLPREDNISOLONE ACETATE 60 MILLIGRAM(S): 80 INJECTION, SUSPENSION INTRA-ARTICULAR; INTRALESIONAL; INTRAMUSCULAR; SOFT TISSUE at 04:36

## 2025-04-16 RX ADMIN — SODIUM CHLORIDE 50 MILLILITER(S): 9 INJECTION, SOLUTION INTRAVENOUS at 16:50

## 2025-04-16 RX ADMIN — AMLODIPINE BESYLATE 10 MILLIGRAM(S): 10 TABLET ORAL at 09:39

## 2025-04-16 RX ADMIN — FUROSEMIDE 40 MILLIGRAM(S): 10 INJECTION INTRAMUSCULAR; INTRAVENOUS at 04:36

## 2025-04-16 RX ADMIN — Medication 10 MILLIGRAM(S): at 10:23

## 2025-04-16 RX ADMIN — Medication 200 GRAM(S): at 01:09

## 2025-04-16 RX ADMIN — Medication 37.5 MICROGRAM(S): at 21:04

## 2025-04-16 RX ADMIN — METOPROLOL SUCCINATE 2.5 MILLIGRAM(S): 50 TABLET, EXTENDED RELEASE ORAL at 09:05

## 2025-04-16 RX ADMIN — CEFTRIAXONE 100 MILLIGRAM(S): 500 INJECTION, POWDER, FOR SOLUTION INTRAMUSCULAR; INTRAVENOUS at 04:36

## 2025-04-16 RX ADMIN — IPRATROPIUM BROMIDE AND ALBUTEROL SULFATE 3 MILLILITER(S): .5; 2.5 SOLUTION RESPIRATORY (INHALATION) at 04:42

## 2025-04-16 RX ADMIN — HEPARIN SODIUM 5000 UNIT(S): 1000 INJECTION INTRAVENOUS; SUBCUTANEOUS at 18:21

## 2025-04-16 RX ADMIN — Medication 150 GRAM(S): at 05:26

## 2025-04-16 RX ADMIN — NITROGLYCERIN 0.15 MICROGRAM(S)/MIN: 20 OINTMENT TOPICAL at 06:10

## 2025-04-16 RX ADMIN — FUROSEMIDE 40 MILLIGRAM(S): 10 INJECTION INTRAMUSCULAR; INTRAVENOUS at 06:05

## 2025-04-16 NOTE — H&P ADULT - PROBLEM SELECTOR PLAN 4
WBC 12.96  T 94F  imaging as above  flu/covid/rsv neg  s/p CTX and zosyn in ED    -f/u cx  -cont zosyn given SBO Meets SIRS Criteria  Source likely PNA, SBO  Lactate 2.4 >> 3.3  -hypothermic overnight and required liang hugger    -IV fluids: s/p 1500 cc NS  -IV Abx : s/p CTX and zosyn in ED  -If lactate is elevated, please trend until Normalization q3hrs  -f/u cx  -cont zosyn for now Meets SIRS Criteria  Source likely PNA, SBO  Lactate 2.4 >> 3.3  -hypothermic overnight and required liang hugger    -IV fluids: s/p 1500 cc NS  -IV Abx : s/p CTX and zosyn in ED  -If lactate is elevated, please trend until Normalization q3hrs  -f/u cx  -cont zosyn for now  -ID Dr. Cardoza Meets SIRS Criteria  Source likely PNA, SBO  Lactate 2.4 >> 3.3  -hypothermic overnight and required liang hugger    -IV fluids: s/p 1500 cc NS  -IV Abx : s/p CTX and zosyn in ED  -If lactate is elevated, please trend until Normalization q3hrs  -f/u cx  -cont zosyn for now  -ID Dr. Cardoza  -gentle fluid hydration D5NS at 50cc/hr while npo Meets SIRS Criteria  Source likely PNA, SBO  Lactate 2.4 >> 3.3 >>> 1.7 >> 2.40  -hypothermic overnight and required liang hugger    -IV fluids: s/p 1500 cc NS  -IV Abx : s/p CTX and zosyn in ED  -If lactate is elevated, please trend until Normalization q3hrs  -f/u cx  -cont zosyn for now  -ID Dr. Cardoza  -gentle fluid hydration D5NS at 50cc/hr while npo

## 2025-04-16 NOTE — CONSULT NOTE ADULT - SUBJECTIVE AND OBJECTIVE BOX
HPI:  Pt is an 84 yo F from [facility], with cervical cancer, brain mass, HTN, HLD, hypothyroid, presenting with abdominal pain. Pt is a limited narrator, collateral information obtained from chart and from family, HCP daughter Nory on the phone. Pt presented to the hospital with complaint of abdominal pain and cold sweats, in ED was found to be hypothermic to 94F, imaging showing LLL PNA, partial SBO. She was placed on warming blanket. Surgery was consulted by ED, NGT placed but pt was encephalopathic and she pulled it out. She was fluid resuscitated for sepsis and lactic acidosis. She began to have severe SOB, required NRB mask as she refused bipap, she was given IV mag, solumedrol, lasix 40 mg IVP x2, she was placed briefly on nitro drip for BP control (BP 190s/110s), with improvement, was weaned off nitro drip and admitted to medicine. On my interview pt was able to provide very limited history, was oriented to only self, but was able to say she came in with abdominal pain, but was not able to provide much other information.  Denies dysuria, nausea, vomiting, diarrhea, SOB, chest pain, IZAGUIRRE, palpitations, dizziness, headache, cough, wheezing, joint pain or swelling, fever, chills.  (16 Apr 2025 11:05)            PAST MEDICAL & SURGICAL HISTORY:  Hypercholesterolemia      HTN (hypertension)      Hypothyroidism      Osteoarthritis      Anemia      Cervical cancer      Brain mass      No significant past surgical history          No Known Allergies      Meds:  dextrose 5% + sodium chloride 0.9%. 1000 milliLiter(s) IV Continuous <Continuous>  heparin   Injectable 5000 Unit(s) SubCutaneous every 12 hours  levothyroxine Injectable 37.5 MICROGram(s) IV Push at bedtime  metoprolol tartrate Injectable 2.5 milliGRAM(s) IV Push every 6 hours PRN  ondansetron Injectable 4 milliGRAM(s) IV Push every 8 hours PRN  pantoprazole  Injectable 40 milliGRAM(s) IV Push daily  piperacillin/tazobactam IVPB.. 3.375 Gram(s) IV Intermittent every 12 hours      SOCIAL HISTORY:  Smoker:  YES / NO        PACK YEARS:                         WHEN QUIT?  ETOH use:  YES / NO               FREQUENCY / QUANTITY:  [place] Drug use:  YES / NO  Occupation:  Assisted device use (Cane / Walker):  Live with:    FAMILY HISTORY:      VITALS:  Vital Signs Last 24 Hrs  T(C): 36.5 (16 Apr 2025 15:51), Max: 36.5 (16 Apr 2025 15:51)  T(F): 97.7 (16 Apr 2025 15:51), Max: 97.7 (16 Apr 2025 15:51)  HR: 110 (16 Apr 2025 15:51) (88 - 136)  BP: 134/83 (16 Apr 2025 15:51) (107/88 - 190/119)  BP(mean): 91 (16 Apr 2025 06:58) (85 - 138)  RR: 18 (16 Apr 2025 15:51) (12 - 32)  SpO2: 96% (16 Apr 2025 15:51) (83% - 100%)    Parameters below as of 16 Apr 2025 15:51  Patient On (Oxygen Delivery Method): room air        LABS/DIAGNOSTIC TESTS:                          13.2   13.16 )-----------( 290      ( 16 Apr 2025 11:50 )             39.2     WBC Count: 13.16 K/uL (04-16 @ 11:50)  WBC Count: 12.96 K/uL (04-15 @ 23:18)      04-16    134[L]  |  99  |  20[H]  ----------------------------<  91  4.5   |  24  |  1.06    Ca    8.8      16 Apr 2025 11:50  Phos  4.6     04-16  Mg     2.3     04-16    TPro  7.3  /  Alb  3.6  /  TBili  0.6  /  DBili  x   /  AST  100[H]  /  ALT  86[H]  /  AlkPhos  82  04-16      Urine Microscopic-Add On (NC) (04.16.25 @ 02:00)   White Blood Cell - Urine: 2 /HPF  Red Blood Cell - Urine: 1 /HPF  Bacteria: Moderate /HPF  Squamous Epithelial Cells: PresentUrinalysis with Rflx Culture (04.16.25 @ 02:00)   Urine Appearance: Clear  Color: Yellow  Specific Gravity: 1.015  pH Urine: 5.5  Protein, Urine: 100 mg/dL  Glucose Qualitative, Urine: Negative mg/dL  Ketone - Urine: Trace mg/dL  Blood, Urine: Negative  Bilirubin: Negative  Urobilinogen: 0.2 mg/dL  Leukocyte Esterase Concentration: Trace  Nitrite: Positive      LIVER FUNCTIONS - ( 16 Apr 2025 11:50 )  Alb: 3.6 g/dL / Pro: 7.3 g/dL / ALK PHOS: 82 U/L / ALT: 86 U/L DA / AST: 100 U/L / GGT: x             PT/INR - ( 15 Apr 2025 23:18 )   PT: 10.2 sec;   INR: 0.88 ratio         PTT - ( 15 Apr 2025 23:18 )  PTT:31.7 sec    LACTATE:Lactate, Blood: 1.7 mmol/L (04-16 @ 11:50)  Lactate, Blood: 3.3 mmol/L (04-16 @ 05:50)  Lactate, Blood: 2.9 mmol/L (04-16 @ 03:47)  Lactate, Blood: 2.4 mmol/L (04-15 @ 23:08)      ABG -     CULTURES:       RADIOLOGY:< from: US Abdomen Upper Quadrant Right (04.16.25 @ 14:50) >  ACC: [id number] EXAM:  US ABDOMEN RT UPR QUADRANT   ORDERED BY: ALFONSO SANDOVAL     PROCEDURE DATE:  04/16/2025          INTERPRETATION:  CLINICAL INFORMATION: Elevated liver function tests    COMPARISON: CT dated 4/16/2025    TECHNIQUE: Sonography of the right upper quadrant.    FINDINGS:  Liver: Within normal limits.  Bile ducts: Common bile duct measures 8.5 mm, prominent.  Gallbladder: Cholelithiasis.  No focal tenderness or evidence of   cholecystitis.  Pancreas: Visualized portions are within normal limits.  Right kidney: 8.5 cm. No hydronephrosis. Calculi measuring up to 2 mm.  Ascites: None.  IVC: Visualized portions are within normal limits.    IMPRESSION:  Cholelithiasis.  No focal tenderness or evidence of cholecystitis.        ---End of Report ---            SEEMA CORTEZ MD; Attending Radiologist  This document has been electronically signed. Apr 16 2025  4:01PM    < end of copied text >  ----------------------------------------------------------------------------------------------------------------------------------    ACC: [id number] EXAM:  CT ABDOMEN AND PELVIS   ORDERED BY: DALE COLLAZO     PROCEDURE DATE:  04/16/2025          INTERPRETATION:  CLINICAL INFORMATION: Abdominal pain, sepsis    COMPARISON: 2/5/2024    CONTRAST/COMPLICATIONS:  IV Contrast: NONE  Oral Contrast: NONE  .    PROCEDURE:  CT of the Chest, Abdomen and Pelvis was performed.  Sagittal and coronal reformats were performed.    FINDINGS:  CHEST:  LUNGS AND LARGE AIRWAYS: Left lower lobe airspace consolidation.   Scattered areas of focal atelectasis right lower lobe.  PLEURA: No pleural effusion.  VESSELS: Scattered atherosclerotic calcifications in the aorta  HEART: Cardiomegaly. No pericardial effusion.  MEDIASTINUM AND SHAD: No lymphadenopathy.  CHEST WALL AND LOWER NECK: Within normal limits.    ABDOMEN AND PELVIS:  LIVER: Within normal limits.  BILE DUCTS: Normal caliber.  GALLBLADDER: Cholelithiasis.  SPLEEN: Within normal limits.  PANCREAS: Within normal limits.  ADRENALS: Within normal limits.  KIDNEYS/URETERS: Limited evaluation. An approximate 4.5 cm cyst is seen   in the left kidney.    BLADDER: Within normal limits.  REPRODUCTIVE ORGANS:    BOWEL: Focal bowel occlusion noted at the third portion of the duodenum,   where it traverses midline (2:175). Resultant severegastric distention,   extending into severe distention of a large intrathoracic hiatal hernia   extending to the level of the thoracic inlet. Scattered dilated loops of   small bowel noted in the pelvis, without wall thickening.  PERITONEUM/RETROPERITONEUM: Within normal limits.  VESSELS: Atherosclerotic calcifications  LYMPH NODES: No lymphadenopathy.  ABDOMINAL WALL: Within normal limits.  BONES: Severe thoracolumbar spine scoliosis    IMPRESSION:  1.  Focal bowel occlusion at the third portionof the duodenum where it   traverses midline. Resultant severe gastric distention, extending into   severe distention of a large intrathoracic hiatal hernia, to the level of   the thoracic inlet  2.  Left lower lobe pneumonia  3.  Scattered loops of dilated small bowel in the pelvis, possible   partial small bowel obstruction versus ileus    --- End of Report ---              < end of copied text >  ------------------------------------------------------------------------------------------------------------------    ACC: [id number] EXAM:  CT CHEST   ORDERED BY: DALE COLLAZO     PROCEDURE DATE:  04/16/2025          INTERPRETATION:  CLINICAL INFORMATION: Abdominal pain    COMPARISON: 2/5/2024    CONTRAST/COMPLICATIONS:  IV Contrast: NONE  Oral Contrast: NONE    PROCEDURE:  CT of the Chest, Abdomen and Pelvis was performed.  Sagittal and coronal reformats were performed.    LIMITATIONS: Lack of intravenous contrast material limits diagnostic   sensitivity in evaluating solid organs /vasculature.    FINDINGS:  CHEST:  LUNGS AND LARGE AIRWAYS: Respiratory motion unsharpness. Patent central   airways partially compressed by diffuse marked air distended esophagus   and intrathoracic stomach. Diffuse LEFT perihilar, lingula and LLL   airspace opacity. RLL andmedial RML passive atelectasis. Mild   interlobular septal thickening  PLEURA: Trace dependent RIGHT pleural effusion.  VESSELS: No aortic dilatation. Aortic /coronary artery calcification.  HEART: Cardiomegaly. Heart compressed anteriorly by marked air distended   intrathoracic stomach.  MEDIASTINUM AND SHAD: No lymphadenopathy. Marked air dilated lower   cervical/thoracic esophagus (5.7 cm max diameter) and subsequent marked   air dilated partial intrathoracic stomach. See BOWEL following  CHEST WALL AND LOWER NECK: Within normal limits.    ABDOMEN AND PELVIS:  LIVER: Within normal limits.  BILE DUCTS: Normal caliber.  GALLBLADDER: Cholelithiasis.  SPLEEN: Within normal limits.  PANCREAS: Limited pancreas evaluation. Pancreatic atrophy and distal   body/tail duct ectasia,  ADRENALS: Nonspecific LEFT suprarenal soft tissue density (2/141), not   significantly changed from 6/28/2023  KIDNEYS/URETERS: LEFT renal cysts measure up to 4.6 cm. Millimeter-sized   RIGHT intrarenal calculus. RIGHT extrarenal pelvic fluid fullness. No   obstructive uropathy.    BLADDER: Love catheter within partially collapsed urinary bladder  REPRODUCTIVE ORGANS: Hysterectomy.    BOWEL: Marked/severe air distended stomach (w/ small fluid level, 9 cm   max diameter) to dilated D2 duodenum with abrupt transition to collapsed   duodenum as it crosses spine. Subsequent dilated proximal small bowel (4   cm max diameter) with distal collapse pelvic small bowel. Exact point of   transition not determined but presumed in central pelvis.   PERITONEUM/RETROPERITONEUM: No ascites or pneumoperitoneum.  VESSELS: Atherosclerotic changes.  LYMPH NODES: No lymphadenopathy.  ABDOMINAL WALL: Within normal limits.  BONES: Osteopenia. Thoracolumbar curvature and degenerative changes..   Grade 1-2 L5-S1 anterolisthesis. Healed mid LEFT rib fracture. Chronic   LEFT pubic ramus fractures/fracture nonunion, unchanged    IMPRESSION:  1.  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  2.  Subsequent dilated small bowel c/w  small bowel obstruction.   Transition point notdetermined but suspected within pelvis.  3.  Diffuse LEFT perihilar, lingula and LLL airspace opacity /   consolidated      This report was discussed with ALIYA Collazo MD at 4/16/2025 6:40 AM.    --- End of Report ---            NEDA BONNER MD; Attending Radiologist  This document has been electronically signed. Apr 16 2025  7:13AM    < end of copied text >  ----------------------------------------------------------------------------------------------------------------------------------  ACC: [id number] EXAM:  XR CHEST PORTABLE IMMED 1V   ORDERED BY: DALE COLLAZO     PROCEDURE DATE:  04/16/2025          INTERPRETATION:  EXAMINATION: XR CHEST IMMEDIATE    CLINICAL INDICATION: acute sob    TECHNIQUE: Frontal portable view of the chest was obtained.    COMPARISON: Chest x-ray same day prior.    FINDINGS:  Large hiatal hernia with gas distended stomach.  The heart size is not well evaluated secondary to left hemidiaphragm   elevation.  Linear opacities in the left middle and right lower lung field, likely   atelectasis. Patchy bibasilar opacities.  There is no pneumothorax or pleural effusion.  No acute bony abnormality.  Distended bowel loops again seen in the upper abdomen.    IMPRESSION:  Bilateral linear and patchy opacities compatible with atelectasis.  Large hiatal hernia with gas distended stomach.  Distended bowel loops in the upper abdomen.    --- End of Report ---          PHUONG REEVES MD; Resident Radiologist  This document has been electronically signed.  ANDRE SAAVEDRA MD; Attending Radiologist  This document has been electronically signed. Apr 16 2025  1:25PM    < end of copied text >        ROS  [  ] UNABLE TO ELICIT               HPI:  Pt is an 84 yo F from [facility], with cervical cancer, brain mass, HTN, HLD, hypothyroid, presenting with abdominal pain. Pt is a limited narrator, collateral information obtained from chart and from family, HCP daughter Nory on the phone. Pt presented to the hospital with complaint of abdominal pain and cold sweats, in ED was found to be hypothermic to 94F, imaging showing LLL PNA, partial SBO. She was placed on warming blanket. Surgery was consulted by ED, NGT placed but pt was encephalopathic and she pulled it out. She was fluid resuscitated for sepsis and lactic acidosis. She began to have severe SOB, required NRB mask as she refused bipap, she was given IV mag, solumedrol, lasix 40 mg IVP x2, she was placed briefly on nitro drip for BP control (BP 190s/110s), with improvement, was weaned off nitro drip and admitted to medicine. On my interview pt was able to provide very limited history, was oriented to only self, but was able to say she came in with abdominal pain, but was not able to provide much other information.  Denies dysuria, nausea, vomiting, diarrhea, SOB, chest pain, IZAGUIRRE, palpitations, dizziness, headache, cough, wheezing, joint pain or swelling, fever, chills.  (16 Apr 2025 11:05)        History as above, asked to see this patient who presented from a NH with a fairly extensive PMH, she is confused , lethargic and not talking to me at all, she is not answering any questions either. She was found to be hypothermic , encephalopathic and SOB at the NH, she was initially put on BIPAP here but not cooperative, she also had a high BP on admission here. She has a normal temp now , she has mild Leukocytosis and an elevated lactate level. She had a CT chest and abdomen and found to have small bowel obstruction and a left lower lobe Pneumonia ( likely aspiration). She has been started on Zosyn for her Pneumonia/ sepsis.          PAST MEDICAL & SURGICAL HISTORY:  Hypercholesterolemia      HTN (hypertension)      Hypothyroidism      Osteoarthritis      Anemia      Cervical cancer      Brain mass      No significant past surgical history          No Known Allergies      Meds:  dextrose 5% + sodium chloride 0.9%. 1000 milliLiter(s) IV Continuous <Continuous>  heparin   Injectable 5000 Unit(s) SubCutaneous every 12 hours  levothyroxine Injectable 37.5 MICROGram(s) IV Push at bedtime  metoprolol tartrate Injectable 2.5 milliGRAM(s) IV Push every 6 hours PRN  ondansetron Injectable 4 milliGRAM(s) IV Push every 8 hours PRN  pantoprazole  Injectable 40 milliGRAM(s) IV Push daily  piperacillin/tazobactam IVPB.. 3.375 Gram(s) IV Intermittent every 12 hours      SOCIAL HISTORY:unknown    FAMILY HISTORY:      VITALS:  Vital Signs Last 24 Hrs  T(C): 36.5 (16 Apr 2025 15:51), Max: 36.5 (16 Apr 2025 15:51)  T(F): 97.7 (16 Apr 2025 15:51), Max: 97.7 (16 Apr 2025 15:51)  HR: 110 (16 Apr 2025 15:51) (88 - 136)  BP: 134/83 (16 Apr 2025 15:51) (107/88 - 190/119)  BP(mean): 91 (16 Apr 2025 06:58) (85 - 138)  RR: 18 (16 Apr 2025 15:51) (12 - 32)  SpO2: 96% (16 Apr 2025 15:51) (83% - 100%)    Parameters below as of 16 Apr 2025 15:51  Patient On (Oxygen Delivery Method): room air        LABS/DIAGNOSTIC TESTS:                          13.2   13.16 )-----------( 290      ( 16 Apr 2025 11:50 )             39.2     WBC Count: 13.16 K/uL (04-16 @ 11:50)  WBC Count: 12.96 K/uL (04-15 @ 23:18)      04-16    134[L]  |  99  |  20[H]  ----------------------------<  91  4.5   |  24  |  1.06    Ca    8.8      16 Apr 2025 11:50  Phos  4.6     04-16  Mg     2.3     04-16    TPro  7.3  /  Alb  3.6  /  TBili  0.6  /  DBili  x   /  AST  100[H]  /  ALT  86[H]  /  AlkPhos  82  04-16      Urine Microscopic-Add On (NC) (04.16.25 @ 02:00)   White Blood Cell - Urine: 2 /HPF  Red Blood Cell - Urine: 1 /HPF  Bacteria: Moderate /HPF  Squamous Epithelial Cells: PresentUrinalysis with Rflx Culture (04.16.25 @ 02:00)   Urine Appearance: Clear  Color: Yellow  Specific Gravity: 1.015  pH Urine: 5.5  Protein, Urine: 100 mg/dL  Glucose Qualitative, Urine: Negative mg/dL  Ketone - Urine: Trace mg/dL  Blood, Urine: Negative  Bilirubin: Negative  Urobilinogen: 0.2 mg/dL  Leukocyte Esterase Concentration: Trace  Nitrite: Positive      LIVER FUNCTIONS - ( 16 Apr 2025 11:50 )  Alb: 3.6 g/dL / Pro: 7.3 g/dL / ALK PHOS: 82 U/L / ALT: 86 U/L DA / AST: 100 U/L / GGT: x             PT/INR - ( 15 Apr 2025 23:18 )   PT: 10.2 sec;   INR: 0.88 ratio         PTT - ( 15 Apr 2025 23:18 )  PTT:31.7 sec    LACTATE:Lactate, Blood: 1.7 mmol/L (04-16 @ 11:50)  Lactate, Blood: 3.3 mmol/L (04-16 @ 05:50)  Lactate, Blood: 2.9 mmol/L (04-16 @ 03:47)  Lactate, Blood: 2.4 mmol/L (04-15 @ 23:08)      ABG -     CULTURES:       RADIOLOGY:< from: US Abdomen Upper Quadrant Right (04.16.25 @ 14:50) >  ACC: [id number] EXAM:  US ABDOMEN RT UPR QUADRANT   ORDERED BY: ALFONSO SANDOVAL     PROCEDURE DATE:  04/16/2025          INTERPRETATION:  CLINICAL INFORMATION: Elevated liver function tests    COMPARISON: CT dated 4/16/2025    TECHNIQUE: Sonography of the right upper quadrant.    FINDINGS:  Liver: Within normal limits.  Bile ducts: Common bile duct measures 8.5 mm, prominent.  Gallbladder: Cholelithiasis.  No focal tenderness or evidence of   cholecystitis.  Pancreas: Visualized portions are within normal limits.  Right kidney: 8.5 cm. No hydronephrosis. Calculi measuring up to 2 mm.  Ascites: None.  IVC: Visualized portions are within normal limits.    IMPRESSION:  Cholelithiasis.  No focal tenderness or evidence of cholecystitis.        ---End of Report ---            SEEMA CORTEZ MD; Attending Radiologist  This document has been electronically signed. Apr 16 2025  4:01PM    < end of copied text >  ----------------------------------------------------------------------------------------------------------------------------------    ACC: [id number] EXAM:  CT ABDOMEN AND PELVIS   ORDERED BY: DALE COLLAZO     PROCEDURE DATE:  04/16/2025          INTERPRETATION:  CLINICAL INFORMATION: Abdominal pain, sepsis    COMPARISON: 2/5/2024    CONTRAST/COMPLICATIONS:  IV Contrast: NONE  Oral Contrast: NONE  .    PROCEDURE:  CT of the Chest, Abdomen and Pelvis was performed.  Sagittal and coronal reformats were performed.    FINDINGS:  CHEST:  LUNGS AND LARGE AIRWAYS: Left lower lobe airspace consolidation.   Scattered areas of focal atelectasis right lower lobe.  PLEURA: No pleural effusion.  VESSELS: Scattered atherosclerotic calcifications in the aorta  HEART: Cardiomegaly. No pericardial effusion.  MEDIASTINUM AND SHAD: No lymphadenopathy.  CHEST WALL AND LOWER NECK: Within normal limits.    ABDOMEN AND PELVIS:  LIVER: Within normal limits.  BILE DUCTS: Normal caliber.  GALLBLADDER: Cholelithiasis.  SPLEEN: Within normal limits.  PANCREAS: Within normal limits.  ADRENALS: Within normal limits.  KIDNEYS/URETERS: Limited evaluation. An approximate 4.5 cm cyst is seen   in the left kidney.    BLADDER: Within normal limits.  REPRODUCTIVE ORGANS:    BOWEL: Focal bowel occlusion noted at the third portion of the duodenum,   where it traverses midline (2:175). Resultant severegastric distention,   extending into severe distention of a large intrathoracic hiatal hernia   extending to the level of the thoracic inlet. Scattered dilated loops of   small bowel noted in the pelvis, without wall thickening.  PERITONEUM/RETROPERITONEUM: Within normal limits.  VESSELS: Atherosclerotic calcifications  LYMPH NODES: No lymphadenopathy.  ABDOMINAL WALL: Within normal limits.  BONES: Severe thoracolumbar spine scoliosis    IMPRESSION:  1.  Focal bowel occlusion at the third portionof the duodenum where it   traverses midline. Resultant severe gastric distention, extending into   severe distention of a large intrathoracic hiatal hernia, to the level of   the thoracic inlet  2.  Left lower lobe pneumonia  3.  Scattered loops of dilated small bowel in the pelvis, possible   partial small bowel obstruction versus ileus    --- End of Report ---              < end of copied text >  ------------------------------------------------------------------------------------------------------------------    ACC: [id number] EXAM:  CT CHEST   ORDERED BY: DALE COLLAZO     PROCEDURE DATE:  04/16/2025          INTERPRETATION:  CLINICAL INFORMATION: Abdominal pain    COMPARISON: 2/5/2024    CONTRAST/COMPLICATIONS:  IV Contrast: NONE  Oral Contrast: NONE    PROCEDURE:  CT of the Chest, Abdomen and Pelvis was performed.  Sagittal and coronal reformats were performed.    LIMITATIONS: Lack of intravenous contrast material limits diagnostic   sensitivity in evaluating solid organs /vasculature.    FINDINGS:  CHEST:  LUNGS AND LARGE AIRWAYS: Respiratory motion unsharpness. Patent central   airways partially compressed by diffuse marked air distended esophagus   and intrathoracic stomach. Diffuse LEFT perihilar, lingula and LLL   airspace opacity. RLL andmedial RML passive atelectasis. Mild   interlobular septal thickening  PLEURA: Trace dependent RIGHT pleural effusion.  VESSELS: No aortic dilatation. Aortic /coronary artery calcification.  HEART: Cardiomegaly. Heart compressed anteriorly by marked air distended   intrathoracic stomach.  MEDIASTINUM AND SHAD: No lymphadenopathy. Marked air dilated lower   cervical/thoracic esophagus (5.7 cm max diameter) and subsequent marked   air dilated partial intrathoracic stomach. See BOWEL following  CHEST WALL AND LOWER NECK: Within normal limits.    ABDOMEN AND PELVIS:  LIVER: Within normal limits.  BILE DUCTS: Normal caliber.  GALLBLADDER: Cholelithiasis.  SPLEEN: Within normal limits.  PANCREAS: Limited pancreas evaluation. Pancreatic atrophy and distal   body/tail duct ectasia,  ADRENALS: Nonspecific LEFT suprarenal soft tissue density (2/141), not   significantly changed from 6/28/2023  KIDNEYS/URETERS: LEFT renal cysts measure up to 4.6 cm. Millimeter-sized   RIGHT intrarenal calculus. RIGHT extrarenal pelvic fluid fullness. No   obstructive uropathy.    BLADDER: Love catheter within partially collapsed urinary bladder  REPRODUCTIVE ORGANS: Hysterectomy.    BOWEL: Marked/severe air distended stomach (w/ small fluid level, 9 cm   max diameter) to dilated D2 duodenum with abrupt transition to collapsed   duodenum as it crosses spine. Subsequent dilated proximal small bowel (4   cm max diameter) with distal collapse pelvic small bowel. Exact point of   transition not determined but presumed in central pelvis.   PERITONEUM/RETROPERITONEUM: No ascites or pneumoperitoneum.  VESSELS: Atherosclerotic changes.  LYMPH NODES: No lymphadenopathy.  ABDOMINAL WALL: Within normal limits.  BONES: Osteopenia. Thoracolumbar curvature and degenerative changes..   Grade 1-2 L5-S1 anterolisthesis. Healed mid LEFT rib fracture. Chronic   LEFT pubic ramus fractures/fracture nonunion, unchanged    IMPRESSION:  1.  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  2.  Subsequent dilated small bowel c/w  small bowel obstruction.   Transition point notdetermined but suspected within pelvis.  3.  Diffuse LEFT perihilar, lingula and LLL airspace opacity /   consolidated      This report was discussed with ALIYA Collazo MD at 4/16/2025 6:40 AM.    --- End of Report ---            NEDA BONNER MD; Attending Radiologist  This document has been electronically signed. Apr 16 2025  7:13AM    < end of copied text >  ----------------------------------------------------------------------------------------------------------------------------------  ACC: [id number] EXAM:  XR CHEST PORTABLE IMMED 1V   ORDERED BY: DALE COLLAZO     PROCEDURE DATE:  04/16/2025          INTERPRETATION:  EXAMINATION: XR CHEST IMMEDIATE    CLINICAL INDICATION: acute sob    TECHNIQUE: Frontal portable view of the chest was obtained.    COMPARISON: Chest x-ray same day prior.    FINDINGS:  Large hiatal hernia with gas distended stomach.  The heart size is not well evaluated secondary to left hemidiaphragm   elevation.  Linear opacities in the left middle and right lower lung field, likely   atelectasis. Patchy bibasilar opacities.  There is no pneumothorax or pleural effusion.  No acute bony abnormality.  Distended bowel loops again seen in the upper abdomen.    IMPRESSION:  Bilateral linear and patchy opacities compatible with atelectasis.  Large hiatal hernia with gas distended stomach.  Distended bowel loops in the upper abdomen.    --- End of Report ---          PHUONG REEVES MD; Resident Radiologist  This document has been electronically signed.  ANDRE SAAVEDRA MD; Attending Radiologist  This document has been electronically signed. Apr 16 2025  1:25PM    < end of copied text >        ROS  [  ] UNABLE TO ELICIT               HPI:  Pt is an 82 yo F from [facility], with cervical cancer, brain mass, HTN, HLD, hypothyroid, presenting with abdominal pain. Pt is a limited narrator, collateral information obtained from chart and from family, HCP daughter Nory on the phone. Pt presented to the hospital with complaint of abdominal pain and cold sweats, in ED was found to be hypothermic to 94F, imaging showing LLL PNA, partial SBO. She was placed on warming blanket. Surgery was consulted by ED, NGT placed but pt was encephalopathic and she pulled it out. She was fluid resuscitated for sepsis and lactic acidosis. She began to have severe SOB, required NRB mask as she refused bipap, she was given IV mag, solumedrol, lasix 40 mg IVP x2, she was placed briefly on nitro drip for BP control (BP 190s/110s), with improvement, was weaned off nitro drip and admitted to medicine. On my interview pt was able to provide very limited history, was oriented to only self, but was able to say she came in with abdominal pain, but was not able to provide much other information.  Denies dysuria, nausea, vomiting, diarrhea, SOB, chest pain, IZAGUIRRE, palpitations, dizziness, headache, cough, wheezing, joint pain or swelling, fever, chills.  (16 Apr 2025 11:05)        History as above, asked to see this patient who presented from a NH with a fairly extensive PMH, she is confused , lethargic and not talking to me at all, she is not answering any questions either. She was found to be hypothermic , encephalopathic and SOB at the NH, she was initially put on BIPAP here but not cooperative, she also had a high BP on admission here. She has a normal temp now , she has mild Leukocytosis and an elevated lactate level. She had a CT chest and abdomen and found to have small bowel obstruction and a left lower lobe Pneumonia ( likely aspiration). She has been started on Zosyn for her Pneumonia/ sepsis.          PAST MEDICAL & SURGICAL HISTORY:  Hypercholesterolemia      HTN (hypertension)      Hypothyroidism      Osteoarthritis      Anemia      Cervical cancer      Brain mass      No significant past surgical history          No Known Allergies      Meds:  dextrose 5% + sodium chloride 0.9%. 1000 milliLiter(s) IV Continuous <Continuous>  heparin   Injectable 5000 Unit(s) SubCutaneous every 12 hours  levothyroxine Injectable 37.5 MICROGram(s) IV Push at bedtime  metoprolol tartrate Injectable 2.5 milliGRAM(s) IV Push every 6 hours PRN  ondansetron Injectable 4 milliGRAM(s) IV Push every 8 hours PRN  pantoprazole  Injectable 40 milliGRAM(s) IV Push daily  piperacillin/tazobactam IVPB.. 3.375 Gram(s) IV Intermittent every 12 hours      SOCIAL HISTORY: unknown    FAMILY HISTORY: unknown      VITALS:  Vital Signs Last 24 Hrs  T(C): 36.5 (16 Apr 2025 15:51), Max: 36.5 (16 Apr 2025 15:51)  T(F): 97.7 (16 Apr 2025 15:51), Max: 97.7 (16 Apr 2025 15:51)  HR: 110 (16 Apr 2025 15:51) (88 - 136)  BP: 134/83 (16 Apr 2025 15:51) (107/88 - 190/119)  BP(mean): 91 (16 Apr 2025 06:58) (85 - 138)  RR: 18 (16 Apr 2025 15:51) (12 - 32)  SpO2: 96% (16 Apr 2025 15:51) (83% - 100%)    Parameters below as of 16 Apr 2025 15:51  Patient On (Oxygen Delivery Method): room air        LABS/DIAGNOSTIC TESTS:                          13.2   13.16 )-----------( 290      ( 16 Apr 2025 11:50 )             39.2     WBC Count: 13.16 K/uL (04-16 @ 11:50)  WBC Count: 12.96 K/uL (04-15 @ 23:18)      04-16    134[L]  |  99  |  20[H]  ----------------------------<  91  4.5   |  24  |  1.06    Ca    8.8      16 Apr 2025 11:50  Phos  4.6     04-16  Mg     2.3     04-16    TPro  7.3  /  Alb  3.6  /  TBili  0.6  /  DBili  x   /  AST  100[H]  /  ALT  86[H]  /  AlkPhos  82  04-16      Urine Microscopic-Add On (NC) (04.16.25 @ 02:00)   White Blood Cell - Urine: 2 /HPF  Red Blood Cell - Urine: 1 /HPF  Bacteria: Moderate /HPF  Squamous Epithelial Cells: PresentUrinalysis with Rflx Culture (04.16.25 @ 02:00)   Urine Appearance: Clear  Color: Yellow  Specific Gravity: 1.015  pH Urine: 5.5  Protein, Urine: 100 mg/dL  Glucose Qualitative, Urine: Negative mg/dL  Ketone - Urine: Trace mg/dL  Blood, Urine: Negative  Bilirubin: Negative  Urobilinogen: 0.2 mg/dL  Leukocyte Esterase Concentration: Trace  Nitrite: Positive      LIVER FUNCTIONS - ( 16 Apr 2025 11:50 )  Alb: 3.6 g/dL / Pro: 7.3 g/dL / ALK PHOS: 82 U/L / ALT: 86 U/L DA / AST: 100 U/L / GGT: x             PT/INR - ( 15 Apr 2025 23:18 )   PT: 10.2 sec;   INR: 0.88 ratio         PTT - ( 15 Apr 2025 23:18 )  PTT:31.7 sec    LACTATE:Lactate, Blood: 1.7 mmol/L (04-16 @ 11:50)  Lactate, Blood: 3.3 mmol/L (04-16 @ 05:50)  Lactate, Blood: 2.9 mmol/L (04-16 @ 03:47)  Lactate, Blood: 2.4 mmol/L (04-15 @ 23:08)      ABG -     CULTURES:       RADIOLOGY:< from: US Abdomen Upper Quadrant Right (04.16.25 @ 14:50) >  ACC: [id number] EXAM:  US ABDOMEN RT UPR QUADRANT   ORDERED BY: ALFONSO SANDOVAL     PROCEDURE DATE:  04/16/2025          INTERPRETATION:  CLINICAL INFORMATION: Elevated liver function tests    COMPARISON: CT dated 4/16/2025    TECHNIQUE: Sonography of the right upper quadrant.    FINDINGS:  Liver: Within normal limits.  Bile ducts: Common bile duct measures 8.5 mm, prominent.  Gallbladder: Cholelithiasis.  No focal tenderness or evidence of   cholecystitis.  Pancreas: Visualized portions are within normal limits.  Right kidney: 8.5 cm. No hydronephrosis. Calculi measuring up to 2 mm.  Ascites: None.  IVC: Visualized portions are within normal limits.    IMPRESSION:  Cholelithiasis.  No focal tenderness or evidence of cholecystitis.        ---End of Report ---            SEEMA CORTEZ MD; Attending Radiologist  This document has been electronically signed. Apr 16 2025  4:01PM    < end of copied text >  ----------------------------------------------------------------------------------------------------------------------------------    ACC: [id number] EXAM:  CT ABDOMEN AND PELVIS   ORDERED BY: DALE COLLAZO     PROCEDURE DATE:  04/16/2025          INTERPRETATION:  CLINICAL INFORMATION: Abdominal pain, sepsis    COMPARISON: 2/5/2024    CONTRAST/COMPLICATIONS:  IV Contrast: NONE  Oral Contrast: NONE  .    PROCEDURE:  CT of the Chest, Abdomen and Pelvis was performed.  Sagittal and coronal reformats were performed.    FINDINGS:  CHEST:  LUNGS AND LARGE AIRWAYS: Left lower lobe airspace consolidation.   Scattered areas of focal atelectasis right lower lobe.  PLEURA: No pleural effusion.  VESSELS: Scattered atherosclerotic calcifications in the aorta  HEART: Cardiomegaly. No pericardial effusion.  MEDIASTINUM AND SHAD: No lymphadenopathy.  CHEST WALL AND LOWER NECK: Within normal limits.    ABDOMEN AND PELVIS:  LIVER: Within normal limits.  BILE DUCTS: Normal caliber.  GALLBLADDER: Cholelithiasis.  SPLEEN: Within normal limits.  PANCREAS: Within normal limits.  ADRENALS: Within normal limits.  KIDNEYS/URETERS: Limited evaluation. An approximate 4.5 cm cyst is seen   in the left kidney.    BLADDER: Within normal limits.  REPRODUCTIVE ORGANS:    BOWEL: Focal bowel occlusion noted at the third portion of the duodenum,   where it traverses midline (2:175). Resultant severegastric distention,   extending into severe distention of a large intrathoracic hiatal hernia   extending to the level of the thoracic inlet. Scattered dilated loops of   small bowel noted in the pelvis, without wall thickening.  PERITONEUM/RETROPERITONEUM: Within normal limits.  VESSELS: Atherosclerotic calcifications  LYMPH NODES: No lymphadenopathy.  ABDOMINAL WALL: Within normal limits.  BONES: Severe thoracolumbar spine scoliosis    IMPRESSION:  1.  Focal bowel occlusion at the third portionof the duodenum where it   traverses midline. Resultant severe gastric distention, extending into   severe distention of a large intrathoracic hiatal hernia, to the level of   the thoracic inlet  2.  Left lower lobe pneumonia  3.  Scattered loops of dilated small bowel in the pelvis, possible   partial small bowel obstruction versus ileus    --- End of Report ---              < end of copied text >  ------------------------------------------------------------------------------------------------------------------    ACC: [id number] EXAM:  CT CHEST   ORDERED BY: DALE COLLAZO     PROCEDURE DATE:  04/16/2025          INTERPRETATION:  CLINICAL INFORMATION: Abdominal pain    COMPARISON: 2/5/2024    CONTRAST/COMPLICATIONS:  IV Contrast: NONE  Oral Contrast: NONE    PROCEDURE:  CT of the Chest, Abdomen and Pelvis was performed.  Sagittal and coronal reformats were performed.    LIMITATIONS: Lack of intravenous contrast material limits diagnostic   sensitivity in evaluating solid organs /vasculature.    FINDINGS:  CHEST:  LUNGS AND LARGE AIRWAYS: Respiratory motion unsharpness. Patent central   airways partially compressed by diffuse marked air distended esophagus   and intrathoracic stomach. Diffuse LEFT perihilar, lingula and LLL   airspace opacity. RLL andmedial RML passive atelectasis. Mild   interlobular septal thickening  PLEURA: Trace dependent RIGHT pleural effusion.  VESSELS: No aortic dilatation. Aortic /coronary artery calcification.  HEART: Cardiomegaly. Heart compressed anteriorly by marked air distended   intrathoracic stomach.  MEDIASTINUM AND SHAD: No lymphadenopathy. Marked air dilated lower   cervical/thoracic esophagus (5.7 cm max diameter) and subsequent marked   air dilated partial intrathoracic stomach. See BOWEL following  CHEST WALL AND LOWER NECK: Within normal limits.    ABDOMEN AND PELVIS:  LIVER: Within normal limits.  BILE DUCTS: Normal caliber.  GALLBLADDER: Cholelithiasis.  SPLEEN: Within normal limits.  PANCREAS: Limited pancreas evaluation. Pancreatic atrophy and distal   body/tail duct ectasia,  ADRENALS: Nonspecific LEFT suprarenal soft tissue density (2/141), not   significantly changed from 6/28/2023  KIDNEYS/URETERS: LEFT renal cysts measure up to 4.6 cm. Millimeter-sized   RIGHT intrarenal calculus. RIGHT extrarenal pelvic fluid fullness. No   obstructive uropathy.    BLADDER: Love catheter within partially collapsed urinary bladder  REPRODUCTIVE ORGANS: Hysterectomy.    BOWEL: Marked/severe air distended stomach (w/ small fluid level, 9 cm   max diameter) to dilated D2 duodenum with abrupt transition to collapsed   duodenum as it crosses spine. Subsequent dilated proximal small bowel (4   cm max diameter) with distal collapse pelvic small bowel. Exact point of   transition not determined but presumed in central pelvis.   PERITONEUM/RETROPERITONEUM: No ascites or pneumoperitoneum.  VESSELS: Atherosclerotic changes.  LYMPH NODES: No lymphadenopathy.  ABDOMINAL WALL: Within normal limits.  BONES: Osteopenia. Thoracolumbar curvature and degenerative changes..   Grade 1-2 L5-S1 anterolisthesis. Healed mid LEFT rib fracture. Chronic   LEFT pubic ramus fractures/fracture nonunion, unchanged    IMPRESSION:  1.  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  2.  Subsequent dilated small bowel c/w  small bowel obstruction.   Transition point notdetermined but suspected within pelvis.  3.  Diffuse LEFT perihilar, lingula and LLL airspace opacity /   consolidated      This report was discussed with ALIYA Collazo MD at 4/16/2025 6:40 AM.    --- End of Report ---            NEDA BONNER MD; Attending Radiologist  This document has been electronically signed. Apr 16 2025  7:13AM    < end of copied text >  ----------------------------------------------------------------------------------------------------------------------------------  ACC: [id number] EXAM:  XR CHEST PORTABLE IMMED 1V   ORDERED BY: DALE COLLAZO     PROCEDURE DATE:  04/16/2025          INTERPRETATION:  EXAMINATION: XR CHEST IMMEDIATE    CLINICAL INDICATION: acute sob    TECHNIQUE: Frontal portable view of the chest was obtained.    COMPARISON: Chest x-ray same day prior.    FINDINGS:  Large hiatal hernia with gas distended stomach.  The heart size is not well evaluated secondary to left hemidiaphragm   elevation.  Linear opacities in the left middle and right lower lung field, likely   atelectasis. Patchy bibasilar opacities.  There is no pneumothorax or pleural effusion.  No acute bony abnormality.  Distended bowel loops again seen in the upper abdomen.    IMPRESSION:  Bilateral linear and patchy opacities compatible with atelectasis.  Large hiatal hernia with gas distended stomach.  Distended bowel loops in the upper abdomen.    --- End of Report ---          PHUONG REEVES MD; Resident Radiologist  This document has been electronically signed.  ANDRE SAAVEDRA MD; Attending Radiologist  This document has been electronically signed. Apr 16 2025  1:25PM    < end of copied text >        ROS  [ x ] UNABLE TO ELICIT

## 2025-04-16 NOTE — H&P ADULT - NSHPPHYSICALEXAM_GEN_ALL_CORE
T(C): 36.3 (04-16-25 @ 09:33), Max: 36.3 (04-16-25 @ 09:33)  HR: 90 (04-16-25 @ 10:08) (88 - 136)  BP: 156/98 (04-16-25 @ 10:08) (107/88 - 190/119)  RR: 18 (04-16-25 @ 10:08) (12 - 32)  SpO2: 96% (04-16-25 @ 10:08) (83% - 100%)    CONSTITUTIONAL: Well groomed, no acute distress, frail, cachectic, elderly, appears older than stated age    HEENT: normotramuatic, acephalic, PERRL and symmetric, EOMI, No conjunctival or scleral injection, non-icteric. Oral mucosa with dry membranes. No external nasal lesions, brown crusting on inside of L nostril               Neck: supple, symmetric and without tracheal deviation    RESPIRATORY: No respiratory distress, no use of accessory muscles; CTA b/l, no wheezes, rales or rhonchi, poor air mvmt    CARDIOVASCULAR: RRRR, +S1S2, no murmurs, no rubs, no gallops; no JVD; no peripheral edema  	Vascular: radial pulse palpable    GASTROINTESTINAL: +diminished BS, Soft, non tender, non distended, no rebound, no guarding; No palpable masses    MUSCULOSKELETAL: No digital clubbing or cyanosis; no spinal tenderness    SKIN: No ulcers noted    NEUROLOGIC: sensation intact in upper and lower extremities b/l to light touch    PSYCHIATRIC: AOx1 only to person    LYMPHATIC: No cervical LAD or tenderness    GENITOURINARY: No suprapubic tenderness, no CVA tenderness

## 2025-04-16 NOTE — CONSULT NOTE ADULT - ASSESSMENT
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

## 2025-04-16 NOTE — H&P ADULT - ASSESSMENT
82 yo F from [facility], with cervical cancer, brain mass, HTN, HLD, hypothyroid, admitted with encephalopathy, severe sepsis with lactic acidosis, partial SBO, LLL PNA, acute hypoxic respiratory failure. 84 yo F from [facility], with cervical cancer, brain mass, HTN, HLD, hypothyroid, admitted with encephalopathy, acute hypoxic respiratory failure likely 2/2 flash pulm edema from HTN emergency, severe sepsis with lactic acidosis, partial SBO, LLL PNA,

## 2025-04-16 NOTE — ED ADULT NURSE REASSESSMENT NOTE - NS ED NURSE REASSESS COMMENT FT1
pt fluids taking long to infuse into patient due to patient having AC iv access and patient frequently bending her arm. pt educated on importance of keeping arm straight. MD Mccormack made aware. ivf infusing as per MD orders.

## 2025-04-16 NOTE — CONSULT NOTE ADULT - SUBJECTIVE AND OBJECTIVE BOX
Patient is a 84y old  Female who presents with a chief complaint of severe sepsis (16 Apr 2025 11:05)      HPI:  Pt is an 84 yo F from [facility], with cervical cancer, brain mass, HTN, HLD, hypothyroid, presenting with abdominal pain. Pt is a limited narrator, collateral information obtained from chart and from family, HCP daughter Nory on the phone. Pt presented to the hospital with complaint of abdominal pain and cold sweats, in ED was found to be hypothermic to 94F, imaging showing LLL PNA, partial SBO. She was placed on warming blanket. Surgery was consulted by ED, NGT placed but pt was encephalopathic and she pulled it out. She was fluid resuscitated for sepsis and lactic acidosis. She began to have severe SOB, required NRB mask as she refused bipap, she was given IV mag, solumedrol, lasix 40 mg IVP x2, she was placed briefly on nitro drip for BP control (BP 190s/110s), with improvement, was weaned off nitro drip and admitted to medicine. On my interview pt was able to provide very limited history, was oriented to only self, but was able to say she came in with abdominal pain, but was not able to provide much other information.  Denies dysuria, nausea, vomiting, diarrhea, SOB, chest pain, IZAGUIRRE, palpitations, dizziness, headache, cough, wheezing, joint pain or swelling, fever, chills.  (16 Apr 2025 11:05)      Called see and deonna 84y.o. Female w/PMH significant for cervical cancer, brain mass, HTN, HLD, hypothyroid. At time of seeing pt reports not having any pain. She reports her last bowel movement was 6 days ago. She denies any n/v/d, fevers, chills.    PAST MEDICAL & SURGICAL HISTORY:  Hypercholesterolemia      HTN (hypertension)      Hypothyroidism      Osteoarthritis      Anemia      Cervical cancer      Brain mass      No significant past surgical history          MEDICATIONS  (STANDING):  heparin   Injectable 5000 Unit(s) SubCutaneous every 12 hours  piperacillin/tazobactam IVPB.. 3.375 Gram(s) IV Intermittent every 12 hours    MEDICATIONS  (PRN):  metoprolol tartrate Injectable 2.5 milliGRAM(s) IV Push every 6 hours PRN SBP >150  ondansetron Injectable 4 milliGRAM(s) IV Push every 8 hours PRN Nausea and/or Vomiting      Allergies    No Known Allergies    Intolerances        Vital Signs Last 24 Hrs  T(C): 36.3 (16 Apr 2025 09:33), Max: 36.3 (16 Apr 2025 09:33)  T(F): 97.4 (16 Apr 2025 09:33), Max: 97.4 (16 Apr 2025 09:33)  HR: 109 (16 Apr 2025 12:46) (88 - 136)  BP: 156/98 (16 Apr 2025 10:08) (107/88 - 190/119)  BP(mean): 91 (16 Apr 2025 06:58) (85 - 138)  RR: 18 (16 Apr 2025 10:08) (12 - 32)  SpO2: 96% (16 Apr 2025 10:08) (83% - 100%)    Parameters below as of 16 Apr 2025 10:08  Patient On (Oxygen Delivery Method): nasal cannula  O2 Flow (L/min): 4      Physical:  Gen: A&Ox1. NAD  Respiratory: unlabored breathing, symmetrical rise and fall  Abd: Soft ND, NT. no rebound tenderness, guarding, or palpable masses on exam    I&O's Detail      LABS:                        13.2   13.16 )-----------( 290      ( 16 Apr 2025 11:50 )             39.2              04-16    134[L]  |  99  |  20[H]  ----------------------------<  91  4.5   |  24  |  1.06    Ca    8.8      16 Apr 2025 11:50  Phos  4.6     04-16  Mg     2.3     04-16    TPro  7.3  /  Alb  3.6  /  TBili  0.6  /  DBili  x   /  AST  100[H]  /  ALT  86[H]  /  AlkPhos  82  04-16            PT/INR - ( 15 Apr 2025 23:18 )   PT: 10.2 sec;   INR: 0.88 ratio         PTT - ( 15 Apr 2025 23:18 )  PTT:31.7 sec  Urinalysis Basic - ( 16 Apr 2025 11:50 )    Color: x / Appearance: x / SG: x / pH: x  Gluc: 91 mg/dL / Ketone: x  / Bili: x / Urobili: x   Blood: x / Protein: x / Nitrite: x   Leuk Esterase: x / RBC: x / WBC x   Sq Epi: x / Non Sq Epi: x / Bacteria: x        RADIOLOGY & ADDITIONAL STUDIES:    < from: CT Abdomen and Pelvis No Cont (04.16.25 @ 06:37) >  PROCEDURE DATE:  04/16/2025          INTERPRETATION:  CLINICAL INFORMATION: Abdominal pain, sepsis    COMPARISON: 2/5/2024    CONTRAST/COMPLICATIONS:  IV Contrast: NONE  Oral Contrast: NONE  .    PROCEDURE:  CT of the Chest, Abdomen and Pelvis was performed.  Sagittal and coronal reformats were performed.    FINDINGS:  CHEST:  LUNGS AND LARGE AIRWAYS: Left lower lobe airspace consolidation.   Scattered areas of focal atelectasis right lower lobe.  PLEURA: No pleural effusion.  VESSELS: Scattered atherosclerotic calcifications in the aorta  HEART: Cardiomegaly. No pericardial effusion.  MEDIASTINUM AND SHAD: No lymphadenopathy.  CHEST WALL AND LOWER NECK: Within normal limits.    ABDOMEN AND PELVIS:  LIVER: Within normal limits.  BILE DUCTS: Normal caliber.  GALLBLADDER: Cholelithiasis.  SPLEEN: Within normal limits.  PANCREAS: Within normal limits.  ADRENALS: Within normal limits.  KIDNEYS/URETERS: Limited evaluation. An approximate 4.5 cm cyst is seen   in the left kidney.    BLADDER: Within normal limits.  REPRODUCTIVE ORGANS:    BOWEL: Focal bowel occlusion noted at the third portion of the duodenum,   where it traverses midline (2:175). Resultant severegastric distention,   extending into severe distention of a large intrathoracic hiatal hernia   extending to the level of the thoracic inlet. Scattered dilated loops of   small bowel noted in the pelvis, without wall thickening.  PERITONEUM/RETROPERITONEUM: Within normal limits.  VESSELS: Atherosclerotic calcifications  LYMPH NODES: No lymphadenopathy.  ABDOMINAL WALL: Within normal limits.  BONES: Severe thoracolumbar spine scoliosis    IMPRESSION:  1.  Focal bowel occlusion at the third portionof the duodenum where it   traverses midline. Resultant severe gastric distention, extending into   severe distention of a large intrathoracic hiatal hernia, to the level of   the thoracic inlet  2.  Left lower lobe pneumonia  3.  Scattered loops of dilated small bowel in the pelvis, possible   partial small bowel obstruction versus ileus    --- End of Report ---    < end of copied text >

## 2025-04-16 NOTE — CONSULT NOTE ADULT - ASSESSMENT
Sepsis  Pneumonia ( aspiration )  Leukocytosis   Hypothermia - resolved    Plan - Cont Zosyn 3.375 gms iv  Sepsis  Pneumonia ( aspiration )  Leukocytosis   Hypothermia - resolved    Plan - Cont Zosyn 3.375 gms iv q12hrs  await culture results.

## 2025-04-16 NOTE — ED ADULT NURSE REASSESSMENT NOTE - NS ED NURSE REASSESS COMMENT FT1
Received Pt from night RN at the bedside. Pt is awake, confused, noted to be agitated. Pt was in B/L wrist restraints and pulled out NGT during hand off. MD Mccormack was informed and came to bedside. As per MD Mccormack B/L wrist restraints are to be removed and were removed. Pt noted to become more calm. Nitroglycerin drip running at 30 mcg/min.  Pt is on cardiac monitor. IV access intact. Fall risk precautions maintained, bed alarm applied. Safety measures maintained.

## 2025-04-16 NOTE — CONSULT NOTE ADULT - ATTENDING COMMENTS
Scan seems like SMA syndrome. Clinically, not a small bowel obstruction.   Plan per GI. Reconsult surgery PRN.
83F from [facility], with PMH of cervical cancer, brain mass, HTN, HLD, hypothyroid, p/w abdominal pain. Admitted to medicine for acute hypoxic respiratory failure likely 2/2 flash pulm edema from HTN emergency, severe sepsis 2/2 PNA and partial SBO. ICU was consulted for AHRF 2/2 flash pulmonary edema. Chart reviewed and case discussed at length with resident. At time of exam pt was improved, extremely Elim IRA, normoxic on RA in no respiratory distress. BP improved 130s/80s. Initial lactate cleared. On exam only with c/o mild abdominal discomfort from SBO. Would continue to monitor hemodynamics and respiratory status closely. Mgmt of mild SBO as per medicine/surgery. Does not require ICU level of care at this time.

## 2025-04-16 NOTE — H&P ADULT - NSICDXPASTMEDICALHX_GEN_ALL_CORE_FT
PAST MEDICAL HISTORY:  Anemia     Brain mass     Cervical cancer     HTN (hypertension)     Hypercholesterolemia     Hypothyroidism     Osteoarthritis

## 2025-04-16 NOTE — H&P ADULT - PROBLEM SELECTOR PLAN 5
s/p nitro drip in ED  s/p amlodipine 10 mg, enalapril 10 mg, lasix 40 mg IVP x2, metop 2.5 mg IVP x2    -BP now within parameters  -tele monitoring  -pt is NPO for SBO, can do IV pushes of enalaprillat or labetalol CT showin.  Focal bowel occlusion at the third portionof the duodenum where it traverses midline. Resultant severe gastric distention, extending into severe distention of a large intrathoracic hiatal hernia, to the level of  the thoracic inlet  2.  Left lower lobe pneumonia 3.  Scattered loops of dilated small bowel in the pelvis, possible partial small bowel obstruction versus ileus  Was seen by surgery  Unclear at this moment if plans for OR  Pt had NGT but pulled it out    -NGT as per surgery  -NPO   -appreciate surg recs CT showin.  Focal bowel occlusion at the third portionof the duodenum where it traverses midline. Resultant severe gastric distention, extending into severe distention of a large intrathoracic hiatal hernia, to the level of  the thoracic inlet  2.  Left lower lobe pneumonia 3.  Scattered loops of dilated small bowel in the pelvis, possible partial small bowel obstruction versus ileus  Was seen by surgery  Unclear at this moment if plans for OR  Pt had NGT but pulled it out    -no NGT as per surgery  -NPO   -appreciate surg recs  -consulted GI

## 2025-04-16 NOTE — CONSULT NOTE ADULT - ASSESSMENT
83F from [facility], with PMH of cervical cancer, brain mass, HTN, HLD, hypothyroid, p/w abdominal pain. Admitted to medicine for acute hypoxic respiratory failure likely 2/2 flash pulm edema from HTN emergency, severe sepsis 2/2 PNA and partial SBO. ICU was consulted for AHRF 2/2 flash pulmonary edema.    #AHRF 2/2 Flash pulmonary edema (resolved)  #Hypertensive emergency (resolved)  #Severe sepsis 2/2 PNA  #Lactic acidosis (resolved)  #Partial SBO  #ROXI (resolved)  #hx of cervical cancer, brain mass, HTN, HLD, hypothyroid    p/w abdominal pain, hypothermia, hypertension  s/p 1500 cc bolus, zosyn and CTX for sepsis  s/p amlodipine 10 mg, enalapril 10 mg, lasix 40 mg IVP x2, metop 2.5 mg IVP x2  Monitor BP closely, administer IV BP meds in the setting of NPO  saturating well on RA, monitor off O2  Recommend Surgery consult  Recommend NGT for decompression  lactate trended down  ROXI resolved  Recommend ABx for PNA coverage possible aspiration  f/u Bcx, PNA w/u  c/w home meds  Rest of the plan as per primary team.  Pt does not have any ICU needs at this time. Continue medical management.    Thank you for the opportunity to participate in the care of the patient. Please, reconsult ICU if clinical status changes.

## 2025-04-16 NOTE — H&P ADULT - PROBLEM SELECTOR PLAN 6
IV synthroid WBC 12.96  T 94F  imaging as above  flu/covid/rsv neg  s/p CTX and zosyn in ED    -f/u cx  -cont zosyn given SBO

## 2025-04-16 NOTE — CONSULT NOTE ADULT - SUBJECTIVE AND OBJECTIVE BOX
Patient is a 84y old  Female who presents with a chief complaint of severe sepsis (16 Apr 2025 11:05)      HPI:  Pt is an 84 yo F from [facility], with cervical cancer, brain mass, HTN, HLD, hypothyroid, presenting with abdominal pain. Pt is a limited narrator, collateral information obtained from chart and from family, HCP daughter Nory on the phone. Pt presented to the hospital with complaint of abdominal pain and cold sweats, in ED was found to be hypothermic to 94F, imaging showing LLL PNA, partial SBO. She was placed on warming blanket. Surgery was consulted by ED, NGT placed but pt was encephalopathic and she pulled it out. She was fluid resuscitated for sepsis and lactic acidosis. She began to have severe SOB, required NRB mask as she refused bipap, she was given IV mag, solumedrol, lasix 40 mg IVP x2, she was placed briefly on nitro drip for BP control (BP 190s/110s), with improvement, was weaned off nitro drip and admitted to medicine. On my interview pt was able to provide very limited history, was oriented to only self, but was able to say she came in with abdominal pain, but was not able to provide much other information.  Denies dysuria, nausea, vomiting, diarrhea, SOB, chest pain, IZAGUIRRE, palpitations, dizziness, headache, cough, wheezing, joint pain or swelling, fever, chills.  (16 Apr 2025 11:05)    ICU was consulted for AHRF 2/2 Flash pulmonary edema. Pt was seen and examined with daughter at bedside. Pt states that she feels much better except her abdominal pain. She was found to be afebrile, normotensive with systolic 130s, saturating 94% on RA. Bedside POCUS shows b/l b lines at the bases. Overall, pt appeared to be improved. lactate cleared. ICU was consulted for AHRF 2/2 Flash pulmonary edema now resolved.     PAST MEDICAL & SURGICAL HISTORY:  Hypercholesterolemia  HTN (hypertension)  Hypothyroidism  Osteoarthritis  Anemia  Cervical cancer  Brain mass    No significant past surgical history    SOCIAL HX:  from [facility], ambulates with a cane at baseline    FAMILY HISTORY:  :  No known cardiovacular family hisotry     ROS:  See Hs    Allergies  No Known Allergies    PHYSICAL EXAM    ICU Vital Signs Last 24 Hrs  T(C): 36.3 (16 Apr 2025 09:33), Max: 36.3 (16 Apr 2025 09:33)  T(F): 97.4 (16 Apr 2025 09:33), Max: 97.4 (16 Apr 2025 09:33)  HR: 109 (16 Apr 2025 12:46) (88 - 136)  BP: 156/98 (16 Apr 2025 10:08) (107/88 - 190/119)  BP(mean): 91 (16 Apr 2025 06:58) (85 - 138)  RR: 18 (16 Apr 2025 10:08) (12 - 32)  SpO2: 96% (16 Apr 2025 10:08) (83% - 100%)    O2 Parameters below as of 16 Apr 2025 10:08  Patient On (Oxygen Delivery Method): nasal cannula  O2 Flow (L/min): 4    GENERAL: NAD, elderly female, cachectic   HEAD:  Atraumatic, Normocephalic  EYES: conjunctiva and sclera clear  ENMT: No tonsillar erythema, exudates, or enlargement;   NECK: Supple, normal appearance, No JVD; Normal thyroid; Trachea midline  NERVOUS SYSTEM:  Alert & Oriented X1,Motor Strength 5/5 B/L upper and lower extremities, sensation intact  CHEST/LUNG: crackles at bases bilaterally, No rales, rhonchi, wheezing   HEART: Regular rate and rhythm; No murmurs, rubs, or gallops  ABDOMEN: Soft, distended , diffuse tenderness, Bowel sounds present  EXTREMITIES:  2+ Peripheral Pulses, No clubbing, cyanosis, or edema  LYMPH: No lymphadenopathy noted  SKIN: No rashes or lesions;  Good capillary refill  MSK: Thoracic scoliosis        LABS:                          13.2   13.16 )-----------( 290      ( 16 Apr 2025 11:50 )             39.2                                               04-16    134[L]  |  99  |  20[H]  ----------------------------<  91  4.5   |  24  |  1.06    Ca    8.8      16 Apr 2025 11:50  Phos  4.6     04-16  Mg     2.3     04-16    TPro  7.3  /  Alb  3.6  /  TBili  0.6   /  AST  100[H]  /  ALT  86[H]  /  AlkPhos  82  04-16  PT/INR - ( 15 Apr 2025 23:18 )   PT: 10.2 sec;   INR: 0.88 ratio         PTT - ( 15 Apr 2025 23:18 )  PTT:31.7 sec                                             LIVER FUNCTIONS - ( 16 Apr 2025 11:50 )  Alb: 3.6 g/dL / Pro: 7.3 g/dL / ALK PHOS: 82 U/L / ALT: 86 U/L DA / AST: 100 U/L / GGT: x           MEDICATIONS  (STANDING):  heparin   Injectable 5000 Unit(s) SubCutaneous every 12 hours  piperacillin/tazobactam IVPB.. 3.375 Gram(s) IV Intermittent every 12 hours    MEDICATIONS  (PRN):  metoprolol tartrate Injectable 2.5 milliGRAM(s) IV Push every 6 hours PRN SBP >150  ondansetron Injectable 4 milliGRAM(s) IV Push every 8 hours PRN Nausea and/or Vomiting

## 2025-04-16 NOTE — CHART NOTE - NSCHARTNOTEFT_GEN_A_CORE
Climical course discussed with HCP Nory by myself this morning on admission via phone. Dr Gavin also spoke to family earlier today while doing ICU consult and provided update. Climical course discussed with HCP Nory by myself this morning on admission via phone. Dr Gavin also spoke to family earlier today while doing ICU consult and provided update.  I called at 5pm again and spoke with Ms Cueto on the phone

## 2025-04-16 NOTE — ED ADULT NURSE REASSESSMENT NOTE - NS ED NURSE REASSESS COMMENT FT1
NGT tube placed by MD Mccormack. pt placed in B/L wrist restraints as per MD orders. pt very agitated and restless in bed. pt thrashing body around in bed. MD aware and at bedside. at change of shift while giving report, pt pulled out NGT tube. MD Whitt made aware and at bedside. NGT tube placed by MD Mccormack. pt placed in B/L wrist restraints as per MD orders. pt very agitated and restless in bed. pt thrashing body around in bed. MD aware and at bedside. at change of shift while giving report, pt pulled out NGT tube. MD Mccormack made aware and at bedside. as per MD discontinue B/L wrist restraints. B/L wrist restraints removed as per MD.

## 2025-04-16 NOTE — H&P ADULT - PROBLEM SELECTOR PLAN 8
lovenox IV synthroid s/p nitro drip in ED  s/p amlodipine 10 mg, enalapril 10 mg, lasix 40 mg IVP x2, metop 2.5 mg IVP x2    -BP now within parameters  -tele monitoring  -pt is NPO for SBO, can do IV pushes of enalaprillat or labetalol s/p nitro drip in ED  s/p amlodipine 10 mg, enalapril 10 mg, lasix 40 mg IVP x2, metop 2.5 mg IVP x2    -BP now within parameters  -tele monitoring  -pt is NPO for SBO, can do IV pushes of enalaprillat q6h if SBP >160  -can do IV labetalol or hydralazine if uncontrolled s/p nitro drip in ED  s/p amlodipine 10 mg, enalapril 10 mg, lasix 40 mg IVP x2, metop 2.5 mg IVP x2    -BP now within parameters  -tele monitoring  -pt is NPO for SBO, can do IV pushes of metoprolol q6h if SBP >150 (hold ACE-i given lilly)  -can do IV labetalol or hydralazine if uncontrolled

## 2025-04-16 NOTE — H&P ADULT - PROBLEM SELECTOR PLAN 3
CT showin.  Focal bowel occlusion at the third portionof the duodenum where it traverses midline. Resultant severe gastric distention, extending into severe distention of a large intrathoracic hiatal hernia, to the level of  the thoracic inlet  2.  Left lower lobe pneumonia 3.  Scattered loops of dilated small bowel in the pelvis, possible partial small bowel obstruction versus ileus  Was seen by surgery  Unclear at this moment if plans for OR  Pt had NGT but pulled it out    -NGT as per surgery  -NPO   -appreciate surg recs BP 190s/110s  fluid resuscitated for sepsis in ED  s/p nitro drip in ED  s/p amlodipine 10 mg, enalapril 10 mg, lasix 40 mg IVP x2, metop 2.5 mg IVP x2    -BP now under control  -she is npo for SBO  -ok to do IV pushes of labetalol, enalaprilat or hydralazine q6h BP 190s/110s  fluid resuscitated for sepsis in ED  s/p nitro drip in ED  s/p amlodipine 10 mg, enalapril 10 mg, lasix 40 mg IVP x2, metop 2.5 mg IVP x2    -BP now under control  -she is npo for SBO  -ok to do IV pushes of labetalol, enalaprilat or hydralazine q6h  -f/u trop BP 190s/110s  fluid resuscitated for sepsis in ED  s/p nitro drip in ED  s/p amlodipine 10 mg, enalapril 10 mg, lasix 40 mg IVP x2, metop 2.5 mg IVP x2    -BP now under control  -she is npo for SBO  -ok to do IV pushes of labetalol, metoprolol or hydralazine q6h  -f/u trop

## 2025-04-16 NOTE — H&P ADULT - HISTORY OF PRESENT ILLNESS
Pt is an 82 yo F from [facility], with cervical cancer, brain mass, HTN, HLD, hypothyroid, presenting with abdominal pain. Pt is a limited narrator, collateral information obtained from chart and from family, HCP daughter Nory on the phone. Pt presented to the hospital with complaint of abdominal pain and cold sweats, in ED was found to be hypothermic to 94F, imaging showing LLL PNA, partial SBO, and SMA syndrome. She was placed on warming blanket. Surgery was consulted by ED, NGT placed but pt was encephalopathic and she pulled it out. She was fluid resuscitated for sepsis and lactic acidosis. She began to have severe SOB, required NRB mask as she refused bipap, she was given IV mag, solumedrol, lasix 40 mg IVP x2, she was placed briefly on nitro drip for BP control (BP 160s/100s), with improvement, was weaned off nitro drip and admitted to medicine. On my interview pt was able to provide very limited history, was oriented to only self, but was able to say she came in with abdominal pain, but was not able to provide much other information.  Denies dysuria, nausea, vomiting, diarrhea, SOB, chest pain, IZAGUIRRE, palpitations, dizziness, headache, cough, wheezing, joint pain or swelling, fever, chills.  Pt is an 84 yo F from [facility], with cervical cancer, brain mass, HTN, HLD, hypothyroid, presenting with abdominal pain. Pt is a limited narrator, collateral information obtained from chart and from family, HCP daughter Nory on the phone. Pt presented to the hospital with complaint of abdominal pain and cold sweats, in ED was found to be hypothermic to 94F, imaging showing LLL PNA, partial SBO. She was placed on warming blanket. Surgery was consulted by ED, NGT placed but pt was encephalopathic and she pulled it out. She was fluid resuscitated for sepsis and lactic acidosis. She began to have severe SOB, required NRB mask as she refused bipap, she was given IV mag, solumedrol, lasix 40 mg IVP x2, she was placed briefly on nitro drip for BP control (BP 160s/100s), with improvement, was weaned off nitro drip and admitted to medicine. On my interview pt was able to provide very limited history, was oriented to only self, but was able to say she came in with abdominal pain, but was not able to provide much other information.  Denies dysuria, nausea, vomiting, diarrhea, SOB, chest pain, IZAGUIRRE, palpitations, dizziness, headache, cough, wheezing, joint pain or swelling, fever, chills.  Pt is an 82 yo F from [facility], with cervical cancer, brain mass, HTN, HLD, hypothyroid, presenting with abdominal pain. Pt is a limited narrator, collateral information obtained from chart and from family, HCP daughter Nory on the phone. Pt presented to the hospital with complaint of abdominal pain and cold sweats, in ED was found to be hypothermic to 94F, imaging showing LLL PNA, partial SBO. She was placed on warming blanket. Surgery was consulted by ED, NGT placed but pt was encephalopathic and she pulled it out. She was fluid resuscitated for sepsis and lactic acidosis. She began to have severe SOB, required NRB mask as she refused bipap, she was given IV mag, solumedrol, lasix 40 mg IVP x2, she was placed briefly on nitro drip for BP control (BP 190s/110s), with improvement, was weaned off nitro drip and admitted to medicine. On my interview pt was able to provide very limited history, was oriented to only self, but was able to say she came in with abdominal pain, but was not able to provide much other information.  Denies dysuria, nausea, vomiting, diarrhea, SOB, chest pain, IZAGUIRRE, palpitations, dizziness, headache, cough, wheezing, joint pain or swelling, fever, chills.

## 2025-04-16 NOTE — ED PROVIDER NOTE - PHYSICAL EXAMINATION
General: Well appearing,mild distress, appears older than stated age  HEENT: normocephalic, atraumatic   Respiratory: normal work of breathing  MSK: no swelling or tenderness of lower extremities, moving all extremities spontaneously   Skin: warm, dry  Neuro: A&Ox2 (name, [facility]), cranial nerves II-XII intact, 5/5 strength in all extremities, no sensory deficits, normal gait   Psych: appropriate af  ABD: soft, nontender, nondistended, no guarding, no rebound, no CVA tenderness General: Well appearing ,mild distress, appears older than stated age  HEENT: normocephalic, atraumatic   Respiratory: normal work of breathing  MSK: no swelling or tenderness of lower extremities, moving all extremities spontaneously   Skin: warm, dry  Neuro: A&Ox2 (name, \[facility]\""), cranial nerves II-XII intact, 5/5 strength in all extremities, no sensory deficits, normal gait   Psych: appropriate af  ABD: soft, nontender, nondistended, no guarding, no rebound, no CVA tenderness.

## 2025-04-16 NOTE — H&P ADULT - PROBLEM SELECTOR PLAN 2
Meets SIRS Criteria  Source likely PNA, SBO  Lactate 2.4 >> 3.3  -hypothermic overnight and required liang hugger    -IV fluids: s/p 1500 cc NS  -IV Abx : s/p CTX and zosyn in ED  -If lactate is elevated, please trend until Normalization q3hrs  -f/u cx  -cont zosyn for now Currently satting at >95% on 4L O2   Normally does not use oxygen at home  likely from flash pulm edema iso HTN emergency BP 190s/110s  She had refused bipap  s/p nitro gtt, lasix 40 mg IVP x2,     -titrate down O2 as tolerated  -rest of mgmt as below

## 2025-04-16 NOTE — ED PROVIDER NOTE - PROGRESS NOTE DETAILS
patient signed out to me pending surgery consult. patient seen by surgery. recommending medicine admission. patient weaned off nitro drip and BP within acceptable ranges given clinical status. patient clinically improving, now awake, alert and answering questions appropriately. ICU aware but no official consult at this time. patient stable for the floor.  Alvarado Garcia

## 2025-04-16 NOTE — H&P ADULT - CONVERSATION DETAILS
I spoke with patient's daughter Nory Anne on the phone ([phone]). She is listed as HCP on documentation from [facility] and on prior hospitalization documentation. Pt has hx of cervical cancer which as per daughter recurred. She has known brain masses. As per documentation fro 2024 when pt was admitted she did not want furhter cancer directed treatments or work up. At that time she had expressed to be DNR/DNI. Nory says that her aunt Ms. Dorita Pierre was there for that disussion at that time. She says that the pt is till to be DNR/DNI, that she would not want aggressive measures like CPR or intubation, but that she is open to other medical treatments including abx, iv fluids, non invasive ventilation.    DNR/DNI  MOLST in chart

## 2025-04-16 NOTE — H&P ADULT - PROBLEM SELECTOR PLAN 7
as per family, pt does not wish to pursue further work up or treatment of cancer s/p nitro drip in ED  s/p amlodipine 10 mg, enalapril 10 mg, lasix 40 mg IVP x2, metop 2.5 mg IVP x2    -BP now within parameters  -tele monitoring  -pt is NPO for SBO, can do IV pushes of enalaprillat or labetalol Pt w/ SCr 1.5 on admission  Baseline SCr - appears to be 1  low utility in doing urine studies at this time as she was aggressively fluid resuscitated already    minimize npehrotoxin  Follow BMP daily Pt w/ SCr 1.5 on admission  Baseline SCr - appears to be 1  egfr <20 on admission  low utility in doing urine studies at this time as she was aggressively fluid resuscitated already    minimize npehrotoxin  Follow BMP daily Pt w/ SCr 1.5 on admission  Baseline SCr - appears to be 1  egfr <20 on admission  low utility in doing urine studies at this time as she was aggressively fluid resuscitated already    minimize npehrotoxin  hold ACE-i/ARB  Follow BMP daily

## 2025-04-16 NOTE — PATIENT PROFILE ADULT - FALL HARM RISK - HARM RISK INTERVENTIONS
Assistance with ambulation/Assistance OOB with selected safe patient handling equipment/Communicate Risk of Fall with Harm to all staff/Discuss with provider need for PT consult/Monitor for mental status changes/Monitor gait and stability/Move patient closer to nurses' station/Provide patient with walking aids - walker, cane, crutches/Reinforce activity limits and safety measures with patient and family/Reorient to person, place and time as needed/Tailored Fall Risk Interventions/Toileting schedule using arm’s reach rule for commode and bathroom/Use of alarms - bed, chair and/or voice tab/Visual Cue: Yellow wristband and red socks/Bed in lowest position, wheels locked, appropriate side rails in place/Call bell, personal items and telephone in reach/Instruct patient to call for assistance before getting out of bed or chair/Non-slip footwear when patient is out of bed/[place] to call system/Physically safe environment - no spills, clutter or unnecessary equipment/Purposeful Proactive Rounding/Room/bathroom lighting operational, light cord in reach

## 2025-04-16 NOTE — ED ADULT NURSE REASSESSMENT NOTE - NS ED NURSE REASSESS COMMENT FT1
Pt is now A&O x 2,awake, tolerated PO water intake well and passed dysphagia screening. MD Garcia made aware. No distress noted.

## 2025-04-16 NOTE — PATIENT PROFILE ADULT - HEALTH LITERACY
Dear Dr. Giles,  Chief Complaint   Patient presents with   • Pre-Op Exam     DOS 6/14/2023 - Tonsillectomy (possible adenoidectomy) - Dr Isai Giles       I am seeing Eddie Cline at your request for evaluation of his chronic medical problems and complete physical prior to his adenoidectomy/tonsillectomy.    Past Medical History:   Diagnosis Date   • Allergy    • RAD (reactive airway disease)     age 12. Nebulizer treatment     Past Surgical History:   Procedure Laterality Date   • Appendectomy  2014    no complications       ALLERGIES:   Allergen Reactions   • Dust Mite Extract Other (See Comments)     Current Outpatient Medications   Medication Sig Dispense Refill   • budesonide-formoterol (Symbicort) 160-4.5 MCG/ACT inhaler Inhale 2 puffs every 4 hours with a spacer as needed for wheezing, cough, shortness of breath or chest tightness. Do not exceed 12 puffs of Symbicort in a 24 hour time period. 10.2 g 1   • omeprazole (PriLOSEC) 40 MG capsule Take 1 capsule by mouth daily. 90 capsule 0   • AEROCHAMBER WITH MASK, MEDIUM use with mdi 1 0     No current facility-administered medications for this visit.        Family History   Problem Relation Age of Onset   • Blood Disorder Maternal Uncle         spherocytosis   • Blood Disorder Father         spherocytosis, carrier   • Allergic Rhinitis Father    • Asthma Father         moderate persistent   • Sleep Apnea Father    • Other Mother         hand eczema   • Allergic Rhinitis Mother         hayfever      Social History     Socioeconomic History   • Marital status: Single     Spouse name: Not on file   • Number of children: Not on file   • Years of education: Not on file   • Highest education level: Not on file   Occupational History   • Not on file   Tobacco Use   • Smoking status: Never   • Smokeless tobacco: Never   • Tobacco comments:     no second hand   Vaping Use   • Vaping Use: never used   Substance and Sexual Activity   • Alcohol use: Yes     Comment: socially    • Drug use: Never   • Sexual activity: Yes     Partners: Female   Other Topics Concern   • ADL RESPONSE Not Asked   • ADL RESPONSE Not Asked   • ADL RESPONSE Not Asked   • ADL RESPONSE Not Asked   • ADL RESPONSE Not Asked   • ADL RESPONSE Not Asked   • ADL RESPONSE Not Asked   • ADL RESPONSE Not Asked   • ADL RESPONSE Not Asked   • ADL RESPONSE Not Asked   • ADL RESPONSE Not Asked   • ADL RESPONSE Not Asked   • ADL RESPONSE Not Asked   • ADL RESPONSE Not Asked   Social History Narrative   • Not on file     Social Determinants of Health     Financial Resource Strain: Not on file   Food Insecurity: Not on file   Transportation Needs: Not on file   Physical Activity: Not on file   Stress: Not on file   Social Connections: Not on file   Intimate Partner Violence: Not on file        REVIEW OF SYSTEMS:   GENERAL: Patient denies fever, chills, tiredness, malaise, weight loss, weight gain.   EYES: Patient denies blurred vision, double vision, pain.   NEUROLOGIC: Patient denies tremors, dizzy spells, numbness, tingling.   ENDOCRINE: Patient denies excessive thirst, heat intolerance, tired/sluggishness.   GASTROINTESTINAL: Patient denies abdominal pain, nausea/vomiting, indigestion/heartburn, constipation.   CARDIOVASCUALR: Patient denies chest pain, shortness of breath, palpitations.   SKIN: Patient denies skin rashes, boils, persistent itching.   MUSCULOSKELETAL: Patient denies joint pain, neck pain, back pain.   ENT/MOUTH: Patient denies ear infections, sore throats, sinus problems.   : Patient denies urine retention, painful urination, urinary frequency, blood in urine.   RESPIRATORY: Patient denies wheezing, frequent cough, shortness of breath.     PHYSICAL EXAMINATION:   General: awake, alert and oriented and in no acute distress   VITALS:    Visit Vitals  /88 (BP Location: RUE - Right upper extremity, Patient Position: Sitting, Cuff Size: Large Adult)   Pulse 88   Resp 16   Ht 5' 8.9\" (1.75 m)   Wt 112.4 kg  (247 lb 14.4 oz)   SpO2 97%   BMI 36.72 kg/m²       HEENT: normocephalic, atraumatic, pupils equal, round and reactive to light and accommodation, tympanic membranes clear, nasal mucosa normal and pharynx normal uvula slightly edematous, tonsils enlarged her nasal tonation.  Neck: no carotid bruits, no thyromegaly, no JVD and no carotid bruits   Lungs: clear to auscultation, good air entry, no rales or wheezes and no rhonchi   Cardiovascular: no JVD, S1 and S2 normal, no S3 or S4, no clicks, rubs or murmurs, PMI nondisplaced, no abdomen bruit and pulses equal to Dorsalis Pedis   Abdomen: soft, non-tender, nondistended, no hepatosplenomegaly, normal active bowel sounds and no masses palpated   Extremities: no erythema, induration, no evidence of joint effusion, ROM of all joints is normal   Neuro: awake, alert, oriented X3, gait normal, balance normal, coordination normal, no pronator drift, speech fluent, DTRs equal throughout and no Rhomberg sign   Skin: color, texture, turgor are normal, no bruises, rashes or lesions   Breast: normal, symmetric, no axillary nodes, no masses palpated, no fluid expressed and no nipple inversion   Genitalia: Not done    ASSESSMENT: Preop cardiovascular exam  (primary encounter diagnosis)  Plan: CBC with Automated Differential, Basic         Metabolic Panel, XR CHEST PA AND LATERAL - 2         views      No further cardiovascular workup required prior to surgery. Cleared for surgery.       David Diane MD  [id number] [address]       no

## 2025-04-16 NOTE — H&P ADULT - PROBLEM SELECTOR PLAN 1
likely from sepsis  unclear baseline mentation, initially in ED was extremely encephalopathic and briefly required restraints  now calm, not requiring restraint, and is answering questions more appropriately though she is AOx1    mentation should improve with treatment of underlying infection as below

## 2025-04-17 DIAGNOSIS — R93.89 ABNORMAL FINDINGS ON DIAGNOSTIC IMAGING OF OTHER SPECIFIED BODY STRUCTURES: ICD-10-CM

## 2025-04-17 DIAGNOSIS — R62.7 ADULT FAILURE TO THRIVE: ICD-10-CM

## 2025-04-17 DIAGNOSIS — A41.9 SEPSIS, UNSPECIFIED ORGANISM: ICD-10-CM

## 2025-04-17 DIAGNOSIS — Z29.9 ENCOUNTER FOR PROPHYLACTIC MEASURES, UNSPECIFIED: ICD-10-CM

## 2025-04-17 DIAGNOSIS — G93.41 METABOLIC ENCEPHALOPATHY: ICD-10-CM

## 2025-04-17 LAB
ANION GAP SERPL CALC-SCNC: 7 MMOL/L — SIGNIFICANT CHANGE UP (ref 5–17)
BUN SERPL-MCNC: 21 MG/DL — HIGH (ref 7–18)
CALCIUM SERPL-MCNC: 8.4 MG/DL — SIGNIFICANT CHANGE UP (ref 8.4–10.5)
CHLORIDE SERPL-SCNC: 102 MMOL/L — SIGNIFICANT CHANGE UP (ref 96–108)
CO2 SERPL-SCNC: 31 MMOL/L — SIGNIFICANT CHANGE UP (ref 22–31)
CREAT SERPL-MCNC: 1.02 MG/DL — SIGNIFICANT CHANGE UP (ref 0.5–1.3)
EGFR: 54 ML/MIN/1.73M2 — LOW
EGFR: 54 ML/MIN/1.73M2 — LOW
GLUCOSE SERPL-MCNC: 92 MG/DL — SIGNIFICANT CHANGE UP (ref 70–99)
HCT VFR BLD CALC: 33.7 % — LOW (ref 34.5–45)
HGB BLD-MCNC: 11.6 G/DL — SIGNIFICANT CHANGE UP (ref 11.5–15.5)
MAGNESIUM SERPL-MCNC: 2.1 MG/DL — SIGNIFICANT CHANGE UP (ref 1.6–2.6)
MCHC RBC-ENTMCNC: 30.7 PG — SIGNIFICANT CHANGE UP (ref 27–34)
MCHC RBC-ENTMCNC: 34.4 G/DL — SIGNIFICANT CHANGE UP (ref 32–36)
MCV RBC AUTO: 89.2 FL — SIGNIFICANT CHANGE UP (ref 80–100)
NRBC BLD AUTO-RTO: 0 /100 WBCS — SIGNIFICANT CHANGE UP (ref 0–0)
PHOSPHATE SERPL-MCNC: 3.2 MG/DL — SIGNIFICANT CHANGE UP (ref 2.5–4.5)
PLATELET # BLD AUTO: 286 K/UL — SIGNIFICANT CHANGE UP (ref 150–400)
POTASSIUM SERPL-MCNC: 3.2 MMOL/L — LOW (ref 3.5–5.3)
POTASSIUM SERPL-SCNC: 3.2 MMOL/L — LOW (ref 3.5–5.3)
RBC # BLD: 3.78 M/UL — LOW (ref 3.8–5.2)
RBC # FLD: 14.4 % — SIGNIFICANT CHANGE UP (ref 10.3–14.5)
SODIUM SERPL-SCNC: 140 MMOL/L — SIGNIFICANT CHANGE UP (ref 135–145)
WBC # BLD: 10.81 K/UL — HIGH (ref 3.8–10.5)
WBC # FLD AUTO: 10.81 K/UL — HIGH (ref 3.8–10.5)

## 2025-04-17 PROCEDURE — 99222 1ST HOSP IP/OBS MODERATE 55: CPT | Mod: FS

## 2025-04-17 RX ORDER — AMLODIPINE BESYLATE 10 MG/1
10 TABLET ORAL DAILY
Refills: 0 | Status: DISCONTINUED | OUTPATIENT
Start: 2025-04-17 | End: 2025-04-22

## 2025-04-17 RX ORDER — SODIUM CHLORIDE 9 G/1000ML
1000 INJECTION, SOLUTION INTRAVENOUS
Refills: 0 | Status: DISCONTINUED | OUTPATIENT
Start: 2025-04-17 | End: 2025-04-20

## 2025-04-17 RX ORDER — ENOXAPARIN SODIUM 100 MG/ML
30 INJECTION SUBCUTANEOUS EVERY 24 HOURS
Refills: 0 | Status: DISCONTINUED | OUTPATIENT
Start: 2025-04-17 | End: 2025-04-22

## 2025-04-17 RX ORDER — ENALAPRIL MALEATE 20 MG
10 TABLET ORAL DAILY
Refills: 0 | Status: DISCONTINUED | OUTPATIENT
Start: 2025-04-17 | End: 2025-04-22

## 2025-04-17 RX ORDER — LEVOTHYROXINE SODIUM 300 MCG
50 TABLET ORAL DAILY
Refills: 0 | Status: DISCONTINUED | OUTPATIENT
Start: 2025-04-17 | End: 2025-04-22

## 2025-04-17 RX ADMIN — Medication 100 MILLIEQUIVALENT(S): at 10:45

## 2025-04-17 RX ADMIN — Medication 100 MILLIEQUIVALENT(S): at 09:05

## 2025-04-17 RX ADMIN — Medication 40 MILLIEQUIVALENT(S): at 18:31

## 2025-04-17 RX ADMIN — Medication 25 GRAM(S): at 07:13

## 2025-04-17 RX ADMIN — Medication 40 MILLIGRAM(S): at 12:28

## 2025-04-17 RX ADMIN — SODIUM CHLORIDE 65 MILLILITER(S): 9 INJECTION, SOLUTION INTRAVENOUS at 09:06

## 2025-04-17 RX ADMIN — Medication 100 MILLIEQUIVALENT(S): at 12:28

## 2025-04-17 RX ADMIN — Medication 25 GRAM(S): at 18:32

## 2025-04-17 RX ADMIN — Medication 10 MILLIGRAM(S): at 18:32

## 2025-04-17 RX ADMIN — ENOXAPARIN SODIUM 30 MILLIGRAM(S): 100 INJECTION SUBCUTANEOUS at 18:32

## 2025-04-17 RX ADMIN — HEPARIN SODIUM 5000 UNIT(S): 1000 INJECTION INTRAVENOUS; SUBCUTANEOUS at 07:14

## 2025-04-17 NOTE — PROGRESS NOTE ADULT - SUBJECTIVE AND OBJECTIVE BOX
84y Female    Meds:  piperacillin/tazobactam IVPB.. 3.375 Gram(s) IV Intermittent every 12 hours    Allergies    No Known Allergies    Intolerances        VITALS:  Vital Signs Last 24 Hrs  T(C): 37.2 (17 Apr 2025 10:59), Max: 37.2 (17 Apr 2025 10:59)  T(F): 99 (17 Apr 2025 10:59), Max: 99 (17 Apr 2025 10:59)  HR: 77 (17 Apr 2025 10:59) (77 - 100)  BP: 140/72 (17 Apr 2025 10:59) (129/75 - 145/71)  BP(mean): 88 (17 Apr 2025 10:59) (88 - 88)  RR: 17 (17 Apr 2025 10:59) (17 - 18)  SpO2: 95% (17 Apr 2025 10:59) (92% - 95%)    Parameters below as of 17 Apr 2025 10:59  Patient On (Oxygen Delivery Method): room air        LABS/DIAGNOSTIC TESTS:                          11.6   10.81 )-----------( 286      ( 17 Apr 2025 06:50 )             33.7     Lactate, Blood: 0.9 mmol/L (04-16 @ 18:00)      04-17    140  |  102  |  21[H]  ----------------------------<  92  3.2[L]   |  31  |  1.02    Ca    8.4      17 Apr 2025 06:50  Phos  3.2     04-17  Mg     2.1     04-17    TPro  7.3  /  Alb  3.6  /  TBili  0.6  /  DBili  x   /  AST  100[H]  /  ALT  86[H]  /  AlkPhos  82  04-16      LIVER FUNCTIONS - ( 16 Apr 2025 11:50 )  Alb: 3.6 g/dL / Pro: 7.3 g/dL / ALK PHOS: 82 U/L / ALT: 86 U/L DA / AST: 100 U/L / GGT: x             CULTURES: Blood Blood-Peripheral  04-15 @ 23:18   No growth at 24 hours  --  --            RADIOLOGY:      ROS:  [  ] UNABLE TO ELICIT 84y Female who is much more awake and talking a little but remains very confused and unable to give any reliable history. She is not febrile and her WBC count is only marginally elevated. She has no diarrhea and she is denying any SOB or coughing or pain but is very confused.    Meds:  piperacillin/tazobactam IVPB.. 3.375 Gram(s) IV Intermittent every 12 hours    Allergies    No Known Allergies    Intolerances        VITALS:  Vital Signs Last 24 Hrs  T(C): 37.2 (17 Apr 2025 10:59), Max: 37.2 (17 Apr 2025 10:59)  T(F): 99 (17 Apr 2025 10:59), Max: 99 (17 Apr 2025 10:59)  HR: 77 (17 Apr 2025 10:59) (77 - 100)  BP: 140/72 (17 Apr 2025 10:59) (129/75 - 145/71)  BP(mean): 88 (17 Apr 2025 10:59) (88 - 88)  RR: 17 (17 Apr 2025 10:59) (17 - 18)  SpO2: 95% (17 Apr 2025 10:59) (92% - 95%)    Parameters below as of 17 Apr 2025 10:59  Patient On (Oxygen Delivery Method): room air        LABS/DIAGNOSTIC TESTS:                          11.6   10.81 )-----------( 286      ( 17 Apr 2025 06:50 )             33.7     Lactate, Blood: 0.9 mmol/L (04-16 @ 18:00)      04-17    140  |  102  |  21[H]  ----------------------------<  92  3.2[L]   |  31  |  1.02    Ca    8.4      17 Apr 2025 06:50  Phos  3.2     04-17  Mg     2.1     04-17    TPro  7.3  /  Alb  3.6  /  TBili  0.6  /  DBili  x   /  AST  100[H]  /  ALT  86[H]  /  AlkPhos  82  04-16      LIVER FUNCTIONS - ( 16 Apr 2025 11:50 )  Alb: 3.6 g/dL / Pro: 7.3 g/dL / ALK PHOS: 82 U/L / ALT: 86 U/L DA / AST: 100 U/L / GGT: x             CULTURES: Blood Blood-Peripheral  04-15 @ 23:18   No growth at 24 hours  --  --            RADIOLOGY:      ROS:  [ x ] UNABLE TO ELICIT, unreliable

## 2025-04-17 NOTE — PROGRESS NOTE ADULT - ASSESSMENT
82 yo F from [facility], with cervical cancer, brain mass, HTN, HLD, hypothyroid, admitted with encephalopathy, acute hypoxic respiratory failure likely 2/2 flash pulm edema from HTN emergency, severe sepsis with lactic acidosis 2/2 LLL PNA. and partial SBO, GI and Id following.

## 2025-04-17 NOTE — CONSULT NOTE ADULT - SUBJECTIVE AND OBJECTIVE BOX
INITIAL GI CONSULTATION    Patient is a 84y old  Female who presents with a chief complaint of severe sepsis (16 Apr 2025 16:30)    HPI:  Pt is an 84 yo F from [facility], with cervical cancer, brain mass, HTN, HLD, hypothyroid, presenting with abdominal pain. Pt is a limited narrator, collateral information obtained from chart and from family, HCP daughter Nory on the phone. Pt presented to the hospital with complaint of abdominal pain and cold sweats, in ED was found to be hypothermic to 94F, imaging showing LLL PNA, partial SBO. She was placed on warming blanket. Surgery was consulted by ED, NGT placed but pt was encephalopathic and she pulled it out. She was fluid resuscitated for sepsis and lactic acidosis. She began to have severe SOB, required NRB mask as she refused bipap, she was given IV mag, solumedrol, lasix 40 mg IVP x2, she was placed briefly on nitro drip for BP control (BP 190s/110s), with improvement, was weaned off nitro drip and admitted to medicine. On my interview pt was able to provide very limited history, was oriented to only self, but was able to say she came in with abdominal pain, but was not able to provide much other information.  Denies dysuria, nausea, vomiting, diarrhea, SOB, chest pain, IZAGUIRRE, palpitations, dizziness, headache, cough, wheezing, joint pain or swelling, fever, chills.  (16 Apr 2025 11:05)    GI HPI: Patient seen and examined on unit. Resting in bed. Patient is a poor historian. No complaints of abdominal pain    PMH/PSH:  PAST MEDICAL & SURGICAL HISTORY:  Hypercholesterolemia      HTN (hypertension)      Hypothyroidism      Osteoarthritis      Anemia      Cervical cancer      Brain mass      No significant past surgical history            FH:  FAMILY HISTORY:        MEDS:  MEDICATIONS  (STANDING):  dextrose 5% + sodium chloride 0.9%. 1000 milliLiter(s) (50 mL/Hr) IV Continuous <Continuous>  heparin   Injectable 5000 Unit(s) SubCutaneous every 12 hours  lactated ringers. 1000 milliLiter(s) (65 mL/Hr) IV Continuous <Continuous>  levothyroxine Injectable 37.5 MICROGram(s) IV Push at bedtime  pantoprazole  Injectable 40 milliGRAM(s) IV Push daily  piperacillin/tazobactam IVPB.. 3.375 Gram(s) IV Intermittent every 12 hours  potassium chloride  10 mEq/100 mL IVPB 10 milliEquivalent(s) IV Intermittent every 1 hour    MEDICATIONS  (PRN):  metoprolol tartrate Injectable 2.5 milliGRAM(s) IV Push every 6 hours PRN SBP >150  OLANZapine Injectable 5 milliGRAM(s) IntraMuscular once PRN agitation  ondansetron Injectable 4 milliGRAM(s) IV Push every 8 hours PRN Nausea and/or Vomiting    Allergies    No Known Allergies    Intolerances        ______________________________________________________________________  PHYSICAL EXAM:  T(C): 36.7 (04-17-25 @ 05:02), Max: 36.7 (04-17-25 @ 05:02)  HR: 100 (04-17-25 @ 05:02)  BP: 129/75 (04-17-25 @ 05:02)  RR: 17 (04-17-25 @ 05:02)  SpO2: 92% (04-17-25 @ 05:02)  Wt(kg): --    04-16 - 04-17  --------------------------------------------------------  IN:  Total IN: 0 mL    OUT:    Indwelling Catheter - Urethral (mL): 1000 mL  Total OUT: 1000 mL    Total NET: -1000 mL          GEN: NAD  PULM: clear  CV: HR regular  GI: Soft, NT, ND; +BS in all four quadrants  MSK:  no edema  NEURO: A&O x 3  ______________________________________________________________________  LABS:                        11.6   10.81 )-----------( 286      ( 17 Apr 2025 06:50 )             33.7     04-17    140  |  102  |  21[H]  ----------------------------<  92  3.2[L]   |  31  |  1.02    Ca    8.4      17 Apr 2025 06:50  Phos  3.2     04-17  Mg     2.1     04-17    TPro  7.3  /  Alb  3.6  /  TBili  0.6  /  DBili  x   /  AST  100[H]  /  ALT  86[H]  /  AlkPhos  82  04-16    LIVER FUNCTIONS - ( 16 Apr 2025 11:50 )  Alb: 3.6 g/dL / Pro: 7.3 g/dL / ALK PHOS: 82 U/L / ALT: 86 U/L DA / AST: 100 U/L / GGT: x           PT/INR - ( 15 Apr 2025 23:18 )   PT: 10.2 sec;   INR: 0.88 ratio         PTT - ( 15 Apr 2025 23:18 )  PTT:31.7 sec  ____________________________________________    IMAGING:  < from: CT Abdomen and Pelvis No Cont (04.16.25 @ 06:37) >  IMPRESSION:  1.  Focal bowel occlusion at the third portion of the duodenum where it   traverses midline. Resultant severe gastric distention, extending into   severe distention of a large intrathoracic hiatal hernia, to the level of   the thoracic inlet  2.  Left lower lobe pneumonia  3.  Scattered loops of dilated small bowel in the pelvis, possible   partial small bowel obstruction versus ileus      < from: US Abdomen Upper Quadrant Right (04.16.25 @ 14:50) >  IMPRESSION:  Cholelithiasis.  No focal tenderness or evidence of cholecystitis.

## 2025-04-17 NOTE — CONSULT NOTE ADULT - ASSESSMENT
84y old  Female with PMH cervical cancer, brain mass, HTN, HLD, hypothyroid, who presents with a chief complaint of abdominal pain    PLAN:  #SBO    -s/p CTAP noted above, Scattered loops of dilated small bowel in the pelvis, possible partial small bowel obstruction versus ileus  -Surgery consult noted, NGT placed and inadvertently removed by patient  -Abdomen soft and non-tender, Advance diet to clear liquids  -Replete electrolytes as needed  -GI will continue to follow      This note and its recommendations herein are preliminary until such time as cosigned by an attending

## 2025-04-17 NOTE — CONSULT NOTE ADULT - NS ATTEND AMEND GEN_ALL_CORE FT
Pt seen in her room this morning. She was sleepy, but arousable to voice and generally answered questions appropriately. She was hard of hearing, but interactive. VSS.  No abd discomfort or distension. Pt notably cachectic. No NGT in place. She asserted repeatedly that she was able to eat and asked for breakfast.    [ ] Trial clear liquids today. If tolerated, advance to soft diet this afternoon/evening. If not tolerated, then advise speech therapy evaluation.  [ ] Okay to hold off on NGT placement  [ ] No plan for inpatient endoscopic evaluation  [ ] Rest as per NP's note.

## 2025-04-17 NOTE — PROGRESS NOTE ADULT - ASSESSMENT
82 yo F from [facility], with cervical cancer, brain mass, HTN, HLD, hypothyroid, admitted with encephalopathy, acute hypoxic respiratory failure likely 2/2 flash pulm edema from HTN emergency, severe sepsis with lactic acidosis 2/2 LLL PNA. and partial SBO, GI and Id following.

## 2025-04-17 NOTE — PROGRESS NOTE ADULT - ASSESSMENT
Sepsis - resolved  Pneumonia ( aspiration )  Leukocytosis - improving  Hypothermia - resolved    Plan - Cont Zosyn 3.375 gms iv q12hrs

## 2025-04-17 NOTE — DIETITIAN INITIAL EVALUATION ADULT - ADD RECOMMEND
Severe malnutrition; Recommend oral supplement Ensure Clear TID (240 kcal, 8 g pro each) as medically feasible.

## 2025-04-17 NOTE — DIETITIAN INITIAL EVALUATION ADULT - ORAL INTAKE PTA/DIET HISTORY
Pt is from nursing home, A&Ox1, sleeping at time of visit. H/o cervical cancer, brain mass, HTN, HLD, hypothyroidism, OA, anemia; admitted for SBO. Pt is currently NPO at time of visit however currently advanced to clear liquid diet as per chart review. NFPE completed with results below. No chewing/ swallowing issues or GI distress noted. NKFA.

## 2025-04-17 NOTE — PROGRESS NOTE ADULT - SUBJECTIVE AND OBJECTIVE BOX
Patient is a 84y old  Female who presents with a chief complaint of severe sepsis (17 Apr 2025 14:21)      INTERVAL HPI/OVERNIGHT EVENTS: pt assess at bedside. Pt wants to ear, appears dry. clear lig diet started per Gi recs. Discussed plan in hallway with daughter regrading plan of care, intervention and discharge planning.         REVIEW OF SYSTEMS:   CONSTITUTIONAL: No fever, chills  ENMT:  No difficulty hearing, no change in vision  NECK: No pain or stiffness  RESPIRATORY: No cough, SOB  CARDIOVASCULAR: No chest pain, palpitations  GASTROINTESTINAL: No abdominal pain. No nausea, vomiting, or diarrhea  GENITOURINARY: No dysuria  NEUROLOGICAL: No HA  SKIN: No itching, burning, rashes, or lesions   LYMPH NODES: No enlarged glands  ENDOCRINE: No heat or cold intolerance; No hair loss  MUSCULOSKELETAL: No joint pain or swelling; No muscle, back, or extremity pain  PSYCHIATRIC: No depression, anxiety  HEME/LYMPH: No easy bruising, or bleeding gums    T(C): 37.2 (04-17-25 @ 10:59), Max: 37.2 (04-17-25 @ 10:59)  HR: 77 (04-17-25 @ 10:59) (77 - 100)  BP: 140/72 (04-17-25 @ 10:59) (129/75 - 145/71)  RR: 17 (04-17-25 @ 10:59) (17 - 18)  SpO2: 95% (04-17-25 @ 10:59) (92% - 95%)  Wt(kg): --Vital Signs Last 24 Hrs  T(C): 37.2 (17 Apr 2025 10:59), Max: 37.2 (17 Apr 2025 10:59)  T(F): 99 (17 Apr 2025 10:59), Max: 99 (17 Apr 2025 10:59)  HR: 77 (17 Apr 2025 10:59) (77 - 100)  BP: 140/72 (17 Apr 2025 10:59) (129/75 - 145/71)  BP(mean): 88 (17 Apr 2025 10:59) (88 - 88)  RR: 17 (17 Apr 2025 10:59) (17 - 18)  SpO2: 95% (17 Apr 2025 10:59) (92% - 95%)    Parameters below as of 17 Apr 2025 10:59  Patient On (Oxygen Delivery Method): room air    MEDICATIONS  (STANDING):  dextrose 5% + sodium chloride 0.9%. 1000 milliLiter(s) (50 mL/Hr) IV Continuous <Continuous>  heparin   Injectable 5000 Unit(s) SubCutaneous every 12 hours  lactated ringers. 1000 milliLiter(s) (65 mL/Hr) IV Continuous <Continuous>  levothyroxine Injectable 37.5 MICROGram(s) IV Push at bedtime  pantoprazole  Injectable 40 milliGRAM(s) IV Push daily  piperacillin/tazobactam IVPB.. 3.375 Gram(s) IV Intermittent every 12 hours  potassium chloride   Powder 40 milliEquivalent(s) Oral once    MEDICATIONS  (PRN):  metoprolol tartrate Injectable 2.5 milliGRAM(s) IV Push every 6 hours PRN SBP >150  OLANZapine Injectable 5 milliGRAM(s) IntraMuscular once PRN agitation  ondansetron Injectable 4 milliGRAM(s) IV Push every 8 hours PRN Nausea and/or Vomiting      PHYSICAL EXAM:  GENERAL: NAD, cachectic dry,   EYES: clear conjunctiva; EOMI  ENMT: Moist mucous membranes  NECK: Supple, No JVD, Normal thyroid  CHEST/LUNG: Clear to auscultation bilaterally  HEART: S1, S2, Regular rate and rhythm  ABDOMEN: Soft, Nontender, Nondistended; Bowel sounds present  NEURO: Alert & awake   EXTREMITIES: No LE edema, no calf tenderness  LYMPH: No lymphadenopathy noted  SKIN: No rashes or lesions    Consultant(s) Notes Reviewed:  [x ] YES  [ ] NO  Care Discussed with Consultants/Other Providers [ x] YES  [ ] NO    LABS:                        11.6   10.81 )-----------( 286      ( 17 Apr 2025 06:50 )             33.7     04-17    140  |  102  |  21[H]  ----------------------------<  92  3.2[L]   |  31  |  1.02    Ca    8.4      17 Apr 2025 06:50  Phos  3.2     04-17  Mg     2.1     04-17    TPro  7.3  /  Alb  3.6  /  TBili  0.6  /  DBili  x   /  AST  100[H]  /  ALT  86[H]  /  AlkPhos  82  04-16    PT/INR - ( 15 Apr 2025 23:18 )   PT: 10.2 sec;   INR: 0.88 ratio         PTT - ( 15 Apr 2025 23:18 )  PTT:31.7 sec  CAPILLARY BLOOD GLUCOSE      Urinalysis Basic - ( 17 Apr 2025 06:50 )    Color: x / Appearance: x / SG: x / pH: x  Gluc: 92 mg/dL / Ketone: x  / Bili: x / Urobili: x   Blood: x / Protein: x / Nitrite: x   Leuk Esterase: x / RBC: x / WBC x   Sq Epi: x / Non Sq Epi: x / Bacteria: x        RADIOLOGY & ADDITIONAL TESTS:    Imaging Personally Reviewed:  [x ] YES  [ ] NO    < from: US Abdomen Upper Quadrant Right (04.16.25 @ 14:50) >    ACC: [id number] EXAM:  US ABDOMEN RT UPR QUADRANT   ORDERED BY: ALFONSO SANDOVAL     PROCEDURE DATE:  04/16/2025          INTERPRETATION:  CLINICAL INFORMATION: Elevated liver function tests    COMPARISON: CT dated 4/16/2025    TECHNIQUE: Sonography of the right upper quadrant.    FINDINGS:  Liver: Within normal limits.  Bile ducts: Common bile duct measures 8.5 mm, prominent.  Gallbladder: Cholelithiasis.  No focal tenderness or evidence of   cholecystitis.  Pancreas: Visualized portions are within normal limits.  Right kidney: 8.5 cm. No hydronephrosis. Calculi measuring up to 2 mm.  Ascites: None.  IVC: Visualized portions are within normal limits.    IMPRESSION:  Cholelithiasis.  No focal tenderness or evidence of cholecystitis.        ---End of Report ---            SEEMA CORTEZ MD; Attending Radiologist  This document has been electronically signed. Apr 16 2025  4:01PM    < end of copied text >  < from: CT Abdomen and Pelvis No Cont (04.16.25 @ 06:37) >    ACC: [id number] EXAM:  CT ABDOMEN AND PELVIS   ORDERED BY: DALE COLLAZO     PROCEDURE DATE:  04/16/2025          INTERPRETATION:  CLINICAL INFORMATION: Abdominal pain, sepsis    COMPARISON: 2/5/2024    CONTRAST/COMPLICATIONS:  IV Contrast: NONE  Oral Contrast: NONE  .    PROCEDURE:  CT of the Chest, Abdomen and Pelvis was performed.  Sagittal and coronal reformats were performed.    FINDINGS:  CHEST:  LUNGS AND LARGE AIRWAYS: Left lower lobe airspace consolidation.   Scattered areas of focal atelectasis right lower lobe.  PLEURA: No pleural effusion.  VESSELS: Scattered atherosclerotic calcifications in the aorta  HEART: Cardiomegaly. No pericardial effusion.  MEDIASTINUM AND SHAD: No lymphadenopathy.  CHEST WALL AND LOWER NECK: Within normal limits.    ABDOMEN AND PELVIS:  LIVER: Within normal limits.  BILE DUCTS: Normal caliber.  GALLBLADDER: Cholelithiasis.  SPLEEN: Within normal limits.  PANCREAS: Within normal limits.  ADRENALS: Within normal limits.  KIDNEYS/URETERS: Limited evaluation. An approximate 4.5 cm cyst is seen   in the left kidney.    BLADDER: Within normal limits.  REPRODUCTIVE ORGANS:    BOWEL: Focal bowel occlusion noted at the third portion of the duodenum,   where it traverses midline (2:175). Resultant severegastric distention,   extending into severe distention of a large intrathoracic hiatal hernia   extending to the level of the thoracic inlet. Scattered dilated loops of   small bowel noted in the pelvis, without wall thickening.  PERITONEUM/RETROPERITONEUM: Within normal limits.  VESSELS: Atherosclerotic calcifications  LYMPH NODES: No lymphadenopathy.  ABDOMINAL WALL: Within normal limits.  BONES: Severe thoracolumbar spine scoliosis    IMPRESSION:  1.  Focal bowel occlusion at the third portionof the duodenum where it   traverses midline. Resultant severe gastric distention, extending into   severe distention of a large intrathoracic hiatal hernia, to the level of   the thoracic inlet  2.  Left lower lobe pneumonia  3.  Scattered loops of dilated small bowel in the pelvis, possible   partial small bowel obstruction versus ileus    --- End of Report ---            JADEN SANTACRUZ MD; Attending Radiologist  This document has been electronically signed. Apr 16 2025  7:22AM    < end of copied text >       Patient is a 84y old  Female who presents with a chief complaint of severe sepsis (17 Apr 2025 14:21)      INTERVAL HPI/OVERNIGHT EVENTS: pt assess at bedside. Pt wants to ear, appears dry. clear lig diet started per Gi recs. Discussed plan in hallway with daughter regarding plan of care, intervention and discharge planning.         REVIEW OF SYSTEMS:   CONSTITUTIONAL: No fever, chills  ENMT:  No difficulty hearing, no change in vision  NECK: No pain or stiffness  RESPIRATORY: No cough, SOB  CARDIOVASCULAR: No chest pain, palpitations  GASTROINTESTINAL: No abdominal pain. No nausea, vomiting, or diarrhea  GENITOURINARY: No dysuria  NEUROLOGICAL: No HA  SKIN: No itching, burning, rashes, or lesions   LYMPH NODES: No enlarged glands  ENDOCRINE: No heat or cold intolerance; No hair loss  MUSCULOSKELETAL: No joint pain or swelling; No muscle, back, or extremity pain  PSYCHIATRIC: No depression, anxiety  HEME/LYMPH: No easy bruising, or bleeding gums    T(C): 37.2 (04-17-25 @ 10:59), Max: 37.2 (04-17-25 @ 10:59)  HR: 77 (04-17-25 @ 10:59) (77 - 100)  BP: 140/72 (04-17-25 @ 10:59) (129/75 - 145/71)  RR: 17 (04-17-25 @ 10:59) (17 - 18)  SpO2: 95% (04-17-25 @ 10:59) (92% - 95%)  Wt(kg): --Vital Signs Last 24 Hrs  T(C): 37.2 (17 Apr 2025 10:59), Max: 37.2 (17 Apr 2025 10:59)  T(F): 99 (17 Apr 2025 10:59), Max: 99 (17 Apr 2025 10:59)  HR: 77 (17 Apr 2025 10:59) (77 - 100)  BP: 140/72 (17 Apr 2025 10:59) (129/75 - 145/71)  BP(mean): 88 (17 Apr 2025 10:59) (88 - 88)  RR: 17 (17 Apr 2025 10:59) (17 - 18)  SpO2: 95% (17 Apr 2025 10:59) (92% - 95%)    Parameters below as of 17 Apr 2025 10:59  Patient On (Oxygen Delivery Method): room air    MEDICATIONS  (STANDING):  dextrose 5% + sodium chloride 0.9%. 1000 milliLiter(s) (50 mL/Hr) IV Continuous <Continuous>  heparin   Injectable 5000 Unit(s) SubCutaneous every 12 hours  lactated ringers. 1000 milliLiter(s) (65 mL/Hr) IV Continuous <Continuous>  levothyroxine Injectable 37.5 MICROGram(s) IV Push at bedtime  pantoprazole  Injectable 40 milliGRAM(s) IV Push daily  piperacillin/tazobactam IVPB.. 3.375 Gram(s) IV Intermittent every 12 hours  potassium chloride   Powder 40 milliEquivalent(s) Oral once    MEDICATIONS  (PRN):  metoprolol tartrate Injectable 2.5 milliGRAM(s) IV Push every 6 hours PRN SBP >150  OLANZapine Injectable 5 milliGRAM(s) IntraMuscular once PRN agitation  ondansetron Injectable 4 milliGRAM(s) IV Push every 8 hours PRN Nausea and/or Vomiting      PHYSICAL EXAM:  GENERAL: NAD, cachectic dry,   EYES: clear conjunctiva; EOMI  ENMT: Moist mucous membranes  NECK: Supple, No JVD, Normal thyroid  CHEST/LUNG: Clear to auscultation bilaterally  HEART: S1, S2, Regular rate and rhythm  ABDOMEN: Soft, Nontender, Nondistended; Bowel sounds present  NEURO: Alert & awake   EXTREMITIES: No LE edema, no calf tenderness  LYMPH: No lymphadenopathy noted  SKIN: No rashes or lesions    Consultant(s) Notes Reviewed:  [x ] YES  [ ] NO  Care Discussed with Consultants/Other Providers [ x] YES  [ ] NO    LABS:                        11.6   10.81 )-----------( 286      ( 17 Apr 2025 06:50 )             33.7     04-17    140  |  102  |  21[H]  ----------------------------<  92  3.2[L]   |  31  |  1.02    Ca    8.4      17 Apr 2025 06:50  Phos  3.2     04-17  Mg     2.1     04-17    TPro  7.3  /  Alb  3.6  /  TBili  0.6  /  DBili  x   /  AST  100[H]  /  ALT  86[H]  /  AlkPhos  82  04-16    PT/INR - ( 15 Apr 2025 23:18 )   PT: 10.2 sec;   INR: 0.88 ratio         PTT - ( 15 Apr 2025 23:18 )  PTT:31.7 sec  CAPILLARY BLOOD GLUCOSE      Urinalysis Basic - ( 17 Apr 2025 06:50 )    Color: x / Appearance: x / SG: x / pH: x  Gluc: 92 mg/dL / Ketone: x  / Bili: x / Urobili: x   Blood: x / Protein: x / Nitrite: x   Leuk Esterase: x / RBC: x / WBC x   Sq Epi: x / Non Sq Epi: x / Bacteria: x        RADIOLOGY & ADDITIONAL TESTS:    Imaging Personally Reviewed:  [x ] YES  [ ] NO    < from: US Abdomen Upper Quadrant Right (04.16.25 @ 14:50) >    ACC: [id number] EXAM:  US ABDOMEN RT UPR QUADRANT   ORDERED BY: ALFONOS SANDOVAL     PROCEDURE DATE:  04/16/2025          INTERPRETATION:  CLINICAL INFORMATION: Elevated liver function tests    COMPARISON: CT dated 4/16/2025    TECHNIQUE: Sonography of the right upper quadrant.    FINDINGS:  Liver: Within normal limits.  Bile ducts: Common bile duct measures 8.5 mm, prominent.  Gallbladder: Cholelithiasis.  No focal tenderness or evidence of   cholecystitis.  Pancreas: Visualized portions are within normal limits.  Right kidney: 8.5 cm. No hydronephrosis. Calculi measuring up to 2 mm.  Ascites: None.  IVC: Visualized portions are within normal limits.    IMPRESSION:  Cholelithiasis.  No focal tenderness or evidence of cholecystitis.        ---End of Report ---            SEEMA CORTEZ MD; Attending Radiologist  This document has been electronically signed. Apr 16 2025  4:01PM    < end of copied text >  < from: CT Abdomen and Pelvis No Cont (04.16.25 @ 06:37) >    ACC: [id number] EXAM:  CT ABDOMEN AND PELVIS   ORDERED BY: DALE COLLAZO     PROCEDURE DATE:  04/16/2025          INTERPRETATION:  CLINICAL INFORMATION: Abdominal pain, sepsis    COMPARISON: 2/5/2024    CONTRAST/COMPLICATIONS:  IV Contrast: NONE  Oral Contrast: NONE  .    PROCEDURE:  CT of the Chest, Abdomen and Pelvis was performed.  Sagittal and coronal reformats were performed.    FINDINGS:  CHEST:  LUNGS AND LARGE AIRWAYS: Left lower lobe airspace consolidation.   Scattered areas of focal atelectasis right lower lobe.  PLEURA: No pleural effusion.  VESSELS: Scattered atherosclerotic calcifications in the aorta  HEART: Cardiomegaly. No pericardial effusion.  MEDIASTINUM AND SHAD: No lymphadenopathy.  CHEST WALL AND LOWER NECK: Within normal limits.    ABDOMEN AND PELVIS:  LIVER: Within normal limits.  BILE DUCTS: Normal caliber.  GALLBLADDER: Cholelithiasis.  SPLEEN: Within normal limits.  PANCREAS: Within normal limits.  ADRENALS: Within normal limits.  KIDNEYS/URETERS: Limited evaluation. An approximate 4.5 cm cyst is seen   in the left kidney.    BLADDER: Within normal limits.  REPRODUCTIVE ORGANS:    BOWEL: Focal bowel occlusion noted at the third portion of the duodenum,   where it traverses midline (2:175). Resultant severegastric distention,   extending into severe distention of a large intrathoracic hiatal hernia   extending to the level of the thoracic inlet. Scattered dilated loops of   small bowel noted in the pelvis, without wall thickening.  PERITONEUM/RETROPERITONEUM: Within normal limits.  VESSELS: Atherosclerotic calcifications  LYMPH NODES: No lymphadenopathy.  ABDOMINAL WALL: Within normal limits.  BONES: Severe thoracolumbar spine scoliosis    IMPRESSION:  1.  Focal bowel occlusion at the third portionof the duodenum where it   traverses midline. Resultant severe gastric distention, extending into   severe distention of a large intrathoracic hiatal hernia, to the level of   the thoracic inlet  2.  Left lower lobe pneumonia  3.  Scattered loops of dilated small bowel in the pelvis, possible   partial small bowel obstruction versus ileus    --- End of Report ---            JADEN SANTACRUZ MD; Attending Radiologist  This document has been electronically signed. Apr 16 2025  7:22AM    < end of copied text >

## 2025-04-17 NOTE — DIETITIAN INITIAL EVALUATION ADULT - PERTINENT LABORATORY DATA
04-17    140  |  102  |  21[H]  ----------------------------<  92  3.2[L]   |  31  |  1.02    Ca    8.4      17 Apr 2025 06:50  Phos  3.2     04-17  Mg     2.1     04-17    TPro  7.3  /  Alb  3.6  /  TBili  0.6  /  DBili  x   /  AST  100[H]  /  ALT  86[H]  /  AlkPhos  82  04-16

## 2025-04-17 NOTE — PROGRESS NOTE ADULT - SUBJECTIVE AND OBJECTIVE BOX
Patient is a 84y old  Female who presents with a chief complaint of severe sepsis (17 Apr 2025       INTERVAL HPI/OVERNIGHT EVENTS: pt assess at bedside. Pt wants to ear, appears dry. clear lig diet started per Gi recs. Discussed plan in hallway with daughter regarding plan of care, intervention and discharge planning.         REVIEW OF SYSTEMS:   CONSTITUTIONAL: No fever, chills  ENMT:  No difficulty hearing, no change in vision  NECK: No pain or stiffness  RESPIRATORY: No cough, SOB  CARDIOVASCULAR: No chest pain, palpitations  GASTROINTESTINAL: No abdominal pain. No nausea, vomiting, or diarrhea  GENITOURINARY: No dysuria  NEUROLOGICAL: No HA  SKIN: No itching, burning, rashes, or lesions   LYMPH NODES: No enlarged glands  ENDOCRINE: No heat or cold intolerance; No hair loss  MUSCULOSKELETAL: No joint pain or swelling; No muscle, back, or extremity pain  PSYCHIATRIC: No depression, anxiety  HEME/LYMPH: No easy bruising, or bleeding gums    T(C): 37.2 (04-17-25 @ 10:59), Max: 37.2 (04-17-25 @ 10:59)  HR: 77 (04-17-25 @ 10:59) (77 - 100)  BP: 140/72 (04-17-25 @ 10:59) (129/75 - 145/71)  RR: 17 (04-17-25 @ 10:59) (17 - 18)  SpO2: 95% (04-17-25 @ 10:59) (92% - 95%)  Wt(kg): --Vital Signs Last 24 Hrs  T(C): 37.2 (17 Apr 2025 10:59), Max: 37.2 (17 Apr 2025 10:59)  T(F): 99 (17 Apr 2025 10:59), Max: 99 (17 Apr 2025 10:59)  HR: 77 (17 Apr 2025 10:59) (77 - 100)  BP: 140/72 (17 Apr 2025 10:59) (129/75 - 145/71)  BP(mean): 88 (17 Apr 2025 10:59) (88 - 88)  RR: 17 (17 Apr 2025 10:59) (17 - 18)  SpO2: 95% (17 Apr 2025 10:59) (92% - 95%)    Parameters below as of 17 Apr 2025 10:59  Patient On (Oxygen Delivery Method): room air    MEDICATIONS  (STANDING):  dextrose 5% + sodium chloride 0.9%. 1000 milliLiter(s) (50 mL/Hr) IV Continuous <Continuous>  heparin   Injectable 5000 Unit(s) SubCutaneous every 12 hours  lactated ringers. 1000 milliLiter(s) (65 mL/Hr) IV Continuous <Continuous>  levothyroxine Injectable 37.5 MICROGram(s) IV Push at bedtime  pantoprazole  Injectable 40 milliGRAM(s) IV Push daily  piperacillin/tazobactam IVPB.. 3.375 Gram(s) IV Intermittent every 12 hours  potassium chloride   Powder 40 milliEquivalent(s) Oral once    MEDICATIONS  (PRN):  metoprolol tartrate Injectable 2.5 milliGRAM(s) IV Push every 6 hours PRN SBP >150  OLANZapine Injectable 5 milliGRAM(s) IntraMuscular once PRN agitation  ondansetron Injectable 4 milliGRAM(s) IV Push every 8 hours PRN Nausea and/or Vomiting      PHYSICAL EXAM:  GENERAL: NAD, cachectic dry,   EYES: clear conjunctiva; EOMI  ENMT: Moist mucous membranes  NECK: Supple, No JVD, Normal thyroid  CHEST/LUNG: dec breath sounds at bases  HEART: S1, S2+  ABDOMEN: Soft, Nontender, Nondistended; Bowel sounds present  NEURO: Alert & awake   EXTREMITIES: No LE edema, no calf tenderness      Consultant(s) Notes Reviewed:  [x ] YES  [ ] NO  Care Discussed with Consultants/Other Providers [ x] YES  [ ] NO    LABS:                        11.6   10.81 )-----------( 286      ( 17 Apr 2025 06:50 )             33.7     04-17    140  |  102  |  21[H]  ----------------------------<  92  3.2[L]   |  31  |  1.02    Ca    8.4      17 Apr 2025 06:50  Phos  3.2     04-17  Mg     2.1     04-17    TPro  7.3  /  Alb  3.6  /  TBili  0.6  /  DBili  x   /  AST  100[H]  /  ALT  86[H]  /  AlkPhos  82  04-16    PT/INR - ( 15 Apr 2025 23:18 )   PT: 10.2 sec;   INR: 0.88 ratio         PTT - ( 15 Apr 2025 23:18 )  PTT:31.7 sec  CAPILLARY BLOOD GLUCOSE      Urinalysis Basic - ( 17 Apr 2025 06:50 )    Color: x / Appearance: x / SG: x / pH: x  Gluc: 92 mg/dL / Ketone: x  / Bili: x / Urobili: x   Blood: x / Protein: x / Nitrite: x   Leuk Esterase: x / RBC: x / WBC x   Sq Epi: x / Non Sq Epi: x / Bacteria: x        RADIOLOGY & ADDITIONAL TESTS:    Imaging Personally Reviewed:  [x ] YES  [ ] NO    < from: US Abdomen Upper Quadrant Right (04.16.25 @ 14:50) >    ACC: [id number] EXAM:  US ABDOMEN RT UPR QUADRANT   ORDERED BY: ALFONSO SANDOVAL     PROCEDURE DATE:  04/16/2025          INTERPRETATION:  CLINICAL INFORMATION: Elevated liver function tests    COMPARISON: CT dated 4/16/2025    TECHNIQUE: Sonography of the right upper quadrant.    FINDINGS:  Liver: Within normal limits.  Bile ducts: Common bile duct measures 8.5 mm, prominent.  Gallbladder: Cholelithiasis.  No focal tenderness or evidence of   cholecystitis.  Pancreas: Visualized portions are within normal limits.  Right kidney: 8.5 cm. No hydronephrosis. Calculi measuring up to 2 mm.  Ascites: None.  IVC: Visualized portions are within normal limits.    IMPRESSION:  Cholelithiasis.  No focal tenderness or evidence of cholecystitis.        ---End of Report ---            SEEMA CORTEZ MD; Attending Radiologist  This document has been electronically signed. Apr 16 2025  4:01PM    < end of copied text >  < from: CT Abdomen and Pelvis No Cont (04.16.25 @ 06:37) >    ACC: [id number] EXAM:  CT ABDOMEN AND PELVIS   ORDERED BY: DALE COLLAZO     PROCEDURE DATE:  04/16/2025          INTERPRETATION:  CLINICAL INFORMATION: Abdominal pain, sepsis    COMPARISON: 2/5/2024    CONTRAST/COMPLICATIONS:  IV Contrast: NONE  Oral Contrast: NONE  .    PROCEDURE:  CT of the Chest, Abdomen and Pelvis was performed.  Sagittal and coronal reformats were performed.    FINDINGS:  CHEST:  LUNGS AND LARGE AIRWAYS: Left lower lobe airspace consolidation.   Scattered areas of focal atelectasis right lower lobe.  PLEURA: No pleural effusion.  VESSELS: Scattered atherosclerotic calcifications in the aorta  HEART: Cardiomegaly. No pericardial effusion.  MEDIASTINUM AND SHAD: No lymphadenopathy.  CHEST WALL AND LOWER NECK: Within normal limits.    ABDOMEN AND PELVIS:  LIVER: Within normal limits.  BILE DUCTS: Normal caliber.  GALLBLADDER: Cholelithiasis.  SPLEEN: Within normal limits.  PANCREAS: Within normal limits.  ADRENALS: Within normal limits.  KIDNEYS/URETERS: Limited evaluation. An approximate 4.5 cm cyst is seen   in the left kidney.    BLADDER: Within normal limits.  REPRODUCTIVE ORGANS:    BOWEL: Focal bowel occlusion noted at the third portion of the duodenum,   where it traverses midline (2:175). Resultant severegastric distention,   extending into severe distention of a large intrathoracic hiatal hernia   extending to the level of the thoracic inlet. Scattered dilated loops of   small bowel noted in the pelvis, without wall thickening.  PERITONEUM/RETROPERITONEUM: Within normal limits.  VESSELS: Atherosclerotic calcifications  LYMPH NODES: No lymphadenopathy.  ABDOMINAL WALL: Within normal limits.  BONES: Severe thoracolumbar spine scoliosis    IMPRESSION:  1.  Focal bowel occlusion at the third portionof the duodenum where it   traverses midline. Resultant severe gastric distention, extending into   severe distention of a large intrathoracic hiatal hernia, to the level of   the thoracic inlet  2.  Left lower lobe pneumonia  3.  Scattered loops of dilated small bowel in the pelvis, possible   partial small bowel obstruction versus ileus    --- End of Report ---            JADEN SANTACRUZ MD; Attending Radiologist  This document has been electronically signed. Apr 16 2025  7:22AM    < end of copied text >

## 2025-04-17 NOTE — CONSULT NOTE ADULT - TIME BILLING
- Review of chart (documentation, labs/studies, VS trends)  - Personal review of imaging  - Medication reconciliation  - Bedside interview and physical examination  - Bedside SpO2 monitoring  - Coordination of care with primary team
chart review, documentation, and in-person evaluation

## 2025-04-17 NOTE — DIETITIAN INITIAL EVALUATION ADULT - NUTRITIONGOAL OUTCOME1
[address]  [address] [facility] [address] [facility] [address] [address]  Dept: [phone]  Dept Fax: [phone]  Loc: [phone]    Rito Koehler is a 43 y.o. female who presents today for her medical conditions/complaints as noted below. Rito Koehler is c/o of Results (to go over medical records ) and Follow-up (from new patient appoinment)       Subjective:     Chief Complaint   Patient presents with    Results     to go over medical records     Follow-up     from new patient appoinment       HPI   This patient is here to follow up from virtual new patient visit in 11/2020. The patient still believes that there is something wrong with her \"hormone levels\". She states she is unable to lose weight no matter what she does. The patient was going to weight loss center to get benzophetamine and \"goes to the gym 5 days a week\". The patient is no longer going to the weight loss center and has gained any \"lost weight\" back  On trazodone 50 mg and celexa 20mg for depression    Wt Readings from Last 3 Encounters:   05/05/21 209 lb (94.8 kg)   10/22/19 171 lb 8.3 oz (77.8 kg)   10/02/19 171 lb 8.3 oz (77.8 kg)     Sleep Apnea  She presents for a sleep evaluation. She complains of snoring, tossing and turning, excessive daytime sleepiness, increased in weight  See above. Symptoms began several months ago, unchanged since that time. . She denies decreased memory, decreased concentration, knees buckling with laughing, completely or partially paralyzed while falling asleep or waking up, sinus problems, take naps during the day, noisy environment, uncomfortable room temperature. Previous evaluation and treatment has included weight loss.         Past Medical History:   Diagnosis Date    Allergic rhinitis     Anxiety     Asthma     Congenital anomaly, optic nerve, right (HCC)     Constipation     Depression     Family history of colon cancer     Gets colonoscopy    Family history of ovarian cancer     Family history of thyroid cancer     Herpes simplex 2013    type2 on culture done by GYN    Hyperlipidemia     Irritable bowel syndrome          Review of Systems   Constitutional: Positive for fatigue and unexpected weight change. Negative for appetite change. HENT: Negative. Eyes: Negative. Respiratory: Negative. Negative for shortness of breath. Snoring   Cardiovascular: Negative. Negative for chest pain, palpitations and leg swelling. Gastrointestinal: Negative. Negative for abdominal pain, anal bleeding, blood in stool and nausea. Endocrine: Negative. Genitourinary: Negative. Negative for hematuria. Musculoskeletal: Negative. Skin: Negative. Negative for rash. Allergic/Immunologic: Negative. Neurological: Negative. Negative for dizziness, syncope, light-headedness and numbness. Hematological: Negative. Does not bruise/bleed easily. Psychiatric/Behavioral: Negative. All other systems reviewed and are negative.        Past Medical History:   Diagnosis Date    Allergic rhinitis     Anxiety     Asthma     Congenital anomaly, optic nerve, right (HCC)     Constipation     Depression     Family history of colon cancer     Gets colonoscopy    Family history of ovarian cancer     Family history of thyroid cancer     Herpes simplex 2013    type2 on culture done by GYN    Hyperlipidemia     Irritable bowel syndrome      Family History   Problem Relation Age of Onset    Cancer Paternal Grandfather         multiple NMSC    Cancer Mother         thyroid, colon,ovarian,    Ulcerative Colitis Mother     Cancer Father         skin     Past Surgical History:   Procedure Laterality Date    COLONOSCOPY  2010    ELBOW FRACTURE SURGERY Left 10/28/2016    INCONTINENCE SURGERY  2019    KNEE SURGERY      lef t    TONSILLECTOMY       Social History     Socioeconomic History    Marital status:      Spouse name: Not on file    Number of children: Not on file    Years of education: Not on file    Highest education level: Not on file   Occupational History    Not on file   Social Needs    Financial resource strain: Not on file    Food insecurity     Worry: Not on file     Inability: Not on file    Transportation needs     Medical: Not on file     Non-medical: Not on file   Tobacco Use    Smoking status: Former Smoker     Packs/day: 1.00     Years: 6.00     Pack years: 6.00     Types: Cigarettes     Quit date: 2010     Years since quittin.3    Smokeless tobacco: Never Used   Substance and Sexual Activity    Alcohol use: Yes    Drug use: No    Sexual activity: Not on file   Lifestyle    Physical activity     Days per week: Not on file     Minutes per session: Not on file    Stress: Not on file   Relationships    Social connections     Talks on phone: Not on file     Gets together: Not on file     Attends [employer] service: Not on file     Active member of club or organization: Not on file     Attends meetings of clubs or organizations: Not on file     Relationship status: Not on file    Intimate partner violence     Fear of current or ex partner: Not on file     Emotionally abused: Not on file     Physically abused: Not on file     Forced sexual activity: Not on file   Other Topics Concern    Not on file   Social History Narrative    Not on file     Current Outpatient Medications   Medication Sig Dispense Refill    albuterol sulfate HFA (PROAIR HFA) 108 (90 Base) MCG/ACT inhaler Inhale 2 puffs into the lungs every 6 hours as needed for Wheezing or Shortness of Breath      hydrOXYzine (VISTARIL) 50 MG capsule 50 mg daily       NUVARING 0.12-0.015 MG/24HR vaginal ring       Multiple Vitamins-Minerals (MULTIVITAMIN ADULT PO) Take by mouth      Cetirizine HCl (ZYRTEC ALLERGY) 10 MG CAPS Take  by mouth.  acyclovir (ZOVIRAX) 400 MG tablet Take 400 mg by mouth 2 times daily.       Linaclotide (LINZESS) 290 MCG CAPS Take  by mouth.       No current facility-administered medications for this visit. No changes in past medical history, past surgical history, social history, orfamily history were noted during the patient encounter unless specifically listed above. All updates of past medical history, past surgical history, social history, or family history were reviewed personally by me duringthe office visit. All problems listed in the assessment are stable unless noted otherwise. Medication profile reviewed personally by me during the office visit. Medication side effects and possible impairments frommedications were discussed as applicable. Objective:     Physical Exam  Vitals and nursing note reviewed. Constitutional:       General: She is not in acute distress. Appearance: Normal appearance. She is well-developed and overweight. HENT:      Head: Normocephalic and atraumatic. Right Ear: Tympanic membrane, ear canal and external ear normal.      Left Ear: Tympanic membrane, ear canal and external ear normal.      Nose: Nose normal.      Mouth/Throat:      Pharynx: Uvula midline. No oropharyngeal exudate. Eyes:      General: Lids are normal.      Conjunctiva/sclera: Conjunctivae normal.      Pupils: Pupils are equal, round, and reactive to light. Neck:      Thyroid: No thyromegaly. Vascular: No carotid bruit or JVD. Cardiovascular:      Rate and Rhythm: Normal rate and regular rhythm. Pulses: Normal pulses. Radial pulses are 2+ on the right side and 2+ on the left side. Dorsalis pedis pulses are 2+ on the right side and 2+ on the left side. Posterior tibial pulses are 2+ on the right side and 2+ on the left side. Heart sounds: Normal heart sounds. No murmur heard. No friction rub. No gallop. Pulmonary:      Effort: Pulmonary effort is normal.      Breath sounds: Normal breath sounds.    Abdominal:      General: Bowel sounds are normal.      Palpations: Abdomen is soft. There is no mass. Tenderness: There is no abdominal tenderness. Musculoskeletal:         General: Normal range of motion. Cervical back: Normal range of motion and neck supple. Lymphadenopathy:      Head:      Right side of head: No submandibular adenopathy. Left side of head: No submandibular adenopathy. Cervical: No cervical adenopathy. Skin:     General: Skin is warm and dry. Findings: No lesion or rash. Neurological:      Mental Status: She is alert and oriented to person, place, and time. Gait: Gait normal.   Psychiatric:         Speech: Speech normal.         Behavior: Behavior normal.         Thought Content: Thought content normal.         Judgment: Judgment normal.         /70 (Site: Left Upper Arm, Position: Sitting, Cuff Size: Large Adult)   Pulse 90   Ht 5' 4\" (1.626 m)   Wt 209 lb (94.8 kg)   LMP 04/21/2021   SpO2 97%   BMI 35.87 kg/m²   Body mass index is 35.87 kg/m².     BP Readings from Last 2 Encounters:   05/05/21 112/70   10/22/19 129/88       Wt Readings from Last 3 Encounters:   05/05/21 209 lb (94.8 kg)   10/22/19 171 lb 8.3 oz (77.8 kg)   10/02/19 171 lb 8.3 oz (77.8 kg)       Lab Review   Abstract on 04/27/2021   Component Date Value    Vit D, 25-Hydroxy 09/24/2020 35.0     TSH 09/24/2020 1.500     Vitamin B-12 09/24/2020 388     Cholesterol, Total 09/24/2020 204     HDL 09/24/2020 63     LDL Calculated 09/24/2020 118     Triglycerides 09/24/2020 128     VLDL 09/24/2020 23     WBC 09/24/2020 5.9     RBC 09/24/2020 12.2     Hemoglobin 09/24/2020 13.1     Hematocrit 09/24/2020 38.9     MCV 09/24/2020 91     MCH 09/24/2020 30.7     MCHC 09/24/2020 33.7     Platelets 44/22/7810 257     RDW 09/24/2020 12.2     Sodium 09/24/2020 139     Chloride 09/24/2020 107     Potassium 09/24/2020 4.3     BUN 09/24/2020 0.3     CREATININE 09/24/2020 0.72     Glucose 09/24/2020 102     AST 09/24/2020 40     ALT 09/24/2020 30     Calcium 09/24/2020 9.1     Total Protein 09/24/2020 6.6     CO2 09/24/2020 19     Albumin 09/24/2020 3.9     Alkaline Phosphatase 09/24/2020 63     Total Bilirubin 09/24/2020 0.3     Gfr Calculated 09/24/2020 104     Anion Gap 09/24/2020 1.4        No results found for this visit on 05/05/21. Assessment:       1. Class 2 obesity without serious comorbidity with body mass index (BMI) of 35.0 to 35.9 in adult, unspecified obesity type    2. Abnormal weight gain    3. Daytime somnolence    4. Fatigue, unspecified type    5. Snoring    6. Vitamin D deficiency    7. Other problems related to lifestyle    8. Encounter for screening for diabetes mellitus    9. Screening for HIV without presence of risk factors        No results found for this visit on 05/05/21. Plan:       Mel was seen today for results and follow-up. Diagnoses and all orders for this visit:    Class 2 obesity without serious comorbidity with body mass index (BMI) of 35.0 to 35.9 in adult, unspecified obesity type   Discussed healthy lifestyle choices to attempt to incorporate into daily routine to attempt to obtain and maintain a healthy weight. General weight loss/lifestyle modification strategies discussed (elicit support from others; identify saboteurs; non-food rewards, etc). Discussed healthy nutritional options as well. Diet interventions: moderate (500 kCal/d) deficit diet. Disscussed trying to incorporate routine physical activity into daily regimen. Informal exercise measures discussed, e.g. taking stairs instead of elevator. Regular aerobic exercise program discussed. Surgical Procedure: was not discussed  Behavioral treatment: discussed commercial programs (Weight Watchers, Nutrisystem,etc), Overeaters Anonymous, Slim Fast, stress management and TOPS. Discussed consequences of obesity in terms of medical conditions that may develop in the future. Patient verbalized understanding.        Abnormal weight gain  - T3  -     T4, Free  -     Thyroid Peroxidase Antibody  -     TSH without Reflex  Records from prior PCP reviewed and no reason noted for weight gain  If labs are normal consider endo referral --patient given information for Dr Heath Rodas. Daytime somnolence  -     Carolyn Bernstein MD, Sleep Medicine, [facility]    Fatigue, unspecified type  -     T3  -     T4, Free  -     Thyroid Peroxidase Antibody  -     TSH without Reflex  -     Carolyn Bernstein MD, Sleep Medicine, [facility]    Snoring  -     Carolyn Bernstein MD, Sleep Medicine, [facility]    Vitamin D deficiency  -     Vitamin D 25 Hydroxy  Discussed with patient that we make vitamin D from the sun and get it from some food sources, but it is very common to be deficient. Discussed the need for vitamin D replacement because low vitamin D can cause fatigue, joint aches and has been implicated in heart disease, bone disease like osteoporosis, and some other chronic illnesses. Patient will start/continue vitamin D supplement as per order. Recommend vitamin D to be rechecked in 6 months. Other problems related to lifestyle  -     Hepatitis C Antibody; Future  -     Cancel: Hepatitis C Antibody    Encounter for screening for diabetes mellitus  -     Hemoglobin A1C - NOT COVERED /DO NOT USE FOR MEDICARE PATIENTS    Screening for HIV without presence of risk factors  -     HIV Screen    Other orders  -     Hepatitis C Antibody        Patient has been instructed call the office immediately with new symptoms, change in symptoms or worseningof symptoms. If this is not feasible, patient is instructed to report to the emergency room. Medication profile reviewed. Medication side effects and possible impairments from medications were discussed as applicable. Allergies were reviewed. Health maintenance was reviewed and updated as appropriate. Return in about 1 year (around 5/5/2022), or if symptoms worsen or fail to improve. (Comment: Please note this report has been produced using a combination of typing and speech recognition software and may contain errors related to that system including errors in grammar, punctuation, and spelling, as well as words and phrases that may be inappropriate.  If there are any questions or concerns please feel free to contact the dictating provider for clarification.) To meet nutrition needs

## 2025-04-17 NOTE — DIETITIAN INITIAL EVALUATION ADULT - PERTINENT MEDS FT
MEDICATIONS  (STANDING):  dextrose 5% + sodium chloride 0.9%. 1000 milliLiter(s) (50 mL/Hr) IV Continuous <Continuous>  heparin   Injectable 5000 Unit(s) SubCutaneous every 12 hours  lactated ringers. 1000 milliLiter(s) (65 mL/Hr) IV Continuous <Continuous>  levothyroxine Injectable 37.5 MICROGram(s) IV Push at bedtime  pantoprazole  Injectable 40 milliGRAM(s) IV Push daily  piperacillin/tazobactam IVPB.. 3.375 Gram(s) IV Intermittent every 12 hours    MEDICATIONS  (PRN):  metoprolol tartrate Injectable 2.5 milliGRAM(s) IV Push every 6 hours PRN SBP >150  OLANZapine Injectable 5 milliGRAM(s) IntraMuscular once PRN agitation  ondansetron Injectable 4 milliGRAM(s) IV Push every 8 hours PRN Nausea and/or Vomiting

## 2025-04-18 LAB
ANION GAP SERPL CALC-SCNC: 4 MMOL/L — LOW (ref 5–17)
BUN SERPL-MCNC: 14 MG/DL — SIGNIFICANT CHANGE UP (ref 7–18)
CALCIUM SERPL-MCNC: 8.7 MG/DL — SIGNIFICANT CHANGE UP (ref 8.4–10.5)
CHLORIDE SERPL-SCNC: 100 MMOL/L — SIGNIFICANT CHANGE UP (ref 96–108)
CO2 SERPL-SCNC: 32 MMOL/L — HIGH (ref 22–31)
CREAT SERPL-MCNC: 0.87 MG/DL — SIGNIFICANT CHANGE UP (ref 0.5–1.3)
EGFR: 66 ML/MIN/1.73M2 — SIGNIFICANT CHANGE UP
EGFR: 66 ML/MIN/1.73M2 — SIGNIFICANT CHANGE UP
GLUCOSE SERPL-MCNC: 90 MG/DL — SIGNIFICANT CHANGE UP (ref 70–99)
HCT VFR BLD CALC: 37.2 % — SIGNIFICANT CHANGE UP (ref 34.5–45)
HGB BLD-MCNC: 12.6 G/DL — SIGNIFICANT CHANGE UP (ref 11.5–15.5)
MCHC RBC-ENTMCNC: 30.8 PG — SIGNIFICANT CHANGE UP (ref 27–34)
MCHC RBC-ENTMCNC: 33.9 G/DL — SIGNIFICANT CHANGE UP (ref 32–36)
MCV RBC AUTO: 91 FL — SIGNIFICANT CHANGE UP (ref 80–100)
NRBC BLD AUTO-RTO: 0 /100 WBCS — SIGNIFICANT CHANGE UP (ref 0–0)
PLATELET # BLD AUTO: 293 K/UL — SIGNIFICANT CHANGE UP (ref 150–400)
POTASSIUM SERPL-MCNC: 3.7 MMOL/L — SIGNIFICANT CHANGE UP (ref 3.5–5.3)
POTASSIUM SERPL-SCNC: 3.7 MMOL/L — SIGNIFICANT CHANGE UP (ref 3.5–5.3)
RBC # BLD: 4.09 M/UL — SIGNIFICANT CHANGE UP (ref 3.8–5.2)
RBC # FLD: 14.3 % — SIGNIFICANT CHANGE UP (ref 10.3–14.5)
SODIUM SERPL-SCNC: 136 MMOL/L — SIGNIFICANT CHANGE UP (ref 135–145)
WBC # BLD: 6.03 K/UL — SIGNIFICANT CHANGE UP (ref 3.8–10.5)
WBC # FLD AUTO: 6.03 K/UL — SIGNIFICANT CHANGE UP (ref 3.8–10.5)

## 2025-04-18 RX ADMIN — ENOXAPARIN SODIUM 30 MILLIGRAM(S): 100 INJECTION SUBCUTANEOUS at 18:56

## 2025-04-18 RX ADMIN — Medication 25 GRAM(S): at 18:55

## 2025-04-18 RX ADMIN — Medication 40 MILLIGRAM(S): at 13:13

## 2025-04-18 RX ADMIN — SODIUM CHLORIDE 65 MILLILITER(S): 9 INJECTION, SOLUTION INTRAVENOUS at 01:08

## 2025-04-18 RX ADMIN — Medication 10 MILLIGRAM(S): at 05:23

## 2025-04-18 RX ADMIN — AMLODIPINE BESYLATE 10 MILLIGRAM(S): 10 TABLET ORAL at 05:23

## 2025-04-18 RX ADMIN — SODIUM CHLORIDE 65 MILLILITER(S): 9 INJECTION, SOLUTION INTRAVENOUS at 18:55

## 2025-04-18 RX ADMIN — Medication 50 MICROGRAM(S): at 05:23

## 2025-04-18 RX ADMIN — Medication 25 GRAM(S): at 05:22

## 2025-04-18 NOTE — PROGRESS NOTE ADULT - ASSESSMENT
84 yo F from [facility], with cervical cancer, brain mass, HTN, HLD, hypothyroid, admitted with encephalopathy, acute hypoxic respiratory failure likely 2/2 flash pulm edema from HTN emergency, severe sepsis with lactic acidosis 2/2 LLL PNA. and partial SBO, GI and Id following.

## 2025-04-18 NOTE — PROGRESS NOTE ADULT - ASSESSMENT
Sepsis - resolved  Pneumonia ( aspiration )  Leukocytosis - normalized   Hypothermia - resolved    Plan -   ·	Cont Zosyn 3.375 gms iv q12hrs (D3)

## 2025-04-18 NOTE — PROGRESS NOTE ADULT - ASSESSMENT
84 yo F from [facility], with cervical cancer, brain mass, HTN, HLD, hypothyroid, admitted with encephalopathy, acute hypoxic respiratory failure likely 2/2 flash pulm edema from HTN emergency, severe sepsis with lactic acidosis 2/2 LLL PNA. and partial SBO, GI and Id following.

## 2025-04-18 NOTE — PROGRESS NOTE ADULT - SUBJECTIVE AND OBJECTIVE BOX
Patient is a 84y old  Female who presents with a chief complaint of severe sepsis (18 Apr 2025 11:25)      INTERVAL HPI/OVERNIGHT EVENTS: no acute events overnight.        REVIEW OF SYSTEMS:  CONSTITUTIONAL: No fever, chills  ENMT:  No difficulty hearing, no change in vision  NECK: No pain or stiffness  RESPIRATORY: No cough, SOB  CARDIOVASCULAR: No chest pain, palpitations  GASTROINTESTINAL: No abdominal pain. No nausea, vomiting, or diarrhea  GENITOURINARY: No dysuria  NEUROLOGICAL: No HA  SKIN: No itching, burning, rashes, or lesions   LYMPH NODES: No enlarged glands  ENDOCRINE: No heat or cold intolerance; No hair loss  MUSCULOSKELETAL: No joint pain or swelling; No muscle, back, or extremity pain  PSYCHIATRIC: No depression, anxiety  HEME/LYMPH: No easy bruising, or bleeding gums    T(C): 36.5 (04-18-25 @ 13:59), Max: 36.7 (04-18-25 @ 05:07)  HR: 92 (04-18-25 @ 13:59) (69 - 92)  BP: 135/62 (04-18-25 @ 13:59) (126/74 - 150/81)  RR: 16 (04-18-25 @ 13:59) (16 - 17)  SpO2: 96% (04-18-25 @ 13:59) (93% - 99%)  Wt(kg): --Vital Signs Last 24 Hrs  T(C): 36.5 (18 Apr 2025 13:59), Max: 36.7 (18 Apr 2025 05:07)  T(F): 97.7 (18 Apr 2025 13:59), Max: 98.1 (18 Apr 2025 05:07)  HR: 92 (18 Apr 2025 13:59) (69 - 92)  BP: 135/62 (18 Apr 2025 13:59) (126/74 - 150/81)  BP(mean): --  RR: 16 (18 Apr 2025 13:59) (16 - 17)  SpO2: 96% (18 Apr 2025 13:59) (93% - 99%)    Parameters below as of 18 Apr 2025 13:59  Patient On (Oxygen Delivery Method): room air        PHYSICAL EXAM:  GENERAL: NAD, Very Hard of hearing , cachectic   EYES: clear conjunctiva; EOMI  ENMT: Moist mucous membranes  NECK: Supple, No JVD, Normal thyroid  CHEST/LUNG: Clear to auscultation bilaterally; No rales, rhonchi, wheezing, or rubs  HEART: S1, S2, Regular rate and rhythm  ABDOMEN: Soft, Nontender, Nondistended; Bowel sounds present  NEURO: Alert & awake   EXTREMITIES: No LE edema, no calf tenderness  LYMPH: No lymphadenopathy noted  SKIN: No rashes or lesions    Consultant(s) Notes Reviewed:  [x ] YES  [ ] NO  Care Discussed with Consultants/Other Providers [ x] YES  [ ] NO    LABS:                        12.6   6.03  )-----------( 293      ( 18 Apr 2025 05:30 )             37.2     04-18    136  |  100  |  14  ----------------------------<  90  3.7   |  32[H]  |  0.87    Ca    8.7      18 Apr 2025 05:30  Phos  3.2     04-17  Mg     2.1     04-17        CAPILLARY BLOOD GLUCOSE      Urinalysis Basic - ( 18 Apr 2025 05:30 )    Color: x / Appearance: x / SG: x / pH: x  Gluc: 90 mg/dL / Ketone: x  / Bili: x / Urobili: x   Blood: x / Protein: x / Nitrite: x   Leuk Esterase: x / RBC: x / WBC x   Sq Epi: x / Non Sq Epi: x / Bacteria: x        RADIOLOGY & ADDITIONAL TESTS:    Imaging Personally Reviewed:  [ x] YES  [ ] NO    < from: US Abdomen Upper Quadrant Right (04.16.25 @ 14:50) >    ACC: [id number] EXAM:  US ABDOMEN RT UPR QUADRANT   ORDERED BY: ALFONSO SANDOVAL     PROCEDURE DATE:  04/16/2025          INTERPRETATION:  CLINICAL INFORMATION: Elevated liver function tests    COMPARISON: CT dated 4/16/2025    TECHNIQUE: Sonography of the right upper quadrant.    FINDINGS:  Liver: Within normal limits.  Bile ducts: Common bile duct measures 8.5 mm, prominent.  Gallbladder: Cholelithiasis.  No focal tenderness or evidence of   cholecystitis.  Pancreas: Visualized portions are within normal limits.  Right kidney: 8.5 cm. No hydronephrosis. Calculi measuring up to 2 mm.  Ascites: None.  IVC: Visualized portions are within normal limits.    IMPRESSION:  Cholelithiasis.  No focal tenderness or evidence of cholecystitis.        ---End of Report ---            SEEMA CORTEZ MD; Attending Radiologist  This document has been electronically signed. Apr 16 2025  4:01PM    < end of copied text >  < from: CT Abdomen and Pelvis No Cont (04.16.25 @ 06:37) >    ACC: [id number] EXAM:  CT ABDOMEN AND PELVIS   ORDERED BY: DALE COLLAZO     PROCEDURE DATE:  04/16/2025          INTERPRETATION:  CLINICAL INFORMATION: Abdominal pain, sepsis    COMPARISON: 2/5/2024    CONTRAST/COMPLICATIONS:  IV Contrast: NONE  Oral Contrast: NONE  .    PROCEDURE:  CT of the Chest, Abdomen and Pelvis was performed.  Sagittal and coronal reformats were performed.    FINDINGS:  CHEST:  LUNGS AND LARGE AIRWAYS: Left lower lobe airspace consolidation.   Scattered areas of focal atelectasis right lower lobe.  PLEURA: No pleural effusion.  VESSELS: Scattered atherosclerotic calcifications in the aorta  HEART: Cardiomegaly. No pericardial effusion.  MEDIASTINUM AND SHAD: No lymphadenopathy.  CHEST WALL AND LOWER NECK: Within normal limits.    ABDOMEN AND PELVIS:  LIVER: Within normal limits.  BILE DUCTS: Normal caliber.  GALLBLADDER: Cholelithiasis.  SPLEEN: Within normal limits.  PANCREAS: Within normal limits.  ADRENALS: Within normal limits.  KIDNEYS/URETERS: Limited evaluation. An approximate 4.5 cm cyst is seen   in the left kidney.    BLADDER: Within normal limits.  REPRODUCTIVE ORGANS:    BOWEL: Focal bowel occlusion noted at the third portion of the duodenum,   where it traverses midline (2:175). Resultant severegastric distention,   extending into severe distention of a large intrathoracic hiatal hernia   extending to the level of the thoracic inlet. Scattered dilated loops of   small bowel noted in the pelvis, without wall thickening.  PERITONEUM/RETROPERITONEUM: Within normal limits.  VESSELS: Atherosclerotic calcifications  LYMPH NODES: No lymphadenopathy.  ABDOMINAL WALL: Within normal limits.  BONES: Severe thoracolumbar spine scoliosis    IMPRESSION:  1.  Focal bowel occlusion at the third portionof the duodenum where it   traverses midline. Resultant severe gastric distention, extending into   severe distention of a large intrathoracic hiatal hernia, to the level of   the thoracic inlet  2.  Left lower lobe pneumonia  3.  Scattered loops of dilated small bowel in the pelvis, possible   partial small bowel obstruction versus ileus    --- End of Report ---            JADEN SANTACRUZ MD; Attending Radiologist  This document has been electronically signed. Apr 16 2025  7:22AM    < end of copied text >

## 2025-04-18 NOTE — PROGRESS NOTE ADULT - SUBJECTIVE AND OBJECTIVE BOX
Patient is a 84y old  Female who presents with a chief complaint of severe sepsis (18 Apr 2025       INTERVAL HPI/OVERNIGHT EVENTS: pt seen and examined      REVIEW OF SYSTEMS:  CONSTITUTIONAL: No fever, chills  ENMT:  No difficulty hearing, no change in vision  NECK: No pain or stiffness  RESPIRATORY: No cough, SOB  CARDIOVASCULAR: No chest pain, palpitations  GASTROINTESTINAL: No abdominal pain. No nausea, vomiting, or diarrhea  GENITOURINARY: No dysuria  NEUROLOGICAL: No HA  SKIN: No itching, burning, rashes, or lesions   LYMPH NODES: No enlarged glands  ENDOCRINE: No heat or cold intolerance; No hair loss  MUSCULOSKELETAL: No joint pain or swelling; No muscle, back, or extremity pain  PSYCHIATRIC: No depression, anxiety  HEME/LYMPH: No easy bruising, or bleeding gums    T(C): 36.5 (04-18-25 @ 13:59), Max: 36.7 (04-18-25 @ 05:07)  HR: 92 (04-18-25 @ 13:59) (69 - 92)  BP: 135/62 (04-18-25 @ 13:59) (126/74 - 150/81)  RR: 16 (04-18-25 @ 13:59) (16 - 17)  SpO2: 96% (04-18-25 @ 13:59) (93% - 99%)  Wt(kg): --Vital Signs Last 24 Hrs  T(C): 36.5 (18 Apr 2025 13:59), Max: 36.7 (18 Apr 2025 05:07)  T(F): 97.7 (18 Apr 2025 13:59), Max: 98.1 (18 Apr 2025 05:07)  HR: 92 (18 Apr 2025 13:59) (69 - 92)  BP: 135/62 (18 Apr 2025 13:59) (126/74 - 150/81)  BP(mean): --  RR: 16 (18 Apr 2025 13:59) (16 - 17)  SpO2: 96% (18 Apr 2025 13:59) (93% - 99%)    Parameters below as of 18 Apr 2025 13:59  Patient On (Oxygen Delivery Method): room air        PHYSICAL EXAM:  GENERAL: NAD, Very Hard of hearing , cachectic   EYES: clear conjunctiva; EOMI  ENMT: Moist mucous membranes  NECK: Supple, No JVD, Normal thyroid  CHEST/LUNG: dec breath sounds at bases  HEART: S1, S2, +  ABDOMEN: Soft, Nontender, Nondistended; Bowel sounds present  NEURO: Alert & awake   EXTREMITIES: No LE edema, no calf tenderness  Consultant(s) Notes Reviewed:  [x ] YES  [ ] NO  Care Discussed with Consultants/Other Providers [ x] YES  [ ] NO    LABS:                        12.6   6.03  )-----------( 293      ( 18 Apr 2025 05:30 )             37.2     04-18    136  |  100  |  14  ----------------------------<  90  3.7   |  32[H]  |  0.87    Ca    8.7      18 Apr 2025 05:30  Phos  3.2     04-17  Mg     2.1     04-17        CAPILLARY BLOOD GLUCOSE      Urinalysis Basic - ( 18 Apr 2025 05:30 )    Color: x / Appearance: x / SG: x / pH: x  Gluc: 90 mg/dL / Ketone: x  / Bili: x / Urobili: x   Blood: x / Protein: x / Nitrite: x   Leuk Esterase: x / RBC: x / WBC x   Sq Epi: x / Non Sq Epi: x / Bacteria: x        RADIOLOGY & ADDITIONAL TESTS:    Imaging Personally Reviewed:  [ x] YES  [ ] NO    < from: US Abdomen Upper Quadrant Right (04.16.25 @ 14:50) >    ACC: [id number] EXAM:  US ABDOMEN RT UPR QUADRANT   ORDERED BY: ALFONSO SANDOVAL     PROCEDURE DATE:  04/16/2025          INTERPRETATION:  CLINICAL INFORMATION: Elevated liver function tests    COMPARISON: CT dated 4/16/2025    TECHNIQUE: Sonography of the right upper quadrant.    FINDINGS:  Liver: Within normal limits.  Bile ducts: Common bile duct measures 8.5 mm, prominent.  Gallbladder: Cholelithiasis.  No focal tenderness or evidence of   cholecystitis.  Pancreas: Visualized portions are within normal limits.  Right kidney: 8.5 cm. No hydronephrosis. Calculi measuring up to 2 mm.  Ascites: None.  IVC: Visualized portions are within normal limits.    IMPRESSION:  Cholelithiasis.  No focal tenderness or evidence of cholecystitis.        ---End of Report ---            SEEMA CORTEZ MD; Attending Radiologist  This document has been electronically signed. Apr 16 2025  4:01PM    < end of copied text >  < from: CT Abdomen and Pelvis No Cont (04.16.25 @ 06:37) >    ACC: [id number] EXAM:  CT ABDOMEN AND PELVIS   ORDERED BY: DALE COLLAZO     PROCEDURE DATE:  04/16/2025          INTERPRETATION:  CLINICAL INFORMATION: Abdominal pain, sepsis    COMPARISON: 2/5/2024    CONTRAST/COMPLICATIONS:  IV Contrast: NONE  Oral Contrast: NONE  .    PROCEDURE:  CT of the Chest, Abdomen and Pelvis was performed.  Sagittal and coronal reformats were performed.    FINDINGS:  CHEST:  LUNGS AND LARGE AIRWAYS: Left lower lobe airspace consolidation.   Scattered areas of focal atelectasis right lower lobe.  PLEURA: No pleural effusion.  VESSELS: Scattered atherosclerotic calcifications in the aorta  HEART: Cardiomegaly. No pericardial effusion.  MEDIASTINUM AND SHAD: No lymphadenopathy.  CHEST WALL AND LOWER NECK: Within normal limits.    ABDOMEN AND PELVIS:  LIVER: Within normal limits.  BILE DUCTS: Normal caliber.  GALLBLADDER: Cholelithiasis.  SPLEEN: Within normal limits.  PANCREAS: Within normal limits.  ADRENALS: Within normal limits.  KIDNEYS/URETERS: Limited evaluation. An approximate 4.5 cm cyst is seen   in the left kidney.    BLADDER: Within normal limits.  REPRODUCTIVE ORGANS:    BOWEL: Focal bowel occlusion noted at the third portion of the duodenum,   where it traverses midline (2:175). Resultant severegastric distention,   extending into severe distention of a large intrathoracic hiatal hernia   extending to the level of the thoracic inlet. Scattered dilated loops of   small bowel noted in the pelvis, without wall thickening.  PERITONEUM/RETROPERITONEUM: Within normal limits.  VESSELS: Atherosclerotic calcifications  LYMPH NODES: No lymphadenopathy.  ABDOMINAL WALL: Within normal limits.  BONES: Severe thoracolumbar spine scoliosis    IMPRESSION:  1.  Focal bowel occlusion at the third portionof the duodenum where it   traverses midline. Resultant severe gastric distention, extending into   severe distention of a large intrathoracic hiatal hernia, to the level of   the thoracic inlet  2.  Left lower lobe pneumonia  3.  Scattered loops of dilated small bowel in the pelvis, possible   partial small bowel obstruction versus ileus    --- End of Report ---            JADEN SANTACRUZ MD; Attending Radiologist  This document has been electronically signed. Apr 16 2025  7:22AM    < end of copied text >

## 2025-04-19 LAB
ANION GAP SERPL CALC-SCNC: 7 MMOL/L — SIGNIFICANT CHANGE UP (ref 5–17)
BUN SERPL-MCNC: 10 MG/DL — SIGNIFICANT CHANGE UP (ref 7–18)
CALCIUM SERPL-MCNC: 8.9 MG/DL — SIGNIFICANT CHANGE UP (ref 8.4–10.5)
CHLORIDE SERPL-SCNC: 100 MMOL/L — SIGNIFICANT CHANGE UP (ref 96–108)
CO2 SERPL-SCNC: 29 MMOL/L — SIGNIFICANT CHANGE UP (ref 22–31)
CREAT SERPL-MCNC: 0.88 MG/DL — SIGNIFICANT CHANGE UP (ref 0.5–1.3)
EGFR: 65 ML/MIN/1.73M2 — SIGNIFICANT CHANGE UP
EGFR: 65 ML/MIN/1.73M2 — SIGNIFICANT CHANGE UP
GLUCOSE SERPL-MCNC: 90 MG/DL — SIGNIFICANT CHANGE UP (ref 70–99)
POTASSIUM SERPL-MCNC: 3.8 MMOL/L — SIGNIFICANT CHANGE UP (ref 3.5–5.3)
POTASSIUM SERPL-SCNC: 3.8 MMOL/L — SIGNIFICANT CHANGE UP (ref 3.5–5.3)
SODIUM SERPL-SCNC: 136 MMOL/L — SIGNIFICANT CHANGE UP (ref 135–145)

## 2025-04-19 RX ADMIN — AMLODIPINE BESYLATE 10 MILLIGRAM(S): 10 TABLET ORAL at 05:29

## 2025-04-19 RX ADMIN — Medication 25 GRAM(S): at 17:26

## 2025-04-19 RX ADMIN — Medication 10 MILLIGRAM(S): at 05:29

## 2025-04-19 RX ADMIN — Medication 50 MICROGRAM(S): at 05:29

## 2025-04-19 RX ADMIN — ENOXAPARIN SODIUM 30 MILLIGRAM(S): 100 INJECTION SUBCUTANEOUS at 18:36

## 2025-04-19 RX ADMIN — Medication 25 GRAM(S): at 05:29

## 2025-04-19 RX ADMIN — Medication 40 MILLIGRAM(S): at 13:14

## 2025-04-19 NOTE — PHYSICAL THERAPY INITIAL EVALUATION ADULT - PERTINENT HX OF CURRENT PROBLEM, REHAB EVAL
Patient is an 84 yo F with cervical cancer, brain mass, HTN, HLD, hypothyroid, admitted with encephalopathy, acute hypoxic respiratory failure likely 2/2 flash pulm edema from HTN emergency, severe sepsis with lactic acidosis, partial SBO, LLL PNA,

## 2025-04-19 NOTE — PHYSICAL THERAPY INITIAL EVALUATION ADULT - ADDITIONAL COMMENTS
Patient reports being Independent in the [facility] using SC as she is aware of her environment, known where the  dinning area and room is despite visual impairment, reports sometimes getting assistance from [facility] staff for some ADLs

## 2025-04-19 NOTE — PROGRESS NOTE ADULT - ASSESSMENT
84 yo F from [facility], with cervical cancer, brain mass, HTN, HLD, hypothyroid, admitted with encephalopathy, acute hypoxic respiratory failure likely 2/2 flash pulm edema from HTN emergency, severe sepsis with lactic acidosis 2/2 LLL PNA. and partial SBO, GI and Id following.

## 2025-04-19 NOTE — PHYSICAL THERAPY INITIAL EVALUATION ADULT - GENERAL OBSERVATIONS, REHAB EVAL
Patient received supine in bed, NAD, denies pain, legally blind, Ambler, IV lock intact, Love catheter in situ; cooperative to PT evaluation

## 2025-04-19 NOTE — PROGRESS NOTE ADULT - SUBJECTIVE AND OBJECTIVE BOX
Patient is a 84y old  Female who presents with a chief complaint of severe sepsis       INTERVAL HPI/OVERNIGHT EVENTS: pt seen and examined        REVIEW OF SYSTEMS:  CONSTITUTIONAL: No fever, chills  ENMT:  No difficulty hearing, no change in vision  NECK: No pain or stiffness  RESPIRATORY: No cough, SOB  CARDIOVASCULAR: No chest pain, palpitations  GASTROINTESTINAL: No abdominal pain. No nausea, vomiting, or diarrhea  GENITOURINARY: No dysuria  NEUROLOGICAL: No HA  SKIN: No itching, burning, rashes, or lesions   LYMPH NODES: No enlarged glands  ENDOCRINE: No heat or cold intolerance; No hair loss  MUSCULOSKELETAL: No joint pain or swelling; No muscle, back, or extremity pain  PSYCHIATRIC: No depression, anxiety  HEME/LYMPH: No easy bruising, or bleeding gums    MEDICATIONS  (STANDING):  amLODIPine   Tablet 10 milliGRAM(s) Oral daily  enalapril 10 milliGRAM(s) Oral daily  enoxaparin Injectable 30 milliGRAM(s) SubCutaneous every 24 hours  lactated ringers. 1000 milliLiter(s) (65 mL/Hr) IV Continuous <Continuous>  levothyroxine 50 MICROGram(s) Oral daily  pantoprazole  Injectable 40 milliGRAM(s) IV Push daily  piperacillin/tazobactam IVPB.. 3.375 Gram(s) IV Intermittent every 12 hours    MEDICATIONS  (PRN):  OLANZapine Injectable 5 milliGRAM(s) IntraMuscular once PRN agitation  ondansetron Injectable 4 milliGRAM(s) IV Push every 8 hours PRN Nausea and/or Vomiting    Vital Signs Last 24 Hrs  T(C): 36.7 (04-19-25 @ 20:34), Max: 36.8 (04-19-25 @ 05:32)  T(F): 98 (04-19-25 @ 20:34), Max: 98.2 (04-19-25 @ 05:32)  HR: 82 (04-19-25 @ 20:34) (77 - 98)  BP: 106/71 (04-19-25 @ 20:34) (106/71 - 122/97)  BP(mean): --  RR: 18 (04-19-25 @ 20:34) (16 - 18)  SpO2: 95% (04-19-25 @ 20:34) (94% - 95%)      PHYSICAL EXAM:  GENERAL: NAD, Very Hard of hearing , cachectic   EYES: clear conjunctiva; EOMI  ENMT: Moist mucous membranes  NECK: Supple, No JVD, Normal thyroid  CHEST/LUNG: dec breath sounds at bases  HEART: S1, S2,+  ABDOMEN: Soft, Nontender, Nondistended; Bowel sounds present  NEURO: Alert & awake   EXTREMITIES: edema    Consultant(s) Notes Reviewed:  [x ] YES  [ ] NO  Care Discussed with Consultants/Other Providers [ x] YES  [ ] NO  LABS:  04-19    136  |  100  |  10  ----------------------------<  90  3.8   |  29  |  0.88    Ca    8.9      19 Apr 2025 05:40      Creatinine Trend: 0.88 <--, 0.87 <--, 1.02 <--, 1.06 <--, 1.33 <--                        12.6   6.03  )-----------( 293      ( 18 Apr 2025 05:30 )             37.2     Urine Studies:  Urinalysis Basic - ( 19 Apr 2025 05:40 )    Color:  / Appearance:  / SG:  / pH:   Gluc: 90 mg/dL / Ketone:   / Bili:  / Urobili:    Blood:  / Protein:  / Nitrite:    Leuk Esterase:  / RBC:  / WBC    Sq Epi:  / Non Sq Epi:  / Bacteria:                       RADIOLOGY & ADDITIONAL TESTS:    Imaging Personally Reviewed:  [ x] YES  [ ] NO    < from: US Abdomen Upper Quadrant Right (04.16.25 @ 14:50) >    ACC: [id number] EXAM:  US ABDOMEN RT UPR QUADRANT   ORDERED BY: ALFONSO SANDOVAL     PROCEDURE DATE:  04/16/2025          INTERPRETATION:  CLINICAL INFORMATION: Elevated liver function tests    COMPARISON: CT dated 4/16/2025    TECHNIQUE: Sonography of the right upper quadrant.    FINDINGS:  Liver: Within normal limits.  Bile ducts: Common bile duct measures 8.5 mm, prominent.  Gallbladder: Cholelithiasis.  No focal tenderness or evidence of   cholecystitis.  Pancreas: Visualized portions are within normal limits.  Right kidney: 8.5 cm. No hydronephrosis. Calculi measuring up to 2 mm.  Ascites: None.  IVC: Visualized portions are within normal limits.    IMPRESSION:  Cholelithiasis.  No focal tenderness or evidence of cholecystitis.        ---End of Report ---            SEEMA CORTEZ MD; Attending Radiologist  This document has been electronically signed. Apr 16 2025  4:01PM    < end of copied text >  < from: CT Abdomen and Pelvis No Cont (04.16.25 @ 06:37) >    ACC: [id number] EXAM:  CT ABDOMEN AND PELVIS   ORDERED BY: KELINGAYLE LUIS ZAY     PROCEDURE DATE:  04/16/2025          INTERPRETATION:  CLINICAL INFORMATION: Abdominal pain, sepsis    COMPARISON: 2/5/2024    CONTRAST/COMPLICATIONS:  IV Contrast: NONE  Oral Contrast: NONE  .    PROCEDURE:  CT of the Chest, Abdomen and Pelvis was performed.  Sagittal and coronal reformats were performed.    FINDINGS:  CHEST:  LUNGS AND LARGE AIRWAYS: Left lower lobe airspace consolidation.   Scattered areas of focal atelectasis right lower lobe.  PLEURA: No pleural effusion.  VESSELS: Scattered atherosclerotic calcifications in the aorta  HEART: Cardiomegaly. No pericardial effusion.  MEDIASTINUM AND SHAD: No lymphadenopathy.  CHEST WALL AND LOWER NECK: Within normal limits.    ABDOMEN AND PELVIS:  LIVER: Within normal limits.  BILE DUCTS: Normal caliber.  GALLBLADDER: Cholelithiasis.  SPLEEN: Within normal limits.  PANCREAS: Within normal limits.  ADRENALS: Within normal limits.  KIDNEYS/URETERS: Limited evaluation. An approximate 4.5 cm cyst is seen   in the left kidney.    BLADDER: Within normal limits.  REPRODUCTIVE ORGANS:    BOWEL: Focal bowel occlusion noted at the third portion of the duodenum,   where it traverses midline (2:175). Resultant severegastric distention,   extending into severe distention of a large intrathoracic hiatal hernia   extending to the level of the thoracic inlet. Scattered dilated loops of   small bowel noted in the pelvis, without wall thickening.  PERITONEUM/RETROPERITONEUM: Within normal limits.  VESSELS: Atherosclerotic calcifications  LYMPH NODES: No lymphadenopathy.  ABDOMINAL WALL: Within normal limits.  BONES: Severe thoracolumbar spine scoliosis    IMPRESSION:  1.  Focal bowel occlusion at the third portionof the duodenum where it   traverses midline. Resultant severe gastric distention, extending into   severe distention of a large intrathoracic hiatal hernia, to the level of   the thoracic inlet  2.  Left lower lobe pneumonia  3.  Scattered loops of dilated small bowel in the pelvis, possible   partial small bowel obstruction versus ileus    --- End of Report ---            JADEN SANTACRUZ MD; Attending Radiologist  This document has been electronically signed. Apr 16 2025  7:22AM    < end of copied text >

## 2025-04-20 LAB
ANION GAP SERPL CALC-SCNC: 5 MMOL/L — SIGNIFICANT CHANGE UP (ref 5–17)
BUN SERPL-MCNC: 8 MG/DL — SIGNIFICANT CHANGE UP (ref 7–18)
CALCIUM SERPL-MCNC: 8.7 MG/DL — SIGNIFICANT CHANGE UP (ref 8.4–10.5)
CHLORIDE SERPL-SCNC: 102 MMOL/L — SIGNIFICANT CHANGE UP (ref 96–108)
CO2 SERPL-SCNC: 31 MMOL/L — SIGNIFICANT CHANGE UP (ref 22–31)
CREAT SERPL-MCNC: 0.99 MG/DL — SIGNIFICANT CHANGE UP (ref 0.5–1.3)
EGFR: 56 ML/MIN/1.73M2 — LOW
EGFR: 56 ML/MIN/1.73M2 — LOW
GLUCOSE SERPL-MCNC: 89 MG/DL — SIGNIFICANT CHANGE UP (ref 70–99)
HCT VFR BLD CALC: 37.9 % — SIGNIFICANT CHANGE UP (ref 34.5–45)
HGB BLD-MCNC: 12.7 G/DL — SIGNIFICANT CHANGE UP (ref 11.5–15.5)
MCHC RBC-ENTMCNC: 30.4 PG — SIGNIFICANT CHANGE UP (ref 27–34)
MCHC RBC-ENTMCNC: 33.5 G/DL — SIGNIFICANT CHANGE UP (ref 32–36)
MCV RBC AUTO: 90.7 FL — SIGNIFICANT CHANGE UP (ref 80–100)
NRBC BLD AUTO-RTO: 0 /100 WBCS — SIGNIFICANT CHANGE UP (ref 0–0)
PLATELET # BLD AUTO: 295 K/UL — SIGNIFICANT CHANGE UP (ref 150–400)
POTASSIUM SERPL-MCNC: 3.6 MMOL/L — SIGNIFICANT CHANGE UP (ref 3.5–5.3)
POTASSIUM SERPL-SCNC: 3.6 MMOL/L — SIGNIFICANT CHANGE UP (ref 3.5–5.3)
RBC # BLD: 4.18 M/UL — SIGNIFICANT CHANGE UP (ref 3.8–5.2)
RBC # FLD: 13.9 % — SIGNIFICANT CHANGE UP (ref 10.3–14.5)
SODIUM SERPL-SCNC: 138 MMOL/L — SIGNIFICANT CHANGE UP (ref 135–145)
WBC # BLD: 4.41 K/UL — SIGNIFICANT CHANGE UP (ref 3.8–10.5)
WBC # FLD AUTO: 4.41 K/UL — SIGNIFICANT CHANGE UP (ref 3.8–10.5)

## 2025-04-20 RX ADMIN — ENOXAPARIN SODIUM 30 MILLIGRAM(S): 100 INJECTION SUBCUTANEOUS at 18:18

## 2025-04-20 RX ADMIN — Medication 50 MICROGRAM(S): at 05:59

## 2025-04-20 RX ADMIN — AMLODIPINE BESYLATE 10 MILLIGRAM(S): 10 TABLET ORAL at 05:59

## 2025-04-20 RX ADMIN — Medication 25 GRAM(S): at 06:00

## 2025-04-20 RX ADMIN — Medication 25 GRAM(S): at 18:18

## 2025-04-20 RX ADMIN — Medication 10 MILLIGRAM(S): at 06:00

## 2025-04-20 RX ADMIN — Medication 40 MILLIGRAM(S): at 11:39

## 2025-04-20 NOTE — PROGRESS NOTE ADULT - SUBJECTIVE AND OBJECTIVE BOX
Patient is a 84y old  Female who presents with a chief complaint of severe sepsis       INTERVAL HPI/OVERNIGHT EVENTS: pt seen and examined    MEDICATIONS  (STANDING):  amLODIPine   Tablet 10 milliGRAM(s) Oral daily  chlorhexidine 2% Cloths 1 Application(s) Topical <User Schedule>  enalapril 10 milliGRAM(s) Oral daily  enoxaparin Injectable 30 milliGRAM(s) SubCutaneous every 24 hours  levothyroxine 50 MICROGram(s) Oral daily  piperacillin/tazobactam IVPB.. 3.375 Gram(s) IV Intermittent every 12 hours    MEDICATIONS  (PRN):  ondansetron Injectable 4 milliGRAM(s) IV Push every 8 hours PRN Nausea and/or Vomiting    Vital Signs Last 24 Hrs  T(C): 36.4 (04-20-25 @ 13:35), Max: 36.8 (04-20-25 @ 05:07)  T(F): 97.6 (04-20-25 @ 13:35), Max: 98.2 (04-20-25 @ 05:07)  HR: 75 (04-20-25 @ 13:35) (75 - 82)  BP: 123/79 (04-20-25 @ 13:35) (106/71 - 155/83)  BP(mean): 80 (04-20-25 @ 13:35) (80 - 81)  RR: 18 (04-20-25 @ 13:35) (17 - 18)  SpO2: 95% (04-20-25 @ 13:35) (94% - 95%)        REVIEW OF SYSTEMS:  CONSTITUTIONAL: No fever, chills  ENMT:  No difficulty hearing, no change in vision  NECK: No pain or stiffness  RESPIRATORY: No cough, SOB  CARDIOVASCULAR: No chest pain, palpitations  GASTROINTESTINAL: No abdominal pain. No nausea, vomiting, or diarrhea  GENITOURINARY: No dysuria  NEUROLOGICAL: No HA  SKIN: No itching, burning, rashes, or lesions   LYMPH NODES: No enlarged glands  ENDOCRINE: No heat or cold intolerance; No hair loss  MUSCULOSKELETAL: No joint pain or swelling; No muscle, back, or extremity pain  PSYCHIATRIC: No depression, anxiety  HEME/LYMPH: No easy bruising, or bleeding gums    PHYSICAL EXAM:  GENERAL: NAD, Very Hard of hearing , cachectic   EYES: clear conjunctiva; EOMI  ENMT: Moist mucous membranes  NECK: Supple, No JVD, Normal thyroid  CHEST/LUNG: dec breath sounds at bases  HEART: S1, S2,+  ABDOMEN: Soft, Nontender, Nondistended; Bowel sounds present  NEURO: Alert & awake   EXTREMITIES: edema    LABS:  04-20    138  |  102  |  8   ----------------------------<  89  3.6   |  31  |  0.99    Ca    8.7      20 Apr 2025 07:08      Creatinine Trend: 0.99 <--, 0.88 <--, 0.87 <--, 1.02 <--, 1.06 <--, 1.33 <--                        12.7   4.41  )-----------( 295      ( 20 Apr 2025 07:08 )             37.9     Urine Studies:  Urinalysis Basic - ( 20 Apr 2025 07:08 )    Color:  / Appearance:  / SG:  / pH:   Gluc: 89 mg/dL / Ketone:   / Bili:  / Urobili:    Blood:  / Protein:  / Nitrite:    Leuk Esterase:  / RBC:  / WBC    Sq Epi:  / Non Sq Epi:  / Bacteria:                             RADIOLOGY & ADDITIONAL TESTS:    Imaging Personally Reviewed:  [ x] YES  [ ] NO    < from: US Abdomen Upper Quadrant Right (04.16.25 @ 14:50) >    ACC: [id number] EXAM:  US ABDOMEN RT UPR QUADRANT   ORDERED BY: ALFONSO SANDOVAL     PROCEDURE DATE:  04/16/2025          INTERPRETATION:  CLINICAL INFORMATION: Elevated liver function tests    COMPARISON: CT dated 4/16/2025    TECHNIQUE: Sonography of the right upper quadrant.    FINDINGS:  Liver: Within normal limits.  Bile ducts: Common bile duct measures 8.5 mm, prominent.  Gallbladder: Cholelithiasis.  No focal tenderness or evidence of   cholecystitis.  Pancreas: Visualized portions are within normal limits.  Right kidney: 8.5 cm. No hydronephrosis. Calculi measuring up to 2 mm.  Ascites: None.  IVC: Visualized portions are within normal limits.    IMPRESSION:  Cholelithiasis.  No focal tenderness or evidence of cholecystitis.        ---End of Report ---            SEEMA CORTEZ MD; Attending Radiologist  This document has been electronically signed. Apr 16 2025  4:01PM    < end of copied text >  < from: CT Abdomen and Pelvis No Cont (04.16.25 @ 06:37) >    ACC: [id number] EXAM:  CT ABDOMEN AND PELVIS   ORDERED BY: DALE COLLAZO     PROCEDURE DATE:  04/16/2025          INTERPRETATION:  CLINICAL INFORMATION: Abdominal pain, sepsis    COMPARISON: 2/5/2024    CONTRAST/COMPLICATIONS:  IV Contrast: NONE  Oral Contrast: NONE  .    PROCEDURE:  CT of the Chest, Abdomen and Pelvis was performed.  Sagittal and coronal reformats were performed.    FINDINGS:  CHEST:  LUNGS AND LARGE AIRWAYS: Left lower lobe airspace consolidation.   Scattered areas of focal atelectasis right lower lobe.  PLEURA: No pleural effusion.  VESSELS: Scattered atherosclerotic calcifications in the aorta  HEART: Cardiomegaly. No pericardial effusion.  MEDIASTINUM AND SHAD: No lymphadenopathy.  CHEST WALL AND LOWER NECK: Within normal limits.    ABDOMEN AND PELVIS:  LIVER: Within normal limits.  BILE DUCTS: Normal caliber.  GALLBLADDER: Cholelithiasis.  SPLEEN: Within normal limits.  PANCREAS: Within normal limits.  ADRENALS: Within normal limits.  KIDNEYS/URETERS: Limited evaluation. An approximate 4.5 cm cyst is seen   in the left kidney.    BLADDER: Within normal limits.  REPRODUCTIVE ORGANS:    BOWEL: Focal bowel occlusion noted at the third portion of the duodenum,   where it traverses midline (2:175). Resultant severegastric distention,   extending into severe distention of a large intrathoracic hiatal hernia   extending to the level of the thoracic inlet. Scattered dilated loops of   small bowel noted in the pelvis, without wall thickening.  PERITONEUM/RETROPERITONEUM: Within normal limits.  VESSELS: Atherosclerotic calcifications  LYMPH NODES: No lymphadenopathy.  ABDOMINAL WALL: Within normal limits.  BONES: Severe thoracolumbar spine scoliosis    IMPRESSION:  1.  Focal bowel occlusion at the third portionof the duodenum where it   traverses midline. Resultant severe gastric distention, extending into   severe distention of a large intrathoracic hiatal hernia, to the level of   the thoracic inlet  2.  Left lower lobe pneumonia  3.  Scattered loops of dilated small bowel in the pelvis, possible   partial small bowel obstruction versus ileus    --- End of Report ---            JADEN SANTACRUZ MD; Attending Radiologist  This document has been electronically signed. Apr 16 2025  7:22AM    < end of copied text >

## 2025-04-20 NOTE — PROGRESS NOTE ADULT - ASSESSMENT
82 yo F from [facility], with cervical cancer, brain mass, HTN, HLD, hypothyroid, admitted with encephalopathy, acute hypoxic respiratory failure likely 2/2 flash pulm edema from HTN emergency, severe sepsis with lactic acidosis 2/2 LLL PNA. and partial SBO, GI and Id following.

## 2025-04-21 RX ORDER — SENNA 187 MG
2 TABLET ORAL AT BEDTIME
Refills: 0 | Status: DISCONTINUED | OUTPATIENT
Start: 2025-04-21 | End: 2025-04-22

## 2025-04-21 RX ORDER — LACTULOSE 10 G/15ML
10 SOLUTION ORAL DAILY
Refills: 0 | Status: DISCONTINUED | OUTPATIENT
Start: 2025-04-21 | End: 2025-04-22

## 2025-04-21 RX ADMIN — Medication 2 TABLET(S): at 21:55

## 2025-04-21 RX ADMIN — Medication 1 APPLICATION(S): at 06:03

## 2025-04-21 RX ADMIN — Medication 25 GRAM(S): at 17:40

## 2025-04-21 RX ADMIN — Medication 10 MILLIGRAM(S): at 05:34

## 2025-04-21 RX ADMIN — Medication 50 MICROGRAM(S): at 05:35

## 2025-04-21 RX ADMIN — ENOXAPARIN SODIUM 30 MILLIGRAM(S): 100 INJECTION SUBCUTANEOUS at 17:40

## 2025-04-21 RX ADMIN — Medication 25 GRAM(S): at 05:35

## 2025-04-21 RX ADMIN — LACTULOSE 10 GRAM(S): 10 SOLUTION ORAL at 11:28

## 2025-04-21 RX ADMIN — AMLODIPINE BESYLATE 10 MILLIGRAM(S): 10 TABLET ORAL at 05:35

## 2025-04-21 NOTE — DISCHARGE NOTE PROVIDER - HOSPITAL COURSE
84 yo F from [facility], with cervical cancer, brain mass, HTN, HLD, hypothyroid, admitted with encephalopathy, acute hypoxic respiratory failure likely 2/2 flash pulm edema from HTN emergency, severe sepsis with lactic acidosis 2/2 LLL PNA. and partial SBO, GI and Id following.      ID Dr. Cardoza rec to continue until last day of abx - Zosyn on 4/22.     Pt was started on a bowel regimen, able to pass BM on 4/21. Diet advanced to pureed and tolerating.     Pt is now medically optimized for discharge.

## 2025-04-21 NOTE — PROGRESS NOTE ADULT - PROBLEM SELECTOR PROBLEM 7
Encounter for deep vein thrombosis (DVT) prophylaxis
Hypothyroid
Encounter for deep vein thrombosis (DVT) prophylaxis
Encounter for deep vein thrombosis (DVT) prophylaxis
Hypothyroid
Encounter for deep vein thrombosis (DVT) prophylaxis

## 2025-04-21 NOTE — PROGRESS NOTE ADULT - PROBLEM SELECTOR PLAN 5
cont levothyroxine
BP 190s/110s  - s/p fluid resuscitated for sepsis in ED  - s/p nitro drip in ED  - cont  amlodipine 10 mg and enalapril 10 mg with parameters   - BP improving
BP 190s/110s  - s/p fluid resuscitated for sepsis in ED  - s/p nitro drip in ED  - cont  amlodipine 10 mg and enalapril 10 mg with parameters   - BP improving
cont levothyroxine 50 mcg qd
cont levothyroxine 50 mcg qd
cont levothyroxine

## 2025-04-21 NOTE — PROGRESS NOTE ADULT - PROBLEM SELECTOR PLAN 3
BP 190s/110s  - s/p fluid resuscitated for sepsis in ED  - s/p nitro drip in ED  - cont  amlodipine 10 mg and enalapril 10 mg with parameters   - BP improving
BP 190s/110s  - s/p fluid resuscitated for sepsis in ED  - s/p nitro drip in ED  - cont  amlodipine 10 mg and enalapril 10 mg with parameters   - BP controlled
BP 190s/110s  - s/p fluid resuscitated for sepsis in ED  - s/p nitro drip in ED  - cont  amlodipine 10 mg and enalapril 10 mg with parameters   - BP improving
same plan as above
BP 190s/110s  - s/p fluid resuscitated for sepsis in ED  - s/p nitro drip in ED  - cont  amlodipine 10 mg and enalapril 10 mg with parameters   - BP controlled
same plan as above

## 2025-04-21 NOTE — PROGRESS NOTE ADULT - ASSESSMENT
84 yo F from [facility], with cervical cancer, brain mass, HTN, HLD, hypothyroid, admitted with encephalopathy, acute hypoxic respiratory failure likely 2/2 flash pulm edema from HTN emergency, severe sepsis with lactic acidosis 2/2 LLL PNA. and partial SBO, GI and Id following.      Love removed on 4/20 and passed trial of void.   Pt started on bowel regimen and able to have a BM today.   Diet advanced from clears to pureed, now tolerating. (pt has no dentures).   Physical therapy eval rec home PT.   Pt is medically optimized for discharge back to [facility].

## 2025-04-21 NOTE — PROGRESS NOTE ADULT - NUTRITIONAL ASSESSMENT
This patient has been assessed with a concern for Malnutrition and has been determined to have a diagnosis/diagnoses of Severe protein-calorie malnutrition.    This patient is being managed with:   Diet Full Liquid-  Supplement Feeding Modality:  Oral  Ensure Enlive Cans or Servings Per Day:  1       Frequency:  Three Times a day  Entered: Apr 20 2025  9:49AM  
This patient has been assessed with a concern for Malnutrition and has been determined to have a diagnosis/diagnoses of Severe protein-calorie malnutrition.    This patient is being managed with:   Diet Clear Liquid-  Entered: Apr 17 2025 10:55AM    The following pending diet order is being considered for treatment of Severe protein-calorie malnutrition:  Diet Clear Liquid-  Supplement Feeding Modality:  Oral  Ensure Clear Cans or Servings Per Day:  1       Frequency:  Three Times a day  Entered: Apr 17 2025  2:34PM  
This patient has been assessed with a concern for Malnutrition and has been determined to have a diagnosis/diagnoses of Severe protein-calorie malnutrition.    This patient is being managed with:   Diet Clear Liquid-  Supplement Feeding Modality:  Oral  Ensure Clear Cans or Servings Per Day:  1       Frequency:  Three Times a day  Entered: Apr 17 2025  2:34PM  
This patient has been assessed with a concern for Malnutrition and has been determined to have a diagnosis/diagnoses of Severe protein-calorie malnutrition.    This patient is being managed with:   Diet Clear Liquid-  Entered: Apr 17 2025 10:55AM    The following pending diet order is being considered for treatment of Severe protein-calorie malnutrition:  Diet Clear Liquid-  Supplement Feeding Modality:  Oral  Ensure Clear Cans or Servings Per Day:  1       Frequency:  Three Times a day  Entered: Apr 17 2025  2:34PM  
This patient has been assessed with a concern for Malnutrition and has been determined to have a diagnosis/diagnoses of Severe protein-calorie malnutrition.    This patient is being managed with:   Diet Pureed-  Supplement Feeding Modality:  Oral  Ensure Enlive Cans or Servings Per Day:  1       Frequency:  Three Times a day  Entered: Apr 21 2025  9:07AM    The following pending diet order is being considered for treatment of Severe protein-calorie malnutrition:  Diet Pureed-  Free Water Flush Instructions:  Oral Supplement: Please give Mighty Shake TID and Magic Cup QD  Entered: Apr 21 2025  4:41PM  
This patient has been assessed with a concern for Malnutrition and has been determined to have a diagnosis/diagnoses of Severe protein-calorie malnutrition.    This patient is being managed with:   Diet Clear Liquid-  Entered: Apr 17 2025 10:55AM    The following pending diet order is being considered for treatment of Severe protein-calorie malnutrition:  Diet Clear Liquid-  Supplement Feeding Modality:  Oral  Ensure Clear Cans or Servings Per Day:  1       Frequency:  Three Times a day  Entered: Apr 17 2025  2:34PM    This patient has been assessed with a concern for Malnutrition and has been determined to have a diagnosis/diagnoses of Severe protein-calorie malnutrition.    This patient is being managed with:   Diet Clear Liquid-  Entered: Apr 17 2025 10:55AM    The following pending diet order is being considered for treatment of Severe protein-calorie malnutrition:  Diet Clear Liquid-  Supplement Feeding Modality:  Oral  Ensure Clear Cans or Servings Per Day:  1       Frequency:  Three Times a day  Entered: Apr 17 2025  2:34PM  
This patient has been assessed with a concern for Malnutrition and has been determined to have a diagnosis/diagnoses of Severe protein-calorie malnutrition.    This patient is being managed with:   Diet Clear Liquid-  Entered: Apr 17 2025 10:55AM    The following pending diet order is being considered for treatment of Severe protein-calorie malnutrition:  Diet Clear Liquid-  Supplement Feeding Modality:  Oral  Ensure Clear Cans or Servings Per Day:  1       Frequency:  Three Times a day  Entered: Apr 17 2025  2:34PM  
This patient has been assessed with a concern for Malnutrition and has been determined to have a diagnosis/diagnoses of Severe protein-calorie malnutrition.    This patient is being managed with:   Diet Pureed-  Supplement Feeding Modality:  Oral  Ensure Enlive Cans or Servings Per Day:  1       Frequency:  Three Times a day  Entered: Apr 21 2025  9:07AM    The following pending diet order is being considered for treatment of Severe protein-calorie malnutrition:  Diet Pureed-  Free Water Flush Instructions:  Oral Supplement: Please give Mighty Shake TID and Magic Cup QD  Entered: Apr 21 2025  4:41PM

## 2025-04-21 NOTE — PROGRESS NOTE ADULT - ASSESSMENT
84 yo F from [facility], with cervical cancer, brain mass, HTN, HLD, hypothyroid, admitted with encephalopathy, acute hypoxic respiratory failure likely 2/2 flash pulm edema from HTN emergency, severe sepsis with lactic acidosis 2/2 LLL PNA. and partial SBO, GI and Id following.      Love removed on 4/20 and passed trial of void.   Pt started on bowel regimen and able to have a BM today.   Diet advanced from clears to pureed, now tolerating. (pt has no dentures).   Physical therapy eval rec home PT.   Pt is medically optimized for discharge back to [facility].

## 2025-04-21 NOTE — PROGRESS NOTE ADULT - PROBLEM SELECTOR PLAN 7
Lovenox  ppi      # advance diet as tolerated depending on pt stool count  - f/u Id for abx plan
cont levothyroxine
dvt - lovenox
cont levothyroxine
dvt - lovenox

## 2025-04-21 NOTE — PROGRESS NOTE ADULT - SUBJECTIVE AND OBJECTIVE BOX
84y Female lying in bed with no overnight events, she is confused and not answering any questions appropriately, presently on room air and saturating at 93%.  She is afebrile and has a normal WBC count.     MEDS:  piperacillin/tazobactam IVPB.. 3.375 Gram(s) IV Intermittent every 12 hours    ALLERGIES: Allergies    No Known Allergies    Intolerances    REVIEW OF SYSTEMS:  [ X ] Not able to elicit    VITALS:  Vital Signs Last 24 Hrs  T(C): 36.9 (21 Apr 2025 05:27), Max: 36.9 (21 Apr 2025 05:27)  T(F): 98.4 (21 Apr 2025 05:27), Max: 98.4 (21 Apr 2025 05:27)  HR: 94 (21 Apr 2025 05:27) (75 - 94)  BP: 123/75 (21 Apr 2025 05:27) (112/71 - 123/79)  BP(mean): 80 (20 Apr 2025 13:35) (80 - 80)  RR: 18 (21 Apr 2025 05:27) (18 - 18)  SpO2: 93% (21 Apr 2025 05:27) (93% - 98%)    Parameters below as of 21 Apr 2025 05:27  Patient On (Oxygen Delivery Method): room air    PHYSICAL EXAM:  HEENT: normocephalic, conjunctivae and sclerae clear; moist mucous membranes, [place]  Neck: supple no LN's   Respiratory: decreased breathe sounds , on room air   Cardiovascular: S1 S2 reg no murmurs  Gastrointestinal: +BS with soft, nondistended abdomen; nontender  : incontinent    Extremities: +1 pedal edema   Skin: no rashes  Ortho: no erythema or joint swelling  Neuro: AAO x 1 (self)    LABS/DIAGNOSTIC TESTS:                        12.7   4.41  )-----------( 295      ( 20 Apr 2025 07:08 )             37.9     WBC Count: 4.41 K/uL (04-20 @ 07:08)  WBC Count: 6.03 K/uL (04-18 @ 05:30)  WBC Count: 10.81 K/uL (04-17 @ 06:50)    04-20    138  |  102  |  8   ----------------------------<  89  3.6   |  31  |  0.99    Ca    8.7      20 Apr 2025 07:08    CULTURES:   Blood Blood-Peripheral  04-15 @ 23:18   No growth at 5 days  --  --    RADIOLOGY:  no new studies

## 2025-04-21 NOTE — PROGRESS NOTE ADULT - ASSESSMENT
Sepsis - resolved  Pneumonia ( aspiration )  Leukocytosis - normalized   Hypothermia - resolved    Plan -   ·	Cont Zosyn 3.375 gms iv q12hrs (D6)   Sepsis - resolved  Pneumonia ( aspiration )  Leukocytosis - normalized   Hypothermia - resolved    Plan -   ·	Cont Zosyn 3.375 gms iv q12hrs (D6), can DC antibiotics tomorrow afternoon.  ·	DC planing.

## 2025-04-21 NOTE — PROGRESS NOTE ADULT - PROBLEM SELECTOR PROBLEM 1
Severe sepsis with acute organ dysfunction

## 2025-04-21 NOTE — PROGRESS NOTE ADULT - PROBLEM SELECTOR PLAN 6
as per family, pt does not wish to pursue further work up or treatment of cancer
CT revealed  possible partial small bowel obstruction versus ileus  Pt pulled NG tube   - surgery following  - start cl liq diet - advance slowly   - GI following
CT revealed  possible partial small bowel obstruction versus ileus  Pt pulled NG tube   - surgery following  - start cl liq diet - advance slowly   - GI following

## 2025-04-21 NOTE — PROGRESS NOTE ADULT - PROBLEM SELECTOR PROBLEM 4
SBO (small bowel obstruction)
Acute respiratory failure with hypoxia
SBO (small bowel obstruction)
SBO (small bowel obstruction)
Acute respiratory failure with hypoxia

## 2025-04-21 NOTE — CHART NOTE - NSCHARTNOTEFT_GEN_A_CORE
Assessment: Nutrition f/u for malnutrition    Visited pt in room, pt is sleeping at time of visit however as per flowsheet pt is with good intake consuming % of meals. However pt is with poor acceptance of current oral supplement order as per observation, 3 unopened bottles of supplement observed at bedside. No chewing/ swallowing issues or GI distress reported per nursing. NKFA.     Factors impacting intake: [ ] none [ ] nausea  [ ] vomiting [ ] diarrhea [ ] constipation  [ ]chewing problems [ ] swallowing issues  [x ] other: advanced age, acute on chronic illness, partial SBO    Diet Presciption: Diet, Pureed:   Supplement Feeding Modality:  Oral  Ensure Enlive Cans or Servings Per Day:  1       Frequency:  Three Times a day (04-21-25 @ 09:07)    Daily   % Weight Change- n/a    Pertinent Medications: MEDICATIONS  (STANDING):  amLODIPine   Tablet 10 milliGRAM(s) Oral daily  enalapril 10 milliGRAM(s) Oral daily  enoxaparin Injectable 30 milliGRAM(s) SubCutaneous every 24 hours  lactulose Syrup 10 Gram(s) Oral daily  levothyroxine 50 MICROGram(s) Oral daily  piperacillin/tazobactam IVPB.. 3.375 Gram(s) IV Intermittent every 12 hours  senna 2 Tablet(s) Oral at bedtime    MEDICATIONS  (PRN):  ondansetron Injectable 4 milliGRAM(s) IV Push every 8 hours PRN Nausea and/or Vomiting    Pertinent Labs: 04-20 Na138 mmol/L Glu 89 mg/dL K+ 3.6 mmol/L Cr  0.99 mg/dL BUN 8 mg/dL 04-17 Phos 3.2 mg/dL 04-16 Alb 3.6 g/dL     CAPILLARY BLOOD GLUCOSE      Skin: intact pressure-wise    Estimated Needs:   [x ] no change since previous assessment  [ ] recalculated:     Previous Nutrition Diagnosis:   [ ] Inadequate Energy Intake [ ]Inadequate Oral Intake [ ] Excessive Energy Intake   [ ] Underweight [ ] Increased Nutrient Needs [ ] Overweight/Obesity  [ ] Swallowing Difficult   [ ] Altered GI Function [ ] Unintended Weight Loss [ ] Food & Nutrition Related Knowledge Deficit [x ] Malnutrition   [ ] Not Ready for Diet/Life Style Changes     Nutrition Diagnosis is [x ] ongoing  [ ] Improving   [ ] resolved [ ] not applicable     New Nutrition Diagnosis: [x ] not applicable       Interventions:   Recommend to change current supplement order to Mighty Shake TID (220 kcal, 6 g pro each) and Magic Cup QD (300 kcal, 9 g pro each) as medically feasible.     Monitoring and Evaluation:   [x ] PO intake [ x ] Tolerance to diet prescription [ x ] weights [ x ] labs[ x ] follow up per protocol  [ ] other:

## 2025-04-21 NOTE — PROGRESS NOTE ADULT - PROBLEM SELECTOR PROBLEM 6
Cervical cancer
SBO (small bowel obstruction)
Cervical cancer
SBO (small bowel obstruction)
Cervical cancer

## 2025-04-21 NOTE — PROGRESS NOTE ADULT - PROBLEM SELECTOR PLAN 1
2/2 PNA  - CT- LLL Pna  - cont zosyn  - lilly resolved   - lactate normalized  - blood Cx negative   - cont maintenance IVF   - ID Dr Cardoza
2/2 PNA  - CT- LLL Pna  - cont zosyn until 4/22  - lilly resolved   - s/p IV fluids  - lactate normalized  - blood Cx negative   - ID Dr Cardoza
2/2 PNA  - CT- LLL Pna  - cont zosyn until 4/22  - lilly resolved   - s/p IV fluids  - lactate normalized  - blood Cx negative   - ID Dr Cardoza
2/2 PNA  - CT- LLL Pna  - cont zosyn  - lilly resolved   - lactate normalized  - blood Cx negative   - cont maintenance IVF   - ID Dr Cardoza
2/2 PNA  - CT- LLL Pna  - cont zosyn  - lilly resolved   - lactate normalized  - blood Cx negative   - cont maintenance IVF   - ID Dr Cardoza
2/2 PNA  - CT- LLL Pna  - ·Cont Zosyn 3.375 gms iv q12hrs
2/2 PNA  - CT- LLL Pna  - cont zosyn  - lilly resolved   - lactate normalized  - f/u blood Cx  - cont maintenance IVF   - ID Dr Cardoza
2/2 PNA  - CT- LLL Pna  - cont zosyn  - lilly resolved   - lactate normalized  - f/u blood Cx  - cont maintenance IVF   - ID Dr Cardoza

## 2025-04-21 NOTE — PROGRESS NOTE ADULT - PROBLEM SELECTOR PLAN 4
CT revealed  possible partial small bowel obstruction versus ileus  Pt pulled NG tube   - surgery following  - start cl liq diet - advance slowly   - GI following
CT revealed  possible partial small bowel obstruction versus ileus  Pt pulled NG tube   - surgery following  - start cl liq diet - advance slowly   - GI following
CT revealed  possible partial small bowel obstruction versus ileus  - Pt pulled NG tube  - diet advanced to pureed and tolerating  - start bowel regimen senna and lactulose daily  - avoid miralax   - surgery following  - GI following
likely from flash pulm edema iso HTN emergency BP 190s/110s  s/p nitro gtt, lasix 40 mg IVP x2,   saturating well on Ra now  --------resolved----------------
CT revealed  possible partial small bowel obstruction versus ileus  - Pt pulled NG tube  - diet advanced to pureed and tolerating  - start bowel regimen senna and lactulose daily  - avoid miralax   - surgery following  - GI following
CT revealed  possible partial small bowel obstruction versus ileus  -Surgery consult noted, NGT placed and inadvertently removed by patient  -Abdomen soft and non-tender, Advance diet to clear liquids
CT revealed  possible partial small bowel obstruction versus ileus  Pt pulled NG tube   - surgery following  - start cl liq diet - advance slowly   - GI following
likely from flash pulm edema iso HTN emergency BP 190s/110s  s/p nitro gtt, lasix 40 mg IVP x2,   saturating well on Ra now  --------resolved----------------

## 2025-04-21 NOTE — PROGRESS NOTE ADULT - SUBJECTIVE AND OBJECTIVE BOX
Patient is a 84y old  Female who presents with a chief complaint of severe sepsis (21 Apr 2025     OVERNIGHT EVENTS:   Pt is awake, tolerating PO, comfortable appearing, requires assistance out of bed to chair.     REVIEW OF SYSTEMS:  CONSTITUTIONAL: No fever, chills  ENMT:  No difficulty hearing, no change in vision  NECK: No pain or stiffness  RESPIRATORY: No cough, SOB  CARDIOVASCULAR: No chest pain, palpitations  GASTROINTESTINAL: No abdominal pain. No nausea, vomiting, or diarrhea  GENITOURINARY: No dysuria  NEUROLOGICAL: No HA  SKIN: No itching, burning, rashes, or lesions   LYMPH NODES: No enlarged glands  ENDOCRINE: No heat or cold intolerance; No hair loss  MUSCULOSKELETAL: No joint pain or swelling; No muscle, back, or extremity pain  PSYCHIATRIC: No depression, anxiety  HEME/LYMPH: No easy bruising, or bleeding gums    T(C): 36.7 (04-21-25 @ 14:29), Max: 36.9 (04-21-25 @ 05:27)  HR: 96 (04-21-25 @ 14:29) (80 - 96)  BP: 115/78 (04-21-25 @ 14:29) (112/71 - 123/75)  RR: 18 (04-21-25 @ 14:29) (18 - 18)  SpO2: 92% (04-21-25 @ 14:29) (92% - 98%)  Wt(kg): --Vital Signs Last 24 Hrs  T(C): 36.7 (21 Apr 2025 14:29), Max: 36.9 (21 Apr 2025 05:27)  T(F): 98 (21 Apr 2025 14:29), Max: 98.4 (21 Apr 2025 05:27)  HR: 96 (21 Apr 2025 14:29) (80 - 96)  BP: 115/78 (21 Apr 2025 14:29) (112/71 - 123/75)  BP(mean): 90 (21 Apr 2025 14:29) (90 - 90)  RR: 18 (21 Apr 2025 14:29) (18 - 18)  SpO2: 92% (21 Apr 2025 14:29) (92% - 98%)    Parameters below as of 21 Apr 2025 14:29  Patient On (Oxygen Delivery Method): room air        MEDICATIONS  (STANDING):  amLODIPine   Tablet 10 milliGRAM(s) Oral daily  enalapril 10 milliGRAM(s) Oral daily  enoxaparin Injectable 30 milliGRAM(s) SubCutaneous every 24 hours  lactulose Syrup 10 Gram(s) Oral daily  levothyroxine 50 MICROGram(s) Oral daily  piperacillin/tazobactam IVPB.. 3.375 Gram(s) IV Intermittent every 12 hours  senna 2 Tablet(s) Oral at bedtime    MEDICATIONS  (PRN):  ondansetron Injectable 4 milliGRAM(s) IV Push every 8 hours PRN Nausea and/or Vomiting      PHYSICAL EXAM:  GENERAL: NAD  EYES: clear conjunctiva  ENMT: Moist mucous membranes  NECK: Supple, No JVD, Normal thyroid  CHEST/LUNG: dec breath sounds at bases  HEART: S1, S2, +  ABDOMEN: Soft, Nontender, Nondistended; Bowel sounds present  NEURO: Alert awake   EXTREMITIES: No LE edema, no calf tenderness      Consultant(s) Notes Reviewed:  [x ] YES  [ ] NO  Care Discussed with Consultants/Other Providers [ x] YES  [ ] NO    LABS:  04-20    138  |  102  |  8   ----------------------------<  89  3.6   |  31  |  0.99    Ca    8.7      20 Apr 2025 07:08      Creatinine Trend: 0.99 <--, 0.88 <--, 0.87 <--, 1.02 <--, 1.06 <--, 1.33 <--                        12.7   4.41  )-----------( 295      ( 20 Apr 2025 07:08 )             37.9     Urine Studies:  Urinalysis Basic - ( 20 Apr 2025 07:08 )    Color:  / Appearance:  / SG:  / pH:   Gluc: 89 mg/dL / Ketone:   / Bili:  / Urobili:    Blood:  / Protein:  / Nitrite:    Leuk Esterase:  / RBC:  / WBC    Sq Epi:  / Non Sq Epi:  / Bacteria:                     RADIOLOGY & ADDITIONAL TESTS:  < from: US Abdomen Upper Quadrant Right (04.16.25 @ 14:50) >    ACC: [id number] EXAM:  US ABDOMEN RT UPR QUADRANT   ORDERED BY: ALFONSO SANDOVAL     PROCEDURE DATE:  04/16/2025          INTERPRETATION:  CLINICAL INFORMATION: Elevated liver function tests    COMPARISON: CT dated 4/16/2025    TECHNIQUE: Sonography of the right upper quadrant.    FINDINGS:  Liver: Within normal limits.  Bile ducts: Common bile duct measures 8.5 mm, prominent.  Gallbladder: Cholelithiasis.  No focal tenderness or evidence of   cholecystitis.  Pancreas: Visualized portions are within normal limits.  Right kidney: 8.5 cm. No hydronephrosis. Calculi measuring up to 2 mm.  Ascites: None.  IVC: Visualized portions are within normal limits.    IMPRESSION:  Cholelithiasis.  No focal tenderness or evidence of cholecystitis.        ---End of Report ---            SEEMA CORTEZ MD; Attending Radiologist  This document has been electronically signed. Apr 16 2025  4:01PM    < end of copied text >  < from: CT Abdomen and Pelvis No Cont (04.16.25 @ 06:37) >    ACC: [id number] EXAM:  CT ABDOMEN AND PELVIS   ORDERED BY: DALE COLLAZO     PROCEDURE DATE:  04/16/2025          INTERPRETATION:  CLINICAL INFORMATION: Abdominal pain, sepsis    COMPARISON: 2/5/2024    CONTRAST/COMPLICATIONS:  IV Contrast: NONE  Oral Contrast: NONE  .    PROCEDURE:  CT of the Chest, Abdomen and Pelvis was performed.  Sagittal and coronal reformats were performed.    FINDINGS:  CHEST:  LUNGS AND LARGE AIRWAYS: Left lower lobe airspace consolidation.   Scattered areas of focal atelectasis right lower lobe.  PLEURA: No pleural effusion.  VESSELS: Scattered atherosclerotic calcifications in the aorta  HEART: Cardiomegaly. No pericardial effusion.  MEDIASTINUM AND SHAD: No lymphadenopathy.  CHEST WALL AND LOWER NECK: Within normal limits.    ABDOMEN AND PELVIS:  LIVER: Within normal limits.  BILE DUCTS: Normal caliber.  GALLBLADDER: Cholelithiasis.  SPLEEN: Within normal limits.  PANCREAS: Within normal limits.  ADRENALS: Within normal limits.  KIDNEYS/URETERS: Limited evaluation. An approximate 4.5 cm cyst is seen   in the left kidney.    BLADDER: Within normal limits.  REPRODUCTIVE ORGANS:    BOWEL: Focal bowel occlusion noted at the third portion of the duodenum,   where it traverses midline (2:175). Resultant severegastric distention,   extending into severe distention of a large intrathoracic hiatal hernia   extending to the level of the thoracic inlet. Scattered dilated loops of   small bowel noted in the pelvis, without wall thickening.  PERITONEUM/RETROPERITONEUM: Within normal limits.  VESSELS: Atherosclerotic calcifications  LYMPH NODES: No lymphadenopathy.  ABDOMINAL WALL: Within normal limits.  BONES: Severe thoracolumbar spine scoliosis    IMPRESSION:  1.  Focal bowel occlusion at the third portionof the duodenum where it   traverses midline. Resultant severe gastric distention, extending into   severe distention of a large intrathoracic hiatal hernia, to the level of   the thoracic inlet  2.  Left lower lobe pneumonia  3.  Scattered loops of dilated small bowel in the pelvis, possible   partial small bowel obstruction versus ileus    --- End of Report ---            JADEN SANTACRUZ MD; Attending Radiologist  This document has been electronically signed. Apr 16 2025  7:22AM    < end of copied text >  < from: CT Chest No Cont (04.16.25 @ 06:37) >    ACC: [id number] EXAM:  CT CHEST   ORDERED BY: DALE COLLAZO     PROCEDURE DATE:  04/16/2025          INTERPRETATION:  CLINICAL INFORMATION: Abdominal pain    COMPARISON: 2/5/2024    CONTRAST/COMPLICATIONS:  IV Contrast: NONE  Oral Contrast: NONE    PROCEDURE:  CT of the Chest, Abdomen and Pelvis was performed.  Sagittal and coronal reformats were performed.    LIMITATIONS: Lack of intravenous contrast material limits diagnostic   sensitivity in evaluating solid organs /vasculature.    FINDINGS:  CHEST:  LUNGS AND LARGE AIRWAYS: Respiratory motion unsharpness. Patent central   airways partially compressed by diffuse marked air distended esophagus   and intrathoracic stomach. Diffuse LEFT perihilar, lingula and LLL   airspace opacity. RLL andmedial RML passive atelectasis. Mild   interlobular septal thickening  PLEURA: Trace dependent RIGHT pleural effusion.  VESSELS: No aortic dilatation. Aortic /coronary artery calcification.  HEART: Cardiomegaly. Heart compressed anteriorly by marked air distended   intrathoracic stomach.  MEDIASTINUM AND SHAD: No lymphadenopathy. Marked air dilated lower   cervical/thoracic esophagus (5.7 cm max diameter) and subsequent marked   air dilated partial intrathoracic stomach. See BOWEL following  CHEST WALL AND LOWER NECK: Within normal limits.    ABDOMEN AND PELVIS:  LIVER: Within normal limits.  BILE DUCTS: Normal caliber.  GALLBLADDER: Cholelithiasis.  SPLEEN: Within normal limits.  PANCREAS: Limited pancreas evaluation. Pancreatic atrophy and distal   body/tail duct ectasia,  ADRENALS: Nonspecific LEFT suprarenal soft tissue density (2/141), not   significantly changed from 6/28/2023  KIDNEYS/URETERS: LEFT renal cysts measure up to 4.6 cm. Millimeter-sized   RIGHT intrarenal calculus. RIGHT extrarenal pelvic fluid fullness. No   obstructive uropathy.    BLADDER: Love catheter within partially collapsed urinary bladder  REPRODUCTIVE ORGANS: Hysterectomy.    BOWEL: Marked/severe air distended stomach (w/ small fluid level, 9 cm   max diameter) to dilated D2 duodenum with abrupt transition to collapsed   duodenum as it crosses spine. Subsequent dilated proximal small bowel (4   cm max diameter) with distal collapse pelvic small bowel. Exact point of   transition not determined but presumed in central pelvis.   PERITONEUM/RETROPERITONEUM: No ascites or pneumoperitoneum.  VESSELS: Atherosclerotic changes.  LYMPH NODES: No lymphadenopathy.  ABDOMINAL WALL: Within normal limits.  BONES: Osteopenia. Thoracolumbar curvature and degenerative changes..   Grade 1-2 L5-S1 anterolisthesis. Healed mid LEFT rib fracture. Chronic   LEFT pubic ramus fractures/fracture nonunion, unchanged    IMPRESSION:  1.  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  2.  Subsequent dilated small bowel c/w  small bowel obstruction.   Transition point notdetermined but suspected within pelvis.  3.  Diffuse LEFT perihilar, lingula and LLL airspace opacity /   consolidated      This report was discussed with ALIYA Collazo MD at 4/16/2025 6:40 AM.    --- End of Report ---            NEDA BONNER MD; Attending Radiologist  This document has been electronically signed. Apr 16 2025  7:13AM    < end of copied text >      Imaging Personally Reviewed:  [ ] YES  [ ] NO

## 2025-04-21 NOTE — PROGRESS NOTE ADULT - PROBLEM SELECTOR PROBLEM 5
Hypertensive emergency
Hypertensive emergency
Hypothyroid

## 2025-04-21 NOTE — PROGRESS NOTE ADULT - PROBLEM SELECTOR PROBLEM 3
Hypertensive emergency
Hypertensive emergency
Pneumonia
Hypertensive emergency
Hypertensive emergency
Pneumonia
Hypertensive emergency
Hypertensive emergency

## 2025-04-21 NOTE — DISCHARGE NOTE PROVIDER - NSDCCPCAREPLAN_GEN_ALL_CORE_FT
PRINCIPAL DISCHARGE DIAGNOSIS  Diagnosis: SBO (small bowel obstruction)  Assessment and Plan of Treatment: you initially came into the hospital due to worsening abdominal pain.   you were found to have a partial bowel obstruction.   A bowel obstruction is a partial or complete blockage of your intestine. Your small or large intestine may be affected. The blockage prevents food and waste from passing through normally.  you were seen by a Surgeon and did not need any surgical intervention at this time.   continue to drink plenty of water daily, eat plenty of fiber as well this will soften your stool and ease passing  continue to get up out of bed to chair or walk daily.   continue taking senna and lactulose daily.   this will help you prevention constipation  follow up with your Primary Care Doctor in 1 week.         SECONDARY DISCHARGE DIAGNOSES  Diagnosis: Severe sepsis with lactic acidosis  Assessment and Plan of Treatment: this was due to Pneumonia.   Sepsis happens when an infection spreads and causes your body to react strongly to germs. Your body's defense system normally releases chemicals to fight off infection at the infected area. When infection spreads, chemicals are released throughout your body.   Call you Health care provider upon arrival home to make a one week follow up appointment.  If you develop fever, chills, malaise, or change in mental status call your Health Care Provider or go to the Emergency Department.  Nutrition is important, eat small frequent meals to help ensure you get adequate calories.  Do not stay in bed all day!  Increase your activity daily as tolerated.    Diagnosis: Pneumonia  Assessment and Plan of Treatment: you were found to have left sided pneumonia on CT scan of chest  you were given a course of IV antibiotics Zosyn 3.375 g      Diagnosis: HTN (hypertension)  Assessment and Plan of Treatment: continue taking blood pressure medication amlodipine 10 mg and enalapril 10 mg daily    Diagnosis: ROXI (acute kidney injury)  Assessment and Plan of Treatment: You creatinine was elevated due to dehydration   Creatinine improved with IV fluids   Avoid taking (NSAIDs) - (ex: Ibuprofen, Advil, Celebrex, Naprosyn)  Avoid taking any nephrotoxic agents (can harm kidneys) - Intravenous contrast for diagnostic testing, combination cold medications.  Have all medications adjusted for your renal function by your Health Care Provider.  Blood pressure control is important.  Take all medication as prescribed.    Diagnosis: Hypothyroid  Assessment and Plan of Treatment: continue taking levothyroxine 50 mcg daily 30 - 60 minutes before eating food    Diagnosis: Cervical cancer  Assessment and Plan of Treatment: you did not want to pursue treatment at this time     PRINCIPAL DISCHARGE DIAGNOSIS  Diagnosis: SBO (small bowel obstruction)  Assessment and Plan of Treatment: you initially came into the hospital due to worsening abdominal pain.   you were found to have a partial bowel obstruction.   A bowel obstruction is a partial or complete blockage of your intestine. Your small or large intestine may be affected. The blockage prevents food and waste from passing through normally.  you were seen by a Surgeon and did not need any surgical intervention at this time.   continue to drink plenty of water daily, eat plenty of fiber as well this will soften your stool and ease passing  continue to get up out of bed to chair or walk daily.   continue taking senna and lactulose daily.   this will help you prevention constipation  follow up with your Primary Care Doctor in 1 week.         SECONDARY DISCHARGE DIAGNOSES  Diagnosis: Severe sepsis with lactic acidosis  Assessment and Plan of Treatment: this was due to Pneumonia.   Sepsis happens when an infection spreads and causes your body to react strongly to germs. Your body's defense system normally releases chemicals to fight off infection at the infected area. When infection spreads, chemicals are released throughout your body.   Call you Health care provider upon arrival home to make a one week follow up appointment.  If you develop fever, chills, malaise, or change in mental status call your Health Care Provider or go to the Emergency Department.  Nutrition is important, eat small frequent meals to help ensure you get adequate calories.  Do not stay in bed all day!  Increase your activity daily as tolerated.    Diagnosis: Pneumonia  Assessment and Plan of Treatment: you were found to have left sided pneumonia on CT scan of chest  you were given and completed a course of IV antibiotics Zosyn 3.375 g      Diagnosis: HTN (hypertension)  Assessment and Plan of Treatment: continue taking blood pressure medication amlodipine 10 mg and enalapril 10 mg daily    Diagnosis: ROXI (acute kidney injury)  Assessment and Plan of Treatment: You creatinine was elevated due to dehydration   Creatinine improved with IV fluids   Avoid taking (NSAIDs) - (ex: Ibuprofen, Advil, Celebrex, Naprosyn)  Avoid taking any nephrotoxic agents (can harm kidneys) - Intravenous contrast for diagnostic testing, combination cold medications.  Have all medications adjusted for your renal function by your Health Care Provider.  Blood pressure control is important.  Take all medication as prescribed.    Diagnosis: Cervical cancer  Assessment and Plan of Treatment: you did not want to pursue treatment at this time    Diagnosis: Hypothyroid  Assessment and Plan of Treatment: continue taking levothyroxine 50 mcg daily 30 - 60 minutes before eating food

## 2025-04-21 NOTE — DISCHARGE NOTE PROVIDER - NSDCMRMEDTOKEN_GEN_ALL_CORE_FT
amLODIPine 5 mg oral tablet: 1 tab(s) orally once a day  ascorbic acid 500 mg oral tablet: 1 tab(s) orally once a day  aspirin 81 mg oral tablet, chewable: 1 tab(s) orally once a day  enalapril 10 mg oral tablet: 1 tab(s) orally once a day  ferrous sulfate 325 mg (65 mg elemental iron) oral tablet: 1 tab(s) orally once a day  levothyroxine 50 mcg (0.05 mg) oral tablet: 1 tab(s) orally once a day Take in empty stomach one hour before breakfast. Do not take with vitamins or iron pills.   TAKE 2 TABS ON SUNDAY  Multiple Vitamins oral capsule: 1 cap(s) orally once a day  pantoprazole 40 mg oral delayed release tablet: 1 tab(s) orally once a day  senna leaf extract oral tablet: 2 tab(s) orally once a day (at bedtime)  simvastatin 10 mg oral tablet: 1 tab(s) orally once a day

## 2025-04-21 NOTE — PROGRESS NOTE ADULT - PROBLEM SELECTOR PROBLEM 2
Sepsis with metabolic encephalopathy
Pneumonia
Sepsis with metabolic encephalopathy

## 2025-04-21 NOTE — DISCHARGE NOTE PROVIDER - CARE PROVIDER_API CALL
Ortiz Corley  Internal Medicine  [id number] [place] [id number]  Phone: [phone]  Fax: [phone]  Follow Up Time: 1 week

## 2025-04-21 NOTE — PROGRESS NOTE ADULT - PROBLEM SELECTOR PLAN 2
same plan as above
continue same plan as above
same plan as above
same plan as above
continue same plan as above

## 2025-04-21 NOTE — PROGRESS NOTE ADULT - SUBJECTIVE AND OBJECTIVE BOX
Patient is a 84y old  Female who presents with a chief complaint of severe sepsis (21 Apr 2025 16:44)    OVERNIGHT EVENTS: no acute changes.     Pt is awake, tolerating PO, comfortable appearing, requires assistance out of bed to chair.     REVIEW OF SYSTEMS:  CONSTITUTIONAL: No fever, chills  ENMT:  No difficulty hearing, no change in vision  NECK: No pain or stiffness  RESPIRATORY: No cough, SOB  CARDIOVASCULAR: No chest pain, palpitations  GASTROINTESTINAL: No abdominal pain. No nausea, vomiting, or diarrhea  GENITOURINARY: No dysuria  NEUROLOGICAL: No HA  SKIN: No itching, burning, rashes, or lesions   LYMPH NODES: No enlarged glands  ENDOCRINE: No heat or cold intolerance; No hair loss  MUSCULOSKELETAL: No joint pain or swelling; No muscle, back, or extremity pain  PSYCHIATRIC: No depression, anxiety  HEME/LYMPH: No easy bruising, or bleeding gums    T(C): 36.7 (04-21-25 @ 14:29), Max: 36.9 (04-21-25 @ 05:27)  HR: 96 (04-21-25 @ 14:29) (80 - 96)  BP: 115/78 (04-21-25 @ 14:29) (112/71 - 123/75)  RR: 18 (04-21-25 @ 14:29) (18 - 18)  SpO2: 92% (04-21-25 @ 14:29) (92% - 98%)  Wt(kg): --Vital Signs Last 24 Hrs  T(C): 36.7 (21 Apr 2025 14:29), Max: 36.9 (21 Apr 2025 05:27)  T(F): 98 (21 Apr 2025 14:29), Max: 98.4 (21 Apr 2025 05:27)  HR: 96 (21 Apr 2025 14:29) (80 - 96)  BP: 115/78 (21 Apr 2025 14:29) (112/71 - 123/75)  BP(mean): 90 (21 Apr 2025 14:29) (90 - 90)  RR: 18 (21 Apr 2025 14:29) (18 - 18)  SpO2: 92% (21 Apr 2025 14:29) (92% - 98%)    Parameters below as of 21 Apr 2025 14:29  Patient On (Oxygen Delivery Method): room air        MEDICATIONS  (STANDING):  amLODIPine   Tablet 10 milliGRAM(s) Oral daily  enalapril 10 milliGRAM(s) Oral daily  enoxaparin Injectable 30 milliGRAM(s) SubCutaneous every 24 hours  lactulose Syrup 10 Gram(s) Oral daily  levothyroxine 50 MICROGram(s) Oral daily  piperacillin/tazobactam IVPB.. 3.375 Gram(s) IV Intermittent every 12 hours  senna 2 Tablet(s) Oral at bedtime    MEDICATIONS  (PRN):  ondansetron Injectable 4 milliGRAM(s) IV Push every 8 hours PRN Nausea and/or Vomiting      PHYSICAL EXAM:  GENERAL: NAD  EYES: clear conjunctiva  ENMT: Moist mucous membranes  NECK: Supple, No JVD, Normal thyroid  CHEST/LUNG: Clear to auscultation bilaterally; No rales, rhonchi, wheezing, or rubs  HEART: S1, S2, Regular rate and rhythm  ABDOMEN: Soft, Nontender, Nondistended; Bowel sounds present  NEURO: AOX2, confused  EXTREMITIES: No LE edema, no calf tenderness  LYMPH: No lymphadenopathy noted  SKIN: No rashes or lesions    Consultant(s) Notes Reviewed:  [x ] YES  [ ] NO  Care Discussed with Consultants/Other Providers [ x] YES  [ ] NO    LABS:                        12.7   4.41  )-----------( 295      ( 20 Apr 2025 07:08 )             37.9     04-20    138  |  102  |  8   ----------------------------<  89  3.6   |  31  |  0.99    Ca    8.7      20 Apr 2025 07:08        CAPILLARY BLOOD GLUCOSE            Urinalysis Basic - ( 20 Apr 2025 07:08 )    Color: x / Appearance: x / SG: x / pH: x  Gluc: 89 mg/dL / Ketone: x  / Bili: x / Urobili: x   Blood: x / Protein: x / Nitrite: x   Leuk Esterase: x / RBC: x / WBC x   Sq Epi: x / Non Sq Epi: x / Bacteria: x        RADIOLOGY & ADDITIONAL TESTS:  < from: US Abdomen Upper Quadrant Right (04.16.25 @ 14:50) >    ACC: [id number] EXAM:  US ABDOMEN RT UPR QUADRANT   ORDERED BY: ALFONSO SANDOVAL     PROCEDURE DATE:  04/16/2025          INTERPRETATION:  CLINICAL INFORMATION: Elevated liver function tests    COMPARISON: CT dated 4/16/2025    TECHNIQUE: Sonography of the right upper quadrant.    FINDINGS:  Liver: Within normal limits.  Bile ducts: Common bile duct measures 8.5 mm, prominent.  Gallbladder: Cholelithiasis.  No focal tenderness or evidence of   cholecystitis.  Pancreas: Visualized portions are within normal limits.  Right kidney: 8.5 cm. No hydronephrosis. Calculi measuring up to 2 mm.  Ascites: None.  IVC: Visualized portions are within normal limits.    IMPRESSION:  Cholelithiasis.  No focal tenderness or evidence of cholecystitis.        ---End of Report ---            SEEMA CORTEZ MD; Attending Radiologist  This document has been electronically signed. Apr 16 2025  4:01PM    < end of copied text >  < from: CT Abdomen and Pelvis No Cont (04.16.25 @ 06:37) >    ACC: [id number] EXAM:  CT ABDOMEN AND PELVIS   ORDERED BY: DALE COLLAZO     PROCEDURE DATE:  04/16/2025          INTERPRETATION:  CLINICAL INFORMATION: Abdominal pain, sepsis    COMPARISON: 2/5/2024    CONTRAST/COMPLICATIONS:  IV Contrast: NONE  Oral Contrast: NONE  .    PROCEDURE:  CT of the Chest, Abdomen and Pelvis was performed.  Sagittal and coronal reformats were performed.    FINDINGS:  CHEST:  LUNGS AND LARGE AIRWAYS: Left lower lobe airspace consolidation.   Scattered areas of focal atelectasis right lower lobe.  PLEURA: No pleural effusion.  VESSELS: Scattered atherosclerotic calcifications in the aorta  HEART: Cardiomegaly. No pericardial effusion.  MEDIASTINUM AND SHAD: No lymphadenopathy.  CHEST WALL AND LOWER NECK: Within normal limits.    ABDOMEN AND PELVIS:  LIVER: Within normal limits.  BILE DUCTS: Normal caliber.  GALLBLADDER: Cholelithiasis.  SPLEEN: Within normal limits.  PANCREAS: Within normal limits.  ADRENALS: Within normal limits.  KIDNEYS/URETERS: Limited evaluation. An approximate 4.5 cm cyst is seen   in the left kidney.    BLADDER: Within normal limits.  REPRODUCTIVE ORGANS:    BOWEL: Focal bowel occlusion noted at the third portion of the duodenum,   where it traverses midline (2:175). Resultant severegastric distention,   extending into severe distention of a large intrathoracic hiatal hernia   extending to the level of the thoracic inlet. Scattered dilated loops of   small bowel noted in the pelvis, without wall thickening.  PERITONEUM/RETROPERITONEUM: Within normal limits.  VESSELS: Atherosclerotic calcifications  LYMPH NODES: No lymphadenopathy.  ABDOMINAL WALL: Within normal limits.  BONES: Severe thoracolumbar spine scoliosis    IMPRESSION:  1.  Focal bowel occlusion at the third portionof the duodenum where it   traverses midline. Resultant severe gastric distention, extending into   severe distention of a large intrathoracic hiatal hernia, to the level of   the thoracic inlet  2.  Left lower lobe pneumonia  3.  Scattered loops of dilated small bowel in the pelvis, possible   partial small bowel obstruction versus ileus    --- End of Report ---            JADEN SANTACRUZ MD; Attending Radiologist  This document has been electronically signed. Apr 16 2025  7:22AM    < end of copied text >  < from: CT Chest No Cont (04.16.25 @ 06:37) >    ACC: [id number] EXAM:  CT CHEST   ORDERED BY: DALE COLLAZO     PROCEDURE DATE:  04/16/2025          INTERPRETATION:  CLINICAL INFORMATION: Abdominal pain    COMPARISON: 2/5/2024    CONTRAST/COMPLICATIONS:  IV Contrast: NONE  Oral Contrast: NONE    PROCEDURE:  CT of the Chest, Abdomen and Pelvis was performed.  Sagittal and coronal reformats were performed.    LIMITATIONS: Lack of intravenous contrast material limits diagnostic   sensitivity in evaluating solid organs /vasculature.    FINDINGS:  CHEST:  LUNGS AND LARGE AIRWAYS: Respiratory motion unsharpness. Patent central   airways partially compressed by diffuse marked air distended esophagus   and intrathoracic stomach. Diffuse LEFT perihilar, lingula and LLL   airspace opacity. RLL andmedial RML passive atelectasis. Mild   interlobular septal thickening  PLEURA: Trace dependent RIGHT pleural effusion.  VESSELS: No aortic dilatation. Aortic /coronary artery calcification.  HEART: Cardiomegaly. Heart compressed anteriorly by marked air distended   intrathoracic stomach.  MEDIASTINUM AND SHAD: No lymphadenopathy. Marked air dilated lower   cervical/thoracic esophagus (5.7 cm max diameter) and subsequent marked   air dilated partial intrathoracic stomach. See BOWEL following  CHEST WALL AND LOWER NECK: Within normal limits.    ABDOMEN AND PELVIS:  LIVER: Within normal limits.  BILE DUCTS: Normal caliber.  GALLBLADDER: Cholelithiasis.  SPLEEN: Within normal limits.  PANCREAS: Limited pancreas evaluation. Pancreatic atrophy and distal   body/tail duct ectasia,  ADRENALS: Nonspecific LEFT suprarenal soft tissue density (2/141), not   significantly changed from 6/28/2023  KIDNEYS/URETERS: LEFT renal cysts measure up to 4.6 cm. Millimeter-sized   RIGHT intrarenal calculus. RIGHT extrarenal pelvic fluid fullness. No   obstructive uropathy.    BLADDER: Love catheter within partially collapsed urinary bladder  REPRODUCTIVE ORGANS: Hysterectomy.    BOWEL: Marked/severe air distended stomach (w/ small fluid level, 9 cm   max diameter) to dilated D2 duodenum with abrupt transition to collapsed   duodenum as it crosses spine. Subsequent dilated proximal small bowel (4   cm max diameter) with distal collapse pelvic small bowel. Exact point of   transition not determined but presumed in central pelvis.   PERITONEUM/RETROPERITONEUM: No ascites or pneumoperitoneum.  VESSELS: Atherosclerotic changes.  LYMPH NODES: No lymphadenopathy.  ABDOMINAL WALL: Within normal limits.  BONES: Osteopenia. Thoracolumbar curvature and degenerative changes..   Grade 1-2 L5-S1 anterolisthesis. Healed mid LEFT rib fracture. Chronic   LEFT pubic ramus fractures/fracture nonunion, unchanged    IMPRESSION:  1.  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  2.  Subsequent dilated small bowel c/w  small bowel obstruction.   Transition point notdetermined but suspected within pelvis.  3.  Diffuse LEFT perihilar, lingula and LLL airspace opacity /   consolidated      This report was discussed with ALIYA Collazo MD at 4/16/2025 6:40 AM.    --- End of Report ---            NEDA BONNER MD; Attending Radiologist  This document has been electronically signed. Apr 16 2025  7:13AM    < end of copied text >      Imaging Personally Reviewed:  [ ] YES  [ ] NO

## 2025-04-22 VITALS
HEART RATE: 62 BPM | SYSTOLIC BLOOD PRESSURE: 136 MMHG | DIASTOLIC BLOOD PRESSURE: 81 MMHG | RESPIRATION RATE: 17 BRPM | TEMPERATURE: 98 F | OXYGEN SATURATION: 93 %

## 2025-04-22 RX ADMIN — AMLODIPINE BESYLATE 10 MILLIGRAM(S): 10 TABLET ORAL at 06:54

## 2025-04-22 RX ADMIN — Medication 50 MICROGRAM(S): at 06:53

## 2025-04-22 RX ADMIN — Medication 25 GRAM(S): at 06:53

## 2025-04-22 RX ADMIN — Medication 10 MILLIGRAM(S): at 06:53

## 2025-04-22 NOTE — PROGRESS NOTE ADULT - SUBJECTIVE AND OBJECTIVE BOX
84y Female lying in bed and in no apparent distress, she is awake and not answering any of my questions appropriately. She remains on room air and saturating at 93%. No diarrhea as per nursing staff. Has not had any fevers and wbc count is normal. She is off all antibiotics as she has completed her course.     MEDS:  piperacillin/tazobactam IVPB.. 3.375 Gram(s) IV Intermittent every 12 hours    ALLERGIES: Allergies    No Known Allergies    Intolerances    REVIEW OF SYSTEMS:  [ X ] Not able to elicit    VITALS:  Vital Signs Last 24 Hrs  T(C): 36.8 (22 Apr 2025 05:10), Max: 36.8 (22 Apr 2025 05:10)  T(F): 98.2 (22 Apr 2025 05:10), Max: 98.2 (22 Apr 2025 05:10)  HR: 62 (22 Apr 2025 05:10) (62 - 96)  BP: 136/81 (22 Apr 2025 05:10) (103/57 - 136/81)  BP(mean): 90 (21 Apr 2025 14:29) (90 - 90)  RR: 17 (22 Apr 2025 05:10) (16 - 18)  SpO2: 93% (22 Apr 2025 05:10) (92% - 95%)    Parameters below as of 22 Apr 2025 05:10  Patient On (Oxygen Delivery Method): room air    PHYSICAL EXAM:  HEENT: normocephalic, conjunctivae and sclerae clear; moist mucous membranes, [place]  Neck: supple no LN's   Respiratory: decreased breathe sounds , on room air   Cardiovascular: S1 S2 reg no murmurs  Gastrointestinal: +BS with soft, nondistended abdomen; nontender  : incontinent    Extremities: +1 pedal edema   Skin: no rashes  Ortho: no erythema or joint swelling  Neuro: AAO x 1 (self)    LABS/DIAGNOSTIC TESTS:    WBC Count: 4.41 K/uL (04-20 @ 07:08)  WBC Count: 6.03 K/uL (04-18 @ 05:30)    CULTURES:   Blood Blood-Peripheral  04-15 @ 23:18   No growth at 5 days  --  --    RADIOLOGY:  no new studies

## 2025-04-22 NOTE — PROGRESS NOTE ADULT - REASON FOR ADMISSION
severe sepsis

## 2025-04-22 NOTE — DISCHARGE NOTE NURSING/CASE MANAGEMENT/SOCIAL WORK - FINANCIAL ASSISTANCE
[facility] provides services at a reduced cost to those who are determined to be eligible through [facility]’s financial assistance program. Information regarding [facility]’s financial assistance program can be found by going to [email or web address] or by calling [phone].

## 2025-04-22 NOTE — DISCHARGE NOTE NURSING/CASE MANAGEMENT/SOCIAL WORK - PATIENT PORTAL LINK FT
You can access the FollowMyHealth Patient Portal offered by [facility] by registering at the following website: [email or web address]. By joining [facility]’s FollowMyHealth portal, you will also be able to view your health information using other applications (apps) compatible with our system.

## 2025-04-22 NOTE — PROGRESS NOTE ADULT - ASSESSMENT
Sepsis - resolved  Pneumonia ( aspiration )  Leukocytosis - normalized   Hypothermia - resolved    Plan -   ·	DC Mireille as she has completed her course  ·	DC planing to [facility]   ·	reconsult prn

## 2025-04-22 NOTE — PROGRESS NOTE ADULT - PROVIDER SPECIALTY LIST ADULT
Infectious Disease
Internal Medicine

## 2025-06-02 ENCOUNTER — INPATIENT (INPATIENT)
Facility: HOSPITAL | Age: 85
LOS: 2 days | Discharge: HOME | DRG: 684 | End: 2025-06-05
Attending: INTERNAL MEDICINE | Admitting: INTERNAL MEDICINE
Payer: MEDICARE

## 2025-06-02 VITALS
SYSTOLIC BLOOD PRESSURE: 153 MMHG | TEMPERATURE: 98 F | OXYGEN SATURATION: 96 % | DIASTOLIC BLOOD PRESSURE: 98 MMHG | HEART RATE: 114 BPM | RESPIRATION RATE: 20 BRPM | WEIGHT: 104.94 LBS

## 2025-06-02 DIAGNOSIS — I10 ESSENTIAL (PRIMARY) HYPERTENSION: ICD-10-CM

## 2025-06-02 DIAGNOSIS — A41.9 SEPSIS, UNSPECIFIED ORGANISM: ICD-10-CM

## 2025-06-02 DIAGNOSIS — G93.89 OTHER SPECIFIED DISORDERS OF BRAIN: ICD-10-CM

## 2025-06-02 DIAGNOSIS — R00.0 TACHYCARDIA, UNSPECIFIED: ICD-10-CM

## 2025-06-02 DIAGNOSIS — R41.82 ALTERED MENTAL STATUS, UNSPECIFIED: ICD-10-CM

## 2025-06-02 DIAGNOSIS — N17.9 ACUTE KIDNEY FAILURE, UNSPECIFIED: ICD-10-CM

## 2025-06-02 DIAGNOSIS — Z29.9 ENCOUNTER FOR PROPHYLACTIC MEASURES, UNSPECIFIED: ICD-10-CM

## 2025-06-02 DIAGNOSIS — E03.9 HYPOTHYROIDISM, UNSPECIFIED: ICD-10-CM

## 2025-06-02 DIAGNOSIS — E78.5 HYPERLIPIDEMIA, UNSPECIFIED: ICD-10-CM

## 2025-06-02 PROBLEM — C53.9 MALIGNANT NEOPLASM OF CERVIX UTERI, UNSPECIFIED: Chronic | Status: ACTIVE | Noted: 2025-04-16

## 2025-06-02 LAB
ALBUMIN SERPL ELPH-MCNC: 3.5 G/DL — SIGNIFICANT CHANGE UP (ref 3.5–5)
ALP SERPL-CCNC: 73 U/L — SIGNIFICANT CHANGE UP (ref 40–120)
ALT FLD-CCNC: 42 U/L DA — SIGNIFICANT CHANGE UP (ref 10–60)
AMMONIA BLD-MCNC: 20 UMOL/L — SIGNIFICANT CHANGE UP (ref 11–32)
ANION GAP SERPL CALC-SCNC: 1 MMOL/L — LOW (ref 5–17)
APPEARANCE UR: CLEAR — SIGNIFICANT CHANGE UP
APTT BLD: 31.2 SEC — SIGNIFICANT CHANGE UP (ref 26.1–36.8)
AST SERPL-CCNC: 53 U/L — HIGH (ref 10–40)
BASOPHILS # BLD AUTO: 0.02 K/UL — SIGNIFICANT CHANGE UP (ref 0–0.2)
BASOPHILS NFR BLD AUTO: 0.2 % — SIGNIFICANT CHANGE UP (ref 0–2)
BILIRUB SERPL-MCNC: 0.7 MG/DL — SIGNIFICANT CHANGE UP (ref 0.2–1.2)
BILIRUB UR-MCNC: NEGATIVE — SIGNIFICANT CHANGE UP
BUN SERPL-MCNC: 30 MG/DL — HIGH (ref 7–18)
CALCIUM SERPL-MCNC: 9.3 MG/DL — SIGNIFICANT CHANGE UP (ref 8.4–10.5)
CHLORIDE SERPL-SCNC: 98 MMOL/L — SIGNIFICANT CHANGE UP (ref 96–108)
CO2 SERPL-SCNC: 32 MMOL/L — HIGH (ref 22–31)
COLOR SPEC: YELLOW — SIGNIFICANT CHANGE UP
CREAT SERPL-MCNC: 1.36 MG/DL — HIGH (ref 0.5–1.3)
D DIMER BLD IA.RAPID-MCNC: 321 NG/ML DDU — HIGH
DIFF PNL FLD: ABNORMAL
EGFR: 38 ML/MIN/1.73M2 — LOW
EGFR: 38 ML/MIN/1.73M2 — LOW
EOSINOPHIL # BLD AUTO: 0.01 K/UL — SIGNIFICANT CHANGE UP (ref 0–0.5)
EOSINOPHIL NFR BLD AUTO: 0.1 % — SIGNIFICANT CHANGE UP (ref 0–6)
FLUAV AG NPH QL: SIGNIFICANT CHANGE UP
FLUBV AG NPH QL: SIGNIFICANT CHANGE UP
GLUCOSE SERPL-MCNC: 130 MG/DL — HIGH (ref 70–99)
GLUCOSE UR QL: NEGATIVE MG/DL — SIGNIFICANT CHANGE UP
HCT VFR BLD CALC: 38.1 % — SIGNIFICANT CHANGE UP (ref 34.5–45)
HGB BLD-MCNC: 12.6 G/DL — SIGNIFICANT CHANGE UP (ref 11.5–15.5)
IMM GRANULOCYTES NFR BLD AUTO: 0.3 % — SIGNIFICANT CHANGE UP (ref 0–0.9)
INR BLD: 0.91 RATIO — SIGNIFICANT CHANGE UP (ref 0.85–1.16)
KETONES UR QL: NEGATIVE MG/DL — SIGNIFICANT CHANGE UP
LACTATE SERPL-SCNC: 1.6 MMOL/L — SIGNIFICANT CHANGE UP (ref 0.7–2)
LEUKOCYTE ESTERASE UR-ACNC: NEGATIVE — SIGNIFICANT CHANGE UP
LYMPHOCYTES # BLD AUTO: 0.53 K/UL — LOW (ref 1–3.3)
LYMPHOCYTES # BLD AUTO: 4.3 % — LOW (ref 13–44)
MAGNESIUM SERPL-MCNC: 1.9 MG/DL — SIGNIFICANT CHANGE UP (ref 1.6–2.6)
MCHC RBC-ENTMCNC: 30.4 PG — SIGNIFICANT CHANGE UP (ref 27–34)
MCHC RBC-ENTMCNC: 33.1 G/DL — SIGNIFICANT CHANGE UP (ref 32–36)
MCV RBC AUTO: 91.8 FL — SIGNIFICANT CHANGE UP (ref 80–100)
MONOCYTES # BLD AUTO: 0.5 K/UL — SIGNIFICANT CHANGE UP (ref 0–0.9)
MONOCYTES NFR BLD AUTO: 4.1 % — SIGNIFICANT CHANGE UP (ref 2–14)
NEUTROPHILS # BLD AUTO: 11.14 K/UL — HIGH (ref 1.8–7.4)
NEUTROPHILS NFR BLD AUTO: 91 % — HIGH (ref 43–77)
NITRITE UR-MCNC: NEGATIVE — SIGNIFICANT CHANGE UP
NRBC BLD AUTO-RTO: 0 /100 WBCS — SIGNIFICANT CHANGE UP (ref 0–0)
PH UR: 7 — SIGNIFICANT CHANGE UP (ref 5–8)
PLATELET # BLD AUTO: 322 K/UL — SIGNIFICANT CHANGE UP (ref 150–400)
POTASSIUM SERPL-MCNC: 5.1 MMOL/L — SIGNIFICANT CHANGE UP (ref 3.5–5.3)
POTASSIUM SERPL-SCNC: 5.1 MMOL/L — SIGNIFICANT CHANGE UP (ref 3.5–5.3)
PROT SERPL-MCNC: 7.3 G/DL — SIGNIFICANT CHANGE UP (ref 6–8.3)
PROT UR-MCNC: NEGATIVE MG/DL — SIGNIFICANT CHANGE UP
PROTHROM AB SERPL-ACNC: 10.7 SEC — SIGNIFICANT CHANGE UP (ref 9.9–13.4)
RBC # BLD: 4.15 M/UL — SIGNIFICANT CHANGE UP (ref 3.8–5.2)
RBC # FLD: 14.6 % — HIGH (ref 10.3–14.5)
RSV RNA NPH QL NAA+NON-PROBE: SIGNIFICANT CHANGE UP
SARS-COV-2 RNA SPEC QL NAA+PROBE: SIGNIFICANT CHANGE UP
SODIUM SERPL-SCNC: 131 MMOL/L — LOW (ref 135–145)
SOURCE RESPIRATORY: SIGNIFICANT CHANGE UP
SP GR SPEC: 1.01 — SIGNIFICANT CHANGE UP (ref 1–1.03)
UROBILINOGEN FLD QL: 0.2 MG/DL — SIGNIFICANT CHANGE UP (ref 0.2–1)
WBC # BLD: 12.24 K/UL — HIGH (ref 3.8–10.5)
WBC # FLD AUTO: 12.24 K/UL — HIGH (ref 3.8–10.5)

## 2025-06-02 PROCEDURE — 36415 COLL VENOUS BLD VENIPUNCTURE: CPT

## 2025-06-02 PROCEDURE — 97162 PT EVAL MOD COMPLEX 30 MIN: CPT

## 2025-06-02 PROCEDURE — 76705 ECHO EXAM OF ABDOMEN: CPT

## 2025-06-02 PROCEDURE — 82803 BLOOD GASES ANY COMBINATION: CPT

## 2025-06-02 PROCEDURE — 93010 ELECTROCARDIOGRAM REPORT: CPT

## 2025-06-02 PROCEDURE — 96375 TX/PRO/DX INJ NEW DRUG ADDON: CPT

## 2025-06-02 PROCEDURE — 74176 CT ABD & PELVIS W/O CONTRAST: CPT | Mod: 26

## 2025-06-02 PROCEDURE — 82947 ASSAY GLUCOSE BLOOD QUANT: CPT

## 2025-06-02 PROCEDURE — 80053 COMPREHEN METABOLIC PANEL: CPT

## 2025-06-02 PROCEDURE — 93005 ELECTROCARDIOGRAM TRACING: CPT

## 2025-06-02 PROCEDURE — 85025 COMPLETE CBC W/AUTO DIFF WBC: CPT

## 2025-06-02 PROCEDURE — 87637 SARSCOV2&INF A&B&RSV AMP PRB: CPT

## 2025-06-02 PROCEDURE — 71250 CT THORAX DX C-: CPT | Mod: MC

## 2025-06-02 PROCEDURE — 71045 X-RAY EXAM CHEST 1 VIEW: CPT | Mod: 26

## 2025-06-02 PROCEDURE — 99285 EMERGENCY DEPT VISIT HI MDM: CPT

## 2025-06-02 PROCEDURE — 71250 CT THORAX DX C-: CPT | Mod: 26

## 2025-06-02 PROCEDURE — 85730 THROMBOPLASTIN TIME PARTIAL: CPT

## 2025-06-02 PROCEDURE — 74176 CT ABD & PELVIS W/O CONTRAST: CPT | Mod: MC

## 2025-06-02 PROCEDURE — 99285 EMERGENCY DEPT VISIT HI MDM: CPT | Mod: GC

## 2025-06-02 PROCEDURE — 71045 X-RAY EXAM CHEST 1 VIEW: CPT

## 2025-06-02 PROCEDURE — 87040 BLOOD CULTURE FOR BACTERIA: CPT

## 2025-06-02 PROCEDURE — 84295 ASSAY OF SERUM SODIUM: CPT

## 2025-06-02 PROCEDURE — 82330 ASSAY OF CALCIUM: CPT

## 2025-06-02 PROCEDURE — 83605 ASSAY OF LACTIC ACID: CPT

## 2025-06-02 PROCEDURE — 82962 GLUCOSE BLOOD TEST: CPT

## 2025-06-02 PROCEDURE — 96372 THER/PROPH/DIAG INJ SC/IM: CPT

## 2025-06-02 PROCEDURE — 94640 AIRWAY INHALATION TREATMENT: CPT

## 2025-06-02 PROCEDURE — 81001 URINALYSIS AUTO W/SCOPE: CPT

## 2025-06-02 PROCEDURE — 86703 HIV-1/HIV-2 1 RESULT ANTBDY: CPT

## 2025-06-02 PROCEDURE — 80048 BASIC METABOLIC PNL TOTAL CA: CPT

## 2025-06-02 PROCEDURE — 70450 CT HEAD/BRAIN W/O DYE: CPT | Mod: 26

## 2025-06-02 PROCEDURE — 84100 ASSAY OF PHOSPHORUS: CPT

## 2025-06-02 PROCEDURE — 85610 PROTHROMBIN TIME: CPT

## 2025-06-02 PROCEDURE — 85027 COMPLETE CBC AUTOMATED: CPT

## 2025-06-02 PROCEDURE — 84132 ASSAY OF SERUM POTASSIUM: CPT

## 2025-06-02 PROCEDURE — 83735 ASSAY OF MAGNESIUM: CPT

## 2025-06-02 PROCEDURE — 96376 TX/PRO/DX INJ SAME DRUG ADON: CPT

## 2025-06-02 PROCEDURE — 96374 THER/PROPH/DIAG INJ IV PUSH: CPT

## 2025-06-02 RX ORDER — LEVOTHYROXINE SODIUM 300 MCG
50 TABLET ORAL DAILY
Refills: 0 | Status: DISCONTINUED | OUTPATIENT
Start: 2025-06-02 | End: 2025-06-05

## 2025-06-02 RX ORDER — SODIUM CHLORIDE 9 G/1000ML
1000 INJECTION, SOLUTION INTRAVENOUS
Refills: 0 | Status: DISCONTINUED | OUTPATIENT
Start: 2025-06-02 | End: 2025-06-03

## 2025-06-02 RX ORDER — PIPERACILLIN-TAZO-DEXTROSE,ISO 3.375G/5
3.38 IV SOLUTION, PIGGYBACK PREMIX FROZEN(ML) INTRAVENOUS ONCE
Refills: 0 | Status: COMPLETED | OUTPATIENT
Start: 2025-06-02 | End: 2025-06-02

## 2025-06-02 RX ORDER — ACETAMINOPHEN 500 MG/5ML
725 LIQUID (ML) ORAL ONCE
Refills: 0 | Status: COMPLETED | OUTPATIENT
Start: 2025-06-02 | End: 2025-06-02

## 2025-06-02 RX ORDER — HEPARIN SODIUM 1000 [USP'U]/ML
5000 INJECTION INTRAVENOUS; SUBCUTANEOUS EVERY 12 HOURS
Refills: 0 | Status: DISCONTINUED | OUTPATIENT
Start: 2025-06-02 | End: 2025-06-05

## 2025-06-02 RX ORDER — SENNA 187 MG
2 TABLET ORAL AT BEDTIME
Refills: 0 | Status: DISCONTINUED | OUTPATIENT
Start: 2025-06-02 | End: 2025-06-05

## 2025-06-02 RX ORDER — B1/B2/B3/B5/B6/B12/VIT C/FOLIC 500-0.5 MG
1 TABLET ORAL DAILY
Refills: 0 | Status: DISCONTINUED | OUTPATIENT
Start: 2025-06-02 | End: 2025-06-05

## 2025-06-02 RX ORDER — CEFTRIAXONE 500 MG/1
1000 INJECTION, POWDER, FOR SOLUTION INTRAMUSCULAR; INTRAVENOUS EVERY 24 HOURS
Refills: 0 | Status: DISCONTINUED | OUTPATIENT
Start: 2025-06-02 | End: 2025-06-02

## 2025-06-02 RX ORDER — ATORVASTATIN CALCIUM 80 MG/1
10 TABLET, FILM COATED ORAL AT BEDTIME
Refills: 0 | Status: DISCONTINUED | OUTPATIENT
Start: 2025-06-02 | End: 2025-06-05

## 2025-06-02 RX ORDER — ASPIRIN 325 MG
81 TABLET ORAL DAILY
Refills: 0 | Status: DISCONTINUED | OUTPATIENT
Start: 2025-06-02 | End: 2025-06-05

## 2025-06-02 RX ORDER — FERROUS SULFATE 137(45) MG
325 TABLET, EXTENDED RELEASE ORAL DAILY
Refills: 0 | Status: DISCONTINUED | OUTPATIENT
Start: 2025-06-02 | End: 2025-06-05

## 2025-06-02 RX ADMIN — Medication 25 GRAM(S): at 22:13

## 2025-06-02 RX ADMIN — Medication 290 MILLIGRAM(S): at 13:45

## 2025-06-02 RX ADMIN — Medication 1000 MILLILITER(S): at 10:31

## 2025-06-02 RX ADMIN — SODIUM CHLORIDE 65 MILLILITER(S): 9 INJECTION, SOLUTION INTRAVENOUS at 22:14

## 2025-06-02 RX ADMIN — Medication 1000 MILLILITER(S): at 12:28

## 2025-06-02 NOTE — H&P ADULT - HISTORY OF PRESENT ILLNESS
82 yo F from [facility], with cervical cancer, brain mass, HTN, HLD, hypothyroid, hx of SBO in April 2025  present for concerns of elevated heart rate and poor appetite.  Patient endorsing generalized weakness and "feeling tired".  Patient was 91% on room air brought in on 2 L. However then improved to 99% on 1 L NC. Patient denies any abdominal pain, nausea vomiting diarrhea although poor historian, oriented only to name. In ED, pt is confused, asking to leave this place, stating she does not know where she is and needs to go get food. Patient without obvious signs of trauma. Per chart from assisted living [facility], patient reported for weakness, poor appetite and elevated heart rate. AAOx1-2 in ED. Admitted to medicine for AMS and Sepsis of Unknown Origin.    Pt is an 83F from [facility], with PMHx of cervical cancer, brain mass, HTN, HLD, hypothyroid, hx of SBO in April 2025 who presents to the ED for concerns of elevated HR, poor appetite, and weakness per NH paperwork. In ED, pt is confused, asking to leave, stating she does not know where she is and needs to go get food. AAOx1. Baseline unknown. Multiple attempts made to reach daughter Nory via phone with no answer. Initially in ED, pt was 91% on RA. However then found to be 97% on RA. Noted with persistent tachycardia with  HR >100. On exam, pt appears dehydrated, however no obvious signs of trauma noted. Denies any abdominal pain, n/v, headache, chest pain, SOB- however she is a poor historian and appears altered. EKG non ischemic. Admitted to medicine for AMS and Sepsis of Unknown Origin.

## 2025-06-02 NOTE — ED PROVIDER NOTE - ATTENDING CONTRIBUTION TO CARE
Patient presenting for tachycardia and decreased appetite otherwise denies any cardiopulmonary symptoms saturation stable on room air afebrile clinically well no sign distress will obtain lab fluid hydrate ED observation reassess

## 2025-06-02 NOTE — ED ADULT NURSE NOTE - OBJECTIVE STATEMENT
pt is here for medical evaluation.  BIBA, sending from [facility],  elevated HR since in the morning, generalized weakness, [place], skin intact, a/ox2,

## 2025-06-02 NOTE — PATIENT PROFILE ADULT - FALL HARM RISK - HARM RISK INTERVENTIONS
Assistance with ambulation/Assistance OOB with selected safe patient handling equipment/Communicate Risk of Fall with Harm to all staff/Discuss with provider need for PT consult/Monitor for mental status changes/Monitor gait and stability/Move patient closer to nurses' station/Provide patient with walking aids - walker, cane, crutches/Reinforce activity limits and safety measures with patient and family/Reorient to person, place and time as needed/Tailored Fall Risk Interventions/Toileting schedule using arm’s reach rule for commode and bathroom/Use of alarms - bed, chair and/or voice tab/Visual Cue: Yellow wristband and red socks/Bed in lowest position, wheels locked, appropriate side rails in place/Call bell, personal items and telephone in reach/Instruct patient to call for assistance before getting out of bed or chair/Non-slip footwear when patient is out of bed/[place] to call system/Physically safe environment - no spills, clutter or unnecessary equipment/Purposeful Proactive Rounding/Room/bathroom lighting operational, light cord in reach

## 2025-06-02 NOTE — ED PROVIDER NOTE - CLINICAL SUMMARY MEDICAL DECISION MAKING FREE TEXT BOX
84 yo F from [facility], with cervical cancer, brain mass, HTN, HLD, hypothyroid, present for concerns of elevated heart rate and poor appetite.  Patient endorsing generalized weakness and "feeling tired".  Patient was 91% on room air brought in on 2 L.  Patient denies any abdominal pain, nausea vomiting diarrhea although poor historian. Patient without obvious signs of trauma. Per chart from [facility] living [facility], patient reported for weakness, poor appetite and elevated heart rate.    Roscoe Crow DO (PGY3)   Physical Exam:    Gen: NAD, AOx3  Head: NCAT  HEENT: EOMI, PEERLA  Lung: CTAB  CV: +tachycardic   Abd: soft, NT, ND, no guarding, no rigidity, no rebound tenderness, no CVA tenderness   MSK: no visible deformities, ROM normal in UE/LE, no midline ttp to entire spine, pelvis stable  Neuro: No focal sensory or motor deficits. Sensation intact to light touch all extremities.  Skin: Warm, well perfused, no rash, no leg swelling  Psych: normal affect    Vital signs remarkable for tachycardia to 104, not hypoxic and weaned off nasal cannula.  Normotensive.  Plan for rectal temp, basic labs, lactate, ammonia level, urinalysis, viral swab, chest x-ray.  Differential diagnosis includes but limited to infectious etiology versus metabolic derangement versus failure to thrive.  Final dispo pending labs imaging close reassessment

## 2025-06-02 NOTE — H&P ADULT - ASSESSMENT
84 yo F from [facility], with cervical cancer, brain mass, HTN, HLD, hypothyroid, present for concerns of elevated heart rate and poor appetite.  Patient endorsing generalized weakness and "feeling tired".  Patient was 91% on room air brought in on 2 L.  Patient denies any abdominal pain, nausea vomiting diarrhea although poor historian. Patient without obvious signs of trauma. Per chart from assisted living [facility] by [facility], patient reported for weakness, poor appetite and elevated heart rate. AAOx1-2 in ED. dmitted to medicine for AMS and Sepsis of Unknown Origin.    82 yo F from [facility], with cervical cancer, brain mass, HTN, HLD, hypothyroid, hx of SBO in April 2025  present for concerns of elevated heart rate and poor appetite.  Patient endorsing generalized weakness and "feeling tired".  Patient was 91% on room air brought in on 2 L. However then improved to 99% on 1 L NC. Patient denies any abdominal pain, nausea vomiting diarrhea although poor historian, oriented only to name. In ED, pt is confused, asking to leave this place, stating she does not know where she is and needs to go get food. Patient without obvious signs of trauma. Per chart from assisted living [facility], patient reported for weakness, poor appetite and elevated heart rate. AAOx1-2 in ED. Admitted to medicine for AMS and Sepsis of Unknown Origin.    Pt is an 83F from [facility], with PMHx of cervical cancer, brain mass, HTN, HLD, hypothyroid, hx of SBO in April 2025 who presents to the ED for concerns of elevated HR, poor appetite, and weakness per NH paperwork. In ED, pt is confused, asking to leave, stating she does not know where she is and needs to go get food. AAOx1. Baseline unknown.  Admitted to medicine for AMS and Sepsis of Unknown Origin.

## 2025-06-02 NOTE — H&P ADULT - TIME BILLING
- Review of records, telemetry, vital signs and daily labs.   - General and cardiovascular physical examination.  - Generation of cardiovascular treatment plan and completion of note .  - Coordination of care-discussed with ACP, and  on disposition plan .      Patient was seen and examined by me on  6/3/25,interim events noted,labs and radiology studies reviewed.  Iker Kirkpatrick MD,FACC.  [address] [place], [place] [id number].  [phone]  Availabe to call or text on [employer] TEAMS.

## 2025-06-02 NOTE — H&P ADULT - CONVERSATION DETAILS
Per Chart Review, pt with prior GOC discussion with daughter Nory requesting DNR/DNI, however daughter unable to be reached at this time and no MOLST form noted in pt paperwork from nursing home.

## 2025-06-02 NOTE — H&P ADULT - PROBLEM SELECTOR PLAN 4
BUN/Cr elevated  baseline noted  IVF  continue to monitor BUN/Cr elevated to 30/1.36   baseline noted Scr 0.99  appears dry on exam  IVF  continue to monitor

## 2025-06-02 NOTE — H&P ADULT - NSHPPHYSICALEXAM_GEN_ALL_CORE
Vital Signs Last 24 Hrs  T(C): 36.8 (02 Jun 2025 18:15), Max: 38.1 (02 Jun 2025 11:46)  T(F): 98.2 (02 Jun 2025 18:15), Max: 100.5 (02 Jun 2025 11:46)  HR: 113 (02 Jun 2025 18:15) (104 - 115)  BP: 150/93 (02 Jun 2025 18:15) (150/93 - 155/96)  BP(mean): 112 (02 Jun 2025 18:15) (112 - 112)  RR: 18 (02 Jun 2025 18:15) (18 - 20)  SpO2: 97% (02 Jun 2025 18:15) (96% - 99%)    Parameters below as of 02 Jun 2025 18:15  Patient On (Oxygen Delivery Method): room air      PHYSICAL EXAM:  GENERAL: NAD, +dehydrated, confused, agitated  HEAD:  Atraumatic, Normocephalic  EYES: EOMI, PERRLA, conjunctiva and sclera clear  NECK: Supple  CHEST/LUNG: Clear to auscultation bilaterally; No wheeze; No crackles; No accessory muscles used  HEART: +Tachycardia; No murmurs;   ABDOMEN: Soft, Nontender, Nondistended; Bowel sounds present; No guarding. large hiatal hernia noted  EXTREMITIES:  2+ Peripheral Pulses, No cyanosis or edema  PSYCH: AAOx1  NEUROLOGY: non-focal  SKIN: No rashes or lesions

## 2025-06-02 NOTE — H&P ADULT - PROBLEM SELECTOR PLAN 2
sepsis of unknown origin  WBC 12  s/p CTX    IVF  f/u CTH, Chest, AP  RVP neg  f/u blood cultures  UA neg VSS- 99% on 2 L NC, temp 36.8 C, , /93  WBC 12  s/p CTX and 1.5 L IVF in ED  sepsis of unknown origin  CXR similar to prior w large hiatal hernia noted     c/w IVF  f/u CTH, Chest, AP  RVP neg  f/u blood cultures  UA neg p/w weakness, tachycardia, and dec PO intake per NH paperwork  VSS- 99% on 2 L NC, temp 36.8 C, , /93  WBC 12  s/p CTX and 1.5 L IVF in ED  CXR noted with large hiatal hernia, otherwise unremarkable on wet read?  sepsis of unknown origin    c/w IVF  f/u CTH, Chest, AP  RVP neg  f/u blood cultures  UA neg

## 2025-06-02 NOTE — PATIENT PROFILE ADULT - FUNCTIONAL ASSESSMENT - BASIC MOBILITY 6.
2-calculated by average/Not able to assess (calculate score using [facility] averaging method)

## 2025-06-02 NOTE — ED ADULT NURSE NOTE - CHIEF COMPLAINT QUOTE
JACOB from [facility] for elevated HR X AM c/o elevated heart rate and poor appetite and weakness, 91% RA  [place], LT eye vision loss

## 2025-06-02 NOTE — H&P ADULT - PROBLEM SELECTOR PLAN 3
HR >110  EKG unremarkable??  continue to monitor  f/u TSH HR >110  EKG sinus tach with LVH and R atrial enlargement (similar to prior)  continue to monitor  f/u TSH HR >110  EKG sinus tach with LVH and R atrial enlargement (similar to prior)  continue to monitor  f/u TSH  f/u D Dimer (r/o PE- CTA Chest deferred due to elevated Cr)

## 2025-06-02 NOTE — H&P ADULT - PROBLEM SELECTOR PLAN 1
p/w AMS, AAOx1-2 in ED, appears confused and unable to state where she is  baseline unknown  ammonia level wnl  VSS- 99% on 2 L NC  family could not be contacted on multiple occasions    f/u CTH p/w AMS, AAOx1-2 in ED, appears confused and unable to state where she is  baseline unknown  ammonia level wnl  VSS- 99% on 2 L NC, temp 36.8 C, , /93  daughter could not be contacted on multiple occasions    f/u CTH p/w AMS, AAOx1-2 in ED, appears confused and unable to state where she is or why she is here  baseline unknown  ammonia level wnl  VSS- 99% on 2 L NC, temp 36.8 C, , /93  daughter could not be contacted on multiple occasions    f/u CT p/w AMS, AAOx1-2 in ED, appears confused and unable to state where she is or why she is here  baseline unknown  ammonia level wnl  VSS- 99% on 2 L NC, temp 36.8 C, , /93  daughter could not be contacted on multiple occasions    f/u CTH:

## 2025-06-02 NOTE — ED ADULT TRIAGE NOTE - CHIEF COMPLAINT QUOTE
JACOB from [facility] for elevated HR X AM c/o elevated heart rate and poor appetite and weakness  [place], LT eye vision loss JACOB from [facility] for elevated HR X AM c/o elevated heart rate and poor appetite and weakness, 91% RA  [place], LT eye vision loss

## 2025-06-03 DIAGNOSIS — K56.7 ILEUS, UNSPECIFIED: ICD-10-CM

## 2025-06-03 DIAGNOSIS — K44.9 DIAPHRAGMATIC HERNIA WITHOUT OBSTRUCTION OR GANGRENE: ICD-10-CM

## 2025-06-03 LAB
ALBUMIN SERPL ELPH-MCNC: 3.4 G/DL — LOW (ref 3.5–5)
ALP SERPL-CCNC: 64 U/L — SIGNIFICANT CHANGE UP (ref 40–120)
ALT FLD-CCNC: 31 U/L DA — SIGNIFICANT CHANGE UP (ref 10–60)
ANION GAP SERPL CALC-SCNC: 7 MMOL/L — SIGNIFICANT CHANGE UP (ref 5–17)
AST SERPL-CCNC: 27 U/L — SIGNIFICANT CHANGE UP (ref 10–40)
BASOPHILS # BLD AUTO: 0.04 K/UL — SIGNIFICANT CHANGE UP (ref 0–0.2)
BASOPHILS NFR BLD AUTO: 0.3 % — SIGNIFICANT CHANGE UP (ref 0–2)
BILIRUB SERPL-MCNC: 1 MG/DL — SIGNIFICANT CHANGE UP (ref 0.2–1.2)
BUN SERPL-MCNC: 19 MG/DL — HIGH (ref 7–18)
CALCIUM SERPL-MCNC: 8.8 MG/DL — SIGNIFICANT CHANGE UP (ref 8.4–10.5)
CHLORIDE SERPL-SCNC: 96 MMOL/L — SIGNIFICANT CHANGE UP (ref 96–108)
CO2 SERPL-SCNC: 30 MMOL/L — SIGNIFICANT CHANGE UP (ref 22–31)
CREAT SERPL-MCNC: 0.93 MG/DL — SIGNIFICANT CHANGE UP (ref 0.5–1.3)
EGFR: 61 ML/MIN/1.73M2 — SIGNIFICANT CHANGE UP
EGFR: 61 ML/MIN/1.73M2 — SIGNIFICANT CHANGE UP
EOSINOPHIL # BLD AUTO: 0 K/UL — SIGNIFICANT CHANGE UP (ref 0–0.5)
EOSINOPHIL NFR BLD AUTO: 0 % — SIGNIFICANT CHANGE UP (ref 0–6)
GLUCOSE BLDC GLUCOMTR-MCNC: 89 MG/DL — SIGNIFICANT CHANGE UP (ref 70–99)
GLUCOSE SERPL-MCNC: 96 MG/DL — SIGNIFICANT CHANGE UP (ref 70–99)
HCT VFR BLD CALC: 37 % — SIGNIFICANT CHANGE UP (ref 34.5–45)
HGB BLD-MCNC: 12.6 G/DL — SIGNIFICANT CHANGE UP (ref 11.5–15.5)
IMM GRANULOCYTES NFR BLD AUTO: 0.3 % — SIGNIFICANT CHANGE UP (ref 0–0.9)
LYMPHOCYTES # BLD AUTO: 1.29 K/UL — SIGNIFICANT CHANGE UP (ref 1–3.3)
LYMPHOCYTES # BLD AUTO: 9.7 % — LOW (ref 13–44)
MAGNESIUM SERPL-MCNC: 1.7 MG/DL — SIGNIFICANT CHANGE UP (ref 1.6–2.6)
MCHC RBC-ENTMCNC: 31.1 PG — SIGNIFICANT CHANGE UP (ref 27–34)
MCHC RBC-ENTMCNC: 34.1 G/DL — SIGNIFICANT CHANGE UP (ref 32–36)
MCV RBC AUTO: 91.4 FL — SIGNIFICANT CHANGE UP (ref 80–100)
MONOCYTES # BLD AUTO: 1.16 K/UL — HIGH (ref 0–0.9)
MONOCYTES NFR BLD AUTO: 8.7 % — SIGNIFICANT CHANGE UP (ref 2–14)
NEUTROPHILS # BLD AUTO: 10.82 K/UL — HIGH (ref 1.8–7.4)
NEUTROPHILS NFR BLD AUTO: 81 % — HIGH (ref 43–77)
NRBC BLD AUTO-RTO: 0 /100 WBCS — SIGNIFICANT CHANGE UP (ref 0–0)
PHOSPHATE SERPL-MCNC: 3.1 MG/DL — SIGNIFICANT CHANGE UP (ref 2.5–4.5)
PLATELET # BLD AUTO: 309 K/UL — SIGNIFICANT CHANGE UP (ref 150–400)
POTASSIUM SERPL-MCNC: 3.4 MMOL/L — LOW (ref 3.5–5.3)
POTASSIUM SERPL-SCNC: 3.4 MMOL/L — LOW (ref 3.5–5.3)
PROT SERPL-MCNC: 6.5 G/DL — SIGNIFICANT CHANGE UP (ref 6–8.3)
RBC # BLD: 4.05 M/UL — SIGNIFICANT CHANGE UP (ref 3.8–5.2)
RBC # FLD: 14.5 % — SIGNIFICANT CHANGE UP (ref 10.3–14.5)
SODIUM SERPL-SCNC: 133 MMOL/L — LOW (ref 135–145)
TSH SERPL-MCNC: 2.44 UU/ML — SIGNIFICANT CHANGE UP (ref 0.34–4.82)
WBC # BLD: 13.35 K/UL — HIGH (ref 3.8–10.5)
WBC # FLD AUTO: 13.35 K/UL — HIGH (ref 3.8–10.5)

## 2025-06-03 PROCEDURE — 99223 1ST HOSP IP/OBS HIGH 75: CPT | Mod: FS

## 2025-06-03 PROCEDURE — 99221 1ST HOSP IP/OBS SF/LOW 40: CPT | Mod: FS

## 2025-06-03 RX ORDER — SODIUM CHLORIDE 9 G/1000ML
1000 INJECTION, SOLUTION INTRAVENOUS
Refills: 0 | Status: DISCONTINUED | OUTPATIENT
Start: 2025-06-03 | End: 2025-06-05

## 2025-06-03 RX ADMIN — HEPARIN SODIUM 5000 UNIT(S): 1000 INJECTION INTRAVENOUS; SUBCUTANEOUS at 07:06

## 2025-06-03 RX ADMIN — Medication 325 MILLIGRAM(S): at 11:44

## 2025-06-03 RX ADMIN — Medication 50 MICROGRAM(S): at 06:20

## 2025-06-03 RX ADMIN — Medication 1 TABLET(S): at 11:44

## 2025-06-03 RX ADMIN — Medication 100 MILLIEQUIVALENT(S): at 10:17

## 2025-06-03 RX ADMIN — Medication 81 MILLIGRAM(S): at 11:44

## 2025-06-03 RX ADMIN — Medication 500 MILLIGRAM(S): at 11:44

## 2025-06-03 RX ADMIN — ATORVASTATIN CALCIUM 10 MILLIGRAM(S): 80 TABLET, FILM COATED ORAL at 21:54

## 2025-06-03 RX ADMIN — HEPARIN SODIUM 5000 UNIT(S): 1000 INJECTION INTRAVENOUS; SUBCUTANEOUS at 18:08

## 2025-06-03 RX ADMIN — SODIUM CHLORIDE 65 MILLILITER(S): 9 INJECTION, SOLUTION INTRAVENOUS at 21:54

## 2025-06-03 RX ADMIN — SODIUM CHLORIDE 65 MILLILITER(S): 9 INJECTION, SOLUTION INTRAVENOUS at 06:20

## 2025-06-03 RX ADMIN — Medication 40 MILLIGRAM(S): at 07:06

## 2025-06-03 RX ADMIN — Medication 100 MILLIEQUIVALENT(S): at 09:04

## 2025-06-03 RX ADMIN — Medication 2 TABLET(S): at 21:54

## 2025-06-03 RX ADMIN — Medication 100 MILLIEQUIVALENT(S): at 08:09

## 2025-06-03 NOTE — SWALLOW BEDSIDE ASSESSMENT ADULT - SWALLOW EVAL: DIAGNOSIS
Oropharyngeal swallow sequence appears WFL commensurate w/ age to tolerate at least thin liquids with no overt s/s of laryngeal penetration or aspiration.

## 2025-06-03 NOTE — CONSULT NOTE ADULT - TIME BILLING
Physician time spent included preparing to see patient, obtaining and reviewing separate obtained history, performing a medically appropriate examination and evaluation, counseling and educating the patient/family/caregiver. Ordering medications, tests, procedures, referring and communicating with other health care professionals. In addition,  documenting clinical information in the electronic health record. In addition, independently interpreting results and communicating results to the patient/family/caregiver, and care coordination.
chart review, in person evaluation, documentation, care coordination

## 2025-06-03 NOTE — DIETITIAN INITIAL EVALUATION ADULT - ADD RECOMMEND
Severe malnutrition; Recommend regular diet order with oral supplement Ensure Plus HP TID (350 kcal, 20 g pro each) if advanced to PO diet as medically feasible.

## 2025-06-03 NOTE — CONSULT NOTE ADULT - ASSESSMENT
ASSESSMENT  83 yo female with PMHx of cervical cancer, brain mass, HTN, HLD, hypothyroid, hx of SBO in April 2025 presents due to AMS per nursing home. CT abdomen reading was concerning for hiatal hernia. Patient denies NVD, tolerated ice chips. Patient denies pain with palpation of abdomen.     PLAN   - monitor patient for NVD  - if patient begins to experience emesis, recommend NG tube placement  - rest of care per primary   ASSESSMENT  85 yo female with PMHx of cervical cancer, brain mass, HTN, HLD, hypothyroid, hx of SBO in April 2025 presents due to AMS per nursing home. CT abdomen reading was concerning for hiatal hernia. Patient denies NVD, tolerated ice chips. Patient denies any abdominal pain, exam benign. Per patient admits to being able to pass gas and have BM, admits to wearing diapers.     PLAN   - monitor patient for NV, abdominal pain  - if patient become symptomatic, rec NG tube  - GI consult for possible endoscopic eval  - recommend reglan  - rest of care per primary    Plans discussed with Dr. Salinas

## 2025-06-03 NOTE — DIETITIAN NUTRITION RISK NOTIFICATION - ADDITIONAL COMMENTS/DIETITIAN RECOMMENDATIONS
Recommend regular diet order with oral supplement Ensure Plus HP TID (350 kcal, 20 g pro each) if advanced to PO diet as medically feasible.

## 2025-06-03 NOTE — CONSULT NOTE ADULT - ASSESSMENT
84y old  Female with PMH cervical cancer, brain mass, HTN, HLD, hypothyroid, who presents with Ileus      PLAN:  #Ileus    -s/p CTAP noted above  + Bowel sounds in all quadrants  -f/u speech and swallow consult  -Aspiration precautions  -Surgery consult noted, NGT if patient becomes symptomatic  -Replete electrolytes as needed  -No plan for endoscopic evaluation at this time        This note and its recommendations herein are preliminary until such time as cosigned by an attending

## 2025-06-03 NOTE — CONSULT NOTE ADULT - NS ATTEND AMEND GEN_ALL_CORE FT
84F with chronic hiatal hernia with intrathoracic stomach, recently here 1 month prior with similar findings. In addition she has an ileus, likely secondary to prior adhesions which in turn exacerbate her hiatal hernia. Given no nausea or vomiting and + bowel function there is little role for acute surgical interventions on the hiatal hernia as no evidence of gastric obstruction or volvulus or ischemia. Patient is tolerating a soft diet without issues.

## 2025-06-03 NOTE — CONSULT NOTE ADULT - NS ATTEND AMEND GEN_ALL_CORE FT
Pt seen in her room this afternoon with daughter/HCP and RN at bedside.  Pt awake and swallowing applesauce without issue. Tympanic and mildly distended abdomen, but non-tender and no grimacing/guarding with palpation.  Normoactive BS on exam.    CTAP findings are similar to prior imaging in April 2025.    [ ] Adv diet as tolerated  [ ] No indication for endoscopic intervention or NGT placement at this time  [ ] GI team will sign off; please call back w/further questions. Rest as per NP's note.

## 2025-06-03 NOTE — DIETITIAN INITIAL EVALUATION ADULT - PERTINENT LABORATORY DATA
06-03    133[L]  |  96  |  19[H]  ----------------------------<  96  3.4[L]   |  30  |  0.93    Ca    8.8      03 Jun 2025 05:53  Phos  3.1     06-03  Mg     1.7     06-03    TPro  6.5  /  Alb  3.4[L]  /  TBili  1.0  /  DBili  x   /  AST  27  /  ALT  31  /  AlkPhos  64  06-03

## 2025-06-03 NOTE — CONSULT NOTE ADULT - SUBJECTIVE AND OBJECTIVE BOX
INITIAL GI CONSULTATION    Patient is a 84y old  Female who presents with a chief complaint of Acute renal failure     (03 Jun 2025 11:45)    HPI:  Pt is an 83F from [facility], with PMHx of cervical cancer, brain mass, HTN, HLD, hypothyroid, hx of SBO in April 2025 who presents to the ED for concerns of elevated HR, poor appetite, and weakness per NH paperwork. In ED, pt is confused, asking to leave, stating she does not know where she is and needs to go get food. AAOx1. Baseline unknown. Multiple attempts made to reach daughter Nory via phone with no answer. Initially in ED, pt was 91% on RA. However then found to be 97% on RA. Noted with persistent tachycardia with  HR >100. On exam, pt appears dehydrated, however no obvious signs of trauma noted. Denies any abdominal pain, n/v, headache, chest pain, SOB- however she is a poor historian and appears altered. EKG non ischemic. Admitted to medicine for AMS and Sepsis of Unknown Origin.    (02 Jun 2025 18:50)    GI HPI: Patient seen and examined on unit. Resting in bed. Daughter, Nory ([phone]), at bedside and providing history. As per daughter, patient was sent to the hospital from the [facility] because of elevated heart rate. To her knowledge, the patient doesn't seem to have any loss of appetite or apparent weight loss. Patient denies abdominal pain.     PMH/PSH:  PAST MEDICAL & SURGICAL HISTORY:  Hypercholesterolemia      HTN (hypertension)      Hypothyroidism      Osteoarthritis      Anemia      Cervical cancer      Brain mass      No significant past surgical history            FH:  FAMILY HISTORY:        MEDS:  MEDICATIONS  (STANDING):  ascorbic acid 500 milliGRAM(s) Oral daily  aspirin  chewable 81 milliGRAM(s) Oral daily  atorvastatin 10 milliGRAM(s) Oral at bedtime  ferrous    sulfate 325 milliGRAM(s) Oral daily  heparin   Injectable 5000 Unit(s) SubCutaneous every 12 hours  lactated ringers. 1000 milliLiter(s) (65 mL/Hr) IV Continuous <Continuous>  levothyroxine 50 MICROGram(s) Oral daily  multivitamin 1 Tablet(s) Oral daily  pantoprazole    Tablet 40 milliGRAM(s) Oral before breakfast  senna 2 Tablet(s) Oral at bedtime    MEDICATIONS  (PRN):    Allergies    No Known Allergies    Intolerances          ______________________________________________________________________  PHYSICAL EXAM:  T(C): 36.7 (06-03-25 @ 12:05), Max: 36.8 (06-02-25 @ 18:15)  HR: 90 (06-03-25 @ 12:05)  BP: 164/81 (06-03-25 @ 12:05)  RR: 17 (06-03-25 @ 12:05)  SpO2: 96% (06-03-25 @ 12:05)  Wt(kg): --    06-02 - 06-03  --------------------------------------------------------  IN:  Total IN: 0 mL    OUT:    Voided (mL): 600 mL  Total OUT: 600 mL    Total NET: -600 mL          GEN: NAD  PULM: clear  CV: HR regular  GI: Soft, NT, ND; +BS in all four quadrants  MSK:  no edema  NEURO: A&O x 1/2  ______________________________________________________________________  LABS:                        12.6   13.35 )-----------( 309      ( 03 Jun 2025 05:53 )             37.0     06-03    133[L]  |  96  |  19[H]  ----------------------------<  96  3.4[L]   |  30  |  0.93    Ca    8.8      03 Jun 2025 05:53  Phos  3.1     06-03  Mg     1.7     06-03    TPro  6.5  /  Alb  3.4[L]  /  TBili  1.0  /  DBili  x   /  AST  27  /  ALT  31  /  AlkPhos  64  06-03    LIVER FUNCTIONS - ( 03 Jun 2025 05:53 )  Alb: 3.4 g/dL / Pro: 6.5 g/dL / ALK PHOS: 64 U/L / ALT: 31 U/L DA / AST: 27 U/L / GGT: x           PT/INR - ( 02 Jun 2025 10:00 )   PT: 10.7 sec;   INR: 0.91 ratio         PTT - ( 02 Jun 2025 10:00 )  PTT:31.2 sec  ____________________________________________    IMAGING:    < from: CT Abdomen and Pelvis No Cont (06.02.25 @ 20:21) >  IMPRESSION:  1. Large hiatal hernia with extension of the gas and fluid distended   cardia and fundus into the left hemithorax and gas and fluid distention   of the esophagus. Consider NG tube decompression to prevent reflux   aspiration.  2. Persistent dilatation of small bowel loops, suggestive of ileus.   Ischemic bowel or low level bowel obstruction is not entirely excluded.    < end of copied text >      This note and its recommendations herein are preliminary until such time as cosigned by an attending.

## 2025-06-03 NOTE — CONSULT NOTE ADULT - SUBJECTIVE AND OBJECTIVE BOX
HPI:  Pt is an 83F from [facility], with PMHx of cervical cancer, brain mass, HTN, HLD, hypothyroid, hx of SBO in April 2025 who presents to the ED for concerns of elevated HR, poor appetite, and weakness per NH paperwork. In ED, pt is confused, asking to leave, stating she does not know where she is and needs to go get food. AAOx1. Baseline unknown. Multiple attempts made to reach daughter Nory via phone with no answer. Initially in ED, pt was 91% on RA. However then found to be 97% on RA. Noted with persistent tachycardia with  HR >100. On exam, pt appears dehydrated, however no obvious signs of trauma noted. Denies any abdominal pain, n/v, headache, chest pain, SOB- however she is a poor historian and appears altered. EKG non ischemic. Admitted to medicine for AMS and Sepsis of Unknown Origin.    (02 Jun 2025 18:50)      PAST MEDICAL & SURGICAL HISTORY:  Hypercholesterolemia      HTN (hypertension)      Hypothyroidism      Osteoarthritis      Anemia      Cervical cancer      Brain mass      No significant past surgical history          MEDICATIONS  (STANDING):  ascorbic acid 500 milliGRAM(s) Oral daily  aspirin  chewable 81 milliGRAM(s) Oral daily  atorvastatin 10 milliGRAM(s) Oral at bedtime  ferrous    sulfate 325 milliGRAM(s) Oral daily  heparin   Injectable 5000 Unit(s) SubCutaneous every 12 hours  lactated ringers. 1000 milliLiter(s) (65 mL/Hr) IV Continuous <Continuous>  levothyroxine 50 MICROGram(s) Oral daily  multivitamin 1 Tablet(s) Oral daily  pantoprazole    Tablet 40 milliGRAM(s) Oral before breakfast  senna 2 Tablet(s) Oral at bedtime    MEDICATIONS  (PRN):      Allergies    No Known Allergies    Intolerances        ROS: Negative except per HPI.     SOCIAL HISTORY:  Smoking history   ETOH history    OBJECTIVE:    Vital Signs Last 24 Hrs  T(C): 36.8 (03 Jun 2025 05:03), Max: 38.1 (02 Jun 2025 11:46)  T(F): 98.3 (03 Jun 2025 05:03), Max: 100.5 (02 Jun 2025 11:46)  HR: 102 (03 Jun 2025 05:03) (102 - 115)  BP: 147/83 (03 Jun 2025 05:03) (147/83 - 163/96)  BP(mean): 112 (02 Jun 2025 18:15) (112 - 112)  RR: 18 (03 Jun 2025 05:03) (17 - 19)  SpO2: 94% (03 Jun 2025 05:03) (93% - 99%)    Parameters below as of 03 Jun 2025 05:03  Patient On (Oxygen Delivery Method): room air        I&O's Summary    02 Jun 2025 07:01  -  03 Jun 2025 07:00  --------------------------------------------------------  IN: 0 mL / OUT: 600 mL / NET: -600 mL        LABS:                        12.6   13.35 )-----------( 309      ( 03 Jun 2025 05:53 )             37.0     06-03    133[L]  |  96  |  19[H]  ----------------------------<  96  3.4[L]   |  30  |  0.93    Ca    8.8      03 Jun 2025 05:53  Phos  3.1     06-03  Mg     1.7     06-03    TPro  6.5  /  Alb  3.4[L]  /  TBili  1.0  /  DBili  x   /  AST  27  /  ALT  31  /  AlkPhos  64  06-03    PT/INR - ( 02 Jun 2025 10:00 )   PT: 10.7 sec;   INR: 0.91 ratio         PTT - ( 02 Jun 2025 10:00 )  PTT:31.2 sec  Urinalysis Basic - ( 03 Jun 2025 05:53 )    Color: x / Appearance: x / SG: x / pH: x  Gluc: 96 mg/dL / Ketone: x  / Bili: x / Urobili: x   Blood: x / Protein: x / Nitrite: x   Leuk Esterase: x / RBC: x / WBC x   Sq Epi: x / Non Sq Epi: x / Bacteria: x      CAPILLARY BLOOD GLUCOSE        LIVER FUNCTIONS - ( 03 Jun 2025 05:53 )  Alb: 3.4 g/dL / Pro: 6.5 g/dL / ALK PHOS: 64 U/L / ALT: 31 U/L DA / AST: 27 U/L / GGT: x             Cultures:      PHYSICAL EXAM:   General: AOx3, NAD.   HEENT: NC/AT. EOMI. Trachea midline.  Cardio: RR  Pulm: Normal chest rise and expansion. Non-labored breathing.  GI: Abdomen soft, nondistended, nontender. No rebound tenderness.   : Circumcised/ uncircumsised male. Testicles descended bilaterally, soft. No scrotal edema. Love   Extremities: Warm, dry with 3 sec cap refill. No edema or swelling noted b/l.  No overlying skin changes noted. No calf tenderness b/l.   Vascular:  Carotid pulse   Brachial   Radial  Femoral   Popliteal  PT   DP  Back: No CVAT b/l.     RADIOLOGY & ADDITIONAL STUDIES:    ASSESSMENT/PLAN:     PLAN     This note and it's recommendations are preliminary until co-signed by attending physician         Pt is an 82 yo F from [facility], with PMHx of cervical cancer, brain mass, HTN, HLD, hypothyroid, hx of SBO in April 2025 who presents to the ED for concerns of elevated HR, poor appetite, and weakness per NH paperwork. In ED, pt is confused, asking to leave, stating she does not know where she is and needs to go get food. AAOx1. Baseline unknown. Multiple attempts made to reach daughter Nory via phone with no answer. Initially in ED, pt was 91% on RA. However then found to be 97% on RA. Noted with persistent tachycardia with  HR >100. On exam, pt appears dehydrated, however no obvious signs of trauma noted. Denies any abdominal pain, n/v, headache, chest pain, SOB- however she is a poor historian and appears altered. EKG non ischemic. Admitted to medicine for AMS and Sepsis of Unknown Origin.         PAST MEDICAL & SURGICAL HISTORY:  Hypercholesterolemia      HTN (hypertension)      Hypothyroidism      Osteoarthritis      Anemia      Cervical cancer      Brain mass      No significant past surgical history          MEDICATIONS  (STANDING):  ascorbic acid 500 milliGRAM(s) Oral daily  aspirin  chewable 81 milliGRAM(s) Oral daily  atorvastatin 10 milliGRAM(s) Oral at bedtime  ferrous    sulfate 325 milliGRAM(s) Oral daily  heparin   Injectable 5000 Unit(s) SubCutaneous every 12 hours  lactated ringers. 1000 milliLiter(s) (65 mL/Hr) IV Continuous <Continuous>  levothyroxine 50 MICROGram(s) Oral daily  multivitamin 1 Tablet(s) Oral daily  pantoprazole    Tablet 40 milliGRAM(s) Oral before breakfast  senna 2 Tablet(s) Oral at bedtime    MEDICATIONS  (PRN):      Allergies    No Known Allergies    Intolerances        ROS: Negative except per HPI.     SOCIAL HISTORY:  Smoking history   ETOH history    OBJECTIVE:     Vital Signs Last 24 Hrs  T(C): 36.8 (03 Jun 2025 05:03), Max: 38.1 (02 Jun 2025 11:46)  T(F): 98.3 (03 Jun 2025 05:03), Max: 100.5 (02 Jun 2025 11:46)  HR: 102 (03 Jun 2025 05:03) (102 - 115)  BP: 147/83 (03 Jun 2025 05:03) (147/83 - 163/96)  BP(mean): 112 (02 Jun 2025 18:15) (112 - 112)  RR: 18 (03 Jun 2025 05:03) (17 - 19)  SpO2: 94% (03 Jun 2025 05:03) (93% - 99%)    Parameters below as of 03 Jun 2025 05:03  Patient On (Oxygen Delivery Method): room air        I&O's Summary    02 Jun 2025 07:01  -  03 Jun 2025 07:00  --------------------------------------------------------  IN: 0 mL / OUT: 600 mL / NET: -600 mL        LABS:                        12.6   13.35 )-----------( 309      ( 03 Jun 2025 05:53 )             37.0     06-03    133[L]  |  96  |  19[H]  ----------------------------<  96  3.4[L]   |  30  |  0.93    Ca    8.8      03 Jun 2025 05:53  Phos  3.1     06-03  Mg     1.7     06-03    TPro  6.5  /  Alb  3.4[L]  /  TBili  1.0  /  DBili  x   /  AST  27  /  ALT  31  /  AlkPhos  64  06-03    PT/INR - ( 02 Jun 2025 10:00 )   PT: 10.7 sec;   INR: 0.91 ratio         PTT - ( 02 Jun 2025 10:00 )  PTT:31.2 sec  Urinalysis Basic - ( 03 Jun 2025 05:53 )    Color: x / Appearance: x / SG: x / pH: x  Gluc: 96 mg/dL / Ketone: x  / Bili: x / Urobili: x   Blood: x / Protein: x / Nitrite: x   Leuk Esterase: x / RBC: x / WBC x   Sq Epi: x / Non Sq Epi: x / Bacteria: x    CAPILLARY BLOOD GLUCOSE      LIVER FUNCTIONS - ( 03 Jun 2025 05:53 )  Alb: 3.4 g/dL / Pro: 6.5 g/dL / ALK PHOS: 64 U/L / ALT: 31 U/L DA / AST: 27 U/L / GGT: x           PHYSICAL EXAM:   General: AOx3, NAD.   HEENT: NC/AT. EOMI. Trachea midline.  Cardio: RR  Pulm: Normal chest rise and expansion. Non-labored breathing.  GI: Abdomen soft, nondistended, nontender. No rebound tenderness.   Extremities: Warm, dry with 3 sec cap refill. No edema or swelling noted b/l.  No overlying skin changes noted. No calf tenderness b/l.   Back: No CVAT b/l.     RADIOLOGY & ADDITIONAL STUDIES:      CT Abdomen and Pelvis:  1. Large hiatal hernia with extension of the gas and fluid distended   cardia and fundus into the left hemithorax and gas and fluid distention   of the esophagus. Consider NG tube decompression to prevent reflux   aspiration.  2. Persistent dilatation of small bowel loops, suggestive of ileus.   Ischemic bowel or low level bowel obstruction is not entirely excluded.   Pt is an 82 yo F from [facility], with PMHx of cervical cancer, brain mass, HTN, HLD, hypothyroid, hx of SBO in April 2025 who presents to the ED for concerns of elevated HR, poor appetite, and weakness per NH paperwork. In ED, pt is confused, asking to leave, stating she does not know where she is and needs to go get food. AAOx1. Baseline unknown. Multiple attempts made to reach daughter Nory via phone with no answer. Initially in ED, pt was 91% on RA. However then found to be 97% on RA. Noted with persistent tachycardia with  HR >100. Surgery consulted for hiatal hernia reading on abdominal CT. On exam today, patient answers questions appropriately. Denies any abdominal pain, n/v, headache, chest pain, SOB.      PAST MEDICAL & SURGICAL HISTORY:  Hypercholesterolemia      HTN (hypertension)      Hypothyroidism      Osteoarthritis      Anemia      Cervical cancer      Brain mass      No significant past surgical history          MEDICATIONS  (STANDING):  ascorbic acid 500 milliGRAM(s) Oral daily  aspirin  chewable 81 milliGRAM(s) Oral daily  atorvastatin 10 milliGRAM(s) Oral at bedtime  ferrous    sulfate 325 milliGRAM(s) Oral daily  heparin   Injectable 5000 Unit(s) SubCutaneous every 12 hours  lactated ringers. 1000 milliLiter(s) (65 mL/Hr) IV Continuous <Continuous>  levothyroxine 50 MICROGram(s) Oral daily  multivitamin 1 Tablet(s) Oral daily  pantoprazole    Tablet 40 milliGRAM(s) Oral before breakfast  senna 2 Tablet(s) Oral at bedtime    MEDICATIONS  (PRN):      Allergies    No Known Allergies    Intolerances        ROS: Negative except per HPI.     SOCIAL HISTORY:  Smoking history   ETOH history    OBJECTIVE:     Vital Signs Last 24 Hrs  T(C): 36.8 (03 Jun 2025 05:03), Max: 38.1 (02 Jun 2025 11:46)  T(F): 98.3 (03 Jun 2025 05:03), Max: 100.5 (02 Jun 2025 11:46)  HR: 102 (03 Jun 2025 05:03) (102 - 115)  BP: 147/83 (03 Jun 2025 05:03) (147/83 - 163/96)  BP(mean): 112 (02 Jun 2025 18:15) (112 - 112)  RR: 18 (03 Jun 2025 05:03) (17 - 19)  SpO2: 94% (03 Jun 2025 05:03) (93% - 99%)    Parameters below as of 03 Jun 2025 05:03  Patient On (Oxygen Delivery Method): room air        I&O's Summary    02 Jun 2025 07:01  -  03 Jun 2025 07:00  --------------------------------------------------------  IN: 0 mL / OUT: 600 mL / NET: -600 mL        LABS:                        12.6   13.35 )-----------( 309      ( 03 Jun 2025 05:53 )             37.0     06-03    133[L]  |  96  |  19[H]  ----------------------------<  96  3.4[L]   |  30  |  0.93    Ca    8.8      03 Jun 2025 05:53  Phos  3.1     06-03  Mg     1.7     06-03    TPro  6.5  /  Alb  3.4[L]  /  TBili  1.0  /  DBili  x   /  AST  27  /  ALT  31  /  AlkPhos  64  06-03    PT/INR - ( 02 Jun 2025 10:00 )   PT: 10.7 sec;   INR: 0.91 ratio         PTT - ( 02 Jun 2025 10:00 )  PTT:31.2 sec  Urinalysis Basic - ( 03 Jun 2025 05:53 )    Color: x / Appearance: x / SG: x / pH: x  Gluc: 96 mg/dL / Ketone: x  / Bili: x / Urobili: x   Blood: x / Protein: x / Nitrite: x   Leuk Esterase: x / RBC: x / WBC x   Sq Epi: x / Non Sq Epi: x / Bacteria: x    CAPILLARY BLOOD GLUCOSE      LIVER FUNCTIONS - ( 03 Jun 2025 05:53 )  Alb: 3.4 g/dL / Pro: 6.5 g/dL / ALK PHOS: 64 U/L / ALT: 31 U/L DA / AST: 27 U/L / GGT: x           PHYSICAL EXAM:   General: AOx3, NAD.   HEENT: NC/AT. EOMI. Trachea midline.  Cardio: RR  Pulm: Normal chest rise and expansion. Non-labored breathing.  GI: Abdomen soft, nondistended, nontender. No rebound tenderness.   Extremities: Warm, dry with 3 sec cap refill. No edema or swelling noted b/l.  No overlying skin changes noted. No calf tenderness b/l.   Back: No CVAT b/l.     RADIOLOGY & ADDITIONAL STUDIES:      CT Abdomen and Pelvis:  1. Large hiatal hernia with extension of the gas and fluid distended   cardia and fundus into the left hemithorax and gas and fluid distention   of the esophagus. Consider NG tube decompression to prevent reflux   aspiration.  2. Persistent dilatation of small bowel loops, suggestive of ileus.   Ischemic bowel or low level bowel obstruction is not entirely excluded.

## 2025-06-03 NOTE — DIETITIAN INITIAL EVALUATION ADULT - ORAL INTAKE PTA/DIET HISTORY
Pt is from nursing home, A&Ox1 with confusion, unable to participate in nutritional interview. H/o brain mass, cervical cancer, HLD, HTN, hypothyroidism, OA, anemia; admitted for AMS and sepsis of unknown origin. Weight of 79 lbs obtained via bed scale; NFPE completed with results below. Weight hx in EMR reviewed indicating weight loss over ~1 year. No chewing/ swallowing issues or GI distress reported. Pt is currently NPO likely d/t AMS; decreased PO intake in noted PTA. NKFA.

## 2025-06-03 NOTE — DIETITIAN INITIAL EVALUATION ADULT - PERTINENT MEDS FT
MEDICATIONS  (STANDING):  ascorbic acid 500 milliGRAM(s) Oral daily  aspirin  chewable 81 milliGRAM(s) Oral daily  atorvastatin 10 milliGRAM(s) Oral at bedtime  ferrous    sulfate 325 milliGRAM(s) Oral daily  heparin   Injectable 5000 Unit(s) SubCutaneous every 12 hours  lactated ringers. 1000 milliLiter(s) (65 mL/Hr) IV Continuous <Continuous>  levothyroxine 50 MICROGram(s) Oral daily  multivitamin 1 Tablet(s) Oral daily  pantoprazole    Tablet 40 milliGRAM(s) Oral before breakfast  senna 2 Tablet(s) Oral at bedtime    MEDICATIONS  (PRN):

## 2025-06-03 NOTE — DIETITIAN INITIAL EVALUATION ADULT - NUTRITION DIAGNOSITC TERMINOLOGY #1
Last appointment: 12/30/2022    Next appointment: none    Pharmacy requesting 90 day supply for     levothyroxine (SYNTHROID) 88 MCG tablet [[id number]]     atenolol (TENORMIN) 50 MG tablet [[id number]]     amLODIPine-benazepril (LOTREL) 5-10 MG per capsule [[id number]]       Pharmacy   [address] [facility] [id number] [address] [phone] (Pharmacy)
Malnutrition...

## 2025-06-04 LAB
ALBUMIN SERPL ELPH-MCNC: 3.1 G/DL — LOW (ref 3.5–5)
ALP SERPL-CCNC: 64 U/L — SIGNIFICANT CHANGE UP (ref 40–120)
ALT FLD-CCNC: 25 U/L DA — SIGNIFICANT CHANGE UP (ref 10–60)
ANION GAP SERPL CALC-SCNC: 8 MMOL/L — SIGNIFICANT CHANGE UP (ref 5–17)
AST SERPL-CCNC: 22 U/L — SIGNIFICANT CHANGE UP (ref 10–40)
BILIRUB SERPL-MCNC: 0.9 MG/DL — SIGNIFICANT CHANGE UP (ref 0.2–1.2)
BUN SERPL-MCNC: 14 MG/DL — SIGNIFICANT CHANGE UP (ref 7–18)
CALCIUM SERPL-MCNC: 9.2 MG/DL — SIGNIFICANT CHANGE UP (ref 8.4–10.5)
CHLORIDE SERPL-SCNC: 97 MMOL/L — SIGNIFICANT CHANGE UP (ref 96–108)
CO2 SERPL-SCNC: 28 MMOL/L — SIGNIFICANT CHANGE UP (ref 22–31)
CREAT SERPL-MCNC: 0.81 MG/DL — SIGNIFICANT CHANGE UP (ref 0.5–1.3)
EGFR: 72 ML/MIN/1.73M2 — SIGNIFICANT CHANGE UP
EGFR: 72 ML/MIN/1.73M2 — SIGNIFICANT CHANGE UP
GLUCOSE SERPL-MCNC: 93 MG/DL — SIGNIFICANT CHANGE UP (ref 70–99)
HCT VFR BLD CALC: 38.9 % — SIGNIFICANT CHANGE UP (ref 34.5–45)
HGB BLD-MCNC: 13.2 G/DL — SIGNIFICANT CHANGE UP (ref 11.5–15.5)
MAGNESIUM SERPL-MCNC: 1.7 MG/DL — SIGNIFICANT CHANGE UP (ref 1.6–2.6)
MCHC RBC-ENTMCNC: 30.2 PG — SIGNIFICANT CHANGE UP (ref 27–34)
MCHC RBC-ENTMCNC: 33.9 G/DL — SIGNIFICANT CHANGE UP (ref 32–36)
MCV RBC AUTO: 89 FL — SIGNIFICANT CHANGE UP (ref 80–100)
NRBC BLD AUTO-RTO: 0 /100 WBCS — SIGNIFICANT CHANGE UP (ref 0–0)
PHOSPHATE SERPL-MCNC: 2.8 MG/DL — SIGNIFICANT CHANGE UP (ref 2.5–4.5)
PLATELET # BLD AUTO: 315 K/UL — SIGNIFICANT CHANGE UP (ref 150–400)
POTASSIUM SERPL-MCNC: 3.7 MMOL/L — SIGNIFICANT CHANGE UP (ref 3.5–5.3)
POTASSIUM SERPL-SCNC: 3.7 MMOL/L — SIGNIFICANT CHANGE UP (ref 3.5–5.3)
PROT SERPL-MCNC: 6.6 G/DL — SIGNIFICANT CHANGE UP (ref 6–8.3)
RBC # BLD: 4.37 M/UL — SIGNIFICANT CHANGE UP (ref 3.8–5.2)
RBC # FLD: 14.1 % — SIGNIFICANT CHANGE UP (ref 10.3–14.5)
SODIUM SERPL-SCNC: 133 MMOL/L — LOW (ref 135–145)
WBC # BLD: 5.62 K/UL — SIGNIFICANT CHANGE UP (ref 3.8–10.5)
WBC # FLD AUTO: 5.62 K/UL — SIGNIFICANT CHANGE UP (ref 3.8–10.5)

## 2025-06-04 RX ADMIN — Medication 40 MILLIGRAM(S): at 06:46

## 2025-06-04 RX ADMIN — Medication 2 TABLET(S): at 22:37

## 2025-06-04 RX ADMIN — Medication 81 MILLIGRAM(S): at 12:33

## 2025-06-04 RX ADMIN — Medication 1 TABLET(S): at 12:33

## 2025-06-04 RX ADMIN — HEPARIN SODIUM 5000 UNIT(S): 1000 INJECTION INTRAVENOUS; SUBCUTANEOUS at 05:24

## 2025-06-04 RX ADMIN — HEPARIN SODIUM 5000 UNIT(S): 1000 INJECTION INTRAVENOUS; SUBCUTANEOUS at 17:47

## 2025-06-04 RX ADMIN — Medication 325 MILLIGRAM(S): at 12:33

## 2025-06-04 RX ADMIN — Medication 500 MILLIGRAM(S): at 12:32

## 2025-06-04 RX ADMIN — ATORVASTATIN CALCIUM 10 MILLIGRAM(S): 80 TABLET, FILM COATED ORAL at 22:37

## 2025-06-04 RX ADMIN — Medication 50 MICROGRAM(S): at 05:24

## 2025-06-04 NOTE — SWALLOW BEDSIDE ASSESSMENT ADULT - SWALLOW EVAL: DIAGNOSIS
Pt p/w presbyphagia c/b slow but functional bolus formation, mild lingual stasis observed w/ PO trials. (soft & bite size) Multiple swallows requried to clear PO trials w/ soft & bite size, cleared w/ liquid wash. Slightly decreased hyolaryngeal elevation observed on palpation. No overt s/s of laryngeal penetration or aspiration at this exam. Pt p/w presbyphagia c/b slow but functional bolus formation, mild lingual stasis observed w/ PO trials. (soft & bite size) cleared w/ liquid wash. Slightly decreased hyolaryngeal elevation observed on palpation. No overt s/s of laryngeal penetration or aspiration at this exam.

## 2025-06-04 NOTE — SWALLOW BEDSIDE ASSESSMENT ADULT - SWALLOW EVAL: RECOMMENDED FEEDING/EATING TECHNIQUES
allow for swallow between intakes/alternate food with liquid/check mouth frequently for oral residue/pocketing/oral hygiene/position upright (90 degrees)
allow for swallow between intakes

## 2025-06-04 NOTE — SWALLOW BEDSIDE ASSESSMENT ADULT - COMMENTS
Pt is AA+Ox2, hard-of hearing. Pt was seated at the bedside at the time of eval. Ora motor mechanism examination WFL. During the eval pat expressed that she was hungry and demonstrated a strong interest in eating, frequently asking, "what else do i have to eat?" and stating that she wanted more food.
Pt is AA+Ox2, hard-of hearing, HOB elevated to 90°, daughter present.  CT Abd (+)Large hiatal hernia with extension of the gas and fluid distended cardia and fundus into the left hemithorax.(+) gas and fluid distention of the esophagus. Consider NG tube decompression to prevent reflux aspiration. Awaiting consultation with GI. PO trials limited to ice chips only at this visit.

## 2025-06-04 NOTE — PHYSICAL THERAPY INITIAL EVALUATION ADULT - PERTINENT HX OF CURRENT PROBLEM, REHAB EVAL
Pt is an 83F from [facility], with PMHx of cervical cancer, brain mass, HTN, HLD, hypothyroid, hx of SBO in April 2025 who presents to the ED for concerns of elevated HR, poor appetite, and weakness per NH paperwork. In ED, pt is confused, asking to leave, stating she does not know where she is and needs to go get food. AAOx1. Baseline unknown.  Admitted to medicine for AMS and Sepsis of Unknown Origin.

## 2025-06-04 NOTE — PHYSICAL THERAPY INITIAL EVALUATION ADULT - LEVEL OF CONSCIOUSNESS, REHAB EVAL
PT TO ULTRASOUND ROOM  SCANS COMPLETED BY RAZA ELLIOTT     HX TAKEN PROCEDURE EXPLAINED QUESTIONS ANSWERED.     PT CONSENTED AT 1219       DR. DENNIS   HERE, SCANNING COMPLETED.  PLATELETS = 296      PT= N/A    INR= N/A    TIME OUT TAKEN AT 1222    1224 CHLORO PREP  AS SKIN PREP STERILE DRAPE APPLIED;    LIDOCAINE 1% 10 MILLIGRAMS PER ML GIVEN FROM KIT  FOR ANESTHETIC AFFECT 10  ML. GIVEN INCISION MADE WITH SCALPEL.    1236 Thoracentesis drained  CATHETER PLACED RIGHT  lung     FLUID ASPIRATED FOR LABS YES     CATHETER HOOKED TO bag DRAINING SEROUS SANGUINOUS FLUID APPEARING COLORED  FLUID.    /64 HR 72 PO2 SAT 96%    BOTTLE #1 DRAINED FOR 1000 ML  AT 1245    SEE VITAL SIGN - FLOW SHEET.    1300 PCXR    1315 CXR RESULTS :  No pneumothorax     /56 HR 60 PO2 SAT 97%      PROCEDURE COMPLETE. PT RE SCANNED. DRAIN DC'D TOTAL AMOUNT DRAINED 1000 ML     PRESSURE TO SITE X 5 MIN AREA CLEANED GUAZE AND PAPER TAPE TO SITE  TO SITE  LARGE BAND AID TO SITE.     POST INSTRUCTIONS GIVEN VERBAL PROVIDED TO PT. PT DISCHARGED.       alert/confused

## 2025-06-04 NOTE — SWALLOW BEDSIDE ASSESSMENT ADULT - SWALLOW EVAL: PATIENT/FAMILY GOALS STATEMENT
Daughter wanted to know information about Pt's kidney function.
Not present at bedside at the time of eval

## 2025-06-04 NOTE — PROGRESS NOTE ADULT - PROBLEM SELECTOR PLAN 5
Per CTAP  GI consulted  BS heard  NG if becomes symptomatic  Surgery following  F/u speech Per CTAP  GI consulted  BS heard  NG if becomes symptomatic  Surgery following  Diet started  Normal BMs and tolerating diet

## 2025-06-04 NOTE — DISCHARGE NOTE PROVIDER - NSDCCAREPROVSEEN_GEN_ALL_CORE_FT
Sho, Brie Kirkpatrick, Iker Pagan, Elbert Salinas, Mehrdad Heredia, Maureen Corrales, Renetta Hickman, Karl Corley, Ortiz Cheema, Valdez Gonzalez, [facility]

## 2025-06-04 NOTE — PHYSICAL THERAPY INITIAL EVALUATION ADULT - LONG TERM MEMORY, REHAB EVAL
Spoke with patient. He states he is scheduled for CORONARY ANGIOGRAM/POSSIBLE PTCA - CV / AORTA BIFEMORAL ANGIOGRAM - CV on 6/20/22. He states he also had an EMG with Dr. Sanches and was advised to see a hand surgeon. He states he assumes the cardiac procedure takes precedence, but he states he was advised to contact his PCP for further advice. Agreed that it is likely most important to complete the cardiac procedure first, then proceed with the hand issues, but message would be routed to PCP for further advice. Please advise.      intact

## 2025-06-04 NOTE — SWALLOW BEDSIDE ASSESSMENT ADULT - SWALLOW EVAL: RECOMMENDED DIET
NPO except ice chips at this time.  Full Liquids when cleared by GI.
Soft & bite size w/ thin liquids

## 2025-06-04 NOTE — SWALLOW BEDSIDE ASSESSMENT ADULT - SLP PERTINENT HISTORY OF CURRENT PROBLEM
83F from [facility], with PMHx of cervical cancer, brain mass, HTN, HLD, hypothyroid, hx of SBO in April 2025 who presents to the ED for concerns of elevated HR, poor appetite, and weakness per NH paperwork. In ED, pt is confused, asking to leave, stating she does not know where she is and needs to go get food. AAOx1. Baseline unknown.  Admitted to medicine for AMS and Sepsis of Unknown Origin.
83F from [facility], with PMHx of cervical cancer, brain mass, HTN, HLD, hypothyroid, hx of SBO in April 2025 who presents to the ED for concerns of elevated HR, poor appetite, and weakness per NH paperwork. In ED, pt is confused, asking to leave, stating she does not know where she is and needs to go get food. AAOx1. Baseline unknown.  Admitted to medicine for AMS and Sepsis of Unknown Origin.

## 2025-06-04 NOTE — PROGRESS NOTE ADULT - SUBJECTIVE AND OBJECTIVE BOX
INTERVAL HPI/OVERNIGHT EVENTS:  Pt resting comfortably. No acute complaints. Endores having flatus, but no BM yesterday. denies n/v/d, fever, chills    MEDICATIONS  (STANDING):  ascorbic acid 500 milliGRAM(s) Oral daily  aspirin  chewable 81 milliGRAM(s) Oral daily  atorvastatin 10 milliGRAM(s) Oral at bedtime  ferrous    sulfate 325 milliGRAM(s) Oral daily  heparin   Injectable 5000 Unit(s) SubCutaneous every 12 hours  lactated ringers. 1000 milliLiter(s) (65 mL/Hr) IV Continuous <Continuous>  levothyroxine 50 MICROGram(s) Oral daily  multivitamin 1 Tablet(s) Oral daily  pantoprazole    Tablet 40 milliGRAM(s) Oral before breakfast  senna 2 Tablet(s) Oral at bedtime    MEDICATIONS  (PRN):      Vital Signs Last 24 Hrs  T(C): 36.6 (04 Jun 2025 05:37), Max: 36.7 (03 Jun 2025 12:05)  T(F): 97.8 (04 Jun 2025 05:37), Max: 98.1 (03 Jun 2025 12:05)  HR: 94 (04 Jun 2025 05:37) (90 - 102)  BP: 141/93 (04 Jun 2025 05:37) (107/61 - 164/81)  BP(mean): --  RR: 18 (04 Jun 2025 05:37) (17 - 18)  SpO2: 95% (04 Jun 2025 05:37) (95% - 99%)    Parameters below as of 04 Jun 2025 05:37  Patient On (Oxygen Delivery Method): room air        Physical exam  CONSTITUTIONAL: Well groomed, no apparent distress  RESP: No respiratory distress, no use of accessory muscles  GI: Soft, NT, ND, no rebound, no guarding; no palpable masses.        I&O's Detail      LABS:                        13.2   5.62  )-----------( 315      ( 04 Jun 2025 04:28 )             38.9             06-04    133[L]  |  97  |  14  ----------------------------<  93  3.7   |  28  |  0.81    Ca    9.2      04 Jun 2025 04:28  Phos  2.8     06-04  Mg     1.7     06-04    TPro  6.6  /  Alb  3.1[L]  /  TBili  0.9  /  DBili  x   /  AST  22  /  ALT  25  /  AlkPhos  64  06-04      84y.o. Female

## 2025-06-04 NOTE — SWALLOW BEDSIDE ASSESSMENT ADULT - SPECIFY REASON(S)
Subjective assessment of current swallow function
Subjective assessment of current speech and swallow function.

## 2025-06-04 NOTE — DISCHARGE NOTE PROVIDER - NSDCCPCAREPLAN_GEN_ALL_CORE_FT
PRINCIPAL DISCHARGE DIAGNOSIS  Diagnosis: Altered mental status  Assessment and Plan of Treatment: You presented to the hospital with confusion. Your ammonia was normal. your CT head was unremarkable for acute changes. You were admitted to the hospital due to altered mental status. It was learned from your daughter that your mental status waxes and wanes due to history of traumatic brain injury and brain mass. Your mental status gradually improved back to baseline. Your SIRS and systemic illness, as below, also contributed to your change in mental status. Please follow up with PCP within 1 week for further evaluation and care      SECONDARY DISCHARGE DIAGNOSES  Diagnosis: SIRS of non-infectious origin w/o acute organ dysfunction  Assessment and Plan of Treatment: You had SRIS, a syndrome of fast heart rate and your high white blood cell count. Your chest X ray and CT did not show source of infection. Your urinalysis and blood cultures also did not show source of infection. You gradaully imporved back to baselinewith IV fluids and without antibiotics.Please follow up with PCP within 1 week for further evaluation and care    Diagnosis: ROXI (acute kidney injury)  Assessment and Plan of Treatment: You had temporary kidney injury with Creatinine of 1.36, baseline 0.99. You gradually imrpoved with IV fluids    Diagnosis: Ileus  Assessment and Plan of Treatment: Your abdominal CT showed signs of ileus, meaning a slowed or stopped movement of the intestines, as well as a hiatal hernia, meaning an upward protrusion of the intestine. Surgery and GI were consulted. You were found to be asymptomatic ,tolerating diet, and having normal bowel movements. You did not require intervention. Please follow up with PCP within 1 week for further evaluation and care    Diagnosis: Hiatal hernia  Assessment and Plan of Treatment: As above    Diagnosis: Hypothyroidism  Assessment and Plan of Treatment: This is one of your chronic comorbidities. Please continue your same home mediations, unchanged     PRINCIPAL DISCHARGE DIAGNOSIS  Diagnosis: Altered mental status  Assessment and Plan of Treatment: You presented to the hospital with confusion. Your ammonia was normal. your CT head was unremarkable for acute changes. You were admitted to the hospital due to altered mental status. It was learned from your daughter that your mental status waxes and wanes due to history of traumatic brain injury and brain mass. Your mental status gradually improved back to baseline. Your SIRS and systemic illness, as below, also contributed to your change in mental status. Please follow up with PCP within 1 week for further evaluation and care      SECONDARY DISCHARGE DIAGNOSES  Diagnosis: SIRS of non-infectious origin w/o acute organ dysfunction  Assessment and Plan of Treatment: You had SRIS, a syndrome of fast heart rate and your high white blood cell count. Your chest X ray and CT did not show source of infection. Your urinalysis and blood cultures also did not show source of infection. You gradaully imporved back to baselinewith IV fluids and without antibiotics.Please follow up with PCP within 1 week for further evaluation and care    Diagnosis: ROXI (acute kidney injury)  Assessment and Plan of Treatment: You had temporary kidney injury with Creatinine of 1.36, baseline 0.99. You gradually imrpoved with IV fluids    Diagnosis: Ileus  Assessment and Plan of Treatment: Your abdominal CT showed signs of ileus, meaning a slowed or stopped movement of the intestines, as well as a hiatal hernia, meaning an upward protrusion of the intestine. Surgery and GI were consulted. You were found to be asymptomatic ,tolerating diet, and having normal bowel movements. You did not require intervention. Please follow up with PCP within 1 week for further evaluation and care    Diagnosis: Hiatal hernia  Assessment and Plan of Treatment: As above    Diagnosis: Hypothyroidism  Assessment and Plan of Treatment: This is one of your chronic comorbidities. Please continue your same home mediations, unchanged    Diagnosis: Hypertension  Assessment and Plan of Treatment: This is one of your chronic comorbidities. Please continue your same home meidcations, amlodipine and enalipirl, unchanged. Your blood pressure target is 120-140/80-90, maintain healthy lifestyle, low salt diet, avoid fatty food, weight loss, exercise regularly or stay active as tolerated 30 mins X 3 times per week. Notify your doctor if you have any of the following symptoms:  (Dizziness, Lightheadedness, Blurry vision, Headache, Chest pain, Shortness of breath.) Please continue taking your home medications and follow-up with your PCP in 1 week from discharge to adjust medications as needed.

## 2025-06-04 NOTE — DISCHARGE NOTE PROVIDER - NSDCFUADDAPPT_GEN_ALL_CORE_FT
APPTS ARE READY TO BE MADE: [X] YES    Best Family or Patient Contact (if needed):    Additional Information about above appointments (if needed):    1: PCP  2: GI  3:     Other comments or requests:   APPTS ARE READY TO BE MADE: [X] YES    Best Family or Patient Contact (if needed):    Additional Information about above appointments (if needed):    1: PCP  2: GI  3:     Other comments or requests:    Provided patient with provider referral information, however patient prefers to schedule the appointments on their own. Spoke with patients jannette Cueto, states no scheduling assistance is needed. Follow up care is to be coordinated by assisted living facility.

## 2025-06-04 NOTE — PHYSICAL THERAPY INITIAL EVALUATION ADULT - GENERAL OBSERVATIONS, REHAB EVAL
Patient received sitting in bed, alert and able to make needs known but very [place], visually impaired, saline lock intact, Premafit in situ; cooperative to PT evaluation

## 2025-06-04 NOTE — PROGRESS NOTE ADULT - PROBLEM SELECTOR PLAN 6
per CTAP  Surgery consulted; no intervention planned  F/u speech per CTAP  Surgery consulted; no intervention planned  As above

## 2025-06-04 NOTE — DISCHARGE NOTE PROVIDER - DETAILS OF MALNUTRITION DIAGNOSIS/DIAGNOSES
This patient has been assessed with a concern for Malnutrition and was treated during this hospitalization for the following Nutrition diagnosis/diagnoses:     -  06/03/2025: Severe protein-calorie malnutrition   -  06/03/2025: Underweight (BMI < 19)

## 2025-06-04 NOTE — DISCHARGE NOTE PROVIDER - NSFTFHOMEHTHYNRD_GEN_ALL_CORE
Department of Surgery - Adult  General Surgery  Dr. Hemal Flores's Progress Note      SUBJECTIVE: The patient has had discharge from the incision. OBJECTIVE      Physical    VITALS:  BP (!) 116/59   Pulse 70   Temp 98.4 °F (36.9 °C) (Oral)   Resp 16   Ht 5' (1.524 m)   Wt 117 lb 12.8 oz (53.4 kg)   SpO2 93%   BMI 23.01 kg/m²   INTAKE/OUTPUT:      Intake/Output Summary (Last 24 hours) at 2020 0738  Last data filed at 2020 0523  Gross per 24 hour   Intake 1538 ml   Output 855 ml   Net 683 ml     TEMPERATURE:  Current - Temp: 98.4 °F (36.9 °C); Max - Temp  Av °F (35.6 °C)  Min: 81.1 °F (27.3 °C)  Max: 98.5 °F (36.9 °C)  RESPIRATIONS RANGE: Resp  Av.7  Min: 16  Max: 20  PULSE RANGE: Pulse  Av.2  Min: 53  Max: 80  BLOOD PRESSURE RANGE:  Systolic (15ADX), AJX:934 , Min:57 , JSM:500   ; Diastolic (81THK), YRQ:13, Min:29, Max:95    PULSE OXIMETRY RANGE: SpO2  Av.9 %  Min: 93 %  Max: 100 %  CONSTITUTIONAL:  awake, alert, cooperative, no apparent distress, and appears stated age  ABDOMEN: On examination: The patient does have a small open area in the distal apex of the wound. The area has bilious discharge.   Data  CBC with Differential:    Lab Results   Component Value Date    WBC 5.1 2020    RBC 3.41 2020    HGB 11.1 2020    HCT 34.6 2020     2020    .5 2020    MCH 32.6 2020    MCHC 32.1 2020    RDW 14.3 2020    LYMPHOPCT 41.2 2020    MONOPCT 7.7 2020    BASOPCT 1.0 2020    MONOSABS 0.39 2020    LYMPHSABS 2.09 2020    EOSABS 0.13 2020    BASOSABS 0.05 2020     CMP:    Lab Results   Component Value Date     2020    K 4.1 2020     2020    CO2 22 2020    BUN 18 2020    CREATININE 1.1 2020    GFRAA 58 2020    LABGLOM 48 2020    GLUCOSE 89 2020    PROT 5.4 2020    LABALBU 2.8 2020    CALCIUM 8.9 09/12/2020    BILITOT <0.2 06/29/2020    ALKPHOS 90 06/29/2020    AST 28 06/29/2020    ALT 18 06/29/2020     BMP:  Hepatic Function Panel:  Ionized Calcium:  No results found for: IONCA  Magnesium:    Lab Results   Component Value Date    MG 1.5 05/11/2020     Phosphorus:    Lab Results   Component Value Date    PHOS 3.1 09/12/2020       Current Inpatient Medications    Current Facility-Administered Medications: dicyclomine (BENTYL) capsule 10 mg, 10 mg, Oral, BID  dilTIAZem (CARDIZEM CD) extended release capsule 120 mg, 120 mg, Oral, Daily  escitalopram (LEXAPRO) tablet 20 mg, 20 mg, Oral, Nightly  pantoprazole (PROTONIX) injection 40 mg, 40 mg, Intravenous, QAM AC **AND** sodium chloride (PF) 0.9 % injection 10 mL, 10 mL, Intravenous, Daily  ceFAZolin (ANCEF) 1 g in sterile water 10 mL IV syringe, 1 g, Intravenous, Q8H  primidone (MYSOLINE) tablet 250 mg, 250 mg, Oral, Nightly  propranolol (INDERAL) tablet 20 mg, 20 mg, Oral, Daily  traZODone (DESYREL) tablet 25 mg, 25 mg, Oral, Nightly PRN  vitamin D (CHOLECALCIFEROL) tablet 2,000 Units, 2,000 Units, Oral, BID  sodium chloride flush 0.9 % injection 10 mL, 10 mL, Intravenous, 2 times per day  sodium chloride flush 0.9 % injection 10 mL, 10 mL, Intravenous, PRN  enoxaparin (LOVENOX) injection 30 mg, 30 mg, Subcutaneous, Daily  morphine (PF) injection 2 mg, 2 mg, Intravenous, Q2H PRN  dextrose 5 % and 0.45 % NaCl with KCl 20 mEq infusion, , Intravenous, Continuous  ondansetron (ZOFRAN) injection 4 mg, 4 mg, Intravenous, Q6H PRN  acetaminophen (TYLENOL) tablet 650 mg, 650 mg, Oral, Q4H PRN    ASSESSMENT:    66 y.o. female status post mesh explantation with enterotomy and closure post op day #  1    PLAN:    Clear liquid diet nothing carbonated  Ostomy bag over the area  Likely surgical correction  Juliann West 9/12/20207:38 AM Yes

## 2025-06-04 NOTE — SWALLOW BEDSIDE ASSESSMENT ADULT - SLP PRECAUTIONS/LIMITATIONS: HEARING
[place], benefitted from increased volume to improve comprehension./impaired
benefitted from increased volume/impaired

## 2025-06-04 NOTE — PROGRESS NOTE ADULT - SUBJECTIVE AND OBJECTIVE BOX
PGY-1 Progress Note discussed with attending      PLEASE CONTACT ON CALL TEAM:  - On Call Team (Please refer to Alissa) FROM 5:00 PM - 8:30PM  - Nightfloat Team FROM 8:30 -7:30 AM    INTERVAL HPI/OVERNIGHT EVENTS:     No acute overnight events. Pt evaluated at bedside. Pt has no new complaints.      MEDICATIONS  (STANDING):  ascorbic acid 500 milliGRAM(s) Oral daily  aspirin  chewable 81 milliGRAM(s) Oral daily  atorvastatin 10 milliGRAM(s) Oral at bedtime  ferrous    sulfate 325 milliGRAM(s) Oral daily  heparin   Injectable 5000 Unit(s) SubCutaneous every 12 hours  lactated ringers. 1000 milliLiter(s) (65 mL/Hr) IV Continuous <Continuous>  levothyroxine 50 MICROGram(s) Oral daily  multivitamin 1 Tablet(s) Oral daily  pantoprazole    Tablet 40 milliGRAM(s) Oral before breakfast  senna 2 Tablet(s) Oral at bedtime    MEDICATIONS  (PRN):      Vital Signs Last 24 Hrs  T(C): 36.6 (04 Jun 2025 05:37), Max: 36.7 (03 Jun 2025 12:05)  T(F): 97.8 (04 Jun 2025 05:37), Max: 98.1 (03 Jun 2025 12:05)  HR: 94 (04 Jun 2025 05:37) (90 - 102)  BP: 141/93 (04 Jun 2025 05:37) (107/61 - 164/81)  BP(mean): --  RR: 18 (04 Jun 2025 05:37) (17 - 18)  SpO2: 95% (04 Jun 2025 05:37) (95% - 99%)    Parameters below as of 04 Jun 2025 05:37  Patient On (Oxygen Delivery Method): room air        PHYSICAL EXAMINATION:  GENERAL: NAD  HEAD:  Atraumatic, Normocephalic  EYES:  conjunctiva and sclera clear  NECK: Supple  CHEST/LUNG: Clear to auscultation  HEART: Regular rate and rhythm; No murmurs, rubs, or gallops  ABDOMEN: Soft, Nontender, Bowel sounds present, no masses on palpation  NERVOUS SYSTEM:  Alert and oriented  EXTREMITIES:  No BLE edema  SKIN: warm, dry                          13.2   5.62  )-----------( 315      ( 04 Jun 2025 04:28 )             38.9     06-04    133[L]  |  97  |  14  ----------------------------<  93  3.7   |  28  |  0.81    Ca    9.2      04 Jun 2025 04:28  Phos  2.8     06-04  Mg     1.7     06-04    TPro  6.6  /  Alb  3.1[L]  /  TBili  0.9  /  DBili  x   /  AST  22  /  ALT  25  /  AlkPhos  64  06-04    LIVER FUNCTIONS - ( 04 Jun 2025 04:28 )  Alb: 3.1 g/dL / Pro: 6.6 g/dL / ALK PHOS: 64 U/L / ALT: 25 U/L DA / AST: 22 U/L / GGT: x               PT/INR - ( 02 Jun 2025 10:00 )   PT: 10.7 sec;   INR: 0.91 ratio         PTT - ( 02 Jun 2025 10:00 )  PTT:31.2 sec    I&O's Summary        Urinalysis with Rflx Culture (collected 02 Jun 2025 16:08)    Culture - Blood (collected 02 Jun 2025 13:50)  Source: Blood Blood  Preliminary Report (03 Jun 2025 20:01):    No growth at 24 hours    Culture - Blood (collected 02 Jun 2025 13:45)  Source: Blood Blood  Preliminary Report (03 Jun 2025 20:01):    No growth at 24 hours     PGY-1 Progress Note discussed with attending      PLEASE CONTACT ON CALL TEAM:  - On Call Team (Please refer to Alissa) FROM 5:00 PM - 8:30PM  - Nightfloat Team FROM 8:30 -7:30 AM    INTERVAL HPI/OVERNIGHT EVENTS:     No acute overnight events. Pt evaluated at bedside. Pt has no new complaints. Tolerating diet and normal bowel movments      MEDICATIONS  (STANDING):  ascorbic acid 500 milliGRAM(s) Oral daily  aspirin  chewable 81 milliGRAM(s) Oral daily  atorvastatin 10 milliGRAM(s) Oral at bedtime  ferrous    sulfate 325 milliGRAM(s) Oral daily  heparin   Injectable 5000 Unit(s) SubCutaneous every 12 hours  lactated ringers. 1000 milliLiter(s) (65 mL/Hr) IV Continuous <Continuous>  levothyroxine 50 MICROGram(s) Oral daily  multivitamin 1 Tablet(s) Oral daily  pantoprazole    Tablet 40 milliGRAM(s) Oral before breakfast  senna 2 Tablet(s) Oral at bedtime    MEDICATIONS  (PRN):      Vital Signs Last 24 Hrs  T(C): 36.6 (04 Jun 2025 05:37), Max: 36.7 (03 Jun 2025 12:05)  T(F): 97.8 (04 Jun 2025 05:37), Max: 98.1 (03 Jun 2025 12:05)  HR: 94 (04 Jun 2025 05:37) (90 - 102)  BP: 141/93 (04 Jun 2025 05:37) (107/61 - 164/81)  BP(mean): --  RR: 18 (04 Jun 2025 05:37) (17 - 18)  SpO2: 95% (04 Jun 2025 05:37) (95% - 99%)    Parameters below as of 04 Jun 2025 05:37  Patient On (Oxygen Delivery Method): room air        PHYSICAL EXAMINATION:  GENERAL: NAD  HEAD:  Atraumatic, Normocephalic  EYES:  conjunctiva and sclera clear  NECK: Supple  CHEST/LUNG: Clear to auscultation  HEART: Regular rate and rhythm; No murmurs, rubs, or gallops  ABDOMEN: Soft, Nontender, Bowel sounds present, no masses on palpation  NERVOUS SYSTEM:  Alert and oriented x 2  EXTREMITIES:  No BLE edema   SKIN: warm, dry                          13.2   5.62  )-----------( 315      ( 04 Jun 2025 04:28 )             38.9     06-04    133[L]  |  97  |  14  ----------------------------<  93  3.7   |  28  |  0.81    Ca    9.2      04 Jun 2025 04:28  Phos  2.8     06-04  Mg     1.7     06-04    TPro  6.6  /  Alb  3.1[L]  /  TBili  0.9  /  DBili  x   /  AST  22  /  ALT  25  /  AlkPhos  64  06-04    LIVER FUNCTIONS - ( 04 Jun 2025 04:28 )  Alb: 3.1 g/dL / Pro: 6.6 g/dL / ALK PHOS: 64 U/L / ALT: 25 U/L DA / AST: 22 U/L / GGT: x               PT/INR - ( 02 Jun 2025 10:00 )   PT: 10.7 sec;   INR: 0.91 ratio         PTT - ( 02 Jun 2025 10:00 )  PTT:31.2 sec    I&O's Summary        Urinalysis with Rflx Culture (collected 02 Jun 2025 16:08)    Culture - Blood (collected 02 Jun 2025 13:50)  Source: Blood Blood  Preliminary Report (03 Jun 2025 20:01):    No growth at 24 hours    Culture - Blood (collected 02 Jun 2025 13:45)  Source: Blood Blood  Preliminary Report (03 Jun 2025 20:01):    No growth at 24 hours

## 2025-06-04 NOTE — DISCHARGE NOTE PROVIDER - HOSPITAL COURSE
Pt is an 83F from [facility], with PMHx of cervical cancer, brain mass, HTN, HLD, hypothyroid, hx of SBO in April 2025 who presents to the ED for concerns of elevated HR, poor appetite, and weakness per NH paperwork. In ED, pt is confused, asking to leave, stating she does not know where she is and needs to go get food. AAOx1. Baseline unknown. Multiple attempts made to reach daughter Nory via phone with no answer. Initially in ED, pt was 91% on RA. However then found to be 97% on RA. Noted with persistent tachycardia with  HR >100. On exam, pt appears dehydrated, however no obvious signs of trauma noted. Denies any abdominal pain, n/v, headache, chest pain, SOB- however she is a poor historian and appears altered. EKG non ischemic. Admitted to medicine for AMS and SIRS    ·  Problem: Altered mental status.   Presented with AMS, AAOx1-2 in ED, appeared confused and unable to state where she is or why she is here. Ammonia level wnl. VSS- 99% on 2 L NC, temp 36.8 C, , /93. CTH no acute changes. AMS was secondary to metabolic encephalopathy. Pt's mental status gradually improved.    ·  Problem: SIRS.   Presented with weakness, tachycardia, and dec PO intake. VSS- 99% on 2 L NC, temp 36.8 C, , /93. WBC 12. s/p CTX and 1.5 L IVF in ED. CXR showed Left hilar segmental atelectasis. CT chest and CTAP did not show source of infection. CTH showed no acute changes. Viral panel ,urine culture, blood cultures negative. No infectious source identified. Pt gradually improved.    ·  Problem: ROXI (acute kidney injury).   BUN/Cr elevated to 30/1.36. baseline noted Scr 0.99. Gradually improved with IVF.    ·  Problem: Ileus; Hernia  Noted per CT: Large hiatal hernia with extension of the gas and fluid distended cardia and fundus into the left hemithorax and gas and fluid distention of the esophagus; Persistent dilatation of small bowel loops, suggestive of ileus. GI and surgery were consulted. No intervention while inpatient. Pt tolerated diet and had bowel movements.    ·  Problem: Hypothyroidism.   TSH unremarkable. Continued home med levothyroxine.    ·  Problem: Brain mass.   Pt with hx of brain mass. CTH: no acute change.    Given patient's improved clinical status and current hemodynamic stability, decision was made to discharge. Discussed with attending. Pt is an 83F from [facility], with PMHx of cervical cancer, brain mass, HTN, HLD, hypothyroid, hx of SBO in April 2025 who presents to the ED for concerns of elevated HR, poor appetite, and weakness per NH paperwork. In ED, pt is confused, asking to leave, stating she does not know where she is and needs to go get food. AAOx1. Baseline unknown. Multiple attempts made to reach daughter Nory via phone with no answer. Initially in ED, pt was 91% on RA. However then found to be 97% on RA. Noted with persistent tachycardia with  HR >100. On exam, pt appears dehydrated, however no obvious signs of trauma noted. Denies any abdominal pain, n/v, headache, chest pain, SOB- however she is a poor historian and appears altered. EKG non ischemic. Admitted to medicine for AMS and SIRS    ·  Problem: Altered mental status.   Presented with AMS, AAOx1-2 in ED, appeared confused and unable to state where she is or why she is here. Ammonia level wnl. VSS- 99% on 2 L NC, temp 36.8 C, , /93. CTH no acute changes. AMS was secondary to metabolic encephalopathy. It was learned from pt's daughter that mental status waxes and wanes due to history of traumatic brain injury. Pt's mental status gradually improved back to baseline.    ·  Problem: SIRS.   Presented with weakness, tachycardia, and dec PO intake. VSS- 99% on 2 L NC, temp 36.8 C, , /93. WBC 12. s/p CTX and 1.5 L IVF in ED. CXR showed Left hilar segmental atelectasis. CT chest and CTAP did not show source of infection. CTH showed no acute changes. Viral panel ,urine culture, blood cultures negative. No infectious source identified. Pt gradually improved.    ·  Problem: ROXI (acute kidney injury).   BUN/Cr elevated to 30/1.36. baseline noted Scr 0.99. Gradually improved with IVF.    ·  Problem: Ileus; Hernia  Noted per CT: Large hiatal hernia with extension of the gas and fluid distended cardia and fundus into the left hemithorax and gas and fluid distention of the esophagus; Persistent dilatation of small bowel loops, suggestive of ileus. GI and surgery were consulted. No intervention while inpatient. Pt tolerated diet and had bowel movements.    ·  Problem: Hypothyroidism.   TSH unremarkable. Continued home med levothyroxine.    ·  Problem: Brain mass.   Pt with hx of brain mass. CTH: no acute change.    Given patient's improved clinical status and current hemodynamic stability, decision was made to discharge. Discussed with attending.   Pt is an 83F from [facility], with PMHx of cervical cancer, brain mass, HTN, HLD, hypothyroid, hx of SBO in April 2025 who presents to the ED for concerns of elevated HR, poor appetite, and weakness per NH paperwork. In ED, pt is confused, asking to leave, stating she does not know where she is and needs to go get food. AAOx1. Baseline unknown. Multiple attempts made to reach daughter Nory via phone with no answer. Initially in ED, pt was 91% on RA. However then found to be 97% on RA. Noted with persistent tachycardia with  HR >100. On exam, pt appears dehydrated, however no obvious signs of trauma noted. Denies any abdominal pain, n/v, headache, chest pain, SOB- however she is a poor historian and appears altered. EKG non ischemic. Admitted to medicine for AMS and SIRS    ·  Problem: Altered mental status.   Presented with AMS, AAOx1-2 in ED, appeared confused and unable to state where she is or why she is here. Ammonia level wnl. VSS- 99% on 2 L NC, temp 36.8 C, , /93. CTH no acute changes. AMS was secondary to metabolic encephalopathy as below. It was learned from pt's daughter that mental status waxes and wanes due to history of traumatic brain injury. Pt's mental status gradually improved back to baseline.    ·  Problem: SIRS.   Presented with weakness, tachycardia, and dec PO intake. VSS- 99% on 2 L NC, temp 36.8 C, , /93. WBC 12. s/p CTX and 1.5 L IVF in ED. CXR showed Left hilar segmental atelectasis. CT chest and CTAP did not show source of infection. CTH showed no acute changes. Viral panel ,urine culture, blood cultures negative. No infectious source identified. Pt gradually improved.    ·  Problem: ROXI (acute kidney injury).   BUN/Cr elevated to 30/1.36. baseline noted Scr 0.99. Gradually improved with IVF.    ·  Problem: Ileus; Hernia  Noted per CT: Large hiatal hernia with extension of the gas and fluid distended cardia and fundus into the left hemithorax and gas and fluid distention of the esophagus; Persistent dilatation of small bowel loops, suggestive of ileus. GI and surgery were consulted. No intervention while inpatient. Pt tolerated diet and had bowel movements.    ·  Problem: Hypothyroidism.   TSH unremarkable. Continued home med levothyroxine.    ·  Problem: Brain mass.   Pt with hx of brain mass. CTH: no acute change.    Given patient's improved clinical status and current hemodynamic stability, decision was made to discharge. Discussed with attending.

## 2025-06-04 NOTE — DISCHARGE NOTE PROVIDER - NSFOLLOWUPCLINICS_GEN_ALL_ED_FT
[facility]  Gastroenterology  [address] [place] [address]  Phone: [phone]  Fax: [phone]

## 2025-06-04 NOTE — PHYSICAL THERAPY INITIAL EVALUATION ADULT - ADDITIONAL COMMENTS
Patient reports ambulatory at [facility] using SC, goes to [facility] gym everyday to exercise, Patient reports ambulatory at [facility] using [facility], goes to [facility] gym everyday to exercise, denies falls

## 2025-06-05 VITALS
SYSTOLIC BLOOD PRESSURE: 141 MMHG | DIASTOLIC BLOOD PRESSURE: 78 MMHG | OXYGEN SATURATION: 96 % | RESPIRATION RATE: 18 BRPM | HEART RATE: 85 BPM

## 2025-06-05 LAB
ALBUMIN SERPL ELPH-MCNC: 3 G/DL — LOW (ref 3.5–5)
ALP SERPL-CCNC: 60 U/L — SIGNIFICANT CHANGE UP (ref 40–120)
ALT FLD-CCNC: 22 U/L DA — SIGNIFICANT CHANGE UP (ref 10–60)
ANION GAP SERPL CALC-SCNC: 3 MMOL/L — LOW (ref 5–17)
AST SERPL-CCNC: 19 U/L — SIGNIFICANT CHANGE UP (ref 10–40)
BILIRUB SERPL-MCNC: 0.7 MG/DL — SIGNIFICANT CHANGE UP (ref 0.2–1.2)
BUN SERPL-MCNC: 20 MG/DL — HIGH (ref 7–18)
CALCIUM SERPL-MCNC: 8.6 MG/DL — SIGNIFICANT CHANGE UP (ref 8.4–10.5)
CHLORIDE SERPL-SCNC: 98 MMOL/L — SIGNIFICANT CHANGE UP (ref 96–108)
CO2 SERPL-SCNC: 32 MMOL/L — HIGH (ref 22–31)
CREAT SERPL-MCNC: 0.98 MG/DL — SIGNIFICANT CHANGE UP (ref 0.5–1.3)
EGFR: 57 ML/MIN/1.73M2 — LOW
EGFR: 57 ML/MIN/1.73M2 — LOW
GLUCOSE SERPL-MCNC: 90 MG/DL — SIGNIFICANT CHANGE UP (ref 70–99)
HCT VFR BLD CALC: 35.5 % — SIGNIFICANT CHANGE UP (ref 34.5–45)
HGB BLD-MCNC: 11.9 G/DL — SIGNIFICANT CHANGE UP (ref 11.5–15.5)
MAGNESIUM SERPL-MCNC: 1.6 MG/DL — SIGNIFICANT CHANGE UP (ref 1.6–2.6)
MCHC RBC-ENTMCNC: 30.3 PG — SIGNIFICANT CHANGE UP (ref 27–34)
MCHC RBC-ENTMCNC: 33.5 G/DL — SIGNIFICANT CHANGE UP (ref 32–36)
MCV RBC AUTO: 90.3 FL — SIGNIFICANT CHANGE UP (ref 80–100)
NRBC BLD AUTO-RTO: 0 /100 WBCS — SIGNIFICANT CHANGE UP (ref 0–0)
PHOSPHATE SERPL-MCNC: 3.5 MG/DL — SIGNIFICANT CHANGE UP (ref 2.5–4.5)
PLATELET # BLD AUTO: 293 K/UL — SIGNIFICANT CHANGE UP (ref 150–400)
POTASSIUM SERPL-MCNC: 3.3 MMOL/L — LOW (ref 3.5–5.3)
POTASSIUM SERPL-SCNC: 3.3 MMOL/L — LOW (ref 3.5–5.3)
PROT SERPL-MCNC: 6.3 G/DL — SIGNIFICANT CHANGE UP (ref 6–8.3)
RBC # BLD: 3.93 M/UL — SIGNIFICANT CHANGE UP (ref 3.8–5.2)
RBC # FLD: 13.7 % — SIGNIFICANT CHANGE UP (ref 10.3–14.5)
SODIUM SERPL-SCNC: 133 MMOL/L — LOW (ref 135–145)
WBC # BLD: 4.48 K/UL — SIGNIFICANT CHANGE UP (ref 3.8–10.5)
WBC # FLD AUTO: 4.48 K/UL — SIGNIFICANT CHANGE UP (ref 3.8–10.5)

## 2025-06-05 PROCEDURE — 71045 X-RAY EXAM CHEST 1 VIEW: CPT

## 2025-06-05 PROCEDURE — 82962 GLUCOSE BLOOD TEST: CPT

## 2025-06-05 PROCEDURE — 83605 ASSAY OF LACTIC ACID: CPT

## 2025-06-05 PROCEDURE — 93005 ELECTROCARDIOGRAM TRACING: CPT

## 2025-06-05 PROCEDURE — 92610 EVALUATE SWALLOWING FUNCTION: CPT

## 2025-06-05 PROCEDURE — 74176 CT ABD & PELVIS W/O CONTRAST: CPT

## 2025-06-05 PROCEDURE — 96374 THER/PROPH/DIAG INJ IV PUSH: CPT

## 2025-06-05 PROCEDURE — 82140 ASSAY OF AMMONIA: CPT

## 2025-06-05 PROCEDURE — 85027 COMPLETE CBC AUTOMATED: CPT

## 2025-06-05 PROCEDURE — 87637 SARSCOV2&INF A&B&RSV AMP PRB: CPT

## 2025-06-05 PROCEDURE — 80053 COMPREHEN METABOLIC PANEL: CPT

## 2025-06-05 PROCEDURE — 70450 CT HEAD/BRAIN W/O DYE: CPT

## 2025-06-05 PROCEDURE — 99285 EMERGENCY DEPT VISIT HI MDM: CPT | Mod: 25

## 2025-06-05 PROCEDURE — 85379 FIBRIN DEGRADATION QUANT: CPT

## 2025-06-05 PROCEDURE — 85730 THROMBOPLASTIN TIME PARTIAL: CPT

## 2025-06-05 PROCEDURE — 81001 URINALYSIS AUTO W/SCOPE: CPT

## 2025-06-05 PROCEDURE — 36415 COLL VENOUS BLD VENIPUNCTURE: CPT

## 2025-06-05 PROCEDURE — 84100 ASSAY OF PHOSPHORUS: CPT

## 2025-06-05 PROCEDURE — 87040 BLOOD CULTURE FOR BACTERIA: CPT

## 2025-06-05 PROCEDURE — 85610 PROTHROMBIN TIME: CPT

## 2025-06-05 PROCEDURE — 84443 ASSAY THYROID STIM HORMONE: CPT

## 2025-06-05 PROCEDURE — 96375 TX/PRO/DX INJ NEW DRUG ADDON: CPT

## 2025-06-05 PROCEDURE — 83735 ASSAY OF MAGNESIUM: CPT

## 2025-06-05 PROCEDURE — 71250 CT THORAX DX C-: CPT

## 2025-06-05 PROCEDURE — 97162 PT EVAL MOD COMPLEX 30 MIN: CPT

## 2025-06-05 PROCEDURE — 85025 COMPLETE CBC W/AUTO DIFF WBC: CPT

## 2025-06-05 RX ORDER — ENALAPRIL MALEATE 20 MG
10 TABLET ORAL DAILY
Refills: 0 | Status: DISCONTINUED | OUTPATIENT
Start: 2025-06-05 | End: 2025-06-05

## 2025-06-05 RX ORDER — AMLODIPINE BESYLATE 10 MG/1
5 TABLET ORAL EVERY 24 HOURS
Refills: 0 | Status: DISCONTINUED | OUTPATIENT
Start: 2025-06-05 | End: 2025-06-05

## 2025-06-05 RX ADMIN — Medication 500 MILLIGRAM(S): at 11:32

## 2025-06-05 RX ADMIN — Medication 1 TABLET(S): at 11:32

## 2025-06-05 RX ADMIN — Medication 325 MILLIGRAM(S): at 11:32

## 2025-06-05 RX ADMIN — Medication 40 MILLIGRAM(S): at 08:00

## 2025-06-05 RX ADMIN — Medication 40 MILLIEQUIVALENT(S): at 13:34

## 2025-06-05 RX ADMIN — Medication 10 MILLIGRAM(S): at 08:00

## 2025-06-05 RX ADMIN — Medication 40 MILLIEQUIVALENT(S): at 09:34

## 2025-06-05 RX ADMIN — HEPARIN SODIUM 5000 UNIT(S): 1000 INJECTION INTRAVENOUS; SUBCUTANEOUS at 05:26

## 2025-06-05 RX ADMIN — Medication 50 MICROGRAM(S): at 05:26

## 2025-06-05 RX ADMIN — Medication 81 MILLIGRAM(S): at 11:32

## 2025-06-05 RX ADMIN — AMLODIPINE BESYLATE 5 MILLIGRAM(S): 10 TABLET ORAL at 08:00

## 2025-06-05 NOTE — PROGRESS NOTE ADULT - PROBLEM SELECTOR PLAN 4
BUN/Cr elevated to 30/1.36   baseline noted Scr 0.99  IVF
BUN/Cr elevated to 30/1.36   baseline noted Scr 0.99  appears dry on exam  IVF  continue to monitor
BUN/Cr elevated to 30/1.36   baseline noted Scr 0.99  appears dry on exam  IVF  continue to monitor

## 2025-06-05 NOTE — PROGRESS NOTE ADULT - PROBLEM SELECTOR PLAN 9
hx of brain mass per chart review  [facility] no acute changes

## 2025-06-05 NOTE — CHART NOTE - NSCHARTNOTEFT_GEN_A_CORE
Informed by RN patient is hypertensive, /93. Chart reviewed, oral antihypertensives held on admission ISO sepsis. As per chart review, no medications listed on surescript and med rec incomplete, however, patient discharged in 04/2025 on amlodipine 5 mg qd and enalapril 10 mg qd. Will resume BP medications for now. Primary team to confirm BP regimen.

## 2025-06-05 NOTE — PROGRESS NOTE ADULT - PROBLEM SELECTOR PLAN 3
HR >110  EKG sinus tach with LVH and R atrial enlargement (similar to prior)  continue to monitor  TSH unremarkable

## 2025-06-05 NOTE — DISCHARGE NOTE NURSING/CASE MANAGEMENT/SOCIAL WORK - PATIENT PORTAL LINK FT
You can access the FollowMyHealth Patient Portal offered by [facility] by registering at the following website: [email or web address]. By joining [employer]’s FollowMyHealth portal, you will also be able to view your health information using other applications (apps) compatible with our system.

## 2025-06-05 NOTE — PROGRESS NOTE ADULT - TIME BILLING
- Review of records, telemetry, vital signs and daily labs.   - General and cardiovascular physical examination.  - Generation of cardiovascular treatment plan and completion of note .  - Coordination of care-discussed with ACP, and  on disposition plan .      Patient was seen and examined by me on  6/3/25,interim events noted,labs and radiology studies reviewed.  Iker Kirkpatrick MD,FACC.  [address] [place], [place] [id number].  [phone]  Availabe to call or text on [employer] TEAMS.
- Review of records, telemetry, vital signs and daily labs.   - General and cardiovascular physical examination.  - Generation of cardiovascular treatment plan and completion of note .  - Coordination of care-discussed with ACP, and  on disposition plan .      Patient was seen and examined by me on 6/5/25,interim events noted,labs and radiology studies reviewed.  Iker Kirkpatrick MD,FACC.  [address] [place], [place] [id number].  [phone]  Availabe to call or text on [employer] TEAMS.
- Review of records, telemetry, vital signs and daily labs.   - General and cardiovascular physical examination.  - Generation of cardiovascular treatment plan and completion of note .  - Coordination of care-discussed with ACP, and  on disposition plan .      Patient was seen and examined by me on 6/4/25 ,interim events noted,labs and radiology studies reviewed.  Iker Kirkpatrick MD,FACC.  [address] [place], [place] [id number].  [phone]  Availabe to call or text on [employer] TEAMS.

## 2025-06-05 NOTE — PROGRESS NOTE ADULT - PROBLEM SELECTOR PLAN 5
Per CTAP  GI consulted  BS heard  NG if becomes symptomatic  Surgery following  Diet started  Normal BMs and tolerating diet

## 2025-06-05 NOTE — PROGRESS NOTE ADULT - PROBLEM SELECTOR PLAN 2
p/w weakness, tachycardia, and dec PO intake per NH paperwork  VSS- 99% on 2 L NC, temp 36.8 C, , /93  WBC 12  s/p CTX and 1.5 L IVF in ED  CXR noted with large hiatal hernia, otherwise unremarkable on wet read?  sepsis of unknown origin  CTH no acute changes  RVP neg  f/u blood cultures  UA neg
p/w weakness, tachycardia, and dec PO intake per NH paperwork  VSS- 99% on 2 L NC, temp 36.8 C, , /93  WBC 12  s/p CTX and 1.5 L IVF in ED  CXR noted with large hiatal hernia, otherwise unremarkable on wet read?  sepsis of unknown origin  CTH no acute changes  RVP neg  UA neg
p/w weakness, tachycardia, and dec PO intake per NH paperwork  VSS- 99% on 2 L NC, temp 36.8 C, , /93  WBC 12  s/p CTX and 1.5 L IVF in ED  CXR noted with large hiatal hernia, otherwise unremarkable on wet read?  sepsis of unknown origin  CTH no acute changes  RVP neg  f/u blood cultures  UA neg

## 2025-06-05 NOTE — PROGRESS NOTE ADULT - SUBJECTIVE AND OBJECTIVE BOX
INTERVAL HPI/OVERNIGHT EVENTS:  Pt resting comfortably. No acute complaints. Tolerating diet.     MEDICATIONS  (STANDING):  amLODIPine   Tablet 5 milliGRAM(s) Oral every 24 hours  ascorbic acid 500 milliGRAM(s) Oral daily  aspirin  chewable 81 milliGRAM(s) Oral daily  atorvastatin 10 milliGRAM(s) Oral at bedtime  enalapril 10 milliGRAM(s) Oral daily  ferrous    sulfate 325 milliGRAM(s) Oral daily  heparin   Injectable 5000 Unit(s) SubCutaneous every 12 hours  lactated ringers. 1000 milliLiter(s) (65 mL/Hr) IV Continuous <Continuous>  levothyroxine 50 MICROGram(s) Oral daily  multivitamin 1 Tablet(s) Oral daily  pantoprazole    Tablet 40 milliGRAM(s) Oral before breakfast  potassium chloride   Powder 40 milliEquivalent(s) Oral every 4 hours  senna 2 Tablet(s) Oral at bedtime    MEDICATIONS  (PRN):      Vital Signs Last 24 Hrs  T(C): 36.6 (05 Jun 2025 05:13), Max: 36.7 (04 Jun 2025 14:23)  T(F): 97.9 (05 Jun 2025 05:13), Max: 98 (04 Jun 2025 14:23)  HR: 73 (05 Jun 2025 08:40) (71 - 96)  BP: 172/84 (05 Jun 2025 08:40) (113/77 - 182/88)  BP(mean): --  RR: 17 (05 Jun 2025 05:13) (17 - 18)  SpO2: 97% (05 Jun 2025 05:13) (96% - 97%)    Parameters below as of 05 Jun 2025 05:13  Patient On (Oxygen Delivery Method): room air        Physical:  General: A&Ox3. NAD.  Resp: Unlabored breathing. Equal chest rise bilaterally.   Abdomen: Soft nondistended, nontender to palpation diffusely.     I&O's Detail      LABS:                        11.9   4.48  )-----------( 293      ( 05 Jun 2025 06:12 )             35.5             06-05    133[L]  |  98  |  20[H]  ----------------------------<  90  3.3[L]   |  32[H]  |  0.98    Ca    8.6      05 Jun 2025 06:12  Phos  3.5     06-05  Mg     1.6     06-05    TPro  6.3  /  Alb  3.0[L]  /  TBili  0.7  /  DBili  x   /  AST  19  /  ALT  22  /  AlkPhos  60  06-05      84y.o. Female

## 2025-06-05 NOTE — PROGRESS NOTE ADULT - PROBLEM SELECTOR PLAN 7
resumed home meds
hold anti htn home medications as pt is normotensive in ED
hold anti htn home medications as pt is normotensive in ED

## 2025-06-05 NOTE — DISCHARGE NOTE NURSING/CASE MANAGEMENT/SOCIAL WORK - FINANCIAL ASSISTANCE
[facility] provides services at a reduced cost to those who are determined to be eligible through [facility]’s financial assistance program. Information regarding [facility]’s financial assistance program can be found by going to [email or web address] or by calling [phone].

## 2025-06-05 NOTE — PROGRESS NOTE ADULT - SUBJECTIVE AND OBJECTIVE BOX
PGY-1 Progress Note discussed with attending      PLEASE CONTACT ON CALL TEAM:  - On Call Team (Please refer to Alissa) FROM 5:00 PM - 8:30PM  - Nightfloat Team FROM 8:30 -7:30 AM    INTERVAL HPI/OVERNIGHT EVENTS:     No acute overnight events. Pt evaluated at bedside. Pt has no new complaints.      MEDICATIONS  (STANDING):  amLODIPine   Tablet 5 milliGRAM(s) Oral every 24 hours  ascorbic acid 500 milliGRAM(s) Oral daily  aspirin  chewable 81 milliGRAM(s) Oral daily  atorvastatin 10 milliGRAM(s) Oral at bedtime  enalapril 10 milliGRAM(s) Oral daily  ferrous    sulfate 325 milliGRAM(s) Oral daily  heparin   Injectable 5000 Unit(s) SubCutaneous every 12 hours  lactated ringers. 1000 milliLiter(s) (65 mL/Hr) IV Continuous <Continuous>  levothyroxine 50 MICROGram(s) Oral daily  multivitamin 1 Tablet(s) Oral daily  pantoprazole    Tablet 40 milliGRAM(s) Oral before breakfast  senna 2 Tablet(s) Oral at bedtime    MEDICATIONS  (PRN):      Vital Signs Last 24 Hrs  T(C): 36.6 (05 Jun 2025 05:13), Max: 36.7 (04 Jun 2025 14:23)  T(F): 97.9 (05 Jun 2025 05:13), Max: 98 (04 Jun 2025 14:23)  HR: 71 (05 Jun 2025 05:13) (71 - 96)  BP: 182/88 (05 Jun 2025 05:13) (113/77 - 182/88)  BP(mean): --  RR: 17 (05 Jun 2025 05:13) (17 - 18)  SpO2: 97% (05 Jun 2025 05:13) (96% - 97%)    Parameters below as of 05 Jun 2025 05:13  Patient On (Oxygen Delivery Method): room air        PHYSICAL EXAMINATION:  GENERAL: NAD  HEAD:  Atraumatic, Normocephalic  EYES:  conjunctiva and sclera clear  NECK: Supple  CHEST/LUNG: Clear to auscultation  HEART: Regular rate and rhythm; No murmurs, rubs, or gallops  ABDOMEN: Soft, Nontender, Bowel sounds present, no masses on palpation  NERVOUS SYSTEM:  Alert and oriented  EXTREMITIES:  No BLE edema  SKIN: warm, dry                          11.9   4.48  )-----------( 293      ( 05 Jun 2025 06:12 )             35.5     06-05    133[L]  |  98  |  20[H]  ----------------------------<  90  3.3[L]   |  32[H]  |  0.98    Ca    8.6      05 Jun 2025 06:12  Phos  3.5     06-05  Mg     1.6     06-05    TPro  6.3  /  Alb  3.0[L]  /  TBili  0.7  /  DBili  x   /  AST  19  /  ALT  22  /  AlkPhos  60  06-05    LIVER FUNCTIONS - ( 05 Jun 2025 06:12 )  Alb: 3.0 g/dL / Pro: 6.3 g/dL / ALK PHOS: 60 U/L / ALT: 22 U/L DA / AST: 19 U/L / GGT: x                   I&O's Summary        Urinalysis with Rflx Culture (collected 02 Jun 2025 16:08)    Culture - Blood (collected 02 Jun 2025 13:50)  Source: Blood Blood  Preliminary Report (04 Jun 2025 20:01):    No growth at 48 Hours    Culture - Blood (collected 02 Jun 2025 13:45)  Source: Blood Blood  Preliminary Report (04 Jun 2025 20:01):    No growth at 48 Hours     PGY-1 Progress Note discussed with attending      PLEASE CONTACT ON CALL TEAM:  - On Call Team (Please refer to Alissa) FROM 5:00 PM - 8:30PM  - Nightfloat Team FROM 8:30 -7:30 AM    INTERVAL HPI/OVERNIGHT EVENTS:     No acute overnight events. Pt evaluated at bedside. Pt has no new complaints.      MEDICATIONS  (STANDING):  amLODIPine   Tablet 5 milliGRAM(s) Oral every 24 hours  ascorbic acid 500 milliGRAM(s) Oral daily  aspirin  chewable 81 milliGRAM(s) Oral daily  atorvastatin 10 milliGRAM(s) Oral at bedtime  enalapril 10 milliGRAM(s) Oral daily  ferrous    sulfate 325 milliGRAM(s) Oral daily  heparin   Injectable 5000 Unit(s) SubCutaneous every 12 hours  lactated ringers. 1000 milliLiter(s) (65 mL/Hr) IV Continuous <Continuous>  levothyroxine 50 MICROGram(s) Oral daily  multivitamin 1 Tablet(s) Oral daily  pantoprazole    Tablet 40 milliGRAM(s) Oral before breakfast  senna 2 Tablet(s) Oral at bedtime    MEDICATIONS  (PRN):      Vital Signs Last 24 Hrs  T(C): 36.6 (05 Jun 2025 05:13), Max: 36.7 (04 Jun 2025 14:23)  T(F): 97.9 (05 Jun 2025 05:13), Max: 98 (04 Jun 2025 14:23)  HR: 71 (05 Jun 2025 05:13) (71 - 96)  BP: 182/88 (05 Jun 2025 05:13) (113/77 - 182/88)  BP(mean): --  RR: 17 (05 Jun 2025 05:13) (17 - 18)  SpO2: 97% (05 Jun 2025 05:13) (96% - 97%)    Parameters below as of 05 Jun 2025 05:13  Patient On (Oxygen Delivery Method): room air        PHYSICAL EXAMINATION:  GENERAL: NAD  HEAD:  Atraumatic, Normocephalic  EYES:  conjunctiva and sclera clear  NECK: Supple  CHEST/LUNG: Clear to auscultation  HEART: Regular rate and rhythm; No murmurs, rubs, or gallops  ABDOMEN: Soft, Nontender, Bowel sounds present, no masses on palpation  NERVOUS SYSTEM:  Alert and oriented x 3  EXTREMITIES:  No BLE edema  SKIN: warm, dry                          11.9   4.48  )-----------( 293      ( 05 Jun 2025 06:12 )             35.5     06-05    133[L]  |  98  |  20[H]  ----------------------------<  90  3.3[L]   |  32[H]  |  0.98    Ca    8.6      05 Jun 2025 06:12  Phos  3.5     06-05  Mg     1.6     06-05    TPro  6.3  /  Alb  3.0[L]  /  TBili  0.7  /  DBili  x   /  AST  19  /  ALT  22  /  AlkPhos  60  06-05    LIVER FUNCTIONS - ( 05 Jun 2025 06:12 )  Alb: 3.0 g/dL / Pro: 6.3 g/dL / ALK PHOS: 60 U/L / ALT: 22 U/L DA / AST: 19 U/L / GGT: x                   I&O's Summary        Urinalysis with Rflx Culture (collected 02 Jun 2025 16:08)    Culture - Blood (collected 02 Jun 2025 13:50)  Source: Blood Blood  Preliminary Report (04 Jun 2025 20:01):    No growth at 48 Hours    Culture - Blood (collected 02 Jun 2025 13:45)  Source: Blood Blood  Preliminary Report (04 Jun 2025 20:01):    No growth at 48 Hours

## 2025-06-05 NOTE — DISCHARGE NOTE NURSING/CASE MANAGEMENT/SOCIAL WORK - NSDCFUADDAPPT_GEN_ALL_CORE_FT
APPTS ARE READY TO BE MADE: [X] YES    Best Family or Patient Contact (if needed):    Additional Information about above appointments (if needed):    1: PCP  2: GI  3:     Other comments or requests:

## 2025-06-05 NOTE — PROGRESS NOTE ADULT - ASSESSMENT
84 year old female  with PMHx of cervical cancer, brain mass, HTN, HLD, hypothyroid, hx of SBO in April 2025 presents due to AMS per nursing home. CT abdomen reading was concerning for hiatal hernia.    - continue monitor patient for NV, abdominal pain  - if patient become symptomatic, rec NG tube  - recommend reglan  - rest of care per primary  -hand off given to gen surg phone [phone]  
84 year old female  with PMHx of cervical cancer, brain mass, HTN, HLD, hypothyroid, hx of SBO in April 2025 presents due to AMS per nursing home. CT abdomen reading was concerning for hiatal hernia.    Plan  - recommend reglan  - if pt has nausea/vomiting/severe abd pain re consult surg  - rest of care per primary  - surgery signing off!  - hand off given to gen surg phone [phone]
Pt is an 83F from [facility], with PMHx of cervical cancer, brain mass, HTN, HLD, hypothyroid, hx of SBO in April 2025 who presents to the ED for concerns of elevated HR, poor appetite, and weakness per NH paperwork. In ED, pt is confused, asking to leave, stating she does not know where she is and needs to go get food. AAOx1. Baseline unknown.  Admitted to medicine for AMS and Sepsis of Unknown Origin. 
Pt is an 83F from [facility], with PMHx of cervical cancer, brain mass, HTN, HLD, hypothyroid, hx of SBO in April 2025 who presents to the ED for concerns of elevated HR, poor appetite, and weakness per NH paperwork. In ED, pt is confused, asking to leave, stating she does not know where she is and needs to go get food. AAOx1. Baseline unknown.  Admitted to medicine for AMS and Sepsis of Unknown Origin. 
Pt is an 83F from [facility], with PMHx of cervical cancer, brain mass, HTN, HLD, hypothyroid, hx of SBO in April 2025 who presents to the ED for concerns of elevated HR, poor appetite, and weakness per NH paperwork. In ED, pt is confused, asking to leave, stating she does not know where she is and needs to go get food. AAOx1. Baseline unknown.  Admitted to medicine for AMS and Sepsis of Unknown Origin.

## 2025-06-05 NOTE — PROGRESS NOTE ADULT - PROBLEM SELECTOR PLAN 1
p/w AMS, AAOx1-2 in ED, appears confused and unable to state where she is or why she is here  Currently at baseline  ammonia level wnl  VSS- 99% on 2 L NC, temp 36.8 C, , /93  daughter could not be contacted on multiple occasions  CTH no acute changes
p/w AMS, AAOx1-2 in ED, appears confused and unable to state where she is or why she is here  baseline unknown  ammonia level wnl  VSS- 99% on 2 L NC, temp 36.8 C, , /93  daughter could not be contacted on multiple occasions  CTH no acute changes
p/w AMS, AAOx1-2 in ED, appears confused and unable to state where she is or why she is here  baseline unknown  ammonia level wnl  VSS- 99% on 2 L NC, temp 36.8 C, , /93  daughter could not be contacted on multiple occasions  CTH no acute changes

## 2025-06-07 LAB
CULTURE RESULTS: SIGNIFICANT CHANGE UP
CULTURE RESULTS: SIGNIFICANT CHANGE UP
SPECIMEN SOURCE: SIGNIFICANT CHANGE UP
SPECIMEN SOURCE: SIGNIFICANT CHANGE UP

## (undated) DEVICE — FORCEP BIOPSY 2.5MM DISP

## (undated) DEVICE — CATH ELCTR GLIDE PRB 7FR

## (undated) DEVICE — ADAPTER ENDO CHNL SINGLE USE

## (undated) DEVICE — KIT ENDO PROCEDURE CUST W/VLV

## (undated) DEVICE — SENSOR O2 FINGER ADULT

## (undated) DEVICE — STERIS DEFENDO 3-PIECE KIT (AIR/WATER, SUCTION & BIOPSY VALVES)

## (undated) DEVICE — TUBING IV SET GRAVITY 3Y 100" MACRO

## (undated) DEVICE — NDL INJ SCLERO INTERJECT 23G

## (undated) DEVICE — SOL INJ NS 0.9% 500ML 1-PORT

## (undated) DEVICE — CLAMP BX HOT RAD JAW 3

## (undated) DEVICE — SNARE LOOP POLY DISP 30MM LOOP

## (undated) DEVICE — SYR LUER LOK 50CC

## (undated) DEVICE — SNARE CAPTIVATOR RND COLD STIFF 2.4X10MM 240CM

## (undated) DEVICE — Device

## (undated) DEVICE — BITE BLOCK ADULT 20 X 27MM (GREEN)

## (undated) DEVICE — TUBING CANNULA SALTER LABS NASAL ADULT 7FT

## (undated) DEVICE — TUBING ENDO EXT OLYMPUS 160 24HR USE GI

## (undated) DEVICE — TUBING MEDI-VAC W MAXIGRIP CONNECTORS 1/4"X6'

## (undated) DEVICE — DRSG CURITY GAUZE SPONGE 4 X 4" 12-PLY

## (undated) DEVICE — VENODYNE/SCD SLEEVE CALF MEDIUM

## (undated) DEVICE — FORCEP RADIAL JAW 4 W NDL 2.2MM 2.8MM 240CM ORANGE DISP

## (undated) DEVICE — RETRIEVER ROTH NET PLATINUM-UNIVERSAL

## (undated) DEVICE — SOLIDIFIER 1200CC

## (undated) DEVICE — LUBRICATING JELLY ONESHOT 1.25OZ